# Patient Record
Sex: FEMALE | Race: BLACK OR AFRICAN AMERICAN | NOT HISPANIC OR LATINO | Employment: OTHER | URBAN - METROPOLITAN AREA
[De-identification: names, ages, dates, MRNs, and addresses within clinical notes are randomized per-mention and may not be internally consistent; named-entity substitution may affect disease eponyms.]

---

## 2017-03-06 ENCOUNTER — ALLSCRIPTS OFFICE VISIT (OUTPATIENT)
Dept: OTHER | Facility: OTHER | Age: 62
End: 2017-03-06

## 2017-03-06 DIAGNOSIS — E03.9 HYPOTHYROIDISM: ICD-10-CM

## 2017-03-06 DIAGNOSIS — I10 ESSENTIAL (PRIMARY) HYPERTENSION: ICD-10-CM

## 2017-03-06 DIAGNOSIS — K29.70 GASTRITIS WITHOUT BLEEDING: ICD-10-CM

## 2017-03-06 DIAGNOSIS — I48.91 ATRIAL FIBRILLATION (HCC): ICD-10-CM

## 2017-03-06 DIAGNOSIS — Z12.31 ENCOUNTER FOR SCREENING MAMMOGRAM FOR MALIGNANT NEOPLASM OF BREAST: ICD-10-CM

## 2017-03-21 ENCOUNTER — GENERIC CONVERSION - ENCOUNTER (OUTPATIENT)
Dept: OTHER | Facility: OTHER | Age: 62
End: 2017-03-21

## 2017-04-09 ENCOUNTER — APPOINTMENT (EMERGENCY)
Dept: RADIOLOGY | Facility: HOSPITAL | Age: 62
End: 2017-04-09
Payer: COMMERCIAL

## 2017-04-09 ENCOUNTER — HOSPITAL ENCOUNTER (EMERGENCY)
Facility: HOSPITAL | Age: 62
Discharge: HOME | End: 2017-04-09
Attending: EMERGENCY MEDICINE | Admitting: EMERGENCY MEDICINE
Payer: COMMERCIAL

## 2017-04-09 VITALS
OXYGEN SATURATION: 98 % | HEIGHT: 66 IN | DIASTOLIC BLOOD PRESSURE: 76 MMHG | BODY MASS INDEX: 35.68 KG/M2 | RESPIRATION RATE: 19 BRPM | SYSTOLIC BLOOD PRESSURE: 127 MMHG | TEMPERATURE: 97 F | WEIGHT: 222 LBS | HEART RATE: 95 BPM

## 2017-04-09 DIAGNOSIS — J45.909 ASTHMA WITH BRONCHITIS: Primary | ICD-10-CM

## 2017-04-09 LAB
ALBUMIN SERPL BCP-MCNC: 3.7 G/DL (ref 3.5–5)
ALP SERPL-CCNC: 87 U/L (ref 46–116)
ALT SERPL W P-5'-P-CCNC: 24 U/L (ref 12–78)
ANION GAP SERPL CALCULATED.3IONS-SCNC: 10 MMOL/L (ref 4–13)
APTT PPP: 35 SECONDS (ref 24–33)
AST SERPL W P-5'-P-CCNC: 15 U/L (ref 5–45)
BASOPHILS # BLD AUTO: 0 THOUSANDS/ΜL (ref 0–0.1)
BASOPHILS NFR BLD AUTO: 0 % (ref 0–1)
BILIRUB SERPL-MCNC: 0.3 MG/DL (ref 0.2–1)
BUN SERPL-MCNC: 16 MG/DL (ref 5–25)
CALCIUM SERPL-MCNC: 8.9 MG/DL (ref 8.3–10.1)
CHLORIDE SERPL-SCNC: 103 MMOL/L (ref 100–108)
CK SERPL-CCNC: 69 U/L (ref 26–192)
CO2 SERPL-SCNC: 29 MMOL/L (ref 21–32)
CREAT SERPL-MCNC: 1.13 MG/DL (ref 0.6–1.3)
DEPRECATED D DIMER PPP: 220 NG/ML (FEU) (ref 190–520)
EOSINOPHIL # BLD AUTO: 0.5 THOUSAND/ΜL (ref 0–0.61)
EOSINOPHIL NFR BLD AUTO: 6 % (ref 0–6)
ERYTHROCYTE [DISTWIDTH] IN BLOOD BY AUTOMATED COUNT: 15.9 % (ref 11.6–15.1)
GFR SERPL CREATININE-BSD FRML MDRD: 59.1 ML/MIN/1.73SQ M
GLUCOSE SERPL-MCNC: 117 MG/DL (ref 65–140)
HCT VFR BLD AUTO: 39.6 % (ref 37–47)
HGB BLD-MCNC: 12.9 G/DL (ref 12–16)
INR PPP: 1.02 (ref 0.86–1.16)
LACTATE SERPL-SCNC: 1.1 MMOL/L (ref 0.5–2)
LYMPHOCYTES # BLD AUTO: 2.5 THOUSANDS/ΜL (ref 0.6–4.47)
LYMPHOCYTES NFR BLD AUTO: 31 % (ref 14–44)
MCH RBC QN AUTO: 26.6 PG (ref 27–31)
MCHC RBC AUTO-ENTMCNC: 32.5 G/DL (ref 31.4–37.4)
MCV RBC AUTO: 82 FL (ref 82–98)
MONOCYTES # BLD AUTO: 0.6 THOUSAND/ΜL (ref 0.17–1.22)
MONOCYTES NFR BLD AUTO: 7 % (ref 4–12)
NEUTROPHILS # BLD AUTO: 4.7 THOUSANDS/ΜL (ref 1.85–7.62)
NEUTS SEG NFR BLD AUTO: 57 % (ref 43–75)
NRBC BLD AUTO-RTO: 0 /100 WBCS
NT-PROBNP SERPL-MCNC: 402 PG/ML
PLATELET # BLD AUTO: 304 THOUSANDS/UL (ref 130–400)
PMV BLD AUTO: 8 FL (ref 8.9–12.7)
POTASSIUM SERPL-SCNC: 3.8 MMOL/L (ref 3.5–5.3)
PROT SERPL-MCNC: 7.3 G/DL (ref 6.4–8.2)
PROTHROMBIN TIME: 10.7 SECONDS (ref 9.4–11.7)
RBC # BLD AUTO: 4.84 MILLION/UL (ref 4.2–5.4)
SODIUM SERPL-SCNC: 142 MMOL/L (ref 136–145)
TROPONIN I SERPL-MCNC: <0.02 NG/ML
WBC # BLD AUTO: 8.3 THOUSAND/UL (ref 4.8–10.8)

## 2017-04-09 PROCEDURE — 94640 AIRWAY INHALATION TREATMENT: CPT

## 2017-04-09 PROCEDURE — 85610 PROTHROMBIN TIME: CPT | Performed by: EMERGENCY MEDICINE

## 2017-04-09 PROCEDURE — 84484 ASSAY OF TROPONIN QUANT: CPT | Performed by: EMERGENCY MEDICINE

## 2017-04-09 PROCEDURE — 71020 HB CHEST X-RAY 2VW FRONTAL&LATL: CPT

## 2017-04-09 PROCEDURE — 85025 COMPLETE CBC W/AUTO DIFF WBC: CPT | Performed by: EMERGENCY MEDICINE

## 2017-04-09 PROCEDURE — 36415 COLL VENOUS BLD VENIPUNCTURE: CPT | Performed by: EMERGENCY MEDICINE

## 2017-04-09 PROCEDURE — 99284 EMERGENCY DEPT VISIT MOD MDM: CPT

## 2017-04-09 PROCEDURE — 82550 ASSAY OF CK (CPK): CPT | Performed by: EMERGENCY MEDICINE

## 2017-04-09 PROCEDURE — 96365 THER/PROPH/DIAG IV INF INIT: CPT

## 2017-04-09 PROCEDURE — 85379 FIBRIN DEGRADATION QUANT: CPT | Performed by: EMERGENCY MEDICINE

## 2017-04-09 PROCEDURE — 83880 ASSAY OF NATRIURETIC PEPTIDE: CPT | Performed by: EMERGENCY MEDICINE

## 2017-04-09 PROCEDURE — 93005 ELECTROCARDIOGRAM TRACING: CPT | Performed by: EMERGENCY MEDICINE

## 2017-04-09 PROCEDURE — 85730 THROMBOPLASTIN TIME PARTIAL: CPT | Performed by: EMERGENCY MEDICINE

## 2017-04-09 PROCEDURE — 87040 BLOOD CULTURE FOR BACTERIA: CPT | Performed by: EMERGENCY MEDICINE

## 2017-04-09 PROCEDURE — 96375 TX/PRO/DX INJ NEW DRUG ADDON: CPT

## 2017-04-09 PROCEDURE — 83605 ASSAY OF LACTIC ACID: CPT | Performed by: EMERGENCY MEDICINE

## 2017-04-09 PROCEDURE — 80053 COMPREHEN METABOLIC PANEL: CPT | Performed by: EMERGENCY MEDICINE

## 2017-04-09 RX ORDER — LEVOFLOXACIN 500 MG/1
500 TABLET, FILM COATED ORAL DAILY
Qty: 10 TABLET | Refills: 0 | Status: SHIPPED | OUTPATIENT
Start: 2017-04-09 | End: 2017-04-19

## 2017-04-09 RX ORDER — LEVALBUTEROL 1.25 MG/.5ML
1.25 SOLUTION, CONCENTRATE RESPIRATORY (INHALATION) ONCE
Status: COMPLETED | OUTPATIENT
Start: 2017-04-09 | End: 2017-04-09

## 2017-04-09 RX ORDER — TORSEMIDE 10 MG/1
20 TABLET ORAL DAILY
COMMUNITY
End: 2018-04-27 | Stop reason: SDUPTHER

## 2017-04-09 RX ORDER — LEVOFLOXACIN 5 MG/ML
500 INJECTION, SOLUTION INTRAVENOUS ONCE
Status: COMPLETED | OUTPATIENT
Start: 2017-04-09 | End: 2017-04-09

## 2017-04-09 RX ORDER — PREDNISONE 50 MG/1
50 TABLET ORAL DAILY
Qty: 4 TABLET | Refills: 0 | Status: SHIPPED | OUTPATIENT
Start: 2017-04-09 | End: 2017-04-13

## 2017-04-09 RX ORDER — METHYLPREDNISOLONE SODIUM SUCCINATE 125 MG/2ML
125 INJECTION, POWDER, LYOPHILIZED, FOR SOLUTION INTRAMUSCULAR; INTRAVENOUS ONCE
Status: COMPLETED | OUTPATIENT
Start: 2017-04-09 | End: 2017-04-09

## 2017-04-09 RX ORDER — LEVALBUTEROL INHALATION SOLUTION 1.25 MG/3ML
1 SOLUTION RESPIRATORY (INHALATION) EVERY 6 HOURS PRN
Qty: 72 ML | Refills: 0 | Status: SHIPPED | OUTPATIENT
Start: 2017-04-09 | End: 2017-05-09

## 2017-04-09 RX ORDER — ALPRAZOLAM 0.25 MG/1
0.25 TABLET ORAL
COMMUNITY
End: 2018-04-27 | Stop reason: CLARIF

## 2017-04-09 RX ORDER — PROMETHAZINE HYDROCHLORIDE AND CODEINE PHOSPHATE 6.25; 1 MG/5ML; MG/5ML
5 SYRUP ORAL EVERY 6 HOURS PRN
Qty: 80 ML | Refills: 0 | Status: SHIPPED | OUTPATIENT
Start: 2017-04-09 | End: 2017-04-19

## 2017-04-09 RX ADMIN — LEVALBUTEROL 1.25 MG: 1.25 SOLUTION, CONCENTRATE RESPIRATORY (INHALATION) at 10:27

## 2017-04-09 RX ADMIN — IPRATROPIUM BROMIDE 0.5 MG: 0.5 SOLUTION RESPIRATORY (INHALATION) at 08:48

## 2017-04-09 RX ADMIN — IPRATROPIUM BROMIDE 0.5 MG: 0.5 SOLUTION RESPIRATORY (INHALATION) at 09:49

## 2017-04-09 RX ADMIN — LEVALBUTEROL 1.25 MG: 1.25 SOLUTION, CONCENTRATE RESPIRATORY (INHALATION) at 09:49

## 2017-04-09 RX ADMIN — LEVOFLOXACIN 500 MG: 5 INJECTION, SOLUTION INTRAVENOUS at 10:33

## 2017-04-09 RX ADMIN — METHYLPREDNISOLONE SODIUM SUCCINATE 125 MG: 125 INJECTION, POWDER, FOR SOLUTION INTRAMUSCULAR; INTRAVENOUS at 08:47

## 2017-04-09 RX ADMIN — IPRATROPIUM BROMIDE 0.5 MG: 0.5 SOLUTION RESPIRATORY (INHALATION) at 10:27

## 2017-04-09 RX ADMIN — LEVALBUTEROL 1.25 MG: 1.25 SOLUTION, CONCENTRATE RESPIRATORY (INHALATION) at 08:47

## 2017-04-10 ENCOUNTER — GENERIC CONVERSION - ENCOUNTER (OUTPATIENT)
Dept: OTHER | Facility: OTHER | Age: 62
End: 2017-04-10

## 2017-04-14 LAB
ATRIAL RATE: 94 BPM
BACTERIA BLD CULT: NORMAL
BACTERIA BLD CULT: NORMAL
P AXIS: 38 DEGREES
PR INTERVAL: 166 MS
QRS AXIS: 4 DEGREES
QRSD INTERVAL: 118 MS
QT INTERVAL: 420 MS
QTC INTERVAL: 525 MS
T WAVE AXIS: 93 DEGREES
VENTRICULAR RATE: 94 BPM

## 2017-04-24 ENCOUNTER — ALLSCRIPTS OFFICE VISIT (OUTPATIENT)
Dept: OTHER | Facility: OTHER | Age: 62
End: 2017-04-24

## 2017-04-27 ENCOUNTER — GENERIC CONVERSION - ENCOUNTER (OUTPATIENT)
Dept: OTHER | Facility: OTHER | Age: 62
End: 2017-04-27

## 2017-04-28 ENCOUNTER — ALLSCRIPTS OFFICE VISIT (OUTPATIENT)
Dept: OTHER | Facility: OTHER | Age: 62
End: 2017-04-28

## 2017-04-29 ENCOUNTER — GENERIC CONVERSION - ENCOUNTER (OUTPATIENT)
Dept: OTHER | Facility: OTHER | Age: 62
End: 2017-04-29

## 2017-05-15 ENCOUNTER — ALLSCRIPTS OFFICE VISIT (OUTPATIENT)
Dept: OTHER | Facility: OTHER | Age: 62
End: 2017-05-15

## 2017-07-13 ENCOUNTER — ALLSCRIPTS OFFICE VISIT (OUTPATIENT)
Dept: OTHER | Facility: OTHER | Age: 62
End: 2017-07-13

## 2017-07-25 ENCOUNTER — APPOINTMENT (EMERGENCY)
Dept: RADIOLOGY | Facility: HOSPITAL | Age: 62
End: 2017-07-25
Payer: COMMERCIAL

## 2017-07-25 ENCOUNTER — HOSPITAL ENCOUNTER (EMERGENCY)
Facility: HOSPITAL | Age: 62
Discharge: HOME | End: 2017-07-25
Attending: EMERGENCY MEDICINE | Admitting: EMERGENCY MEDICINE
Payer: COMMERCIAL

## 2017-07-25 VITALS
SYSTOLIC BLOOD PRESSURE: 145 MMHG | RESPIRATION RATE: 18 BRPM | TEMPERATURE: 98.3 F | DIASTOLIC BLOOD PRESSURE: 78 MMHG | WEIGHT: 252 LBS | HEART RATE: 88 BPM | OXYGEN SATURATION: 100 % | BODY MASS INDEX: 40.67 KG/M2

## 2017-07-25 DIAGNOSIS — R06.00 DYSPNEA: Primary | ICD-10-CM

## 2017-07-25 DIAGNOSIS — J44.1 COPD EXACERBATION (HCC): ICD-10-CM

## 2017-07-25 LAB
ALBUMIN SERPL BCP-MCNC: 3.6 G/DL (ref 3.5–5)
ALP SERPL-CCNC: 75 U/L (ref 46–116)
ALT SERPL W P-5'-P-CCNC: 20 U/L (ref 12–78)
ANION GAP SERPL CALCULATED.3IONS-SCNC: 10 MMOL/L (ref 4–13)
APTT PPP: 32 SECONDS (ref 24–33)
AST SERPL W P-5'-P-CCNC: 15 U/L (ref 5–45)
BASOPHILS # BLD AUTO: 0 THOUSANDS/ΜL (ref 0–0.1)
BASOPHILS NFR BLD AUTO: 0 % (ref 0–1)
BILIRUB SERPL-MCNC: 0.4 MG/DL (ref 0.2–1)
BUN SERPL-MCNC: 10 MG/DL (ref 5–25)
CALCIUM SERPL-MCNC: 9 MG/DL (ref 8.3–10.1)
CHLORIDE SERPL-SCNC: 106 MMOL/L (ref 100–108)
CO2 SERPL-SCNC: 27 MMOL/L (ref 21–32)
CREAT SERPL-MCNC: 0.97 MG/DL (ref 0.6–1.3)
EOSINOPHIL # BLD AUTO: 0.1 THOUSAND/ΜL (ref 0–0.61)
EOSINOPHIL NFR BLD AUTO: 2 % (ref 0–6)
ERYTHROCYTE [DISTWIDTH] IN BLOOD BY AUTOMATED COUNT: 15.5 % (ref 11.6–15.1)
GFR SERPL CREATININE-BSD FRML MDRD: 72 ML/MIN/1.73SQ M
GLUCOSE SERPL-MCNC: 105 MG/DL (ref 65–140)
HCT VFR BLD AUTO: 38.1 % (ref 37–47)
HGB BLD-MCNC: 12.4 G/DL (ref 12–16)
INR PPP: 1.02 (ref 0.86–1.16)
LYMPHOCYTES # BLD AUTO: 2 THOUSANDS/ΜL (ref 0.6–4.47)
LYMPHOCYTES NFR BLD AUTO: 21 % (ref 14–44)
MCH RBC QN AUTO: 27 PG (ref 27–31)
MCHC RBC AUTO-ENTMCNC: 32.6 G/DL (ref 31.4–37.4)
MCV RBC AUTO: 83 FL (ref 82–98)
MONOCYTES # BLD AUTO: 0.5 THOUSAND/ΜL (ref 0.17–1.22)
MONOCYTES NFR BLD AUTO: 6 % (ref 4–12)
NEUTROPHILS # BLD AUTO: 6.6 THOUSANDS/ΜL (ref 1.85–7.62)
NEUTS SEG NFR BLD AUTO: 71 % (ref 43–75)
NRBC BLD AUTO-RTO: 0 /100 WBCS
NT-PROBNP SERPL-MCNC: 711 PG/ML
PLATELET # BLD AUTO: 286 THOUSANDS/UL (ref 130–400)
PMV BLD AUTO: 8.7 FL (ref 8.9–12.7)
POTASSIUM SERPL-SCNC: 4.1 MMOL/L (ref 3.5–5.3)
PROT SERPL-MCNC: 7.1 G/DL (ref 6.4–8.2)
PROTHROMBIN TIME: 10.7 SECONDS (ref 9.4–11.7)
RBC # BLD AUTO: 4.59 MILLION/UL (ref 4.2–5.4)
SODIUM SERPL-SCNC: 143 MMOL/L (ref 136–145)
TROPONIN I SERPL-MCNC: <0.02 NG/ML
WBC # BLD AUTO: 9.3 THOUSAND/UL (ref 4.8–10.8)

## 2017-07-25 PROCEDURE — 85025 COMPLETE CBC W/AUTO DIFF WBC: CPT | Performed by: EMERGENCY MEDICINE

## 2017-07-25 PROCEDURE — 71010 HB CHEST X-RAY 1 VIEW FRONTAL (PORTABLE): CPT

## 2017-07-25 PROCEDURE — 94640 AIRWAY INHALATION TREATMENT: CPT

## 2017-07-25 PROCEDURE — 85730 THROMBOPLASTIN TIME PARTIAL: CPT | Performed by: EMERGENCY MEDICINE

## 2017-07-25 PROCEDURE — 80053 COMPREHEN METABOLIC PANEL: CPT | Performed by: EMERGENCY MEDICINE

## 2017-07-25 PROCEDURE — 84484 ASSAY OF TROPONIN QUANT: CPT | Performed by: EMERGENCY MEDICINE

## 2017-07-25 PROCEDURE — 36415 COLL VENOUS BLD VENIPUNCTURE: CPT | Performed by: EMERGENCY MEDICINE

## 2017-07-25 PROCEDURE — 85610 PROTHROMBIN TIME: CPT | Performed by: EMERGENCY MEDICINE

## 2017-07-25 PROCEDURE — 83880 ASSAY OF NATRIURETIC PEPTIDE: CPT | Performed by: EMERGENCY MEDICINE

## 2017-07-25 PROCEDURE — 96374 THER/PROPH/DIAG INJ IV PUSH: CPT

## 2017-07-25 PROCEDURE — 93005 ELECTROCARDIOGRAM TRACING: CPT | Performed by: EMERGENCY MEDICINE

## 2017-07-25 PROCEDURE — 99285 EMERGENCY DEPT VISIT HI MDM: CPT

## 2017-07-25 RX ORDER — LEVALBUTEROL 1.25 MG/.5ML
1.25 SOLUTION, CONCENTRATE RESPIRATORY (INHALATION) ONCE
Status: COMPLETED | OUTPATIENT
Start: 2017-07-25 | End: 2017-07-25

## 2017-07-25 RX ORDER — SIMVASTATIN 40 MG
40 TABLET ORAL
COMMUNITY
End: 2018-04-24 | Stop reason: SDUPTHER

## 2017-07-25 RX ORDER — PREDNISONE 20 MG/1
20 TABLET ORAL 2 TIMES DAILY
Qty: 20 TABLET | Refills: 0 | Status: SHIPPED | OUTPATIENT
Start: 2017-07-25 | End: 2017-08-04

## 2017-07-25 RX ORDER — NITROGLYCERIN 0.4 MG/1
0.4 TABLET SUBLINGUAL ONCE
Status: COMPLETED | OUTPATIENT
Start: 2017-07-25 | End: 2017-07-25

## 2017-07-25 RX ORDER — METHYLPREDNISOLONE SODIUM SUCCINATE 125 MG/2ML
125 INJECTION, POWDER, LYOPHILIZED, FOR SOLUTION INTRAMUSCULAR; INTRAVENOUS ONCE
Status: COMPLETED | OUTPATIENT
Start: 2017-07-25 | End: 2017-07-25

## 2017-07-25 RX ORDER — ASPIRIN 81 MG/1
81 TABLET, CHEWABLE ORAL ONCE
Status: COMPLETED | OUTPATIENT
Start: 2017-07-25 | End: 2017-07-25

## 2017-07-25 RX ORDER — IPRATROPIUM BROMIDE AND ALBUTEROL SULFATE 2.5; .5 MG/3ML; MG/3ML
3 SOLUTION RESPIRATORY (INHALATION) ONCE
Status: DISCONTINUED | OUTPATIENT
Start: 2017-07-25 | End: 2017-07-25

## 2017-07-25 RX ADMIN — METHYLPREDNISOLONE SODIUM SUCCINATE 125 MG: 125 INJECTION, POWDER, FOR SOLUTION INTRAMUSCULAR; INTRAVENOUS at 12:58

## 2017-07-25 RX ADMIN — LEVALBUTEROL 1.25 MG: 1.25 SOLUTION, CONCENTRATE RESPIRATORY (INHALATION) at 11:36

## 2017-07-25 RX ADMIN — IPRATROPIUM BROMIDE 0.5 MG: 0.5 SOLUTION RESPIRATORY (INHALATION) at 11:36

## 2017-07-25 RX ADMIN — NITROGLYCERIN 0.4 MG: 0.4 TABLET SUBLINGUAL at 09:03

## 2017-07-25 RX ADMIN — LEVALBUTEROL 1.25 MG: 1.25 SOLUTION, CONCENTRATE RESPIRATORY (INHALATION) at 12:58

## 2017-07-25 RX ADMIN — ASPIRIN 81 MG 81 MG: 81 TABLET ORAL at 08:58

## 2017-07-27 ENCOUNTER — GENERIC CONVERSION - ENCOUNTER (OUTPATIENT)
Dept: OTHER | Facility: OTHER | Age: 62
End: 2017-07-27

## 2017-07-27 LAB
ATRIAL RATE: 96 BPM
P AXIS: 48 DEGREES
PR INTERVAL: 172 MS
QRS AXIS: 2 DEGREES
QRSD INTERVAL: 120 MS
QT INTERVAL: 394 MS
QTC INTERVAL: 497 MS
T WAVE AXIS: 110 DEGREES
VENTRICULAR RATE: 96 BPM

## 2017-08-17 ENCOUNTER — ALLSCRIPTS OFFICE VISIT (OUTPATIENT)
Dept: OTHER | Facility: OTHER | Age: 62
End: 2017-08-17

## 2017-10-23 NOTE — CONSULTS
Assessment  1  Mild intermittent asthma without complication (630 01) (X21 11)   2  Exposure to second hand smoke (V15 89) (Z77 22)   3  Cardiomyopathy (425 4) (I42 9)   4  Chronic combined systolic and diastolic heart failure, NYHA class 2 (428 42) (I50 42)    Plan  Mild intermittent asthma without complication    · Symbicort 80-4 5 MCG/ACT Inhalation Aerosol; INHALE 2 PUFFS TWICE DAILY  RINSE MOUTH AFTER USE   Rx By: Elliot Linder; Dispense: 0 Days ; #:1 GM; Refill: 5;For: Mild intermittent asthma without complication; [place] = N; Verified Transmission to [address]; Last Updated By: [employer]; 8/17/2017 8:57:27 AM   · Follow-up visit in 6 months Evaluation and Treatment  Follow-up  Status: Hold For -  Scheduling  Requested for: 05Aes9033   Ordered; For: Mild intermittent asthma without complication; Ordered By: Elliot Linder Performed:  Due: 05Mav5899    Results/Data  Afton Sleepiness Score 13OFL9338 09:09AM User, [employer]     Test Name Result Flag Reference   Afton Score 4 - Normal     Answer Summary: Q1-1, Q2-1, Q3-1, Q4-0, Q5-1, Q6-0, Q7-0, Q8-0     STOP BANG Questionnaire 09YXF5247 09:06AM User, [employer]     Test Name Result Flag Reference   STOP BANG Questionnaire Risk of MALINDA Intermediate Risk     Snoring: No  Tired: No  Observed: No  Blood Pressure: Yes  BMI: Yes  Age: Yes  Neck Circumference: No  Gender: No   STOP BANG Questionnaire MALINDA Total Score 3     Snoring: No  Tired: No  Observed: No  Blood Pressure: Yes  BMI: Yes  Age: Yes  Neck Circumference: No  Gender: No     Results Free Text Form [facility]:   Results   Chest X-ray all images are reviewed by me  Most recent chest x-ray performed in July shows no evidence of infiltrate or hyperinflation  PFT Results v2:     Spirometry: Forced vital capacity: 2 11L74% Predicted Values  Forced expiratory volume in one second: 1 66L75% Predicted Value  FEV1/FVC ratio is 79% Predicted Values     Post Bronchodilator Spirometry:   Lung Volumes: DLCO:    PFT Interpretation:   8/17/17?no obstructive defect  Discussion/Summary  Discussion Summary:   1  Mild intermittent asthma and recent exacerbation, currently improved  -At this time will decrease Symbicort dosing to 80?4 0 5 ?g -She does not need to use the rescue inhaler on a daily basis unless needed  -Refrain from exacerbating environment such as secondhand smoke  -There is no evidence of COPD and this is discussed in detail with the patient and chest imaging is also reviewed  2  Patient appears to be mildly decompensated in terms of her heart failure and this is secondary to noncompliance with dietary restrictions  I have explained to her that she will need to limit her fluid and salt intake  Should her lower extremity swelling get worse within the next day or 2 despite dietary restrictions she should call her cardiologist to discuss medication options  She verbalizes understanding  3  Follow-up in 6 months or sooner if needed  All questions are answered to the patients satisfaction and understanding and verbalize understanding  encouraged to call with any questions or concerns  Goals and Barriers: The patient has the current Goals: Improved breathing wean off medications  The patent has the current Barriers: Living environment  Patient's Capacity to Self-Care: Patient is able to Self-Care  Medication SE Review and Pt Understands Tx: Possible side effects of new medications were reviewed with the patient/guardian today  The treatment plan was reviewed with the patient/guardian   The patient/guardian understands and agrees with the treatment plan      Active Problems   · Acid reflux (530 81) (K21 9)   · Acute bronchitis (466 0) (J20 9)   · Allergic reaction (995 3) (T78 40XA)   · Atrial fibrillation (427 31) (I48 91)   · BMI 40 0-44 9, adult (V85 41) (Z68 41)   · Cardiomyopathy (425 4) (I42 9)   · Chronic combined systolic and diastolic heart failure, NYHA class 2 (428 42) (I50 42)   · Colonoscopy (Fiberoptic) Screening   · Depression with anxiety (300 4) (F41 8)   · Dyslipidemia (272 4) (E78 5)   · Encounter for screening for cardiovascular disorders (V81 2) (Z13 6)   · Gastritis (535 50) (K29 70)   · Generalized pain (780 96) (R52)   · Hypertension (401 9) (I10)   · Hypomagnesemia (275 2) (E83 42)   · Hypothyroidism (244 9) (E03 9)   · Leg cramping (729 82) (R25 2)   · Myalgia and myositis (729 1) (M79 1,M60 9)   · Need for influenza vaccination (V04 81) (Z23)   · Need for Tdap vaccination (V06 1) (Z23)   · Tracheobronchitis (490) (J40)   · Visit for screening mammogram (V76 12) (Z12 31)    Chief Complaint  Chief Complaint Free Text Note Form: pt is today for copd eval       History of Present Illness  HPI: Patient is a 59 yo female with past medical history of asthma, CHF s/p AICD/PM, hyperlipidemia, hypothyroidism,anxiety, GERD who presents for initial evaluation to assess for COPD  Patient states that her asthma started about 6-7 years ago  At that time [place] it started  She was back and forth between the ER  and she was given a home nebulizer, rescue inhalers  She has moved back for about 4 years  Initially she lived in an apartment in [place] and had a heavy smokers that lived next her- was in and out of the ER with all this environment  She has now moved and still may have slight smoke exposure but not as bad  She moved to a different apartment about 1 year ago  and over the past year she has been to the ER 3 times  She does have wheezing during these episodes  +cough  currently cough is improving  recently on antibiotics  currently off steroids  currently not requiring extra use of nebulizer or inhalers  Also component of CHF each time she presents to the emergency room  she states that most recently she has been noticing increasing lower extremity edema  She has not been compliant with her fluid and salt intake   She drinks at least 2-1/2 L a day  She does not use the nebulizer often  She uses the rescue inhaler once daily as directed and as needed  No nighttime awakening  ?snoring  no choking and gasping  no daytime sleepiness  Retired- worked in an office  family history of sister- TB patient does have seasonal allergies for which she uses Flonase  She states that this is under control  she is on Symbicort 160- has been placed on it over a year now  She has not been using it everyday until 3 weeks ago  Review of Systems  Complete-Female - Pulm:   Constitutional: No fever, no chills, feels well, no tiredness, no recent weight gain or weight loss  Eyes: no eyesight problems  ENT: no nosebleedsno nasal discharge  Cardiovascular: lower extremity edema, but--b0no chest painno palpitations  Respiratory: as noted in HPI,--b0no orthopneano PND  Gastrointestinal: no abdominal pain,--b0no nausea,--b0no constipationno diarrhea  Genitourinary: no dysuria  Musculoskeletal: no arthralgiasno myalgias  Integumentary: no rashesno skin lesions  Neurological: no headacheno confusion  Psychiatric: anxiety, but--b0not suicidalno depression  Endocrine: no muscle weakness  Hematologic/Lymphatic: no tendency for easy bleeding  Past Medical History  1  History of Abdominal pain, RLQ (789 03) (R10 31)   2  History of Abnormal blood sugar (790 29) (R73 09)   3  History of Abnormal heart rate (785 3) (R00 9)   4  History of Acute congestive heart failure, unspecified congestive heart failure type (428 0)   (I50 9)   5  History of Anxiety (300 00) (F41 9)   6  History of Asthma exacerbation (493 92) (J45 901)   7  History of Benign essential hypertension (401 1) (I10)   8  History of Cardiomyopathy (425 4) (I42 9)   9  History of Cellulitis Of The Knee (682 6)   10  History of COPD exacerbation (491 21) (J44 1)   11  History of Elbow pain, unspecified laterality (719 42) (M25 529)   12  History of Follow up (V67 9) (Z09)   13  History of Fracture Of Olecranon Process (813 01)   14  History of allergic rhinitis (V12 69) (Z87 09)   15  History of arthritis (V13 4) (Z87 39)   16  History of asthma (V12 69) (Z87 09)   17  History of chest pain (V13 89) (Z87 898)   18  History of constipation (V12 79) (Z87 19)   19  History of hematuria (V13 09) (Z87 448)   20  History of hypokalemia (V12 29) (Z86 39)   21  History of shortness of breath (V13 89) (Z87 898)   22  History of shortness of breath (V13 89) (Z87 898)   23  History of Late Effects Of Fracture Of Upper Extremities (905 2)   24  History of Morbid or severe obesity due to excess calories (278 01) (E66 01)   25  History of Nausea in adult (787 02) (R11 0)   26  History of Need for vaccination with 13-polyvalent pneumococcal conjugate vaccine    (V03 82) (Z23)   27  History of Prepatellar bursitis, unspecified laterality (726 65) (M70 40)   28  History of Screening for diabetes mellitus (V77 1) (Z13 1)   29  History of Screening for lipid disorders (V77 91) (Z13 220)   30  History of Spasm of muscle (728 85) (M62 838)   31  History of Thyroid trouble (246 9) (E07 9)  Active Problems And Past Medical History Reviewed: The active problems and past medical history were reviewed and updated today  Surgical History  1  History of Cholecystectomy Laparoscopic   2  History of Hysterectomy   3  History of Pacemaker Permanent Placement  Surgical History Reviewed: The surgical history was reviewed and updated today  Family History  Mother    1  Family history of diabetes mellitus (V18 0) (Z83 3)  Family History    2  Family history of Coronary Artery Disease (V17 49)  Family History Reviewed: The family history was reviewed and updated today  Social History   · Denied: History of Alcohol Use (History)   · Drinks wine (V49 89) (Z78 9)   · Former smoker (T46 55) (X29 098)   · Single  Social History Reviewed: The social history was reviewed and updated today   The social history was reviewed and is unchanged  Current Meds   1  Carvedilol 12 5 MG Oral Tablet; Take 1 tablet twice daily  Requested for: 98QHT0866; Last   Rx:05Jun2017 Ordered   2  Corlanor 5 MG Oral Tablet; TAKE 1/2  TABLETS 2 timesDAILY  Requested for:   98Sbp0514; Last Rx:49Cpz9355 Ordered   3  Diclofenac Sodium 1 % Transdermal Gel; apply 2 grams to affected area four times a   day DO NOT APPLY MORE THAN 8 GRAMS TO ANY AFFECTED JOINT; Therapy: 12YAU7995 to (JJQEXKHM:66DHW7864)  Requested for: 27Apr2017; Last   Rx:27Apr2017 Ordered   4  EpiPen 2-Paul 0 3 MG/0 3ML Injection Solution Auto-injector; INJECT 0 3ML   INTRAMUSCULARLY AS DIRECTED; Therapy: 02QAK2436 to (Last Rx:00Bxk0213)  Requested for: 58YBY5188 Ordered   5  Escitalopram Oxalate 10 MG Oral Tablet; TAKE 1 TABLET DAILY; Therapy: 00Dpa8035 to (Evaluate:73Gyr1366)  Requested for: 70Ljp0544; Last   Rx:55Uhc7228 Ordered   6  Fluticasone Propionate 50 MCG/ACT Nasal Suspension; use 2 sprays in each nostril   once daily; Therapy: 99PXA2538 to (Jalen Chris Rack)  Requested for: 27Apr2017 Ordered   7  Levalbuterol HCl - 1 25 MG/3ML Inhalation Nebulization Solution; USE 1 UNIT DOSE IN   NEBULIZER EVERY 6 HOURS AS NEEDED; Therapy: 60Dlj0987 to (Evaluate:25Jun2018)  Requested for: 97HSP6366; Last   Rx:30Jun2017 Ordered   8  Levothyroxine Sodium 100 MCG Oral Tablet; take 1 tablet by mouth once daily; Therapy: (Ilia Aw) to Recorded   9  Levothyroxine Sodium 150 MCG Oral Tablet; TAKE 1 TABLET DAILY; Therapy: 20OLV2533 to (Evaluate:22Apr2018)  Requested for: 27Apr2017; Last   Rx:27Apr2017 Ordered   10  Pantoprazole Sodium 40 MG Oral Tablet Delayed Release; take 1 tablet by mouth once    daily; Therapy: 93OBU2938 to (Ed Bautista)  Requested for: 80OCK3685; Last    Rx:91Dvm2839 Ordered   11  Potassium Chloride ER 10 MEQ Oral Tablet Extended Release; TAKE 1 TABLET ONCE    DAILY  Requested for: 31PRC1566; Last Rx:14Bnt1976 Ordered   12   Pravastatin Sodium 40 MG Oral Tablet; TAKE 1 TABLET DAILY; Therapy: 28Apr2017 to (Evaluate:11Oct2017)  Requested for: 73HWM6312; Last    Rx:21Qhk5872 Ordered   13  Symbicort 160-4 5 MCG/ACT Inhalation Aerosol; INHALE 1 PUFFS Twice daily; Therapy: 23Jvx9293 to (Supa Chandra)  Requested for: 28JXT0776; Last    Rx:06Mar2017 Ordered   14  Torsemide 10 MG Oral Tablet; take 1 tablet by mouth once daily; Therapy: 87PTJ2841 to (Evaluate:14Jan2018)  Requested for: 84XWJ6714; Last    Rx:17Kmi7591 Ordered   15  Valsartan 40 MG Oral Tablet; TAKE 0 5 tablet daily; Therapy: 90KSP8064 to (Marilin Bell)  Requested for: 34RAI3657; Last    Rx:96Vnh0121 Ordered   16  Xarelto 20 MG Oral Tablet; Take 1 tablet daily  Requested for: 27Jun2017; Last    DM:05GHG5501 Ordered   17  Xopenex HFA 45 MCG/ACT Inhalation Aerosol; INHALE 1 TO 2 PUFFS EVERY 4 TO 6    HOURS AS NEEDED AND AS DIRECTED; Therapy: 30Dgp6405 to (Marilin Bell)  Requested for: 43HEP8442; Last    Rx:26Fwe3655 Ordered  Medication List Reviewed: The medication list was reviewed and updated today  Allergies  1  Penicillins  2  IV Dye   3  Shellfish    Vitals  Vital Signs    Recorded: 17Aug2017 08:21AM   Temperature 98 7 F, Oral   Heart Rate 62   Pulse Quality Normal   Respiration Quality Normal   Respiration 12   Systolic 703, RUE, Sitting   Diastolic 84, RUE, Sitting   Height 5 ft 6 5 in   Weight 278 lb    BMI Calculated 44 2   BSA Calculated 2 31   O2 Saturation 100, RA     Physical Exam    Constitutional   General appearance: No acute distress, well appearing and well nourished  Eyes   Examination of pupil and irises: Anicteric, pupils reactive  Ears, Nose, Mouth, and Throat   External inspection of ears and nose: Normal     Oropharynx: Normal with no erythema, edema, exudate or lesions  --b0no tonsillar hypertrophy  Mallampati Classification: 3  Neck   Neck: Supple, symmetric, trachea midline, no masses      Pulmonary   Chest: Normal  Respiratory effort: No increased work of breathing or signs of respiratory distress  Auscultation of lungs: Clear to auscultation, no rales, no crackles, no wheezing  Cardiovascular   Auscultation of heart: Normal rate and rhythm, normal S1 and S2, no murmurs  Examination of extremities for edema and/or varicosities: Abnormal  --[id number] 1+ pitting edema  Abdomen   Abdomen: Soft, non-tender  Lymphatic   Palpation of lymph nodes in neck: No lymphadenopathy  Musculoskeletal   Gait and station: Normal     Digits and nails: Normal without clubbing or cyanosis  Neurologic   Mental Status: Normal  Not confused, no evidence of dementia, good comprehension, good concentration  Skin   Skin and subcutaneous tissue: Limited exam shows no rash  Psychiatric   Orientation to person, place and time: Normal     Mood and affect: Normal        Future Appointments    Date/Time Provider Specialty Site   02/19/2018 02:00 PM DORIE Alonso   Pulmonary Medicine [facility] [address]     Signatures   Electronically signed by : DORIE Freeman ; Aug 17 2017  2:01PM EST                       (Author)

## 2017-11-13 ENCOUNTER — ALLSCRIPTS OFFICE VISIT (OUTPATIENT)
Dept: OTHER | Facility: OTHER | Age: 62
End: 2017-11-13

## 2018-01-12 NOTE — MISCELLANEOUS
Provider Comments  Provider Comments:   Left VM for PT to CB & reschedule appt        Signatures   Electronically signed by : Nanette Conn, ; Aug  4 2016 10:10AM EST                       (Author)    Electronically signed by : James Mott DO; Aug  4 2016 10:18AM EST                       (Author)

## 2018-01-13 VITALS
BODY MASS INDEX: 43.07 KG/M2 | HEIGHT: 66 IN | SYSTOLIC BLOOD PRESSURE: 114 MMHG | DIASTOLIC BLOOD PRESSURE: 70 MMHG | WEIGHT: 268 LBS | HEART RATE: 72 BPM

## 2018-01-13 VITALS
SYSTOLIC BLOOD PRESSURE: 118 MMHG | RESPIRATION RATE: 16 BRPM | HEART RATE: 76 BPM | TEMPERATURE: 97.7 F | HEIGHT: 66 IN | BODY MASS INDEX: 43.23 KG/M2 | OXYGEN SATURATION: 98 % | DIASTOLIC BLOOD PRESSURE: 82 MMHG | WEIGHT: 269 LBS

## 2018-01-13 VITALS
WEIGHT: 268 LBS | OXYGEN SATURATION: 99 % | BODY MASS INDEX: 43.07 KG/M2 | SYSTOLIC BLOOD PRESSURE: 126 MMHG | HEIGHT: 66 IN | HEART RATE: 70 BPM | DIASTOLIC BLOOD PRESSURE: 78 MMHG | RESPIRATION RATE: 18 BRPM | TEMPERATURE: 97.8 F

## 2018-01-13 NOTE — PROGRESS NOTES
History of Present Illness  Care Coordination Encounter Information:   Type of Encounter: Telephonic   Last Office Visit: 3/6/17   Spoke to Patient  Care Coordination SL Nurse Lamine Allan:   The reason for call is to discuss outreach for follow up/needed services  I spoke with Miladis Jara as per the [facility]  She is agreeable to having a medication reconciliation with the [facility] pharmacist  She has recently joined [facility] and saw Dr Jesus Frye  Active Problems    1  Acid reflux (530 81) (K21 9)   2  Asthma (493 90) (J45 909)   3  Atrial fibrillation (427 31) (I48 91)   4  BMI 40 0-44 9, adult (V85 41) (Z68 41)   5  Cardiomyopathy (425 4) (I42 9)   6  Colonoscopy (Fiberoptic) Screening   7  Gastritis (535 50) (K29 70)   8  Generalized pain (780 96) (R52)   9  Hypertension (401 9) (I10)   10  Hypomagnesemia (275 2) (E83 42)   11  Hypothyroidism (244 9) (E03 9)   12  Leg cramping (729 82) (R25 2)   13  Myalgia and myositis (729 1) (M79 1,M60 9)   14  Need for influenza vaccination (V04 81) (Z23)   15  Need for Tdap vaccination (V06 1) (Z23)   16  Visit for screening mammogram (V76 12) (Z12 31)    Past Medical History    1  History of Abdominal pain, RLQ (789 03) (R10 31)   2  History of Abnormal blood sugar (790 29) (R73 09)   3  History of Acute congestive heart failure, unspecified congestive heart failure type (428 0)   (I50 9)   4  History of Anxiety (300 00) (F41 9)   5  History of Asthma exacerbation (493 92) (J45 901)   6  History of Benign essential hypertension (401 1) (I10)   7  History of Cardiomyopathy (425 4) (I42 9)   8  History of Cellulitis Of The Knee (682 6)   9  History of Elbow pain, unspecified laterality (719 42) (M25 529)   10  History of Follow up (V67 9) (Z09)   11  History of Fracture Of Olecranon Process (813 01)   12  History of allergic rhinitis (V12 69) (Z87 09)   13  History of arthritis (V13 4) (Z87 39)   14  History of chest pain (V13 89) (Z87 898)   15  History of constipation (V12 79) (Z87 19)   16  History of hematuria (V13 09) (Z87 448)   17  History of hypokalemia (V12 29) (Z86 39)   18  History of shortness of breath (V13 89) (Z87 898)   19  History of shortness of breath (V13 89) (Z87 898)   20  History of Late Effects Of Fracture Of Upper Extremities (905 2)   21  History of Morbid or severe obesity due to excess calories (278 01) (E66 01)   22  History of Nausea in adult (787 02) (R11 0)   23  History of Need for vaccination with 13-polyvalent pneumococcal conjugate vaccine    (V03 82) (Z23)   24  History of Prepatellar bursitis, unspecified laterality (726 65) (M70 40)   25  History of Screening for diabetes mellitus (V77 1) (Z13 1)   26  History of Screening for lipid disorders (V77 91) (Z13 220)   27  History of Spasm of muscle (728 85) (V00 299)    Surgical History    1  History of Cholecystectomy Laparoscopic   2  History of Hysterectomy   3  History of Pacemaker Permanent Placement    Family History  Mother    1  Family history of diabetes mellitus (V18 0) (Z83 3)  Family History    2  Family history of Coronary Artery Disease (V17 49)    Social History    · Denied: History of Alcohol Use (History)   · Former smoker (V15 82) (A85 004)    Current Meds    1  Pantoprazole Sodium 40 MG Oral Tablet Delayed Release (Protonix); take 1 tablet by   mouth once daily; Therapy: 62WXW0154 to (Radha Mcgowan)  Requested for: 28Nov2016; Last   Rx:28Nov2016 Ordered    2  Xarelto 20 MG Oral Tablet; Take 1 tablet daily  Requested for: 15PJL1000; Last   Rx:80Ojd4462 Ordered    3  Carvedilol 12 5 MG Oral Tablet (Coreg); Take 1 tablet twice daily  Requested for:   26MUK8595; Last Rx:90Jrx0294 Ordered    4  Levothyroxine Sodium 137 MCG Oral Tablet; take 1 tablet by mouth once daily ON EMPTY   STOMACH WITH NOTHING TO EAT OR DRINK FOR 1 HOUR;   Therapy: 05TUS7011 to (Evaluate:01Mar2018)  Requested for: 74WOD3156; Last   Rx:06Mar2017 Ordered    5   Torsemide 10 MG Oral Tablet; TAKE 1 TABLET ONCE DAILY; Therapy: 35UAE8468 to (Renew:09Zdm3977)  Requested for: 13ROR3029; Last   Rx:23Jan2017 Ordered    6  ALPRAZolam 0 25 MG Oral Tablet (Xanax); take 1 tablet daily prn; Therapy: 80HGQ2655 to [phone])  Requested for: 70BXK7212; Last   Rx:06Mar2017 Ordered    7  Dulera 200-5 MCG/ACT Inhalation Aerosol; INHALE 1 PUFFS Twice daily; Therapy: 73URC7678 to (Evaluate:18Owm3553); Last Rx:23Sep2016 Ordered   8  Levalbuterol HCl - 1 25 MG/3ML Inhalation Nebulization Solution (Xopenex); USE 1 UNIT   DOSE IN NEBULIZER EVERY 6 HOURS AS NEEDED; Therapy: 09Nhn1105 to (Darletta Madera)  Requested for: 55ICU9879; Last   Rx:06Mar2017 Ordered   9  Symbicort 160-4 5 MCG/ACT Inhalation Aerosol; INHALE 1 PUFFS Twice daily; Therapy: 97Vwu1198 to (Darletta Madera)  Requested for: 21FQF5373; Last   Rx:06Mar2017 Ordered   10  Xopenex HFA 45 MCG/ACT Inhalation Aerosol; INHALE 1 TO 2 PUFFS EVERY 4 TO 6    HOURS AS NEEDED AND AS DIRECTED; Therapy: 06Aug2015 to (Darletta Madera)  Requested for: 22RLW7212; Last    Rx:06Mar2017 Ordered    11  Potassium Chloride ER 10 MEQ Oral Tablet Extended Release; TAKE 1 TABLET ONCE    DAILY  Requested for: 92QLV4484; Last Rx:28Nov2016 Ordered    12  TiZANidine HCl - 2 MG Oral Tablet; TAKE 1 TABLET 3 times daily PRN; Therapy: 56XWG3959 to (Evaluate:75Gvm2708)  Requested for: 42IYN1584; Last    Rx:06Jun2016 Ordered    13  Corlanor 5 MG Oral Tablet; TAKE 1/2  TABLETS 2 timesDAILY; Therapy: (Recorded:06Mar2017) to Recorded   14  Diovan 40 MG Oral Tablet (Valsartan); Take 1 tablet twice daily; Therapy: (Recorded:05Nov2015) to Recorded    Allergies    1  Penicillins    2  IV Dye    End of Encounter Meds    1  Pantoprazole Sodium 40 MG Oral Tablet Delayed Release (Protonix); take 1 tablet by   mouth once daily; Therapy: 52EWY9515 to (Baudilio Hutson)  Requested for: 28Nov2016; Last   Rx:28Nov2016 Ordered    2   Xarelto 20 MG Oral Tablet; Take 1 tablet daily  Requested for: 85GPJ3095; Last   Rx:70Dix1035 Ordered    3  Carvedilol 12 5 MG Oral Tablet (Coreg); Take 1 tablet twice daily  Requested for:   53IQT0314; Last Rx:02Iek8814 Ordered    4  Levothyroxine Sodium 137 MCG Oral Tablet; take 1 tablet by mouth once daily ON EMPTY   STOMACH WITH NOTHING TO EAT OR DRINK FOR 1 HOUR;   Therapy: 33YKY0213 to (Evaluate:01Mar2018)  Requested for: 86RII8019; Last   Rx:06Mar2017 Ordered    5  Torsemide 10 MG Oral Tablet; TAKE 1 TABLET ONCE DAILY; Therapy: 59EKY5378 to (Renew:99Twr4701)  Requested for: 89GEG7100; Last   Rx:23Jan2017 Ordered    6  ALPRAZolam 0 25 MG Oral Tablet (Xanax); take 1 tablet daily prn; Therapy: 09AUG9763 to (Katie Landis)  Requested for: 27ZPH1300; Last   Rx:06Mar2017 Ordered    7  Dulera 200-5 MCG/ACT Inhalation Aerosol; INHALE 1 PUFFS Twice daily; Therapy: 92CIN0984 to (Evaluate:95Wva1063); Last Rx:04Fis5908 Ordered   8  Levalbuterol HCl - 1 25 MG/3ML Inhalation Nebulization Solution (Xopenex); USE 1 UNIT   DOSE IN NEBULIZER EVERY 6 HOURS AS NEEDED; Therapy: 90Jol6240 to (Donelda [place])  Requested for: 95IQH6301; Last   Rx:06Mar2017 Ordered   9  Symbicort 160-4 5 MCG/ACT Inhalation Aerosol; INHALE 1 PUFFS Twice daily; Therapy: 90Tym3258 to (Donelda Jeniffer)  Requested for: 18KMF9676; Last   Rx:06Mar2017 Ordered   10  Xopenex HFA 45 MCG/ACT Inhalation Aerosol; INHALE 1 TO 2 PUFFS EVERY 4 TO 6    HOURS AS NEEDED AND AS DIRECTED; Therapy: 01Imb4724 to (Donelda Jeniffer)  Requested for: 48YYS0902; Last    Rx:06Mar2017 Ordered    11  Potassium Chloride ER 10 MEQ Oral Tablet Extended Release; TAKE 1 TABLET ONCE    DAILY  Requested for: 51XST9427; Last Rx:28Nov2016 Ordered    12  TiZANidine HCl - 2 MG Oral Tablet; TAKE 1 TABLET 3 times daily PRN; Therapy: 87VUL4176 to (Evaluate:13Dqj0279)  Requested for: 80OWG9444; Last    Rx:06Jun2016 Ordered    13   Corlanor 5 MG Oral Tablet; TAKE 1/2  TABLETS 2 timesDAILY; Therapy: (Recorded:06Mar2017) to Recorded   14  Diovan 40 MG Oral Tablet (Valsartan); Take 1 tablet twice daily; Therapy: (Recorded:05Nov2015) to Recorded    Future Appointments    Date/Time Provider Specialty Site   04/28/2017 01:20 PM Lamarr Meckel, M D  Cardiology [address]     Patient Care Team    Care Team Member Role Specialty Office Number   [address] [phone]   [facility]  Cardiology [phone]   Electronically signed by :  Ever Van RN; Mar 21 2017 10:13AM EST                       (Author)

## 2018-01-14 VITALS
HEIGHT: 66 IN | SYSTOLIC BLOOD PRESSURE: 118 MMHG | BODY MASS INDEX: 43.87 KG/M2 | DIASTOLIC BLOOD PRESSURE: 72 MMHG | HEART RATE: 88 BPM | WEIGHT: 273 LBS | OXYGEN SATURATION: 98 %

## 2018-01-14 VITALS
HEIGHT: 66 IN | HEART RATE: 91 BPM | DIASTOLIC BLOOD PRESSURE: 70 MMHG | WEIGHT: 269 LBS | SYSTOLIC BLOOD PRESSURE: 130 MMHG | BODY MASS INDEX: 43.23 KG/M2 | RESPIRATION RATE: 18 BRPM | OXYGEN SATURATION: 98 %

## 2018-01-14 VITALS
OXYGEN SATURATION: 100 % | WEIGHT: 278 LBS | TEMPERATURE: 98.7 F | HEIGHT: 67 IN | BODY MASS INDEX: 43.63 KG/M2 | DIASTOLIC BLOOD PRESSURE: 84 MMHG | SYSTOLIC BLOOD PRESSURE: 122 MMHG | HEART RATE: 62 BPM | RESPIRATION RATE: 12 BRPM

## 2018-01-16 NOTE — MISCELLANEOUS
Provider Comments  Provider Comments:   L/M for pt to call to R/S missed apt  CE      Signatures   Electronically signed by : GUY Renee Sa;  Apr 10 2017  8:47AM EST                       (Author)

## 2018-01-16 NOTE — RESULT NOTES
Message   please fax labs to Dr Garces Pulse     Verified Results  [facility]) UA/M w/rflx Culture, Routine [id number] 12:34PM Froy Clayton     Test Name Result Flag Reference   Specific Gravity 1 009  1 005-1 030   pH 6 0  5 0-7 5   Urine-Color Yellow  Yellow   Appearance Clear  Clear   WBC Esterase Negative  Negative   Protein Negative  Negative/Trace   Glucose Negative  Negative   Ketones Negative  Negative   Occult Blood Negative  Negative   Bilirubin Negative  Negative   Urobilinogen,Semi-Qn 0 2 EU/dL  0 2-1 0   Nitrite, Urine Negative  Negative   Microscopic Examination Comment     Microscopic follows if indicated  Microscopic Examination See below:     Urinalysis Reflex Comment     This specimen will not reflex to a Urine Culture  WBC 0-5 /hpf  0 -  5   RBC None seen /hpf  0 -  2   Epithelial Cells (non renal) 0-10 /hpf  0 - 10   Crystals Present A N/A   Crystal Type Calcium Oxalate  N/A   Mucus Threads Present  Not Estab     Bacteria Few  None seen/Few

## 2018-01-16 NOTE — MISCELLANEOUS
Message   Recorded as Task   Date: 04/24/2017 04:00 PM, Created By: Kristian Ardon   Task Name: Miscellaneous   Assigned To: Oanh Anna   Regarding Patient: Calton Hodgkin, Status: Active   Comment:    Kristian Ardon - 24 Apr 2017 4:00 PM     TASK CREATED  pt wants to make sure you didnt forget to call her regarding lab results   [facility] - 24 Apr 2017 7:35 PM     TASK EDITED  DR ANNA - PT IS CALLING FOR HER BW RESULTS  CALL HER [phone]  Tiki Lackey - 25 Apr 2017 10:05 AM     TASK EDITED  PLEASE CALL WITH RESULTS PT HAS WAITED   Kristian Ardon - 25 Apr 2017 3:32 PM     TASK EDITED  [phone] - PT ASKING THAT YOU CALL EMMANUEL Griffith - 26 Apr 2017 11:09 AM     TASK EDITED  PT CALLED RE BLOOD WORK  PLEASE CALL PT,SHE SAID YOU CALLED HER AND WERE GOING TO GET BACK TO HER VERY CONCERNED ABOUT THYROID  LR   Soni Wood - 26 Apr 2017 11:56 AM     TASK REPLIED TO: Previously Assigned To [place] nurse,Team  I did call her leave her a message that her TSh is abnormal, medication need to be adjusted, she came in on 4/24 to follow up blood result, and nothing done? ??? what does she need? ???   Susu Denise - 26 Apr 2017 12:00 PM     TASK REASSIGNED: Previously Assigned To [place] nurse,Team  dr Sharon Bruan was this addressed at visit please let dr Mitchell Desir know  thanks   Oanh Anna - 27 Apr 2017 2:37 PM     TASK EDITED  called and left VM   Oanh Anna - 29 Apr 2017 8:37 AM     TASK EDITED  spoke with patient- discussed about her elevated TSH- will increase to 150mcg daily- repeat in 4-6 weeks- advised to start right away- pt agreed and understands - will return in 4-6 weeks    pt requesting flonase for allergies- refilled    pt requesting refill for voltaren gel- refilled    answered all questions        Signatures   Electronically signed by :  DORIE Momin ; Apr 29 2017  8:37AM EST                       (Author)

## 2018-01-16 NOTE — MISCELLANEOUS
Message  called patient to discuss about TSH results- no answer- left VM to call back   discussed results with dR myers and will increase Levothyroxine to 150mcg at this time- repeat TSH in 4-6 weeks      Plan  Hypothyroidism    · Levothyroxine Sodium 137 MCG Oral Tablet   · Levothyroxine Sodium 150 MCG Oral Tablet; TAKE ONE TABLET BY MOUTH  ONCE DAILY IN THE MORNING    Signatures   Electronically signed by :  DORIE Murry ; Apr 27 2017  2:37PM EST                       (Author)

## 2018-01-16 NOTE — PROGRESS NOTES
History of Present Illness  Care Coordination Encounter Information:   Type of Encounter: Telephonic   Contact: Initial Contact   Last Office Visit: 6/6/16   Spoke to Patient  Care Coordination SL Nurse [facility]:   The reason for call is to discuss outreach for follow up/needed services and coordination of meeting care plan treatment goals  I called Valentina Torres as she is a high Emergency Room Utilizer  I scheduled an appointment for her to be seen by Dr Emilia Vazquez at [facility]  she complains of stomach issues  suggest probiotic, enzymes, grain free diet which she will speak to the doctor about  We discussed the ability to call the office prior to returning to the ER  Active Problems    1  Acid reflux (530 81) (K21 9)   2  Asthma (493 90) (J45 909)   3  Atrial fibrillation (427 31) (I48 91)   4  BMI 40 0-44 9, adult (V85 41) (Z68 41)   5  Cardiomyopathy (425 4) (I42 9)   6  Colonoscopy (Fiberoptic) Screening   7  Gastritis (535 50) (K29 70)   8  Generalized pain (780 96) (R52)   9  Hypertension (401 9) (I10)   10  Hypothyroidism (244 9) (E03 9)   11  Leg cramping (729 82) (R25 2)   12  Myalgia and myositis (729 1) (M79 1,M60 9)    Past Medical History    1  History of Abdominal pain, RLQ (789 03) (R10 31)   2  History of Abnormal blood sugar (790 29) (R73 09)   3  History of Acute congestive heart failure, unspecified congestive heart failure type (428 0)   (I50 9)   4  History of Anxiety (300 00) (F41 9)   5  History of Asthma exacerbation (493 92) (J45 901)   6  History of Benign essential hypertension (401 1) (I10)   7  History of Cardiomyopathy (425 4) (I42 9)   8  History of Cellulitis Of The Knee (682 6)   9  History of Elbow pain, unspecified laterality (719 42) (M25 529)   10  History of Follow up (V67 9) (Z09)   11  History of Fracture Of Olecranon Process (813 01)   12  History of allergic rhinitis (V12 69) (Z87 09)   13  History of arthritis (V13 4) (Z87 39)   14   History of chest pain (V13 89) (Z87 898)   15  History of constipation (V12 79) (Z87 19)   16  History of hematuria (V13 09) (Z87 448)   17  History of hypokalemia (V12 29) (Z86 39)   18  History of shortness of breath (V13 89) (Z87 898)   19  History of shortness of breath (V13 89) (Z87 898)   20  History of Late Effects Of Fracture Of Upper Extremities (905 2)   21  History of Morbid or severe obesity due to excess calories (278 01) (E66 01)   22  History of Nausea in adult (787 02) (R11 0)   23  History of Need for vaccination with 13-polyvalent pneumococcal conjugate vaccine    (V03 82) (Z23)   24  History of Prepatellar bursitis, unspecified laterality (726 65) (M70 40)   25  History of Screening for diabetes mellitus (V77 1) (Z13 1)   26  History of Screening for lipid disorders (V77 91) (Z13 220)   27  History of Spasm of muscle (728 85) (M62 838)   28  History of Visit for screening mammogram (V76 12) (Z12 31)    Surgical History    1  History of Cholecystectomy Laparoscopic   2  History of Hysterectomy   3  History of Pacemaker Permanent Placement    Family History  Mother    1  Family history of diabetes mellitus (V18 0) (Z83 3)  Family History    2  Family history of Coronary Artery Disease (V17 49)    Social History    · Denied: History of Alcohol Use (History)   · Former smoker (V15 82) (B18 398)    Current Meds    1  Pantoprazole Sodium 40 MG Oral Tablet Delayed Release (Protonix); take 1 tablet by   mouth once daily; Therapy: 14BKQ7861 to (Evaluate:44Xke8867)  Requested for: 73NIB3053; Last   Rx:28Cja3565 Ordered    2  Xarelto 20 MG Oral Tablet; Take 1 tablet daily  Requested for: 43UXP0648; Last   Rx:22Mqt2857 Ordered    3  Lidocaine 5 % External Patch (Lidoderm); APPLY 1 PATCH TO THE AFFECTED AREA   AND LEAVE IN PLACE FOR 12 HOURS, THEN REMOVE AND LEAVE OFF FOR 12   HOURS  Requested for: 80Kwd1589; Last Rx:85Cbm6879 Ordered    4   Levothyroxine Sodium 137 MCG Oral Tablet; TAKE 1 TABLET BY MOUTH ONCE DAILY   ON AN EMPTY STOMACH WITH NOTHING TOEAT OR DRINK FOR ONE HOUR;   Therapy: 96JVK6014 to (Evaluate:70Fpv2119)  Requested for: 31EEO4109; Last   OE:22APP3045 Ordered    5  Torsemide 10 MG Oral Tablet; take 1 tablet by mouth once daily; Therapy: 42CJP2929 to (Evaluate:63Xhk2418)  Requested for: 33HUC3504; Last   D15EET7514 Ordered    6  ALPRAZolam 0 25 MG Oral Tablet (Xanax); TAKE 1 TABLET BY MOUTH EVERY 8-12   HOURS AS NEEDED; Therapy: 30GBB3801 to (Evaluate:10Whf8570)  Requested for: 90AWZ4688; Last   Rx:38Zhh0331 Ordered    7  Symbicort 160-4 5 MCG/ACT Inhalation Aerosol; INHALE 1 PUFFS Twice daily; Therapy: 90Ehp3588 to [phone])  Requested for: 77GVT5488; Last   TE:42VZU7973 Ordered   8  Xopenex HFA 45 MCG/ACT Inhalation Aerosol; INHALE 1 TO 2 PUFFS EVERY 4 TO 6   HOURS AS NEEDED AND AS DIRECTED; Therapy: 69Gov7498 to (Evaluate:04Hag6748)  Requested for: 66Aqo6140; Last   Rx:24Rws8865 Ordered    9  Voltaren 1 % Transdermal Gel (Diclofenac Sodium); APPLY TO UPPER EXTREMITIES, 2   GM OF GEL TO AFFECTED AREA 4 TIMES DAILY  DO NOT APPLY MORE   THAN 8 GM DAILY TO ANY ONE AFFECTED JOINT; Therapy: 39DOX0541 to (Last Rx:09Thf8620)  Requested for: 02Nut3759 Ordered    10  Fluticasone Propionate 50 MCG/ACT Nasal Suspension; instill 1 spray into each nostril    twice a day; Therapy: 27BNQ4693 to ([place])  Requested for: 28Nvq5759; Last    Rx:11Ndd5243 Ordered    11  Potassium Chloride ER 10 MEQ Oral Tablet Extended Release; TAKE 1 TABLET ONCE    DAILY  Requested for: 66XWJ3531; Last Rx:55Cis4364 Ordered    12  TiZANidine HCl - 2 MG Oral Tablet; TAKE 1 TABLET 3 times daily PRN; Therapy: 48HDX2805 to (Evaluate:96Qfl3336)  Requested for: 55DBF4178; Last    Rx:77Dju3693 Ordered    13  Coreg 12 5 MG Oral Tablet (Carvedilol); Take 1 tablet twice daily; Therapy: (SSWDBDYC:38PCF1449) to Recorded   14  Corlanor 5 MG Oral Tablet; TAKE 1/2 TO 2 TABLETS DAILY; Therapy: (0664 313 06 34) to Recorded   15  Diovan 40 MG Oral Tablet (Valsartan); Take 1 tablet twice daily; Therapy: (Recorded:05Nov2015) to Recorded    Allergies    1  Penicillins    2  IV Dye    End of Encounter Meds    1  Pantoprazole Sodium 40 MG Oral Tablet Delayed Release (Protonix); take 1 tablet by   mouth once daily; Therapy: 17RBN8442 to (Evaluate:68Kni0116)  Requested for: 65TUP5700; Last   Rx:06Jun2016 Ordered    2  Xarelto 20 MG Oral Tablet; Take 1 tablet daily  Requested for: 09CGE8677; Last   Rx:23Jun2016 Ordered    3  Lidocaine 5 % External Patch (Lidoderm); APPLY 1 PATCH TO THE AFFECTED AREA   AND LEAVE IN PLACE FOR 12 HOURS, THEN REMOVE AND LEAVE OFF FOR 12   HOURS  Requested for: 31Hey6667; Last Rx:85Jxg1541 Ordered    4  Levothyroxine Sodium 137 MCG Oral Tablet; TAKE 1 TABLET BY MOUTH ONCE DAILY   ON AN EMPTY STOMACH WITH NOTHING TOEAT OR DRINK FOR ONE HOUR;   Therapy: 99ZRG3687 to (Evaluate:48Yhb5399)  Requested for: 29RVQ9797; Last   Rx:23Jun2016 Ordered    5  Torsemide 10 MG Oral Tablet; take 1 tablet by mouth once daily; Therapy: 05JRX9054 to (Evaluate:80Pgq5538)  Requested for: 32RSZ7724; Last   XB:74CTG2237 Ordered    6  ALPRAZolam 0 25 MG Oral Tablet (Xanax); TAKE 1 TABLET BY MOUTH EVERY 8-12   HOURS AS NEEDED; Therapy: 95CJA7950 to (Evaluate:32Lxf7017)  Requested for: 10AXA3232; Last   Rx:06Jun2016 Ordered    7  Symbicort 160-4 5 MCG/ACT Inhalation Aerosol; INHALE 1 PUFFS Twice daily; Therapy: 83Nia3125 to [phone])  Requested for: 63BIP8213; Last   KF:86GQA0718 Ordered   8  Xopenex HFA 45 MCG/ACT Inhalation Aerosol; INHALE 1 TO 2 PUFFS EVERY 4 TO 6   HOURS AS NEEDED AND AS DIRECTED; Therapy: 32Rez6432 to (Evaluate:90Hfg1402)  Requested for: 55Huv0758; Last   Rx:87Snn7474 Ordered    9  Voltaren 1 % Transdermal Gel (Diclofenac Sodium); APPLY TO UPPER EXTREMITIES, 2   GM OF GEL TO AFFECTED AREA 4 TIMES DAILY  DO NOT APPLY MORE   THAN 8 GM DAILY TO ANY ONE AFFECTED JOINT;    Therapy: 86TWW1285 to (Last Rx:19Apr2016)  Requested for: 19Apr2016 Ordered    10  Fluticasone Propionate 50 MCG/ACT Nasal Suspension; instill 1 spray into each nostril    twice a day; Therapy: 44KRC6170 to ([facility])  Requested for: 27Apr2016; Last    Rx:27Apr2016 Ordered    11  Potassium Chloride ER 10 MEQ Oral Tablet Extended Release; TAKE 1 TABLET ONCE    DAILY  Requested for: 54OYD4930; Last Rx:07Jun2016 Ordered    12  TiZANidine HCl - 2 MG Oral Tablet; TAKE 1 TABLET 3 times daily PRN; Therapy: 44QEM7969 to (Evaluate:12Myc5180)  Requested for: 87LVG4337; Last    Rx:06Jun2016 Ordered    13  Coreg 12 5 MG Oral Tablet (Carvedilol); Take 1 tablet twice daily; Therapy: (BNSSAZEJ:04ULE8789) to Recorded   14  Corlanor 5 MG Oral Tablet; TAKE 1/2 TO 2 TABLETS DAILY; Therapy: (450 45 295) to Recorded   15  Diovan 40 MG Oral Tablet (Valsartan); Take 1 tablet twice daily; Therapy: (Recorded:20Dps7665) to Recorded    Future Appointments    Date/Time Provider Specialty Site   09/07/2016 10:00 AM Iliana Trinh, [address] [facility]     Patient Care Team    Care Team Member Role Specialty Office Number   [address] [phone]   [facility] MD  Cardiology [phone]   Electronically signed by :  Lynn Anglin RN; Aug 23 2016  4:23PM EST                       (Author)

## 2018-01-17 NOTE — MISCELLANEOUS
Provider Comments  Provider Comments:   PT rsch  appt for late November        Signatures   Electronically signed by : Sin Madera, ; Oct 17 2016  9:21AM EST                       (Author)

## 2018-01-22 ENCOUNTER — TRANSCRIBE ORDERS (OUTPATIENT)
Dept: ADMINISTRATIVE | Facility: HOSPITAL | Age: 63
End: 2018-01-22

## 2018-01-22 VITALS
OXYGEN SATURATION: 98 % | DIASTOLIC BLOOD PRESSURE: 72 MMHG | RESPIRATION RATE: 16 BRPM | WEIGHT: 268 LBS | HEART RATE: 78 BPM | HEIGHT: 66 IN | SYSTOLIC BLOOD PRESSURE: 116 MMHG | TEMPERATURE: 97.4 F | BODY MASS INDEX: 43.07 KG/M2

## 2018-01-22 DIAGNOSIS — I42.9 FAMILIAL CARDIOMYOPATHY (HCC): ICD-10-CM

## 2018-02-12 ENCOUNTER — OFFICE VISIT (OUTPATIENT)
Dept: FAMILY MEDICINE CLINIC | Facility: CLINIC | Age: 63
End: 2018-02-12
Payer: COMMERCIAL

## 2018-02-12 VITALS
DIASTOLIC BLOOD PRESSURE: 82 MMHG | WEIGHT: 255 LBS | BODY MASS INDEX: 40.98 KG/M2 | HEIGHT: 66 IN | SYSTOLIC BLOOD PRESSURE: 132 MMHG | OXYGEN SATURATION: 99 % | HEART RATE: 84 BPM

## 2018-02-12 DIAGNOSIS — J40 TRACHEOBRONCHITIS: Primary | ICD-10-CM

## 2018-02-12 PROCEDURE — 99213 OFFICE O/P EST LOW 20 MIN: CPT | Performed by: STUDENT IN AN ORGANIZED HEALTH CARE EDUCATION/TRAINING PROGRAM

## 2018-02-12 RX ORDER — BENZONATATE 150 MG/1
150 CAPSULE ORAL 3 TIMES DAILY PRN
Qty: 20 CAPSULE | Refills: 0 | Status: SHIPPED | OUTPATIENT
Start: 2018-02-12 | End: 2018-02-12 | Stop reason: SDUPTHER

## 2018-02-12 RX ORDER — BENZONATATE 150 MG/1
150 CAPSULE ORAL 3 TIMES DAILY PRN
Qty: 20 CAPSULE | Refills: 0 | Status: SHIPPED | OUTPATIENT
Start: 2018-02-12 | End: 2018-07-26

## 2018-02-12 RX ORDER — METHYLPREDNISOLONE 4 MG/1
TABLET ORAL
Qty: 21 TABLET | Refills: 0 | Status: SHIPPED | OUTPATIENT
Start: 2018-02-12 | End: 2018-02-12 | Stop reason: SDUPTHER

## 2018-02-12 RX ORDER — METHYLPREDNISOLONE 4 MG/1
TABLET ORAL
Qty: 21 TABLET | Refills: 0 | Status: SHIPPED | OUTPATIENT
Start: 2018-02-12 | End: 2018-02-20

## 2018-02-13 NOTE — PROGRESS NOTES
Assessment/Plan:    No problem-specific Assessment & Plan notes found for this encounter  Diagnoses and all orders for this visit:    Tracheobronchitis  -     benzonatate (TESSALON) 150 MG CAPS; Take 1 capsule (150 mg total) by mouth 3 (three) times a day as needed for cough  -     Methylprednisolone 4 MG TBPK; Use as directed on package        Subjective:      Patient ID: Ann Grimm is a 58 y o  female  70-year-old female with history of mild intermittent asthma who presents with 5 day history of dry hacking cough  Patient reports she gets this cough when the seasons change  She also reports wheezing that is not relieved with her home nebulizer treatments  She denies any fevers, chills, respiratory distress, chest pain  Previously she reports the cough gets better with a steroid Dosepak and cough syrup  Cough   Associated symptoms include wheezing  Pertinent negatives include no chest pain, chills, fever, headaches, rhinorrhea or shortness of breath  The following portions of the patient's history were reviewed and updated as appropriate: allergies, current medications, past family history, past medical history, past social history, past surgical history and problem list     Review of Systems   Constitutional: Negative for chills and fever  HENT: Negative for congestion, rhinorrhea and trouble swallowing  Eyes: Negative for visual disturbance  Respiratory: Positive for cough and wheezing  Negative for shortness of breath  Cardiovascular: Negative for chest pain and palpitations  Gastrointestinal: Negative for abdominal distention, abdominal pain, diarrhea and nausea  Genitourinary: Negative for dysuria  Neurological: Negative for dizziness, weakness and headaches  Objective:    Vitals:    02/12/18 1558   BP: 132/82   Pulse: 84   SpO2: 99%        Physical Exam   Constitutional: She is oriented to person, place, and time   She appears well-developed and well-nourished  No distress  HENT:   Head: Normocephalic and atraumatic  Nose: Nose normal    Mouth/Throat: Oropharynx is clear and moist  No oropharyngeal exudate  Eyes: Right eye exhibits no discharge  Left eye exhibits no discharge  Neck: Neck supple  Cardiovascular: Normal rate, regular rhythm, normal heart sounds and intact distal pulses  Exam reveals no gallop and no friction rub  No murmur heard  Pulmonary/Chest: Effort normal    Decreased breath sounds bilaterally, no wheezes or rhonchi   Abdominal: Soft  Bowel sounds are normal  She exhibits no distension  There is no tenderness  There is no rebound  Musculoskeletal: She exhibits no edema  Neurological: She is alert and oriented to person, place, and time  Skin: Skin is warm and dry  No rash noted  She is not diaphoretic  No erythema  Psychiatric: She has a normal mood and affect   Her behavior is normal

## 2018-02-14 ENCOUNTER — TELEPHONE (OUTPATIENT)
Dept: FAMILY MEDICINE CLINIC | Facility: CLINIC | Age: 63
End: 2018-02-14

## 2018-02-14 NOTE — TELEPHONE ENCOUNTER
Pt called about tessalon perles not being covered under her insurance  OTC cough med not working  Said in the past khai yoon gaver her cough med with codeine and that helped  Would like you to order it for her

## 2018-02-14 NOTE — TELEPHONE ENCOUNTER
Dr Tomie Ahumada PT IS NOT COVERED BY HER INS  SHE WANTS SOMETHING ELSE  SHE WANTS SOMETHING WITH CODEIN IN It  SHE WANTS THIS SENT TO [facility]  SHE WANTS THIS TO BE DONE TONIGHT  PT IS NOW BRINGING UP PHLEGM NOW

## 2018-02-15 NOTE — TELEPHONE ENCOUNTER
Donnie Gamez,    Dr Mauro Campos is on nights this month and will likely be unable to attend to this task  I double checked and the medication Dr Mauro Campos prescribed (benzonatate / [place]) IS in fact in the [facility] drug formulary  Did her insurance change? I would advise patient to follow up with her insurance or with the pharmacy because from my end there is no reason I can see that benzonatate would not be covered  Based on my review of the visit, narcotic cough syrup is not indicated at this time  Also, considering the patient history, the fact that she has no fever, and her symptoms have only been 5 days in duration, antibiotics are not indicated at this time either  If the patient does not feel better by next week she may make another appointment       Thanks,    Christy Fernandez

## 2018-02-15 NOTE — TELEPHONE ENCOUNTER
Dr Emilia Messer  SHE WANTS THEM TODAY CALLED INTO [facility]  SHE WOULD LIKE CALL BACK

## 2018-02-16 NOTE — TELEPHONE ENCOUNTER
Patient called on-call provider line  Call returned immediately  She stated that she has a nagging cough, worse at night, that is productive with yellow sputum  She was recently prescribed a Medrol dose pack from an office visit, which is helped her cold symptoms significantly, however, she was also prescribed Tessalon Pearles but did not pick it up due to the cost ($40-50)  Is requesting Levaquin and Robitussin with Codeine  Denied fevers, decreased oral intake, N/V, SOB  Has chest pain related to the cough  After lengthy discussion, I explained that antibiotics are likely not indicated for the treatment of her illness, especially considering the absence of other signs and symptoms  Also advised that I cannot call in something with Codeine to the pharmacy and would need a written prescription  She still has one more pill of the medrol dose pack and was agreeable to monitor her symptoms for one more day without antibiotic prescription or prescription cough medicine  She will continue to use OTC  She will call us back if not improving and/or worsening

## 2018-02-20 ENCOUNTER — TELEPHONE (OUTPATIENT)
Dept: PULMONOLOGY | Facility: MEDICAL CENTER | Age: 63
End: 2018-02-20

## 2018-02-20 ENCOUNTER — HOSPITAL ENCOUNTER (OUTPATIENT)
Dept: NON INVASIVE DIAGNOSTICS | Facility: HOSPITAL | Age: 63
Discharge: HOME | End: 2018-02-20
Attending: INTERNAL MEDICINE
Payer: COMMERCIAL

## 2018-02-20 ENCOUNTER — OFFICE VISIT (OUTPATIENT)
Dept: PULMONOLOGY | Facility: MEDICAL CENTER | Age: 63
End: 2018-02-20
Payer: COMMERCIAL

## 2018-02-20 VITALS
WEIGHT: 270 LBS | RESPIRATION RATE: 12 BRPM | BODY MASS INDEX: 43.39 KG/M2 | HEIGHT: 66 IN | HEART RATE: 101 BPM | SYSTOLIC BLOOD PRESSURE: 138 MMHG | DIASTOLIC BLOOD PRESSURE: 84 MMHG | OXYGEN SATURATION: 96 % | TEMPERATURE: 98.7 F

## 2018-02-20 DIAGNOSIS — J45.21 MILD INTERMITTENT ASTHMA WITH ACUTE EXACERBATION: Primary | ICD-10-CM

## 2018-02-20 DIAGNOSIS — I42.9 FAMILIAL CARDIOMYOPATHY (HCC): ICD-10-CM

## 2018-02-20 PROBLEM — J40 TRACHEOBRONCHITIS: Status: RESOLVED | Noted: 2018-02-12 | Resolved: 2018-02-20

## 2018-02-20 PROCEDURE — 93306 TTE W/DOPPLER COMPLETE: CPT

## 2018-02-20 PROCEDURE — 99214 OFFICE O/P EST MOD 30 MIN: CPT | Performed by: INTERNAL MEDICINE

## 2018-02-20 RX ORDER — BUDESONIDE AND FORMOTEROL FUMARATE DIHYDRATE 160; 4.5 UG/1; UG/1
1 AEROSOL RESPIRATORY (INHALATION) 2 TIMES DAILY
Qty: 1 INHALER | Refills: 0 | Status: SHIPPED | OUTPATIENT
Start: 2018-02-20 | End: 2018-07-26

## 2018-02-20 RX ORDER — HYDROCODONE POLISTIREX AND CHLORPHENIRAMINE POLISTIREX 10; 8 MG/5ML; MG/5ML
5 SUSPENSION, EXTENDED RELEASE ORAL EVERY 12 HOURS PRN
Qty: 120 ML | Refills: 0 | Status: SHIPPED | OUTPATIENT
Start: 2018-02-20 | End: 2018-02-21 | Stop reason: CLARIF

## 2018-02-20 NOTE — PATIENT INSTRUCTIONS
Increase Symbicort to 160/4 5 mg to be used twice daily  Use for 1 month and if you feel better you can decrease back to to the 80 mg Symbicort inhaler  Rinse and spit afterwards  Codeine cough syrup- take at night as needed  If cough does not improve please call office  Hydrocodone/Chlorpheniramine (By mouth)   Chlorpheniramine (klor-fen-IR-a-meen), Hydrocodone (tmr-dpqz-NHN-done)  Treats cough and other symptoms of a cold or allergies  This medicine contains a narcotic cough suppressant  Brand Name(s): HYDROcodone and Chlorpheniramine Pennkinetic, TussiCaps, Tussionex Pennkinetic, Vituz   There may be other brand names for this medicine  When This Medicine Should Not Be Used: This medicine is not right for everyone  Do not use it if you had an allergic reaction to hydrocodone or chlorpheniramine  How to Use This Medicine:   Long Acting Capsule, Liquid  · Your doctor will tell you how much medicine to use  Do not use more than directed  · Do not mix this medicine with liquids or other medicines  · Oral liquid: Measure the oral liquid medicine with a marked measuring spoon, oral syringe, or medicine cup  Shake the medicine well before you use it  Rinse the measuring device or dosing spoon after each use  · Swallow the extended-release capsule whole  Do not crush, break, or chew it  · This medicine should come with a Medication Guide  Ask your pharmacist for a copy if you do not have one  · Missed dose: Take a dose as soon as you remember  If it is almost time for your next dose, wait until then and take a regular dose  Do not take extra medicine to make up for a missed dose  · Store the medicine in a closed container at room temperature, away from heat, moisture, and direct light  Drugs and Foods to Avoid:   Ask your doctor or pharmacist before using any other medicine, including over-the-counter medicines, vitamins, and herbal products    · Do not use this medicine if you are using or have used an MAO inhibitor within the past 14 days  · Some medicines can affect how hydrocodone and chlorpheniramine work  Tell your doctor if you are using medicine to treat depression, anxiety, or mental health problems  · Do not drink alcohol while you are using this medicine  · Tell your doctor if you use anything else that makes you sleepy  Some examples are allergy medicine, narcotic pain medicine, and alcohol  Warnings While Using This Medicine:   · Tell your doctor if you are pregnant or breastfeeding, or if you have kidney disease, liver disease, Roberta disease, an enlarged prostate or trouble urinating, glaucoma, heart disease, high blood pressure, lung or breathing problems (such as asthma), stomach or bowel problems, or thyroid problems  Tell your doctor if you have a history of head injury or drug or alcohol addiction  · This medicine may cause the following problems:  ¨ Respiratory depression (severe breathing problem)  ¨ Risk of overdose, which can lead to death  · This medicine may make you dizzy or drowsy  Do not drive or do anything else that could be dangerous until you know how this medicine affects you  · This medicine can be habit-forming  Do not use more than your prescribed dose  Call your doctor if you think your medicine is not working  · This medicine may cause constipation, especially with long-term use  Ask your doctor if you should use a laxative to prevent and treat constipation  · Keep all medicine out of the reach of children  Never share your medicine with anyone    Possible Side Effects While Using This Medicine:   Call your doctor right away if you notice any of these side effects:  · Allergic reaction: Itching or hives, swelling in your face or hands, swelling or tingling in your mouth or throat, chest tightness, trouble breathing  · Blue lips, fingernails, or skin, trouble breathing  · Extreme dizziness or weakness, shallow breathing, slow heartbeat, seizures, and cold, clammy skin  · Severe constipation  If you notice these less serious side effects, talk with your doctor:   · Drowsiness  · Dry mouth or throat  · Mild constipation, nausea, or vomiting  If you notice other side effects that you think are caused by this medicine, tell your doctor  Call your doctor for medical advice about side effects  You may report side effects to FDA at 5-352-FDA-7371  © 2017 [employer]  Information is for End User's use only and may not be sold, redistributed or otherwise used for commercial purposes  The above information is an  only  It is not intended as medical advice for individual conditions or treatments  Talk to your doctor, nurse or pharmacist before following any medical regimen to see if it is safe and effective for you

## 2018-02-20 NOTE — ASSESSMENT & PLAN NOTE
She appears to be improving gradually  No further need for prednisone  Recommend that she increase her Symbicort from 80 to160 mg and prescription is sent to her pharmacy  For her cough she is given prescription for Tussionex  Nebulizer and Xopenex can be used as needed

## 2018-02-20 NOTE — LETTER
February 20, 2018     Wilfrido Rubin MD  [address] [facility] [address]    Patient: Desire Tee   YOB: 1955   Date of Visit: 2/20/2018       Dear Dr Moulton Needs:    Thank you for referring Cierra Rico to me for evaluation  Below are my notes for this consultation  If you have questions, please do not hesitate to call me  I look forward to following your patient along with you  Sincerely,        Jackie Pop MD        CC: No Recipients  Jackie Pop MD  2/20/2018  2:55 PM  Sign at close encounter  Assessment/Plan:     Problem List Items Addressed This Visit        Respiratory    Mild intermittent asthma with acute exacerbation - Primary     She appears to be improving gradually  No further need for prednisone  Recommend that she increase her Symbicort from 80 to160 mg and prescription is sent to her pharmacy  For her cough she is given prescription for Tussionex  Nebulizer and Xopenex can be used as needed  Relevant Medications    budesonide-formoterol (SYMBICORT) 160-4 5 mcg/act inhaler    hydrocodone-chlorpheniramine polistirex (TUSSIONEX) 10-8 mg/5 mL ER suspension            Return in about 6 months (around 8/20/2018)  All questions are answered to the patient's satisfaction and understanding  She verbalizes understanding  She is encouraged to call with any further questions or concerns  Portions of the record may have been created with voice recognition software  Occasional wrong word or "sound a like" substitutions may have occurred due to the inherent limitations of voice recognition software  Read the chart carefully and recognize, using context, where substitutions have occurred      ______________________________________________________________________    Chief Complaint:   Chief Complaint   Patient presents with    Cough     productive      Shortness of Breath     getting better occurs with exertion    Follow-up     feelings like someone is punching her lungs       Patient ID: Chava Cavanaugh is a 58 y o  y o  female has a past medical history of A-fib ([facility] [address] ); Abnormal blood sugar; Acid reflux; Acute bronchitis; Allergic reaction; Anxiety; Arthritis; Asthma; Asthma in adult, mild intermittent, uncomplicated; Cardiomyopathy ([facility] [address] ); Cellulitis of left lower leg; Chest pain; CHF (congestive heart failure) ([facility] [address] ); Constipation; COPD (chronic obstructive pulmonary disease) ([facility] [address] ); Depression with anxiety; Diabetes mellitus ([facility] [address] ); Disease of thyroid gland; Diverticulitis; Dyslipidemia; Dyspnea on exertion; Elbow pain; Fracture of olecranon, open; Gastritis; Generalized pain; Hematuria; History of colonoscopy; Hypertension; Hypokalemia; Hypomagnesemia; Leg cramping; Moderate obesity; Morbid obesity due to excess calories ([facility] [address] ); Myalgia; Myositis; Nausea in adult; Nephrolithiasis; Prepatellar bursitis, unspecified knee; Screening for lipid disorders; Shortness of breath; Spasm of muscle; and Thyroid trouble  2/20/2018  Patient presents today for follow-up visit  However most recently over the last 2 weeks she has been ill with an asthma exacerbation  She was seen by her primary care physician and given Medrol Dosepak and Tessalon Perles  There was no evidence for underlying infectious etiology and therefore no antibiotics were given to her at that time  She states that she had no sick contacts  It is possible that this was exacerbated by the erratic weather  She did have subjective fevers and chills  She did not take her temperature  She had a severe cough  She has been unable to sleep due to this cough  Prior to this exacerbation she had been feeling well and has not had any recent exacerbation  Cough   This is a new problem  The current episode started 1 to 4 weeks ago  The problem has been gradually improving  The cough is non-productive  Associated symptoms include chills, a fever, myalgias (From coughing), shortness of breath and wheezing   Pertinent negatives include no chest pain, ear congestion, ear pain, headaches, heartburn, hemoptysis, nasal congestion, postnasal drip, rash, rhinorrhea, sore throat, sweats or weight loss  Exacerbated by: Any activity  Risk factors: Chronic asthma  She has tried a beta-agonist inhaler and ipratropium inhaler for the symptoms  The treatment provided no relief  Her past medical history is significant for asthma  There is no history of environmental allergies  Shortness of Breath   This is a new problem  The current episode started 1 to 4 weeks ago  The problem occurs constantly  The problem has been waxing and waning  Associated symptoms include a fever and wheezing  Pertinent negatives include no abdominal pain, chest pain, ear pain, headaches, hemoptysis, leg swelling, rash, rhinorrhea or sore throat  The symptoms are aggravated by any activity  The patient has no known risk factors for DVT/PE  She has tried beta agonist inhalers, ipratropium inhalers and oral steroids (She did improve on steroids) for the symptoms  The treatment provided no relief  Her past medical history is significant for asthma  Review of Systems   Constitutional: Positive for chills and fever  Negative for weight loss  HENT: Negative for ear pain, postnasal drip, rhinorrhea and sore throat  Eyes: Negative for visual disturbance  Respiratory: Positive for cough, shortness of breath and wheezing  Negative for hemoptysis  Cardiovascular: Negative for chest pain and leg swelling  Gastrointestinal: Negative for abdominal pain, diarrhea, heartburn and nausea  Endocrine: Negative for cold intolerance  Genitourinary: Negative for difficulty urinating  Musculoskeletal: Positive for back pain (From coughing) and myalgias (From coughing)  Skin: Negative for rash  Allergic/Immunologic: Negative for environmental allergies  Neurological: Negative for dizziness, numbness and headaches  Hematological: Negative for adenopathy  Psychiatric/Behavioral: Negative for agitation and behavioral problems  Smoking history: She reports that she quit smoking about 36 years ago  She has a 0 50 pack-year smoking history   She has never used smokeless tobacco     The following portions of the patient's history were reviewed and updated as appropriate: allergies, current medications, past family history, past medical history, past social history, past surgical history and problem list     Immunization History   Administered Date(s) Administered    Pneumococcal Conjugate 13-Valent 11/05/2015    Tdap 03/06/2017     Current Outpatient Prescriptions   Medication Sig Dispense Refill    ALPRAZolam (XANAX) 0 25 mg tablet Take 0 25 mg by mouth daily at bedtime as needed for anxiety      benzonatate (TESSALON) 150 MG CAPS Take 1 capsule (150 mg total) by mouth 3 (three) times a day as needed for cough 20 capsule 0    budesonide-formoterol (SYMBICORT) 160-4 5 mcg/act inhaler Inhale 1 puff 2 (two) times a day 1 Inhaler 0    carvedilol (COREG) 12 5 mg tablet Take 12 5 mg by mouth 2 (two) times a day      Ivabradine HCl (CORLANOR) 5 MG TABS Take 5 mg by mouth daily        levalbuterol (XOPENEX HFA) 45 mcg/act inhaler Inhale 2 puffs every 6 (six) hours as needed      levothyroxine 137 mcg tablet Take 137 mcg by mouth daily      pantoprazole (PROTONIX) 40 mg tablet Take 1 tablet by mouth daily      POTASSIUM CHLORIDE ER PO Take 10 mEq by mouth daily      rivaroxaban (XARELTO) 20 mg tablet Take 1 tablet by mouth daily      simvastatin (ZOCOR) 40 mg tablet Take 40 mg by mouth daily at bedtime      torsemide (DEMADEX) 10 mg tablet Take 10 mg by mouth daily      valsartan (DIOVAN) 40 mg tablet Take 40 mg by mouth 2 (two) times a day      hydrocodone-chlorpheniramine polistirex (TUSSIONEX) 10-8 mg/5 mL ER suspension Take 5 mL by mouth every 12 (twelve) hours as needed for cough Max Daily Amount: 10 mL 120 mL 0    ipratropium (ATROVENT) 0 02 % nebulizer solution Take 2 5 mL by nebulization 4 (four) times a day for 30 days 300 mL 0     No current facility-administered medications for this visit  Allergies: Omnipaque [iohexol]; Penicillins; Iv dye  [iodinated diagnostic agents]; and Shellfish-derived products    Objective:  Vitals:    02/20/18 1352 02/20/18 1400 02/20/18 1403   BP: 138/84     BP Location: Right arm     Patient Position: Sitting     Cuff Size: Extra-Large     Pulse: 101     Resp: 12     Temp: 98 7 °F (37 1 °C)     TempSrc: Oral     SpO2: 97% 97% 96%   Weight: 122 kg (270 lb)     Height: 5' 6" (1 676 m)     Oxygen Therapy  SpO2: 96 % (with exertion)  Oxygen Therapy: None (Room air)    Wt Readings from Last 3 Encounters:   02/20/18 122 kg (270 lb)   02/12/18 116 kg (255 lb)   08/17/17 126 kg (278 lb)     Body mass index is 43 58 kg/m²  Physical Exam   Constitutional: She is oriented to person, place, and time  She appears well-developed and well-nourished  No distress  HENT:   Head: Normocephalic and atraumatic  Mouth/Throat: Oropharynx is clear and moist  No oropharyngeal exudate  Eyes: EOM are normal  Pupils are equal, round, and reactive to light  Neck: Normal range of motion  Neck supple  No tracheal deviation present  No thyromegaly present  Cardiovascular: Normal rate and regular rhythm  No murmur heard  Pulmonary/Chest: Effort normal and breath sounds normal  No respiratory distress  She has no wheezes  She has no rales  She exhibits no tenderness  Abdominal: Soft  Bowel sounds are normal  She exhibits no distension  There is no tenderness  Musculoskeletal: Normal range of motion  She exhibits no edema  Lymphadenopathy:     She has no cervical adenopathy  Neurological: She is alert and oriented to person, place, and time  A cranial nerve deficit is present  Skin: Skin is warm and dry  She is not diaphoretic  Psychiatric: She has a normal mood and affect   Her behavior is normal    Vitals reviewed        Lab Review:   No recent labs    Diagnostics:   No recent imaging  Office Spirometry Results:      Spirometry from August 2017 was normal

## 2018-02-20 NOTE — PROGRESS NOTES
Assessment/Plan:     Problem List Items Addressed This Visit        Respiratory    Mild intermittent asthma with acute exacerbation - Primary     She appears to be improving gradually  No further need for prednisone  Recommend that she increase her Symbicort from 80 to160 mg and prescription is sent to her pharmacy  For her cough she is given prescription for Tussionex  Nebulizer and Xopenex can be used as needed  Relevant Medications    budesonide-formoterol (SYMBICORT) 160-4 5 mcg/act inhaler    hydrocodone-chlorpheniramine polistirex (TUSSIONEX) 10-8 mg/5 mL ER suspension            Return in about 6 months (around 8/20/2018)  All questions are answered to the patient's satisfaction and understanding  She verbalizes understanding  She is encouraged to call with any further questions or concerns  Portions of the record may have been created with voice recognition software  Occasional wrong word or "sound a like" substitutions may have occurred due to the inherent limitations of voice recognition software  Read the chart carefully and recognize, using context, where substitutions have occurred  ______________________________________________________________________    Chief Complaint:   Chief Complaint   Patient presents with    Cough     productive      Shortness of Breath     getting better occurs with exertion    Follow-up     feelings like someone is punching her lungs       Patient ID: Tessa Bowers is a 58 y o  y o  female has a past medical history of A-fib ([facility] [address] ); Abnormal blood sugar; Acid reflux; Acute bronchitis; Allergic reaction; Anxiety; Arthritis; Asthma; Asthma in adult, mild intermittent, uncomplicated; Cardiomyopathy ([address] ); Cellulitis of left lower leg; Chest pain; CHF (congestive heart failure) ([address] ); Constipation; COPD (chronic obstructive pulmonary disease) ([address] ); Depression with anxiety; Diabetes mellitus ([address] ); Disease of thyroid gland; Diverticulitis; Dyslipidemia;  Dyspnea on exertion; Elbow pain; Fracture of olecranon, open; Gastritis; Generalized pain; Hematuria; History of colonoscopy; Hypertension; Hypokalemia; Hypomagnesemia; Leg cramping; Moderate obesity; Morbid obesity due to excess calories ([address] ); Myalgia; Myositis; Nausea in adult; Nephrolithiasis; Prepatellar bursitis, unspecified knee; Screening for lipid disorders; Shortness of breath; Spasm of muscle; and Thyroid trouble  2/20/2018  Patient presents today for follow-up visit  However most recently over the last 2 weeks she has been ill with an asthma exacerbation  She was seen by her primary care physician and given Medrol Dosepak and Tessalon Perles  There was no evidence for underlying infectious etiology and therefore no antibiotics were given to her at that time  She states that she had no sick contacts  It is possible that this was exacerbated by the erratic weather  She did have subjective fevers and chills  She did not take her temperature  She had a severe cough  She has been unable to sleep due to this cough  Prior to this exacerbation she had been feeling well and has not had any recent exacerbation  Cough   This is a new problem  The current episode started 1 to 4 weeks ago  The problem has been gradually improving  The cough is non-productive  Associated symptoms include chills, a fever, myalgias (From coughing), shortness of breath and wheezing  Pertinent negatives include no chest pain, ear congestion, ear pain, headaches, heartburn, hemoptysis, nasal congestion, postnasal drip, rash, rhinorrhea, sore throat, sweats or weight loss  Exacerbated by: Any activity  Risk factors: Chronic asthma  She has tried a beta-agonist inhaler and ipratropium inhaler for the symptoms  The treatment provided no relief  Her past medical history is significant for asthma  There is no history of environmental allergies  Shortness of Breath   This is a new problem   The current episode started 1 to 4 weeks ago  The problem occurs constantly  The problem has been waxing and waning  Associated symptoms include a fever and wheezing  Pertinent negatives include no abdominal pain, chest pain, ear pain, headaches, hemoptysis, leg swelling, rash, rhinorrhea or sore throat  The symptoms are aggravated by any activity  The patient has no known risk factors for DVT/PE  She has tried beta agonist inhalers, ipratropium inhalers and oral steroids (She did improve on steroids) for the symptoms  The treatment provided no relief  Her past medical history is significant for asthma  Review of Systems   Constitutional: Positive for chills and fever  Negative for weight loss  HENT: Negative for ear pain, postnasal drip, rhinorrhea and sore throat  Eyes: Negative for visual disturbance  Respiratory: Positive for cough, shortness of breath and wheezing  Negative for hemoptysis  Cardiovascular: Negative for chest pain and leg swelling  Gastrointestinal: Negative for abdominal pain, diarrhea, heartburn and nausea  Endocrine: Negative for cold intolerance  Genitourinary: Negative for difficulty urinating  Musculoskeletal: Positive for back pain (From coughing) and myalgias (From coughing)  Skin: Negative for rash  Allergic/Immunologic: Negative for environmental allergies  Neurological: Negative for dizziness, numbness and headaches  Hematological: Negative for adenopathy  Psychiatric/Behavioral: Negative for agitation and behavioral problems  Smoking history: She reports that she quit smoking about 36 years ago  She has a 0 50 pack-year smoking history   She has never used smokeless tobacco     The following portions of the patient's history were reviewed and updated as appropriate: allergies, current medications, past family history, past medical history, past social history, past surgical history and problem list     Immunization History   Administered Date(s) Administered    Pneumococcal Conjugate 13-Valent 11/05/2015    Tdap 03/06/2017     Current Outpatient Prescriptions   Medication Sig Dispense Refill    ALPRAZolam (XANAX) 0 25 mg tablet Take 0 25 mg by mouth daily at bedtime as needed for anxiety      benzonatate (TESSALON) 150 MG CAPS Take 1 capsule (150 mg total) by mouth 3 (three) times a day as needed for cough 20 capsule 0    budesonide-formoterol (SYMBICORT) 160-4 5 mcg/act inhaler Inhale 1 puff 2 (two) times a day 1 Inhaler 0    carvedilol (COREG) 12 5 mg tablet Take 12 5 mg by mouth 2 (two) times a day      Ivabradine HCl (CORLANOR) 5 MG TABS Take 5 mg by mouth daily        levalbuterol (XOPENEX HFA) 45 mcg/act inhaler Inhale 2 puffs every 6 (six) hours as needed      levothyroxine 137 mcg tablet Take 137 mcg by mouth daily      pantoprazole (PROTONIX) 40 mg tablet Take 1 tablet by mouth daily      POTASSIUM CHLORIDE ER PO Take 10 mEq by mouth daily      rivaroxaban (XARELTO) 20 mg tablet Take 1 tablet by mouth daily      simvastatin (ZOCOR) 40 mg tablet Take 40 mg by mouth daily at bedtime      torsemide (DEMADEX) 10 mg tablet Take 10 mg by mouth daily      valsartan (DIOVAN) 40 mg tablet Take 40 mg by mouth 2 (two) times a day      hydrocodone-chlorpheniramine polistirex (TUSSIONEX) 10-8 mg/5 mL ER suspension Take 5 mL by mouth every 12 (twelve) hours as needed for cough Max Daily Amount: 10 mL 120 mL 0    ipratropium (ATROVENT) 0 02 % nebulizer solution Take 2 5 mL by nebulization 4 (four) times a day for 30 days 300 mL 0     No current facility-administered medications for this visit  Allergies: Omnipaque [iohexol]; Penicillins;  Iv dye  [iodinated diagnostic agents]; and Shellfish-derived products    Objective:  Vitals:    02/20/18 1352 02/20/18 1400 02/20/18 1403   BP: 138/84     BP Location: Right arm     Patient Position: Sitting     Cuff Size: Extra-Large     Pulse: 101     Resp: 12     Temp: 98 7 °F (37 1 °C)     TempSrc: Oral     SpO2: 97% 97% 96%   Weight: 122 kg (270 lb)     Height: 5' 6" (1 676 m)     Oxygen Therapy  SpO2: 96 % (with exertion)  Oxygen Therapy: None (Room air)    Wt Readings from Last 3 Encounters:   02/20/18 122 kg (270 lb)   02/12/18 116 kg (255 lb)   08/17/17 126 kg (278 lb)     Body mass index is 43 58 kg/m²  Physical Exam   Constitutional: She is oriented to person, place, and time  She appears well-developed and well-nourished  No distress  HENT:   Head: Normocephalic and atraumatic  Mouth/Throat: Oropharynx is clear and moist  No oropharyngeal exudate  Eyes: EOM are normal  Pupils are equal, round, and reactive to light  Neck: Normal range of motion  Neck supple  No tracheal deviation present  No thyromegaly present  Cardiovascular: Normal rate and regular rhythm  No murmur heard  Pulmonary/Chest: Effort normal and breath sounds normal  No respiratory distress  She has no wheezes  She has no rales  She exhibits no tenderness  Abdominal: Soft  Bowel sounds are normal  She exhibits no distension  There is no tenderness  Musculoskeletal: Normal range of motion  She exhibits no edema  Lymphadenopathy:     She has no cervical adenopathy  Neurological: She is alert and oriented to person, place, and time  A cranial nerve deficit is present  Skin: Skin is warm and dry  She is not diaphoretic  Psychiatric: She has a normal mood and affect  Her behavior is normal    Vitals reviewed        Lab Review:   No recent labs    Diagnostics:   No recent imaging  Office Spirometry Results:      Spirometry from August 2017 was normal

## 2018-02-21 DIAGNOSIS — R05.3 COUGH, PERSISTENT: Primary | ICD-10-CM

## 2018-02-21 PROCEDURE — 93306 TTE W/DOPPLER COMPLETE: CPT | Performed by: INTERNAL MEDICINE

## 2018-02-21 RX ORDER — PROMETHAZINE HYDROCHLORIDE AND CODEINE PHOSPHATE 6.25; 1 MG/5ML; MG/5ML
5 SYRUP ORAL
Qty: 118 ML | Refills: 0 | Status: SHIPPED | OUTPATIENT
Start: 2018-02-21 | End: 2018-03-06

## 2018-02-27 ENCOUNTER — TELEPHONE (OUTPATIENT)
Dept: PULMONOLOGY | Facility: MEDICAL CENTER | Age: 63
End: 2018-02-27

## 2018-02-27 DIAGNOSIS — J45.21 MILD INTERMITTENT ASTHMA WITH EXACERBATION: Primary | ICD-10-CM

## 2018-02-27 RX ORDER — PREDNISONE 1 MG/1
TABLET ORAL
Qty: 30 TABLET | Refills: 0 | Status: SHIPPED | OUTPATIENT
Start: 2018-02-27 | End: 2018-04-27 | Stop reason: HOSPADM

## 2018-02-27 NOTE — PROGRESS NOTES
Patient is beginning to have difficulty with shortness of breath again and wheezing  Will prescribe longer steroid taper at lower dose  No other symptoms are reported

## 2018-02-27 NOTE — TELEPHONE ENCOUNTER
PATIENT WOULD LIKE TO KNOW IF YOU COULD CALL IN MORE PREDNISONE FOR HER  SHE SAID YOU DISCUSSED IT AT HER LAST APPT

## 2018-03-03 PROBLEM — R06.00 DYSPNEA ON EXERTION: Status: ACTIVE | Noted: 2018-01-22

## 2018-03-03 PROBLEM — R06.09 DYSPNEA ON EXERTION: Status: ACTIVE | Noted: 2018-01-22

## 2018-03-03 PROBLEM — E66.8 MODERATE OBESITY: Status: ACTIVE | Noted: 2018-01-22

## 2018-03-03 PROBLEM — I50.42 CHRONIC COMBINED SYSTOLIC AND DIASTOLIC HEART FAILURE, NYHA CLASS 2 (HCC): Status: ACTIVE | Noted: 2017-07-13

## 2018-03-03 PROBLEM — E78.5 DYSLIPIDEMIA: Status: ACTIVE | Noted: 2017-04-28

## 2018-03-03 PROBLEM — I48.0 PAROXYSMAL ATRIAL FIBRILLATION (HCC): Status: ACTIVE | Noted: 2018-01-22

## 2018-03-03 NOTE — PROGRESS NOTES
Progress Note - Cardiology Office  Ayana Umanzor 58 y o  female MRN: [id number]  : 1955   Encounter: [id number]      Assessment:     1  Chronic combined systolic and diastolic heart failure, NYHA class 2 ([facility] [address] )    2  Dilated cardiomyopathy (HCC)    3  Paroxysmal atrial fibrillation ([facility] [address] )    4  Dyslipidemia    5  Moderate obesity    6  Dyspnea on exertion    7  Mild intermittent asthma with acute exacerbation        Discussion Summary and Plan:  1  Nonischemic dilated cardiopathy myopathy  Patient has recent evidence of fluid overload  Medication adjustment will be made  Will add Aldactone 12 5 mg daily along with Demadex 10 mg to alternate with 20 mg daily  2  Chronic systolic heart failure New [place] Heart Association class II/3  Had lengthy discussion with patient's daughter about heart failure and diet control along with using newer medications  After extensive discussion patient well certain was stopped and patient will be started on a low-dose of Entresto  They were told that we have to wait 36-48 hours  She was given some samples and prescriptions written  Will add digoxin 0 125 mg twice a week  Will check BMP in 7-10 days  They need to be compliant with diet instructions were given  3  Status post [facility] ICD  Single-chamber it was interrogated and is functioning adequately   4  Bronchial asthma  Many per pulmonary medicine   5  History of paroxysmal at fibrillation currently in sinus rhythm  Continue antithrombotic therapy  Patient tolerating well   6  Hypothyroidism  She is on supplementation  Will check TSH   7  Anxiety  She's now on Lexapro   8  Dyslipidemia  Patient now on Pravachol  Hence will check fasting lipids and LFTs     9  Shortness of breath  Mostly exertional and there is no clinical evidence of any heart failure  Most likely etiologies patient weight gain  Patient's BMI is now 37  Discussed with patient at length advised to lose weight   Will check echo to see any worsening of cardiomyopathy  Further better also of these tests become available  up in 4 months  Patient will try to lose about 5-10 pounds by then  Counseling :  A description of the counseling  Goals and Barriers  The patient has the current Goals: To stay out of heart failure  The patent has the current Barriers: Weight gain  Patient's ability to self care: Yes  Medication side effect reviewed with patient in detail and all their questions answered to their satisfaction  HPI :     Lindia Nissen is a 58y o  year old female who came for follow up  Patient has with past medical history significant for nonischemic dilated cardiomyopathy, hypertension, bronchial asthma, moderate obesity, dyslipidemia, hypothyroidism, status post [facility] single-chamber ICD, mild nonobstructive disease by cardiac catheterization who came for regular follow-up and interrogation of her ICD  She has been doing pretty well she's she is on coralanor, she did she denies any chest pain, any shortness of breath  Recently she gained more weight as she was on steroids for bronchial asthma  Her ICD was interrogated today  No fever, no chills, no other significant complaint   /13/17 came for follow-up on nonischemic cardio myopathy, hypertension, obesity hypothyroidism and for ICD  She has been doing well  She has been on current medications  Somehow she was not taking valsartan  She denies any chest pain or any shortness of breath no leg edema no PND no orthopnea no nausea, no other significant complaint   /22/2018 came for follow-up  Above reviewed  She has gained some weight  Complaining about more shortness of breath  BMI is now 43  Denies any chest pain  She is taking now will certain and cholesterol medications  No PND no orthopnea  Heart rate is acceptable  No leg edema  No ICD firing  She had [address] ICD   03/06/2018  Above reviewed  Patient was recently in emergency room of a [facility] with evidence of fluid overload    I spoke to patient's ER doctor  She was prescribed additional dose of diuretic and felt better and was sent home  She has gained weight over the last few months  She is feeling now much better  Repeat echo shows her ejection fraction remains 20-25% and LV is dilated  Her leg edema has significantly improved  She is no longer wheezing  No cough no PND no orthopnea  No other significant complaint at this time  She came with her daughter  Review of Systems   Constitutional: Positive for activity change and fatigue  Respiratory: Positive for shortness of breath and wheezing  Cardiovascular: Positive for leg swelling  Neurological: Positive for weakness  Psychiatric/Behavioral: The patient is nervous/anxious  All other systems reviewed and are negative        Historical Information   Past Medical History:   Diagnosis Date    A-fib ([place] [address] )     Abnormal blood sugar     Acid reflux     Acute bronchitis     Allergic reaction     Anxiety     Arthritis     Asthma     Asthma in adult, mild intermittent, uncomplicated     Cardiomyopathy ([facility] )     Cellulitis of left lower leg     Chest pain     CH[facility]congestive heart failure) ([facility]     Constipation     COPD (chronic obstructive pulmonary disease) ([facility])     Depression with anxiety     Diabetes mellitus ([place] [address] )     Disease of thyroid gland     Diverticulitis     Dyslipidemia     Dyspnea on exertion     Elbow pain     Fracture of olecranon, open     Gastritis     Generalized pain     Hematuria     History of colonoscopy     Hypertension     Hypokalemia     Hypomagnesemia     Leg cramping     Moderate obesity     Morbid obesity due to excess calories ([facility])     Myalgia     Myositis     Nausea in adult     Nephrolithiasis     Prepatellar bursitis, unspecified knee     Screening for lipid disorders     Shortness of breath     Spasm of muscle     Thyroid trouble      Past Surgical History:   Procedure Laterality Date    CARDIAC DEFIBRILLATOR PLACEMENT      LAPAROSCOPIC CHOLECYSTECTOMY      TOTAL ABDOMINAL HYSTERECTOMY W/ BILATERAL SALPINGOOPHORECTOMY       History   Alcohol Use No     History   Drug Use No     History   Smoking Status    Former Smoker    Packs/day: 0 25    Years: 2 00    Quit date: 1982   Smokeless Tobacco    Never Used     Family History:   Family History   Problem Relation Age of Onset    Hypertension Sister     Cancer Sister     Coronary artery disease Family     Diabetes type II Family     Hypertension Family        Meds/Allergies     Allergies   Allergen Reactions    Omnipaque [Iohexol] Swelling    Penicillins Swelling    Iv Dye  [Iodinated Diagnostic Agents] Throat Swelling    Shellfish-Derived Products Hives       Current Outpatient Prescriptions:     ALPRAZolam (XANAX) 0 25 mg tablet, Take 0 25 mg by mouth daily at bedtime as needed for anxiety, Disp: , Rfl:     benzonatate (TESSALON) 150 MG CAPS, Take 1 capsule (150 mg total) by mouth 3 (three) times a day as needed for cough, Disp: 20 capsule, Rfl: 0    budesonide-formoterol (SYMBICORT) 160-4 5 mcg/act inhaler, Inhale 1 puff 2 (two) times a day, Disp: 1 Inhaler, Rfl: 0    carvedilol (COREG) 12 5 mg tablet, Take 12 5 mg by mouth 2 (two) times a day, Disp: , Rfl:     ipratropium (ATROVENT) 0 02 % nebulizer solution, Take 2 5 mL by nebulization 4 (four) times a day for 30 days, Disp: 300 mL, Rfl: 0    Ivabradine HCl (CORLANOR) 5 MG TABS, Take 5 mg by mouth daily  , Disp: , Rfl:     levalbuterol (XOPENEX HFA) 45 mcg/act inhaler, Inhale 2 puffs every 6 (six) hours as needed, Disp: , Rfl:     levothyroxine 137 mcg tablet, Take 137 mcg by mouth daily, Disp: , Rfl:     pantoprazole (PROTONIX) 40 mg tablet, Take 1 tablet by mouth daily, Disp: , Rfl:     POTASSIUM CHLORIDE ER PO, Take 10 mEq by mouth daily, Disp: , Rfl:     rivaroxaban (XARELTO) 20 mg tablet, Take 1 tablet by mouth daily, Disp: , Rfl:     simvastatin (ZOCOR) 40 mg tablet, Take 40 mg by mouth daily at bedtime, Disp: , Rfl:     torsemide (DEMADEX) 10 mg tablet, Take 20 mg by mouth daily  , Disp: , Rfl:     [START ON 3/8/2018] digoxin (LANOXIN) 0 125 mg tablet, Take 1 tablet (125 mcg total) by mouth 2 (two) times a week, Disp: 10 tablet, Rfl: 3    predniSONE 5 mg tablet, 20 mg for 3 days, then take 10 mg for 3 days then take 5 mg for 3 days then stop, Disp: 30 tablet, Rfl: 0    sacubitril-valsartan (ENTRESTO) 24-26 MG TABS, Take 1 tablet by mouth 2 (two) times a day, Disp: 60 tablet, Rfl: 0    spironolactone (ALDACTONE) 25 mg tablet, Take 0 5 tablets (12 5 mg total) by mouth daily, Disp: 30 tablet, Rfl: 3    Vitals: Blood pressure 138/70, pulse 72, height 5' 6" (1 676 m), weight 122 kg (268 lb 3 2 oz), SpO2 97 %  Body mass index is 43 29 kg/m²  Vitals:    03/06/18 1011   Weight: 122 kg (268 lb 3 2 oz)     BP Readings from Last 3 Encounters:   03/06/18 138/70   02/20/18 138/84   02/12/18 132/82       Physical Exam    Neurologic:  Alert & oriented x 3, no new focal deficits, Not in any acute distress,  Constitutional:  Well developed, well nourished, non-toxic appearance   Eyes:  Pupil equal and reacting to light, conjunctiva normal, No JVP, No LNP   HENT:  Atraumatic, oropharynx moist, Neck- normal range of motion, no tenderness,  Neck supple   Respiratory:  Bilateral air entry, mostly clear to auscultation  Cardiovascular: S1-S2 regular with a 2/6 ejection systolic murmur and S4 is present  GI:  Soft, nondistended, normal bowel sounds, nontender, no hepatosplenomegaly appreciated  Musculoskeletal:  No edema, no tenderness, no deformities     Skin:  Well hydrated, no rash   Lymphatic:  No lymphadenopathy noted   Extremities:  mild edema and distal pulses are present    Diagnostic Studies Review Cardio:    Echocardiogram/VANNESA: Echo Doppler done in February 2016 shows EF 25-30%, pacemaker in the right-sided chambers, thickened aortic valve without stenosis, moderate to severe mitral regurgitation  Repeat echo Doppler done February 20, 2018  LV is markedly dilated EF 25%, mild LVH, right atrium mildly dilated mild MR and trace TR which was insufficient to measure pulmonary artery pressure  Pacemaker Wire in right-sided chambers  Catheterization: Cardiac catheter patient done in 2013 was EF 30%, no evidence of significant obstructive coronary artery disease  This was done in outside hospital       ICD/ Pacer Interpretation Single-chamber ICD interrogation shows patient [facility] ICD is working adequately is set at a rate of the VVI 60  ECG Report:      Comparison to prior ECGs: no interval change  01/22/2018  Twelve lead EKG shows normal sinus rhythm heart rate 81 beats per minute  Poor R-wave progression  No change from old EKG  Repeat 12 lead EKG on 03/08/2000 18 shows normal sinus rhythm  Rare PVCs heart rate 78 beats per minute  Prolonged QTC  Imaging:  Chest X-Ray:   Xr Chest 2 Views    Result Date: 4/9/2017  Impression Stable cardiomegaly without active pulmonary disease   Workstation performed: [id number]     Lab Review   Lab Results   Component Value Date    WBC 9 30 07/25/2017    HGB 12 4 07/25/2017    HCT 38 1 07/25/2017    MCV 83 07/25/2017     07/25/2017     Lab Results   Component Value Date     07/25/2017    K 4 1 07/25/2017     07/25/2017    CO2 27 07/25/2017    ANIONGAP 10 07/25/2017    BUN 10 07/25/2017    CREATININE 0 97 07/25/2017    GLUCOSE 105 07/25/2017    CALCIUM 9 0 07/25/2017    AST 15 07/25/2017    ALT 20 07/25/2017    ALKPHOS 75 07/25/2017    PROT 7 1 07/25/2017    BILITOT 0 40 07/25/2017    EGFR 72 07/25/2017     Lab Results   Component Value Date    GLUCOSE 105 07/25/2017    CALCIUM 9 0 07/25/2017     07/25/2017    K 4 1 07/25/2017    CO2 27 07/25/2017     07/25/2017    BUN 10 07/25/2017    CREATININE 0 97 07/25/2017         Lab Results   Component Value Date    HGBA1C 6 2 (H) 06/06/2016         Chrissy Benoit MD [facility] [place]      "This note has been constructed using a voice recognition system  Therefore there may be syntax, spelling, and/or grammatical errors   Please call if you have any questions  "

## 2018-03-06 ENCOUNTER — TRANSCRIBE ORDERS (OUTPATIENT)
Dept: ADMINISTRATIVE | Facility: HOSPITAL | Age: 63
End: 2018-03-06

## 2018-03-06 ENCOUNTER — OFFICE VISIT (OUTPATIENT)
Dept: CARDIOLOGY CLINIC | Facility: CLINIC | Age: 63
End: 2018-03-06
Payer: COMMERCIAL

## 2018-03-06 VITALS
BODY MASS INDEX: 43.1 KG/M2 | HEART RATE: 72 BPM | WEIGHT: 268.2 LBS | SYSTOLIC BLOOD PRESSURE: 138 MMHG | DIASTOLIC BLOOD PRESSURE: 70 MMHG | HEIGHT: 66 IN | OXYGEN SATURATION: 97 %

## 2018-03-06 DIAGNOSIS — E78.5 DYSLIPIDEMIA: ICD-10-CM

## 2018-03-06 DIAGNOSIS — E66.8 MODERATE OBESITY: ICD-10-CM

## 2018-03-06 DIAGNOSIS — I50.42 CHRONIC COMBINED SYSTOLIC AND DIASTOLIC HEART FAILURE, NYHA CLASS 2 (HCC): Primary | ICD-10-CM

## 2018-03-06 DIAGNOSIS — I42.0 DILATED CARDIOMYOPATHY (HCC): ICD-10-CM

## 2018-03-06 DIAGNOSIS — J45.21 MILD INTERMITTENT ASTHMA WITH ACUTE EXACERBATION: ICD-10-CM

## 2018-03-06 DIAGNOSIS — I48.0 PAROXYSMAL ATRIAL FIBRILLATION (HCC): ICD-10-CM

## 2018-03-06 DIAGNOSIS — R06.00 DYSPNEA ON EXERTION: ICD-10-CM

## 2018-03-06 PROCEDURE — 93000 ELECTROCARDIOGRAM COMPLETE: CPT | Performed by: INTERNAL MEDICINE

## 2018-03-06 PROCEDURE — 99214 OFFICE O/P EST MOD 30 MIN: CPT | Performed by: INTERNAL MEDICINE

## 2018-03-06 RX ORDER — DIGOXIN 125 MCG
125 TABLET ORAL 2 TIMES WEEKLY
Qty: 10 TABLET | Refills: 3 | Status: SHIPPED | OUTPATIENT
Start: 2018-03-08 | End: 2018-07-26

## 2018-03-06 RX ORDER — SPIRONOLACTONE 25 MG/1
12.5 TABLET ORAL DAILY
Qty: 30 TABLET | Refills: 3 | Status: SHIPPED | OUTPATIENT
Start: 2018-03-06 | End: 2018-10-09 | Stop reason: SDUPTHER

## 2018-03-06 NOTE — PATIENT INSTRUCTIONS
Heart Failure   AMBULATORY CARE:   Heart failure (HF) is a condition that does not allow your heart to fill or pump properly  Not enough oxygen in your blood gets to your organs and tissues  HF can occur in the right side, the left side, or both lower chambers of your heart  HF is often caused by damage or injury to your heart  The damage may be caused by heart attack, other heart conditions, or high blood pressure  HF is a long-term condition that tends to get worse over time  It is important to manage your health to improve your quality of life  HF can be worsened by heavy alcohol use, smoking, diabetes that is not controlled, or obesity  Common signs and symptoms:   · Difficulty breathing with activity that worsens to difficulty breathing at rest    · Shortness of breath while lying flat    · Severe shortness of breath and coughing at night that usually wakes you     · Chest pain at night    · Periods of no breathing, then breathing fast    · Fatigue or lack of energy (often worsened by physical activity)     · Swelling in your ankles, legs, or abdomen    · Fast heartbeat, purple color around your mouth and nailbeds    · Fingers and toes cool to the touch  Call 911 if:   · You have any of the following signs of a heart attack:      ¨ Squeezing, pressure, or pain in your chest that lasts longer than 5 minutes or returns    ¨ Discomfort or pain in your back, neck, jaw, stomach, or arm     ¨ Trouble breathing    ¨ Nausea or vomiting    ¨ Lightheadedness or a sudden cold sweat, especially with chest pain or trouble breathing    Seek care immediately if:   · You gain 3 or more pounds (1 4 kg) in a day, or more than your healthcare provider says you should  · Your heartbeat is fast, slow, or uneven all the time    Contact your healthcare provider if:   · You have symptoms of worsening HF:      ¨ Shortness of breath at rest, at night, or that is getting worse in any way     ¨ Weight gain of 5 or more pounds (2 2 kg) in a week     ¨ More swelling in your legs or ankles     ¨ Abdominal pain or swelling     ¨ More coughing     ¨ Loss of appetite     ¨ Feeling tired all the time    · You feel hopeless or depressed, or you have lost interest in things you used to enjoy  · You often feel worried or afraid  · You have questions or concerns about your condition or care  Treatment for HF  may include any of the following:  · Medicines  may be needed to help regulate your heart rhythm  You may also need medicines to lower your blood pressure, and to get rid of extra fluids  · Oxygen  may help you breathe easier if your oxygen level is lower than normal  A CPAP machine may be used to keep your airway open while you sleep  · Surgery  can be done to implant a pacemaker in your chest to regulate your heart rhythm  Other types of surgery can open blocked heart vessels, replace a damaged heart valve, or remove scar tissue  Manage or prevent HF:   · Do not smoke  Nicotine and other chemicals in cigarettes and cigars can cause lung damage and make HF difficult to manage  Ask your healthcare provider for information if you currently smoke and need help to quit  E-cigarettes or smokeless tobacco still contain nicotine  Talk to your healthcare provider before you use these products  · Do not drink alcohol or take illegal drugs  Alcohol and drugs can worsen your symptoms quickly  · Weigh yourself every morning  Use the same scale, in the same spot  Do this after you use the bathroom, but before you eat or drink anything  Wear the same type of clothing  Do not wear shoes  Record your weight each day so you will notice any sudden weight gain  Swelling and weight gain are signs of fluid retention  If you are overweight, ask how to lose weight safely  · Check your blood pressure and heart rate every day  Ask for more information about how to measure your blood pressure and heart rate correctly   Ask what these numbers should be for you  · Manage any chronic health conditions you have  These include high blood pressure, diabetes, obesity, high cholesterol, metabolic syndrome, and COPD  You will have fewer symptoms if you manage these health conditions  Follow your healthcare provider's recommendations and follow up with him or her regularly  · Eat heart-healthy foods and limit sodium (salt)  An easy way to do this is to eat more fresh fruits and vegetables and fewer canned and processed foods  Replace butter and margarine with heart-healthy oils such as olive oil and canola oil  Other heart-healthy foods include walnuts, whole-grain breads, low-fat dairy products, beans, and lean meats  Fatty fish such as salmon and tuna are also heart healthy  Ask how much salt you can eat each day  Do not use salt substitutes  · Drink liquids as directed  You may need to limit the amount of liquids you drink if you retain fluid  Ask how much liquid to drink each day and which liquids are best for you  · Stay active  If you are not active, your symptoms are likely to worsen quickly  Walking, bicycling, and other types of physical activity help maintain your strength and improve your mood  Physical activity also helps you manage your weight  Work with your healthcare provider to create an exercise plan that is right for you  · Get vaccines as directed  Get a flu shot every year  You may also need the pneumonia vaccine  The flu and pneumonia can be severe for a person who has HF  Vaccines protect you from these infections  Join a support group:  Living with HF can be difficult  It may be helpful to talk with others who have HF  You may learn how to better manage your condition or get emotional support  For more information:   · [address]  [place] , [place]   Phone: 5- 801 - [phone]  Web Address: https://[employer]/  org   Follow up with your healthcare provider or cardiologist within 2 weeks or as directed: You may need to return for other tests  You may need home health care  A healthcare provider will monitor your vital signs, weight, and make sure your medicines are working  Write down your questions so you remember to ask them during your visits  © 2017 [employer] Information is for End User's use only and may not be sold, redistributed or otherwise used for commercial purposes  All illustrations and images included in CareNotes® are the copyrighted property of [employer] , [employer]  or Artem Schmitt  The above information is an  only  It is not intended as medical advice for individual conditions or treatments  Talk to your doctor, nurse or pharmacist before following any medical regimen to see if it is safe and effective for you

## 2018-03-07 LAB
BUN SERPL-MCNC: 15 MG/DL (ref 8–27)
BUN/CREAT SERPL: 14 (ref 12–28)
CALCIUM SERPL-MCNC: 9.5 MG/DL (ref 8.7–10.3)
CHLORIDE SERPL-SCNC: 101 MMOL/L (ref 96–106)
CO2 SERPL-SCNC: 25 MMOL/L (ref 18–29)
CREAT SERPL-MCNC: 1.04 MG/DL (ref 0.57–1)
GLUCOSE SERPL-MCNC: 99 MG/DL (ref 65–99)
POTASSIUM SERPL-SCNC: 3.7 MMOL/L (ref 3.5–5.2)
SL AMB EGFR AFRICAN AMERICAN: 67 ML/MIN/1.73
SL AMB EGFR NON AFRICAN AMERICAN: 58 ML/MIN/1.73
SODIUM SERPL-SCNC: 145 MMOL/L (ref 134–144)

## 2018-03-07 NOTE — PROGRESS NOTES
"  Plan  PMH: Spasm of muscle    · TiZANidine HCl - 2 MG Oral Tablet; TAKE 1 TABLET 3 times daily PRN   Rx By: Valentino Mas; Dispense: 30 Days ; #:90 Tablet; Refill: 1; For: PMH: Spasm of muscle; [place] = N; Verified Transmission to [facility]; Last Updated By: System, Helijia; 11/9/2017 5:51:54 PM    Results/Data  Cardiac Device In Clinic [id number] 02:18PM Hernandez Antis     Test Name Result Flag Reference   MISCELLANEOUS COMMENT (Report)     DEVICE INTERROGATED IN THE [facility] OFFICE: NP TO DEVICE CLINIC  BATTERY VOLTAGE ADEQUATE (5 8 YR)   < 1%  ALL LEAD PARAMETERS WITHIN NORMAL LIMITS  NO SIGNIFICANT HIGH RATE EPISODES  NO PROGRAMMING CHANGES MADE TO DEVICE PARAMETERS  CORVUE IMPEDANCE MONITORING WITHIN NORMAL LIMITS  NORMAL ICD FUNCTION  RG   Cardiac Electrophysiology Report      [id number]  pdf   DEVICE TYPE ICD       Cardiac Electrophysiology Report [id number] 02:18PM Hernandez Antis     Test Name Result Flag Reference   Cardiac Electrophysiology Report      [id number]  pdf     Signatures   Electronically signed by : Dio Davies, ; Nov 13 2017  9:52AM EST                       (Author)    Electronically signed by : DORIE Smith ; Nov 13 2017 12:44PM EST                       (Author)    "

## 2018-03-07 NOTE — PROGRESS NOTES
Plan  PMH: Spasm of muscle    · TiZANidine HCl - 2 MG Oral Tablet; TAKE 1 TABLET 3 times daily PRN   Rx By: Calli Nava; Dispense: 30 Days ; #:90 Tablet;  Refill: 1; For: PMH: Spasm of muscle; [place] = N; Verified Transmission to [facility] [place] [address] [place] ; Last Updated By: System Xtract; 11/9/2017 5:51:54 PM    Discussion/Summary  Normal device function      Signatures   Electronically signed by : DORIE Stout ; Nov 13 2017 12:46PM EST                       (Author)

## 2018-03-08 ENCOUNTER — TELEPHONE (OUTPATIENT)
Dept: CARDIOLOGY CLINIC | Facility: CLINIC | Age: 63
End: 2018-03-08

## 2018-03-09 ENCOUNTER — CLINICAL SUPPORT (OUTPATIENT)
Dept: CARDIOLOGY CLINIC | Facility: CLINIC | Age: 63
End: 2018-03-09
Payer: COMMERCIAL

## 2018-03-09 DIAGNOSIS — I50.42 CHRONIC COMBINED SYSTOLIC AND DIASTOLIC HEART FAILURE (HCC): Primary | ICD-10-CM

## 2018-03-09 DIAGNOSIS — Z95.810 PRESENCE OF AUTOMATIC CARDIOVERTER/DEFIBRILLATOR (AICD): ICD-10-CM

## 2018-03-09 DIAGNOSIS — I48.0 PAROXYSMAL ATRIAL FIBRILLATION (HCC): ICD-10-CM

## 2018-03-09 PROCEDURE — 93295 DEV INTERROG REMOTE 1/2/MLT: CPT | Performed by: INTERNAL MEDICINE

## 2018-03-09 PROCEDURE — 93297 REM INTERROG DEV EVAL ICPMS: CPT | Performed by: INTERNAL MEDICINE

## 2018-03-09 PROCEDURE — 93296 REM INTERROG EVL PM/IDS: CPT | Performed by: INTERNAL MEDICINE

## 2018-03-12 NOTE — PROGRESS NOTES
[facility] TRANSMISSION:(ICD)   BATTERY VOLTAGE ADEQUATE  (5 6 YRS)   <1%  ALL AVAILABLE LEAD PARAMETERS WITHIN NORMAL LIMITS  NO SIGNIFICANT HIGH RATE EPISODES  CORVUE IMPEDANCE MONITORING WITHIN NORMAL LIMITS  NORMAL DEVICE FUNCTION  ---WALLACE

## 2018-03-14 ENCOUNTER — TELEPHONE (OUTPATIENT)
Dept: CARDIOLOGY CLINIC | Facility: CLINIC | Age: 63
End: 2018-03-14

## 2018-03-14 NOTE — TELEPHONE ENCOUNTER
PT called believes she may have had an allergic reaction to the medication Lanoxin (digoxin)  Same day as beginning the med she broke out with rash, PT reported she took benadryl and symptoms subsided  Spoke to Dr Gomez explained situation he suggested PT hold medication  Spoke to PT and gave instructions as per Dr Gomez  PT verbalized understanding  Please follow up with PT on Monday

## 2018-03-19 NOTE — TELEPHONE ENCOUNTER
Spoke to PT wants to know if you want her to take another medication in place of the digoxin  PT also states with the dosage of tursomide and lina she is still having muscle spasm  Also wants to know if its safe foe her to travel

## 2018-03-27 DIAGNOSIS — I50.42 CHRONIC COMBINED SYSTOLIC AND DIASTOLIC HEART FAILURE (HCC): Primary | ICD-10-CM

## 2018-04-02 DIAGNOSIS — I48.0 PAROXYSMAL ATRIAL FIBRILLATION (HCC): Primary | ICD-10-CM

## 2018-04-02 RX ORDER — RIVAROXABAN 20 MG/1
TABLET, FILM COATED ORAL
Qty: 90 TABLET | Refills: 0 | Status: SHIPPED | OUTPATIENT
Start: 2018-04-02 | End: 2018-07-26

## 2018-04-05 LAB
BUN SERPL-MCNC: 16 MG/DL (ref 8–27)
BUN/CREAT SERPL: 14 (ref 12–28)
CALCIUM SERPL-MCNC: 10 MG/DL (ref 8.7–10.3)
CHLORIDE SERPL-SCNC: 101 MMOL/L (ref 96–106)
CO2 SERPL-SCNC: 26 MMOL/L (ref 18–29)
CREAT SERPL-MCNC: 1.11 MG/DL (ref 0.57–1)
GLUCOSE SERPL-MCNC: 106 MG/DL (ref 65–99)
POTASSIUM SERPL-SCNC: 4.5 MMOL/L (ref 3.5–5.2)
SL AMB EGFR AFRICAN AMERICAN: 61 ML/MIN/1.73
SL AMB EGFR NON AFRICAN AMERICAN: 53 ML/MIN/1.73
SODIUM SERPL-SCNC: 144 MMOL/L (ref 134–144)

## 2018-04-06 ENCOUNTER — TELEPHONE (OUTPATIENT)
Dept: CARDIOLOGY CLINIC | Facility: CLINIC | Age: 63
End: 2018-04-06

## 2018-04-09 ENCOUNTER — TELEPHONE (OUTPATIENT)
Dept: CARDIOLOGY CLINIC | Facility: CLINIC | Age: 63
End: 2018-04-09

## 2018-04-09 NOTE — TELEPHONE ENCOUNTER
----- Message from Param Dickerson MD sent at 4/7/2018  8:24 AM EDT -----  Patient labs are acceptable  Continue same medications  She is advised to follow up her appointment regularly

## 2018-04-23 DIAGNOSIS — I50.42 CHRONIC COMBINED SYSTOLIC AND DIASTOLIC HEART FAILURE, NYHA CLASS 2 (HCC): ICD-10-CM

## 2018-04-24 RX ORDER — ESCITALOPRAM OXALATE 10 MG/1
10 TABLET ORAL DAILY
Qty: 30 TABLET | Refills: 0 | Status: CANCELLED | OUTPATIENT
Start: 2018-04-24

## 2018-04-24 RX ORDER — TIZANIDINE 2 MG/1
2 TABLET ORAL 3 TIMES DAILY
Qty: 90 TABLET | Refills: 0 | Status: CANCELLED | OUTPATIENT
Start: 2018-04-24

## 2018-04-24 RX ORDER — VALSARTAN 80 MG/1
TABLET ORAL
Refills: 0 | COMMUNITY
Start: 2018-03-20 | End: 2018-07-26

## 2018-04-24 RX ORDER — PRAVASTATIN SODIUM 80 MG/1
TABLET ORAL
Refills: 0 | COMMUNITY
Start: 2018-03-20 | End: 2018-07-03 | Stop reason: SDUPTHER

## 2018-04-27 DIAGNOSIS — M62.830 BACK MUSCLE SPASM: ICD-10-CM

## 2018-04-27 DIAGNOSIS — E87.6 HYPOKALEMIA: Primary | ICD-10-CM

## 2018-04-27 DIAGNOSIS — I50.42 CHRONIC COMBINED SYSTOLIC AND DIASTOLIC HEART FAILURE, NYHA CLASS 2 (HCC): Primary | ICD-10-CM

## 2018-04-27 DIAGNOSIS — F41.9 ANXIETY: Primary | ICD-10-CM

## 2018-04-27 RX ORDER — TIZANIDINE HYDROCHLORIDE 2 MG/1
2 CAPSULE, GELATIN COATED ORAL 3 TIMES DAILY
Qty: 90 CAPSULE | Refills: 0 | OUTPATIENT
Start: 2018-04-27

## 2018-04-27 RX ORDER — ESCITALOPRAM OXALATE 10 MG/1
10 TABLET ORAL DAILY
Qty: 7 TABLET | Refills: 0 | Status: SHIPPED | OUTPATIENT
Start: 2018-04-27 | End: 2018-09-11 | Stop reason: ALTCHOICE

## 2018-04-27 RX ORDER — ESCITALOPRAM OXALATE 10 MG/1
10 TABLET ORAL DAILY
Qty: 30 TABLET | Refills: 0 | OUTPATIENT
Start: 2018-04-27

## 2018-04-27 RX ORDER — TIZANIDINE 2 MG/1
2 TABLET ORAL EVERY 8 HOURS PRN
Qty: 30 TABLET | Refills: 0 | Status: SHIPPED | OUTPATIENT
Start: 2018-04-27 | End: 2018-06-04 | Stop reason: SDUPTHER

## 2018-04-27 RX ORDER — TORSEMIDE 10 MG/1
TABLET ORAL
Qty: 30 TABLET | Refills: 3 | Status: SHIPPED | OUTPATIENT
Start: 2018-04-27 | End: 2018-06-11 | Stop reason: SDUPTHER

## 2018-04-27 RX ORDER — POTASSIUM CHLORIDE 750 MG/1
10 TABLET, FILM COATED, EXTENDED RELEASE ORAL DAILY
Qty: 30 TABLET | Refills: 0 | Status: SHIPPED | OUTPATIENT
Start: 2018-04-27 | End: 2018-04-30 | Stop reason: SDUPTHER

## 2018-04-30 DIAGNOSIS — E87.6 HYPOKALEMIA: ICD-10-CM

## 2018-04-30 DIAGNOSIS — K21.9 GASTROESOPHAGEAL REFLUX DISEASE WITHOUT ESOPHAGITIS: Primary | ICD-10-CM

## 2018-04-30 RX ORDER — PANTOPRAZOLE SODIUM 40 MG/1
40 TABLET, DELAYED RELEASE ORAL DAILY
Qty: 60 TABLET | Refills: 0 | Status: SHIPPED | OUTPATIENT
Start: 2018-04-30 | End: 2018-12-31 | Stop reason: SDUPTHER

## 2018-04-30 RX ORDER — POTASSIUM CHLORIDE 750 MG/1
10 TABLET, FILM COATED, EXTENDED RELEASE ORAL DAILY
Qty: 30 TABLET | Refills: 0 | Status: SHIPPED | OUTPATIENT
Start: 2018-04-30 | End: 2018-06-04 | Stop reason: SDUPTHER

## 2018-06-04 DIAGNOSIS — E87.6 HYPOKALEMIA: ICD-10-CM

## 2018-06-04 DIAGNOSIS — M62.830 BACK MUSCLE SPASM: ICD-10-CM

## 2018-06-05 RX ORDER — POTASSIUM CHLORIDE 750 MG/1
10 TABLET, FILM COATED, EXTENDED RELEASE ORAL DAILY
Qty: 30 TABLET | Refills: 6 | Status: SHIPPED | OUTPATIENT
Start: 2018-06-05 | End: 2019-01-16 | Stop reason: SDUPTHER

## 2018-06-05 RX ORDER — TIZANIDINE 2 MG/1
2 TABLET ORAL EVERY 8 HOURS PRN
Qty: 30 TABLET | Refills: 6 | Status: SHIPPED | OUTPATIENT
Start: 2018-06-05 | End: 2019-01-10 | Stop reason: SDUPTHER

## 2018-06-11 DIAGNOSIS — I50.42 CHRONIC COMBINED SYSTOLIC AND DIASTOLIC HEART FAILURE, NYHA CLASS 2 (HCC): ICD-10-CM

## 2018-06-11 RX ORDER — TORSEMIDE 10 MG/1
TABLET ORAL
Qty: 60 TABLET | Refills: 1 | Status: SHIPPED | OUTPATIENT
Start: 2018-06-11 | End: 2018-08-23 | Stop reason: SDUPTHER

## 2018-07-03 DIAGNOSIS — E78.5 DYSLIPIDEMIA: Primary | ICD-10-CM

## 2018-07-03 RX ORDER — PRAVASTATIN SODIUM 80 MG/1
80 TABLET ORAL DAILY
Qty: 90 TABLET | Refills: 3 | Status: SHIPPED | OUTPATIENT
Start: 2018-07-03 | End: 2019-07-14 | Stop reason: HOSPADM

## 2018-07-03 NOTE — TELEPHONE ENCOUNTER
Please confirm PT is still taking this medication, checked last note didn't see this medication on the current med list

## 2018-07-24 ENCOUNTER — TELEPHONE (OUTPATIENT)
Dept: CARDIOLOGY CLINIC | Facility: CLINIC | Age: 63
End: 2018-07-24

## 2018-07-25 NOTE — PROGRESS NOTES
Progress Note - Cardiology Office  [facility]  Ranjit Cavanaugh 61 y o  female MRN: [id number]  : 1955   Encounter: [id number]      Assessment:     1  Chronic combined systolic and diastolic heart failure, NYHA class 2 (HCC)    2  Paroxysmal atrial fibrillation ([address] )    3  Dilated cardiomyopathy ([address] )    4  Hypothyroidism due to acquired atrophy of thyroid    5  Mild intermittent asthma with acute exacerbation    6  Dyslipidemia    7  Moderate obesity    8  Dyspnea on exertion        Discussion Summary and Plan:  1  Nonischemic dilated cardiopathy myopathy  Patient has recent evidence of fluid overload  Medication adjustment will be made  Will add Aldactone 12 5 mg daily along with Demadex 10 mg to alternate with 20 mg daily  Patient is so far doing well  Will check electrolytes  2  Chronic systolic heart failure New [place] Heart Association class II/3  Patient has tolerated Entresto very well  Dose will be increased to we will check BMP  She was given some samples of medication  She is not taking digoxin  But she is taking Aldactone and Demadex  Will also continue coralanor as heart rate is still little high and her blood pressure is low  I would like her heart rate to be around 70 beats per minute  3  Status post [facility] ICD  Single-chamber it was interrogated and is functioning adequately     4  Bronchial asthma  Currently stable  5  History of paroxysmal at fibrillation currently in sinus rhythm  Continue antithrombotic therapy  Patient tolerating well     6  Hypothyroidism  She is on supplementation  7  Anxiety  She's now on Lexapro     8  Dyslipidemia  Patient now on Pravachol  9   Dyspnea on exertion  Patient dyspnea on exertion is better  She need to lose more weight  Continue low-dose diuretics  All issues discussed with patient at length including low-salt diet  Regular exercise  Counseling :  A description of the counseling    Issues related to heart failure, regular diet, weight all discussed at length  All her questions answered  Goals and Barriers  The patient has the current Goals: To stay out of heart failure  The patent has the current Barriers: Weight gain  Patient's ability to self care: Yes  Medication side effect reviewed with patient in detail and all their questions answered to their satisfaction  HPI :     Katelyn Bocanegra is a 61y o  year old female who came for follow up  Patient has with past medical history significant for nonischemic dilated cardiomyopathy, hypertension, bronchial asthma, moderate obesity, dyslipidemia, hypothyroidism, status post [facility] single-chamber ICD, mild nonobstructive disease by cardiac catheterization who came for regular follow-up and interrogation of her ICD  She has been doing pretty well she's she is on coralanor, she did she denies any chest pain, any shortness of breath  Recently she gained more weight as she was on steroids for bronchial asthma  Her ICD was interrogated today  No fever, no chills, no other significant complaint   /13/17 came for follow-up on nonischemic cardio myopathy, hypertension, obesity hypothyroidism and for ICD  She has been doing well  She has been on current medications  Somehow she was not taking valsartan  She denies any chest pain or any shortness of breath no leg edema no PND no orthopnea no nausea, no other significant complaint   /22/2018 came for follow-up  Above reviewed  She has gained some weight  Complaining about more shortness of breath  BMI is now 43  Denies any chest pain  She is taking now will certain and cholesterol medications  No PND no orthopnea  Heart rate is acceptable  No leg edema  No ICD firing  She had [address] ICD   03/06/2018  Above reviewed  Patient was recently in emergency room of a [facility] with evidence of fluid overload  I spoke to patient's ER doctor    She was prescribed additional dose of diuretic and felt better and was sent home  She has gained weight over the last few months  She is feeling now much better  Repeat echo shows her ejection fraction remains 20-25% and LV is dilated  Her leg edema has significantly improved  She is no longer wheezing  No cough no PND no orthopnea  No other significant complaint at this time  She came with her daughter  Review of Systems   Constitutional: Negative for activity change, chills, diaphoresis, fever and unexpected weight change  HENT: Negative for congestion  Eyes: Negative for discharge and redness  Respiratory: Negative for cough, chest tightness, shortness of breath and wheezing  Cardiovascular: Negative for chest pain and palpitations  Gastrointestinal: Negative for abdominal pain, diarrhea and nausea  Endocrine: Negative  Genitourinary: Negative for decreased urine volume and urgency  Musculoskeletal: Negative  Negative for arthralgias, back pain and gait problem  Skin: Negative for rash and wound  Allergic/Immunologic: Negative  Neurological: Negative for dizziness, seizures, syncope, light-headedness and headaches  Hematological: Negative  Psychiatric/Behavioral: Negative for agitation and confusion  The patient is nervous/anxious  All other systems reviewed and are negative        Historical Information   Past Medical History:   Diagnosis Date    A-fib ([facility] )     Abnormal blood sugar     Acid reflux     Acute bronchitis     Allergic reaction     Anxiety     Arthritis     Asthma     Asthma in adult, mild intermittent, uncomplicated     Cardiomyopathy (HCC)     Cellulitis of left lower leg     Chest pain     CH[facility]congestive heart failure) ([facility])     Constipation     COPD (chronic obstructive pulmonary disease) ([facility])     Depression with anxiety     Diabetes mellitus ([facility] )     Disease of thyroid gland     Diverticulitis     Dyslipidemia     Dyspnea on exertion     Elbow pain     Fracture of olecranon, open     Gastritis     Generalized pain     Hematuria     History of colonoscopy     Hypertension     Hypokalemia     Hypomagnesemia     Leg cramping     Moderate obesity     Morbid obesity due to excess calories (HCC)     Myalgia     Myositis     Nausea in adult     Nephrolithiasis     Prepatellar bursitis, unspecified knee     Screening for lipid disorders     Shortness of breath     Spasm of muscle     Thyroid trouble      Past Surgical History:   Procedure Laterality Date    CARDIAC DEFIBRILLATOR PLACEMENT      LAPAROSCOPIC CHOLECYSTECTOMY      TOTAL ABDOMINAL HYSTERECTOMY W/ BILATERAL SALPINGOOPHORECTOMY       History   Alcohol Use No     History   Drug Use No     History   Smoking Status    Former Smoker    Packs/day: 0 25    Years: 2 00    Quit date: 1982   Smokeless Tobacco    Never Used     Family History:   Family History   Problem Relation Age of Onset    Hypertension Sister     Cancer Sister     Coronary artery disease Family     Diabetes type II Family     Hypertension Family        Meds/Allergies     Allergies   Allergen Reactions    Omnipaque [Iohexol] Swelling    Penicillins Swelling    Iv Dye  [Iodinated Diagnostic Agents] Throat Swelling    Shellfish-Derived Products Hives       Current Outpatient Prescriptions:     carvedilol (COREG) 12 5 mg tablet, Take 12 5 mg by mouth 2 (two) times a day, Disp: , Rfl:     escitalopram (LEXAPRO) 10 mg tablet, Take 1 tablet (10 mg total) by mouth daily, Disp: 7 tablet, Rfl: 0    Ivabradine HCl (CORLANOR) 5 MG TABS, Take 5 mg by mouth daily  , Disp: , Rfl:     levalbuterol (XOPENEX HFA) 45 mcg/act inhaler, Inhale 2 puffs every 6 (six) hours as needed, Disp: , Rfl:     levothyroxine 137 mcg tablet, Take 137 mcg by mouth daily, Disp: , Rfl:     pantoprazole (PROTONIX) 40 mg tablet, Take 1 tablet (40 mg total) by mouth daily, Disp: 60 tablet, Rfl: 0    potassium chloride (K-DUR) 10 mEq tablet, Take 1 tablet (10 mEq total) by mouth daily, Disp: 30 tablet, Rfl: 6    pravastatin (PRAVACHOL) 80 mg tablet, Take 1 tablet (80 mg total) by mouth daily, Disp: 90 tablet, Rfl: 3    rivaroxaban (XARELTO) 20 mg tablet, Take 1 tablet (20 mg total) by mouth daily, Disp: 90 tablet, Rfl: 3    sacubitril-valsartan (ENTRESTO) 24-26 MG TABS, Take 1 tablet by mouth 2 (two) times a day, Disp: 180 tablet, Rfl: 3    spironolactone (ALDACTONE) 25 mg tablet, Take 0 5 tablets (12 5 mg total) by mouth daily, Disp: 30 tablet, Rfl: 3    tiZANidine (ZANAFLEX) 2 mg tablet, Take 1 tablet (2 mg total) by mouth every 8 (eight) hours as needed for muscle spasms, Disp: 30 tablet, Rfl: 6    torsemide (DEMADEX) 10 mg tablet, Patient taking 20 mg daily as per last office note , Disp: 60 tablet, Rfl: 1    ipratropium (ATROVENT) 0 02 % nebulizer solution, Take 2 5 mL by nebulization 4 (four) times a day for 30 days, Disp: 300 mL, Rfl: 0    Vitals: Blood pressure 122/80, pulse 90, height 5' 6" (1 676 m), weight 122 kg (270 lb), SpO2 100 %  Body mass index is 43 58 kg/m²  Vitals:    07/26/18 1525   Weight: 122 kg (270 lb)     BP Readings from Last 3 Encounters:   07/26/18 122/80   03/06/18 138/70   02/20/18 138/84       Physical Exam   Constitutional: She is oriented to person, place, and time  She appears well-developed  No distress  HENT:   Head: Normocephalic and atraumatic  Eyes: Pupils are equal, round, and reactive to light  Neck: Normal range of motion  Neck supple  No JVD present  No thyromegaly present  Cardiovascular:   S1-S2 irregular with heart rate around 90 beats per minute  2/6 ejection systolic murmur  Pulmonary/Chest: No respiratory distress  She has no wheezes  She has no rales  Bilateral air entry clear to auscultation even at bases  Abdominal: Soft  Bowel sounds are normal  She exhibits no distension  There is no tenderness  There is no rebound  Musculoskeletal: Normal range of motion  She exhibits no edema or tenderness     No edema Neurological: She is alert and oriented to person, place, and time  Skin: Skin is warm and dry  She is not diaphoretic  Psychiatric: She has a normal mood and affect  Her behavior is normal  Judgment normal      Diagnostic Studies Review Cardio:    Echocardiogram/VANNESA: Echo Doppler done in February 2016 shows EF 25-30%, pacemaker in the right-sided chambers, thickened aortic valve without stenosis, moderate to severe mitral regurgitation  Repeat echo Doppler done February 20, 2018  LV is markedly dilated EF 25%, mild LVH, right atrium mildly dilated mild MR and trace TR which was insufficient to measure pulmonary artery pressure  Pacemaker Wire in right-sided chambers  Catheterization: Cardiac catheter patient done in 2013 was EF 30%, no evidence of significant obstructive coronary artery disease  This was done in outside hospital       ICD/ Pacer Interpretation Single-chamber ICD interrogation shows patient [facility] ICD is working adequately is set at a rate of the VVI 60  ECG Report:      Comparison to prior ECGs: no interval change  01/22/2018  Twelve lead EKG shows normal sinus rhythm heart rate 81 beats per minute  Poor R-wave progression  No change from old EKG  Repeat 12 lead EKG on 03/08/2018 shows normal sinus rhythm  Rare PVCs heart rate 78 beats per minute  Prolonged QTC      07/26/2018 12 lead EKG shows normal sinus rhythm heart rate 90 beats per minute  Peak PVCs noted  IVCD otherwise no other significant ST changes  Her QRS duration is 128 milliseconds  Imaging:  Chest X-Ray:   Xr Chest 2 Views    Result Date: 4/9/2017  Impression Stable cardiomegaly without active pulmonary disease   Workstation performed: [id number]     Lab Review   Lab Results   Component Value Date    WBC 9 30 07/25/2017    HGB 12 4 07/25/2017    HCT 38 1 07/25/2017    MCV 83 07/25/2017     07/25/2017     Lab Results   Component Value Date     07/25/2017    K 4 1 07/25/2017     07/25/2017    CO2 27 07/25/2017    ANIONGAP 10 07/25/2017    BUN 16 04/04/2018    CREATININE 1 11 (H) 04/04/2018    GLUCOSE 105 07/25/2017    CALCIUM 9 0 07/25/2017    AST 15 07/25/2017    ALT 20 07/25/2017    ALKPHOS 75 07/25/2017    PROT 7 1 07/25/2017    BILITOT 0 40 07/25/2017    EGFR 72 07/25/2017     Lab Results   Component Value Date    GLUCOSE 105 07/25/2017    CALCIUM 9 0 07/25/2017     07/25/2017    K 4 1 07/25/2017    CO2 27 07/25/2017     07/25/2017    BUN 16 04/04/2018    CREATININE 1 11 (H) 04/04/2018         Lab Results   Component Value Date    HGBA1C 6 2 (H) 06/06/2016       Dr Cristi Rondon MD [facility]      "This note has been constructed using a voice recognition system  Therefore there may be syntax, spelling, and/or grammatical errors   Please call if you have any questions  "

## 2018-07-26 ENCOUNTER — OFFICE VISIT (OUTPATIENT)
Dept: CARDIOLOGY CLINIC | Facility: CLINIC | Age: 63
End: 2018-07-26
Payer: COMMERCIAL

## 2018-07-26 ENCOUNTER — TELEPHONE (OUTPATIENT)
Dept: CARDIOLOGY CLINIC | Facility: CLINIC | Age: 63
End: 2018-07-26

## 2018-07-26 VITALS
HEART RATE: 90 BPM | HEIGHT: 66 IN | BODY MASS INDEX: 43.39 KG/M2 | DIASTOLIC BLOOD PRESSURE: 80 MMHG | OXYGEN SATURATION: 100 % | WEIGHT: 270 LBS | SYSTOLIC BLOOD PRESSURE: 122 MMHG

## 2018-07-26 DIAGNOSIS — J45.21 MILD INTERMITTENT ASTHMA WITH ACUTE EXACERBATION: ICD-10-CM

## 2018-07-26 DIAGNOSIS — I50.22 CHRONIC SYSTOLIC HEART FAILURE (HCC): Primary | ICD-10-CM

## 2018-07-26 DIAGNOSIS — E03.4 HYPOTHYROIDISM DUE TO ACQUIRED ATROPHY OF THYROID: ICD-10-CM

## 2018-07-26 DIAGNOSIS — I42.0 DILATED CARDIOMYOPATHY (HCC): ICD-10-CM

## 2018-07-26 DIAGNOSIS — E78.5 DYSLIPIDEMIA: ICD-10-CM

## 2018-07-26 DIAGNOSIS — I50.42 CHRONIC COMBINED SYSTOLIC AND DIASTOLIC HEART FAILURE, NYHA CLASS 2 (HCC): ICD-10-CM

## 2018-07-26 DIAGNOSIS — R06.00 DYSPNEA ON EXERTION: ICD-10-CM

## 2018-07-26 DIAGNOSIS — I48.0 PAROXYSMAL ATRIAL FIBRILLATION (HCC): ICD-10-CM

## 2018-07-26 DIAGNOSIS — E66.8 MODERATE OBESITY: ICD-10-CM

## 2018-07-26 PROCEDURE — 93000 ELECTROCARDIOGRAM COMPLETE: CPT | Performed by: INTERNAL MEDICINE

## 2018-07-26 PROCEDURE — 99214 OFFICE O/P EST MOD 30 MIN: CPT | Performed by: INTERNAL MEDICINE

## 2018-07-26 NOTE — TELEPHONE ENCOUNTER
Samples:   entresto 49-51 mg  56 tabs  Lot   Exp 7/2019    corlanor 5 mg  28 tabs  Lot [id number]  Exp: 12/2018

## 2018-08-19 DIAGNOSIS — I42.0 DILATED CARDIOMYOPATHY (HCC): Primary | ICD-10-CM

## 2018-08-19 RX ORDER — CARVEDILOL 12.5 MG/1
TABLET ORAL
Qty: 180 TABLET | Refills: 1 | Status: SHIPPED | OUTPATIENT
Start: 2018-08-19 | End: 2019-02-14 | Stop reason: SDUPTHER

## 2018-08-23 DIAGNOSIS — I50.42 CHRONIC COMBINED SYSTOLIC AND DIASTOLIC HEART FAILURE, NYHA CLASS 2 (HCC): ICD-10-CM

## 2018-08-25 RX ORDER — TORSEMIDE 10 MG/1
TABLET ORAL
Qty: 60 TABLET | Refills: 1 | Status: SHIPPED | OUTPATIENT
Start: 2018-08-25 | End: 2018-10-18 | Stop reason: SDUPTHER

## 2018-09-11 ENCOUNTER — OFFICE VISIT (OUTPATIENT)
Dept: FAMILY MEDICINE CLINIC | Facility: CLINIC | Age: 63
End: 2018-09-11
Payer: COMMERCIAL

## 2018-09-11 VITALS
RESPIRATION RATE: 18 BRPM | SYSTOLIC BLOOD PRESSURE: 122 MMHG | WEIGHT: 273.44 LBS | DIASTOLIC BLOOD PRESSURE: 88 MMHG | BODY MASS INDEX: 43.94 KG/M2 | OXYGEN SATURATION: 99 % | TEMPERATURE: 97.3 F | HEART RATE: 106 BPM | HEIGHT: 66 IN
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
## 2018-09-11 PROCEDURE — 99213 OFFICE O/P EST LOW 20 MIN: CPT | Performed by: FAMILY MEDICINE

## 2018-09-11 PROCEDURE — 3008F BODY MASS INDEX DOCD: CPT | Performed by: FAMILY MEDICINE

## 2018-09-11 PROCEDURE — 3725F SCREEN DEPRESSION PERFORMED: CPT | Performed by: FAMILY MEDICINE

## 2018-09-11 RX ORDER — TRIAMCINOLONE ACETONIDE 5 MG/G
OINTMENT TOPICAL 2 TIMES DAILY
Qty: 30 G | Refills: 0 | Status: SHIPPED | OUTPATIENT
Start: 2018-09-11 | End: 2018-10-17 | Stop reason: ALTCHOICE

## 2018-09-11 NOTE — PROGRESS NOTES
Assessment/Plan:     Diagnoses and all orders for this visit:    Bug bite, initial encounter  -     triamcinolone (KENALOG) 0 5 % ointment; Apply topically 2 (two) times a day  - Pt also counseled on using [employer] original meat tenderizer from the grocery store which is known to help decrease inflammation in bug bites  Pt expressed understanding  All questions answered  - Discussed with Dr Mari Aguero  Subjective:      Patient ID: Dez Sarah is a 61 y o  female  HPI  60 yo F presents today for bug bites that started while she was on vacation last week at [facility] [place]  Bites are located on left arm, back, left leg and right leg  They are large, round, red, itchy, swollen and at times painful  Pt has tried OTC Benadryl topical and PO as well as Cortisone cream with minimal relief  She does not recall what bit her, but woke up with bites in her hotel room where she was sleeping alone  Denies fevers, chills, fatigue, HA, dizziness, N/V  The following portions of the patient's history were reviewed and updated as appropriate: allergies, current medications, past family history, past medical history, past social history, past surgical history and problem list     Review of Systems   Constitutional: Negative for activity change, appetite change, chills, diaphoresis, fatigue, fever and unexpected weight change  HENT: Negative  Respiratory: Negative for cough, shortness of breath and wheezing  Cardiovascular: Negative for chest pain, palpitations and leg swelling  Gastrointestinal: Negative for nausea and vomiting  Genitourinary: Negative  Musculoskeletal: Negative for arthralgias and myalgias  Skin:        As noted in HPI   Neurological: Negative for dizziness, weakness, light-headedness and headaches           Objective:      /88 (BP Location: Left arm, Patient Position: Sitting)   Pulse (!) 106   Temp (!) 97 3 °F (36 3 °C) (Tympanic)   Resp 18   Ht 5' 6" (1 676 m)   Wt 124 kg (273 lb 7 oz)   SpO2 99%   BMI 44 13 kg/m²          Physical Exam   Constitutional: She is oriented to person, place, and time  She appears well-developed and well-nourished  No distress  Musculoskeletal: Normal range of motion  She exhibits no edema, tenderness or deformity  Neurological: She is alert and oriented to person, place, and time  Skin: Skin is warm and dry  She is not diaphoretic  Multiple large (approx 1 5 cm x 1 5 cm) erythematous raised, round lesions throughout body on left arm, left leg, back, right arm

## 2018-09-11 NOTE — LETTER
September 11, 2018     Patient: Nelson Bryant   YOB: 1955   Date of Visit: 9/11/2018       To Whom it May Concern:    Juan Antonio Ash is under my professional care  She was seen in my office on 9/11/2018  Pt's diagnosis is consistent with bug bites, type of bug unclear  She is advised to use topical steroid ointment until resolution  If you have any questions or concerns, please don't hesitate to call           Sincerely,          Rani Dumas MD        CC: No Recipients

## 2018-09-12 ENCOUNTER — TELEPHONE (OUTPATIENT)
Dept: CARDIOLOGY CLINIC | Facility: CLINIC | Age: 63
End: 2018-09-12

## 2018-09-12 LAB
ALBUMIN SERPL-MCNC: 4.2 G/DL (ref 3.6–4.8)
ALBUMIN/GLOB SERPL: 1.8 {RATIO} (ref 1.2–2.2)
ALP SERPL-CCNC: 78 IU/L (ref 39–117)
ALT SERPL-CCNC: 13 IU/L (ref 0–32)
AST SERPL-CCNC: 15 IU/L (ref 0–40)
BILIRUB SERPL-MCNC: 0.3 MG/DL (ref 0–1.2)
BUN SERPL-MCNC: 12 MG/DL (ref 8–27)
BUN/CREAT SERPL: 11 (ref 12–28)
CALCIUM SERPL-MCNC: 9.3 MG/DL (ref 8.7–10.3)
CHLORIDE SERPL-SCNC: 102 MMOL/L (ref 96–106)
CO2 SERPL-SCNC: 26 MMOL/L (ref 20–29)
CREAT SERPL-MCNC: 1.05 MG/DL (ref 0.57–1)
GLOBULIN SER-MCNC: 2.4 G/DL (ref 1.5–4.5)
GLUCOSE SERPL-MCNC: 102 MG/DL (ref 65–99)
POTASSIUM SERPL-SCNC: 4.2 MMOL/L (ref 3.5–5.2)
PROT SERPL-MCNC: 6.6 G/DL (ref 6–8.5)
SL AMB EGFR AFRICAN AMERICAN: 65 ML/MIN/1.73
SL AMB EGFR NON AFRICAN AMERICAN: 57 ML/MIN/1.73
SODIUM SERPL-SCNC: 143 MMOL/L (ref 134–144)

## 2018-09-12 NOTE — TELEPHONE ENCOUNTER
----- Message from Chase Garcia MD sent at 9/12/2018 10:23 AM EDT -----  Patient labs are acceptable  Continue same medications  Patient is advised to follow up  appointment regularly  Please call patient about the  about results

## 2018-09-12 NOTE — PROGRESS NOTES
Patient labs are acceptable  Continue same medications  Patient is advised to follow up  appointment regularly  Please call patient about the  about results

## 2018-09-15 RX ORDER — LEVOTHYROXINE SODIUM 137 UG/1
TABLET ORAL
Qty: 90 TABLET | Refills: 0 | Status: CANCELLED | OUTPATIENT
Start: 2018-09-15

## 2018-09-17 DIAGNOSIS — E03.9 HYPOTHYROIDISM, UNSPECIFIED TYPE: Primary | ICD-10-CM

## 2018-09-18 RX ORDER — LEVOTHYROXINE SODIUM 137 UG/1
137 TABLET ORAL DAILY
Qty: 90 TABLET | Refills: 0 | Status: SHIPPED | OUTPATIENT
Start: 2018-09-18 | End: 2018-11-15

## 2018-09-25 ENCOUNTER — TELEPHONE (OUTPATIENT)
Dept: FAMILY MEDICINE CLINIC | Facility: CLINIC | Age: 63
End: 2018-09-25

## 2018-09-25 NOTE — TELEPHONE ENCOUNTER
PT CALLED TO REQUEST REFILL OF VOLTAREN GEL THAT SHE TAKES FOR JOINT PAIN  THIS IS ON HER MED LIST IN [facility]  REFILL WAS REFUSED BY DR Daron Saldana BUT PT SAID SHE HAS BEEN ON THIS MED FOREVER  ASKS IF YOU WILL REFILL  PLEASE CALL PT WITH ANY QUESTIONS OR IF YOU ARE NOT GOING TO REFILL  WAS HERE ON 9/11 WITH DR VENTURA FOR A SICK VISIT

## 2018-09-27 DIAGNOSIS — J45.21 MILD INTERMITTENT ASTHMA WITH ACUTE EXACERBATION: ICD-10-CM

## 2018-09-27 RX ORDER — BUDESONIDE AND FORMOTEROL FUMARATE DIHYDRATE 160; 4.5 UG/1; UG/1
AEROSOL RESPIRATORY (INHALATION)
Qty: 10.2 G | Refills: 0 | OUTPATIENT
Start: 2018-09-27

## 2018-09-27 RX ORDER — LEVALBUTEROL TARTRATE 45 MCG
HFA AEROSOL WITH ADAPTER (GRAM) INHALATION
Refills: 2 | Status: CANCELLED | OUTPATIENT
Start: 2018-09-27

## 2018-09-28 DIAGNOSIS — J45.21 MILD INTERMITTENT ASTHMA WITH EXACERBATION: Primary | ICD-10-CM

## 2018-09-28 RX ORDER — LEVALBUTEROL TARTRATE 45 UG/1
2 AEROSOL, METERED ORAL EVERY 6 HOURS PRN
Qty: 1 INHALER | Refills: 3 | Status: SHIPPED | OUTPATIENT
Start: 2018-09-28 | End: 2018-10-04 | Stop reason: SDUPTHER

## 2018-09-28 RX ORDER — BUDESONIDE AND FORMOTEROL FUMARATE DIHYDRATE 160; 4.5 UG/1; UG/1
2 AEROSOL RESPIRATORY (INHALATION) 2 TIMES DAILY
Qty: 1 INHALER | Refills: 3 | Status: SHIPPED | OUTPATIENT
Start: 2018-09-28 | End: 2018-10-04 | Stop reason: SDUPTHER

## 2018-10-01 DIAGNOSIS — M19.90 OSTEOARTHRITIS, UNSPECIFIED OSTEOARTHRITIS TYPE, UNSPECIFIED SITE: Primary | ICD-10-CM

## 2018-10-04 ENCOUNTER — TELEPHONE (OUTPATIENT)
Dept: FAMILY MEDICINE CLINIC | Facility: CLINIC | Age: 63
End: 2018-10-04

## 2018-10-04 DIAGNOSIS — J45.21 MILD INTERMITTENT ASTHMA WITH EXACERBATION: ICD-10-CM

## 2018-10-04 RX ORDER — LEVALBUTEROL TARTRATE 45 UG/1
2 AEROSOL, METERED ORAL EVERY 6 HOURS PRN
Qty: 1 INHALER | Refills: 5 | Status: SHIPPED | OUTPATIENT
Start: 2018-10-04

## 2018-10-04 RX ORDER — BUDESONIDE AND FORMOTEROL FUMARATE DIHYDRATE 160; 4.5 UG/1; UG/1
2 AEROSOL RESPIRATORY (INHALATION) 2 TIMES DAILY
Qty: 1 INHALER | Refills: 5 | Status: SHIPPED | OUTPATIENT
Start: 2018-10-04

## 2018-10-04 NOTE — TELEPHONE ENCOUNTER
DR Ruperto Neal - PT IS REQUESTING REFILL FOR VOLTAREN WHICH WAS SENT TO PHARM BY YOU , HOWEVER, IT NEEDS TO APPROVED BY HER [employer]  SHE SAID THE PHARM FAXED SOMETHING TO US FOR APPROVAL  DID YOU RECEIVE IT

## 2018-10-09 DIAGNOSIS — I50.42 CHRONIC COMBINED SYSTOLIC AND DIASTOLIC HEART FAILURE, NYHA CLASS 2 (HCC): ICD-10-CM

## 2018-10-09 RX ORDER — SPIRONOLACTONE 25 MG/1
12.5 TABLET ORAL DAILY
Qty: 30 TABLET | Refills: 5 | Status: SHIPPED | OUTPATIENT
Start: 2018-10-09 | End: 2019-09-03 | Stop reason: SDUPTHER

## 2018-10-17 ENCOUNTER — OFFICE VISIT (OUTPATIENT)
Dept: FAMILY MEDICINE CLINIC | Facility: CLINIC | Age: 63
End: 2018-10-17
Payer: COMMERCIAL

## 2018-10-17 VITALS
WEIGHT: 279 LBS | HEART RATE: 98 BPM | DIASTOLIC BLOOD PRESSURE: 74 MMHG | BODY MASS INDEX: 45.03 KG/M2 | OXYGEN SATURATION: 98 % | RESPIRATION RATE: 16 BRPM | SYSTOLIC BLOOD PRESSURE: 122 MMHG

## 2018-10-17 DIAGNOSIS — R51.9 ACUTE NONINTRACTABLE HEADACHE, UNSPECIFIED HEADACHE TYPE: Primary | ICD-10-CM

## 2018-10-17 PROCEDURE — 99213 OFFICE O/P EST LOW 20 MIN: CPT | Performed by: FAMILY MEDICINE

## 2018-10-18 DIAGNOSIS — I50.42 CHRONIC COMBINED SYSTOLIC AND DIASTOLIC HEART FAILURE, NYHA CLASS 2 (HCC): ICD-10-CM

## 2018-10-18 RX ORDER — TORSEMIDE 10 MG/1
TABLET ORAL
Qty: 60 TABLET | Refills: 1 | Status: SHIPPED | OUTPATIENT
Start: 2018-10-18 | End: 2018-12-12 | Stop reason: SDUPTHER

## 2018-10-18 NOTE — PROGRESS NOTES
Assessment/Plan:    No problem-specific Assessment & Plan notes found for this encounter  Headache   -advised patient to keep a headache diary log of when headaches are occurring and was associated symptoms occur  Advised to take Tylenol or Advil for the headaches and see if it resolves  At this time there is no neurological deficits  Will monitor and follow up in 1-2 weeks  There are no diagnoses linked to this encounter  Subjective:      Patient ID: Cricket Styles is a 61 y o  female  70-year-old female with a history of cardiomyopathy presents with left-sided headache  She states that it feels like someone is punching on the head  She has been having this for the past 2 weeks  It occurs at random times there is no pattern  No radiation  No alleviating or exacerbating factors  She denies any nausea, tearing from the eyes, association with food, or any relation to certain time of day  She has not tried any medication for it  She did call Neurology and make an appointment in January and was concern for stroke  She denies any visual auras or neurological deficits  She denies any chest pain, shortness of breath, or abdominal pain  Denies any recent stress  The following portions of the patient's history were reviewed and updated as appropriate: allergies, current medications, past family history, past medical history, past social history, past surgical history and problem list     Review of Systems   Constitutional: Negative for appetite change and fever  HENT: Negative for ear pain and sore throat  Eyes: Negative for photophobia and visual disturbance  Respiratory: Negative for shortness of breath  Cardiovascular: Negative for chest pain and leg swelling  Gastrointestinal: Negative for abdominal pain, diarrhea, nausea and vomiting  Musculoskeletal: Negative for arthralgias  Skin: Negative for color change  Neurological: Positive for headaches   Negative for dizziness, tremors and light-headedness  Psychiatric/Behavioral: Negative for agitation and behavioral problems  Objective:      /74   Pulse 98   Resp 16   Wt 127 kg (279 lb)   SpO2 98%   BMI 45 03 kg/m²          Physical Exam   Constitutional: She is oriented to person, place, and time  She appears well-developed and well-nourished  No distress  HENT:   Head: Normocephalic and atraumatic  Right Ear: External ear normal    Left Ear: External ear normal    Nose: Nose normal    Mouth/Throat: Oropharynx is clear and moist  No oropharyngeal exudate  Eyes: EOM are normal  Right eye exhibits no discharge  Left eye exhibits no discharge  Neck: Normal range of motion  Neck supple  Cardiovascular: Normal rate, regular rhythm, normal heart sounds and intact distal pulses  No murmur heard  Pulmonary/Chest: Effort normal and breath sounds normal  No respiratory distress  Abdominal: Soft  Bowel sounds are normal  She exhibits no distension  There is no tenderness  Musculoskeletal: Normal range of motion  She exhibits no edema  Neurological: She is alert and oriented to person, place, and time  She has normal reflexes  No cranial nerve deficit  She exhibits normal muscle tone  Skin: Skin is warm  Psychiatric: She has a normal mood and affect   Her behavior is normal

## 2018-11-08 ENCOUNTER — OFFICE VISIT (OUTPATIENT)
Dept: CARDIOLOGY CLINIC | Facility: CLINIC | Age: 63
End: 2018-11-08
Payer: COMMERCIAL

## 2018-11-08 VITALS
DIASTOLIC BLOOD PRESSURE: 78 MMHG | SYSTOLIC BLOOD PRESSURE: 126 MMHG | HEIGHT: 66 IN | HEART RATE: 86 BPM | OXYGEN SATURATION: 96 % | BODY MASS INDEX: 45.48 KG/M2 | WEIGHT: 283 LBS

## 2018-11-08 DIAGNOSIS — E78.5 DYSLIPIDEMIA: ICD-10-CM

## 2018-11-08 DIAGNOSIS — I50.42 CHRONIC COMBINED SYSTOLIC AND DIASTOLIC HEART FAILURE, NYHA CLASS 2 (HCC): ICD-10-CM

## 2018-11-08 DIAGNOSIS — I48.0 PAROXYSMAL ATRIAL FIBRILLATION (HCC): ICD-10-CM

## 2018-11-08 DIAGNOSIS — I47.2 VENTRICULAR TACHYCARDIA (HCC): ICD-10-CM

## 2018-11-08 DIAGNOSIS — E66.8 MODERATE OBESITY: ICD-10-CM

## 2018-11-08 DIAGNOSIS — E03.4 HYPOTHYROIDISM DUE TO ACQUIRED ATROPHY OF THYROID: ICD-10-CM

## 2018-11-08 DIAGNOSIS — J45.21 MILD INTERMITTENT ASTHMA WITH ACUTE EXACERBATION: ICD-10-CM

## 2018-11-08 DIAGNOSIS — I42.0 DILATED CARDIOMYOPATHY (HCC): ICD-10-CM

## 2018-11-08 DIAGNOSIS — R06.00 DYSPNEA ON EXERTION: ICD-10-CM

## 2018-11-08 DIAGNOSIS — I10 ESSENTIAL HYPERTENSION: ICD-10-CM

## 2018-11-08 PROCEDURE — 93000 ELECTROCARDIOGRAM COMPLETE: CPT | Performed by: INTERNAL MEDICINE

## 2018-11-08 PROCEDURE — 99215 OFFICE O/P EST HI 40 MIN: CPT | Performed by: INTERNAL MEDICINE

## 2018-11-08 RX ORDER — SACUBITRIL AND VALSARTAN 49; 51 MG/1; MG/1
1 TABLET, FILM COATED ORAL 2 TIMES DAILY
Refills: 0 | COMMUNITY
Start: 2018-10-18 | End: 2019-07-14 | Stop reason: HOSPADM

## 2018-11-08 NOTE — PROGRESS NOTES
Progress Note - Cardiology Office  [facility]  Anmol Kaur 61 y o  female MRN: [id number]  : 1955   Encounter: [id number]      Assessment:     1  Dilated cardiomyopathy ([facility] [address] )    2  Chronic combined systolic and diastolic heart failure, NYHA class 2 ([facility] [address] )    3  Essential hypertension    4  Paroxysmal atrial fibrillation (HCC)    5  Dyslipidemia    6  Mild intermittent asthma with acute exacerbation    7  Hypothyroidism due to acquired atrophy of thyroid    8  Moderate obesity    9  Dyspnea on exertion    10  Status post ICD shock for VFib arrest        Discussion Summary and Plan:  1  Status post ICD shock for ventricular arrhythmias  I reviewed those strips  I also spoke to E P  There appears to be post PVC pause and then arrhythmias  Heart rate seems to be regular  She does have history of atrial fibrillation  Spoke to EP  Will check electrolytes  Of CMP CBC fasting lipid TSH  She may need device upgrade to have dual-chamber device along with possible biventricular ICD as patient has QRS complex of 118 milliseconds  She will be referred to EP  2  Chronic systolic heart failure New York Heart Association class II/3  Patient has tolerated Entresto very well  No clinical evidence of heart failure or ischemia at this time  She is tolerating her medications well  Continue current Rx  Electrolyte has been ordered  3  Status post [facility]en ICD  Single-chamber it was interrogated had ventricular arrhythmia it will be upgraded  EP referral made    4  Bronchial asthma  Currently stable  5  History of paroxysmal at fibrillation currently in sinus rhythm  Continue antithrombotic therapy  Patient tolerating well  Due to history of atrial fibrillation it makes more sense to put a atrial lead  6  Hypothyroidism  She is on supplementation  7  Anxiety  She's now on Lexapro     8  Dyslipidemia  Patient now on Pravachol  She is tolerating it very well    9  Dyspnea on exertion  Patient dyspnea on exertion is better  She need to lose more weight  Continue low-dose diuretics  All issues discussed with patient at length including low-salt diet  Regular exercise  I spoke to patient and patient's daughter who came with her along with patient's sister who is a nurse  Issues related to atrial fibrillation, ventricle arrhythmias, ICD shock, need to upgrade device, infection all discussed at length  They agree with the plan referral was made  She will be seen by EPS as possible  They were told to go to hospital if she has any more ICD shocks  Labs were ordered  Counseling :  A description of the counseling  Issues related to heart failure, regular diet, weight all discussed at length  All her questions answered  Goals and Barriers  The patient has the current Goals: To stay out of heart failure  The patent has the current Barriers: Weight gain  Patient's ability to self care: Yes  Medication side effect reviewed with patient in detail and all their questions answered to their satisfaction  HPI :     Pepe Silva is a 61y o  year old female who came for follow up  Patient has with past medical history significant for nonischemic dilated cardiomyopathy, hypertension, bronchial asthma, moderate obesity, dyslipidemia, hypothyroidism, status post [facility] single-chamber ICD, mild nonobstructive disease by cardiac catheterization who came for regular follow-up and interrogation of her ICD  She has been doing pretty well she's she is on coralanor, she did she denies any chest pain, any shortness of breath  Recently she gained more weight as she was on steroids for bronchial asthma  Her ICD was interrogated today  No fever, no chills, no other significant complaint   /13/17 came for follow-up on nonischemic cardio myopathy, hypertension, obesity hypothyroidism and for ICD  She has been doing well  She has been on current medications   Somehow she was not taking valsartan  She denies any chest pain or any shortness of breath no leg edema no PND no orthopnea no nausea, no other significant complaint   /22/2018 came for follow-up  Above reviewed  She has gained some weight  Complaining about more shortness of breath  BMI is now 43  Denies any chest pain  She is taking now will certain and cholesterol medications  No PND no orthopnea  Heart rate is acceptable  No leg edema  No ICD firing  She had [address] ICD   03/06/2018  Above reviewed  Patient was recently in emergency room of a [facility] with evidence of fluid overload  I spoke to patient's ER doctor  She was prescribed additional dose of diuretic and felt better and was sent home  She has gained weight over the last few months  She is feeling now much better  Repeat echo shows her ejection fraction remains 20-25% and LV is dilated  Her leg edema has significantly improved  She is no longer wheezing  No cough no PND no orthopnea  No other significant complaint at this time  She came with her daughter  11/08/2018  Above reviewed  We got alert that patient had ICD shock yesterday  Patient was was aware of it but did not call us  It happened at 4:11 a m  Yesterday  She felt lightheaded and then she felt shock  Her strips were reviewed  Look like she had a post PVC pause and then went into to ventricular arrhythmia leading to VFib and ICD shock  She went in to sinus rhythm  She feels okay she had no significant issues  Her intrinsic heart rate is around 80 beats per minute  Her QRS is mildly wide  I did discuss her strips with EP  She did not do anything unusual   She has no leg swelling no shortness of breath no PND no orthopnea she does have history of nonischemic cardiomyopathy and she is very compliant with her medications  She does have history of atrial fibrillation but she has single-chamber device             Review of Systems   Constitutional: Negative for activity change, chills, diaphoresis, fever and unexpected weight change  HENT: Negative for congestion  Eyes: Negative for discharge and redness  Respiratory: Negative for cough, chest tightness, shortness of breath and wheezing  Cardiovascular: Negative for chest pain and palpitations  Recent ICD shock as mentioned above   Gastrointestinal: Negative for abdominal pain, diarrhea and nausea  Endocrine: Negative  Genitourinary: Negative for decreased urine volume and urgency  Musculoskeletal: Negative  Negative for arthralgias, back pain and gait problem  Skin: Negative for rash and wound  Allergic/Immunologic: Negative  Neurological: Negative for dizziness, seizures, syncope, light-headedness and headaches  Hematological: Negative  Psychiatric/Behavioral: Negative for agitation and confusion  The patient is nervous/anxious  All other systems reviewed and are negative        Historical Information   Past Medical History:   Diagnosis Date    A-fib ([place] [address] )     Abnormal blood sugar     Acid reflux     Acute bronchitis     Allergic reaction     Anxiety     Arthritis     Asthma     Asthma in adult, mild intermittent, uncomplicated     Cardiomyopathy ([place] [address] )     Cellulitis of left lower leg     Chest pain     [facility]     Constipation     COPD (chronic obstructive pulmonary disease) ([facility]     Depression with anxiety     Diabetes mellitus ([place] [address] )     Disease of thyroid gland     Diverticulitis     Dyslipidemia     Dyspnea on exertion     Elbow pain     Fracture of olecranon, open     Gastritis     Generalized pain     Hematuria     History of colonoscopy     Hypertension     Hypokalemia     Hypomagnesemia     Leg cramping     Moderate obesity     Morbid obesity due to excess calories ([facility])     Myalgia     Myositis     Nausea in adult     Nephrolithiasis     Prepatellar bursitis, unspecified knee     Screening for lipid disorders     Shortness of breath     Spasm of muscle     Thyroid trouble      Past Surgical History:   Procedure Laterality Date    CARDIAC DEFIBRILLATOR PLACEMENT      LAPAROSCOPIC CHOLECYSTECTOMY      TOTAL ABDOMINAL HYSTERECTOMY W/ BILATERAL SALPINGOOPHORECTOMY       History   Alcohol Use No     History   Drug Use No     History   Smoking Status    Never Smoker   Smokeless Tobacco    Never Used     Family History:   Family History   Problem Relation Age of Onset    Hypertension Sister     Cancer Sister     Coronary artery disease Family     Diabetes type II Family     Hypertension Family        Meds/Allergies     Allergies   Allergen Reactions    Omnipaque [Iohexol] Swelling    Penicillins Swelling    Iv Dye  [Iodinated Diagnostic Agents] Throat Swelling    Shellfish-Derived Products Hives       Current Outpatient Prescriptions:     budesonide-formoterol (SYMBICORT) 160-4 5 mcg/act inhaler, Inhale 2 puffs 2 (two) times a day Rinse mouth after use , Disp: 1 Inhaler, Rfl: 5    carvedilol (COREG) 12 5 mg tablet, take 1 tablet by mouth twice a day, Disp: 180 tablet, Rfl: 1    diclofenac sodium (VOLTAREN) 1 %, Apply 2 g topically 4 (four) times a day, Disp: 1 Tube, Rfl: 3    ENTRESTO 49-51 MG TABS, , Disp: , Rfl: 0    ivabradine HCl (CORLANOR) 5 MG tablet, Take 1 tablet (5 mg total) by mouth daily, Disp: 28 tablet, Rfl: 0    levalbuterol (XOPENEX HFA) 45 mcg/act inhaler, Inhale 2 puffs every 6 (six) hours as needed for shortness of breath, Disp: 1 Inhaler, Rfl: 5    levothyroxine 137 mcg tablet, Take 1 tablet (137 mcg total) by mouth daily, Disp: 90 tablet, Rfl: 0    pantoprazole (PROTONIX) 40 mg tablet, Take 1 tablet (40 mg total) by mouth daily, Disp: 60 tablet, Rfl: 0    potassium chloride (K-DUR) 10 mEq tablet, Take 1 tablet (10 mEq total) by mouth daily, Disp: 30 tablet, Rfl: 6    pravastatin (PRAVACHOL) 80 mg tablet, Take 1 tablet (80 mg total) by mouth daily, Disp: 90 tablet, Rfl: 3    rivaroxaban (XARELTO) 20 mg tablet, Take 1 tablet (20 mg total) by mouth daily, Disp: 90 tablet, Rfl: 3    spironolactone (ALDACTONE) 25 mg tablet, Take 0 5 tablets (12 5 mg total) by mouth daily, Disp: 30 tablet, Rfl: 5    tiZANidine (ZANAFLEX) 2 mg tablet, Take 1 tablet (2 mg total) by mouth every 8 (eight) hours as needed for muscle spasms, Disp: 30 tablet, Rfl: 6    torsemide (DEMADEX) 10 mg tablet, take 2 tablet by mouth once daily, Disp: 60 tablet, Rfl: 1    ipratropium (ATROVENT) 0 02 % nebulizer solution, Take 2 5 mL by nebulization 4 (four) times a day for 30 days, Disp: 300 mL, Rfl: 0    Vitals: Blood pressure 126/78, pulse 86, height 5' 6" (1 676 m), weight 128 kg (283 lb), SpO2 96 %  Body mass index is 45 68 kg/m²  Vitals:    11/08/18 1557   Weight: 128 kg (283 lb)     BP Readings from Last 3 Encounters:   11/08/18 126/78   10/17/18 122/74   09/11/18 122/88       Physical Exam   Constitutional: She is oriented to person, place, and time  She appears well-developed  No distress  HENT:   Head: Normocephalic and atraumatic  Eyes: Pupils are equal, round, and reactive to light  Neck: Normal range of motion  Neck supple  No JVD present  No thyromegaly present  Cardiovascular: Normal rate and regular rhythm  Murmur heard  S1-S2 regular with a 2/6 ejection   Pulmonary/Chest: Effort normal and breath sounds normal  No respiratory distress  She has no wheezes  She has no rales  Abdominal: Soft  Bowel sounds are normal  She exhibits no distension  There is no tenderness  There is no rebound  Musculoskeletal: Normal range of motion  She exhibits no edema or tenderness  Neurological: She is alert and oriented to person, place, and time  Skin: Skin is warm and dry  She is not diaphoretic  Psychiatric: She has a normal mood and affect   Her behavior is normal  Judgment normal      Diagnostic Studies Review Cardio:    Echocardiogram/VANNESA: Echo Doppler done in February 2016 shows EF 25-30%, pacemaker in the right-sided chambers, thickened aortic valve without stenosis, moderate to severe mitral regurgitation  Repeat echo Doppler done February 20, 2018  LV is markedly dilated EF 25%, mild LVH, right atrium mildly dilated mild MR and trace TR which was insufficient to measure pulmonary artery pressure  Pacemaker Wire in right-sided chambers  Catheterization: Cardiac catheter patient done in 2013 was EF 30%, no evidence of significant obstructive coronary artery disease  This was done in outside hospital       ICD/ Pacer Interpretation Single-chamber ICD interrogation shows patient [facility] ICD is working adequately is set at a rate of the VVI 60  ECG Report:      Comparison to prior ECGs: no interval change  01/22/2018  Twelve lead EKG shows normal sinus rhythm heart rate 81 beats per minute  Poor R-wave progression  No change from old EKG  Repeat 12 lead EKG on 03/08/2018 shows normal sinus rhythm  Rare PVCs heart rate 78 beats per minute  Prolonged QTC      07/26/2018 12 lead EKG shows normal sinus rhythm heart rate 90 beats per minute  Peak PVCs noted  IVCD otherwise no other significant ST changes  Her QRS duration is 118 milliseconds  Twelve lead EKG done today 11/08/2018 shows normal sinus rhythm  Incomplete LBBB with IVCD her QS duration is 118 milliseconds  Her device findings reviewed with her    Imaging:  Chest X-Ray:   Xr Chest 2 Views    Result Date: 4/9/2017  Impression Stable cardiomegaly without active pulmonary disease   Workstation performed: [id number]     Lab Review   Lab Results   Component Value Date    WBC 9 30 07/25/2017    HGB 12 4 07/25/2017    HCT 38 1 07/25/2017    MCV 83 07/25/2017     07/25/2017     Lab Results   Component Value Date     06/06/2016    K 4 2 09/11/2018     09/11/2018    CO2 26 09/11/2018    ANIONGAP 12 9 11/01/2015    BUN 12 09/11/2018    CREATININE 0 97 07/25/2017    GLUCOSE 95 06/06/2016    CALCIUM 9 0 07/25/2017    AST 15 07/25/2017    ALT 20 07/25/2017    ALKPHOS 75 07/25/2017    PROT 6 8 06/06/2016    BILITOT 0 3 06/06/2016    EGFR 72 07/25/2017     Lab Results   Component Value Date    GLUCOSE 95 06/06/2016    CALCIUM 9 0 07/25/2017     06/06/2016    K 4 2 09/11/2018    CO2 26 09/11/2018     09/11/2018    BUN 12 09/11/2018    CREATININE 0 97 07/25/2017         Lab Results   Component Value Date    HGBA1C 6 2 (H) 06/06/2016       Dr Mina Kovacs MD [facility]      "This note has been constructed using a voice recognition system  Therefore there may be syntax, spelling, and/or grammatical errors   Please call if you have any questions  "

## 2018-11-09 ENCOUNTER — TELEPHONE (OUTPATIENT)
Dept: CARDIOLOGY CLINIC | Facility: CLINIC | Age: 63
End: 2018-11-09

## 2018-11-09 NOTE — TELEPHONE ENCOUNTER
Patient called today with concerns/questions about her defibrillator  It went off and she is afraid  She has a lot of questions and would like a call back today  She may be reached at [phone]

## 2018-11-09 NOTE — TELEPHONE ENCOUNTER
I will call her  Unfortunately her insurance will not allow to go her to [place]  I spoke to Dr Ash Renteria at [place]  He will be contacting her  Please get me a copy of her labs

## 2018-11-10 ENCOUNTER — HOSPITAL ENCOUNTER (EMERGENCY)
Facility: HOSPITAL | Age: 63
Discharge: HOME | End: 2018-11-10
Attending: EMERGENCY MEDICINE | Admitting: EMERGENCY MEDICINE
Payer: COMMERCIAL

## 2018-11-10 ENCOUNTER — APPOINTMENT (EMERGENCY)
Dept: RADIOLOGY | Facility: HOSPITAL | Age: 63
End: 2018-11-10
Payer: COMMERCIAL

## 2018-11-10 VITALS
OXYGEN SATURATION: 97 % | WEIGHT: 256 LBS | DIASTOLIC BLOOD PRESSURE: 65 MMHG | RESPIRATION RATE: 18 BRPM | HEART RATE: 71 BPM | SYSTOLIC BLOOD PRESSURE: 174 MMHG | BODY MASS INDEX: 41.32 KG/M2 | TEMPERATURE: 98 F

## 2018-11-10 DIAGNOSIS — T82.9XXA PACEMAKER COMPLICATIONS, INITIAL ENCOUNTER: Primary | ICD-10-CM

## 2018-11-10 LAB
ALBUMIN SERPL BCP-MCNC: 3.9 G/DL (ref 3.5–5)
ALBUMIN SERPL-MCNC: 4.5 G/DL (ref 3.6–4.8)
ALBUMIN/GLOB SERPL: 1.7 {RATIO} (ref 1.2–2.2)
ALP SERPL-CCNC: 87 IU/L (ref 39–117)
ALP SERPL-CCNC: 88 U/L (ref 46–116)
ALT SERPL W P-5'-P-CCNC: 19 U/L (ref 12–78)
ALT SERPL-CCNC: 11 IU/L (ref 0–32)
ANION GAP SERPL CALCULATED.3IONS-SCNC: 9 MMOL/L (ref 4–13)
APTT PPP: 46 SECONDS (ref 24–36)
AST SERPL W P-5'-P-CCNC: 13 U/L (ref 5–45)
AST SERPL-CCNC: 17 IU/L (ref 0–40)
BASOPHILS # BLD AUTO: 0 X10E3/UL (ref 0–0.2)
BASOPHILS # BLD AUTO: 0.04 THOUSANDS/ΜL (ref 0–0.1)
BASOPHILS NFR BLD AUTO: 0 %
BASOPHILS NFR BLD AUTO: 1 % (ref 0–1)
BILIRUB SERPL-MCNC: 0.4 MG/DL (ref 0.2–1)
BILIRUB SERPL-MCNC: 0.4 MG/DL (ref 0–1.2)
BUN SERPL-MCNC: 13 MG/DL (ref 8–27)
BUN SERPL-MCNC: 20 MG/DL (ref 5–25)
BUN/CREAT SERPL: 11 (ref 12–28)
CALCIUM SERPL-MCNC: 9.1 MG/DL (ref 8.3–10.1)
CALCIUM SERPL-MCNC: 9.6 MG/DL (ref 8.7–10.3)
CHLORIDE SERPL-SCNC: 101 MMOL/L (ref 96–106)
CHLORIDE SERPL-SCNC: 102 MMOL/L (ref 100–108)
CO2 SERPL-SCNC: 25 MMOL/L (ref 20–29)
CO2 SERPL-SCNC: 28 MMOL/L (ref 21–32)
CREAT SERPL-MCNC: 1.18 MG/DL (ref 0.57–1)
CREAT SERPL-MCNC: 1.37 MG/DL (ref 0.6–1.3)
EOSINOPHIL # BLD AUTO: 0.3 X10E3/UL (ref 0–0.4)
EOSINOPHIL # BLD AUTO: 0.32 THOUSAND/ΜL (ref 0–0.61)
EOSINOPHIL NFR BLD AUTO: 4 %
EOSINOPHIL NFR BLD AUTO: 4 % (ref 0–6)
ERYTHROCYTE [DISTWIDTH] IN BLOOD BY AUTOMATED COUNT: 14.6 % (ref 11.6–15.1)
ERYTHROCYTE [DISTWIDTH] IN BLOOD BY AUTOMATED COUNT: 15.6 % (ref 12.3–15.4)
GFR SERPL CREATININE-BSD FRML MDRD: 47 ML/MIN/1.73SQ M
GLOBULIN SER-MCNC: 2.6 G/DL (ref 1.5–4.5)
GLUCOSE SERPL-MCNC: 100 MG/DL (ref 65–99)
GLUCOSE SERPL-MCNC: 103 MG/DL (ref 65–140)
HCT VFR BLD AUTO: 40.1 % (ref 34–46.6)
HCT VFR BLD AUTO: 42.3 % (ref 34.8–46.1)
HGB BLD-MCNC: 13.1 G/DL (ref 11.1–15.9)
HGB BLD-MCNC: 13.5 G/DL (ref 11.5–15.4)
IMM GRANULOCYTES # BLD AUTO: 0.02 THOUSAND/UL (ref 0–0.2)
IMM GRANULOCYTES # BLD: 0 X10E3/UL (ref 0–0.1)
IMM GRANULOCYTES NFR BLD AUTO: 0 % (ref 0–2)
IMM GRANULOCYTES NFR BLD: 0 %
INR PPP: 1.38 (ref 0.86–1.16)
LYMPHOCYTES # BLD AUTO: 2.4 X10E3/UL (ref 0.7–3.1)
LYMPHOCYTES # BLD AUTO: 2.76 THOUSANDS/ΜL (ref 0.6–4.47)
LYMPHOCYTES NFR BLD AUTO: 32 % (ref 14–44)
LYMPHOCYTES NFR BLD AUTO: 33 %
MAGNESIUM SERPL-MCNC: 2.1 MG/DL (ref 1.6–2.6)
MAGNESIUM SERPL-MCNC: 2.2 MG/DL (ref 1.6–2.3)
MCH RBC QN AUTO: 27.2 PG (ref 26.8–34.3)
MCH RBC QN AUTO: 27.4 PG (ref 26.6–33)
MCHC RBC AUTO-ENTMCNC: 31.9 G/DL (ref 31.4–37.4)
MCHC RBC AUTO-ENTMCNC: 32.7 G/DL (ref 31.5–35.7)
MCV RBC AUTO: 84 FL (ref 79–97)
MCV RBC AUTO: 85 FL (ref 82–98)
MONOCYTES # BLD AUTO: 0.5 X10E3/UL (ref 0.1–0.9)
MONOCYTES # BLD AUTO: 0.68 THOUSAND/ΜL (ref 0.17–1.22)
MONOCYTES NFR BLD AUTO: 6 %
MONOCYTES NFR BLD AUTO: 8 % (ref 4–12)
NEUTROPHILS # BLD AUTO: 4.1 X10E3/UL (ref 1.4–7)
NEUTROPHILS # BLD AUTO: 4.75 THOUSANDS/ΜL (ref 1.85–7.62)
NEUTROPHILS NFR BLD AUTO: 57 %
NEUTS SEG NFR BLD AUTO: 55 % (ref 43–75)
NRBC BLD AUTO-RTO: 0 /100 WBCS
NT-PROBNP SERPL-MCNC: 412 PG/ML
PLATELET # BLD AUTO: 324 X10E3/UL (ref 150–379)
PLATELET # BLD AUTO: 356 THOUSANDS/UL (ref 149–390)
PMV BLD AUTO: 9.7 FL (ref 8.9–12.7)
POTASSIUM SERPL-SCNC: 4 MMOL/L (ref 3.5–5.3)
POTASSIUM SERPL-SCNC: 4.1 MMOL/L (ref 3.5–5.2)
PROT SERPL-MCNC: 7.1 G/DL (ref 6–8.5)
PROT SERPL-MCNC: 7.8 G/DL (ref 6.4–8.2)
PROTHROMBIN TIME: 14.2 SECONDS (ref 9.4–11.7)
RBC # BLD AUTO: 4.78 X10E6/UL (ref 3.77–5.28)
RBC # BLD AUTO: 4.97 MILLION/UL (ref 3.81–5.12)
SL AMB EGFR AFRICAN AMERICAN: 57 ML/MIN/1.73
SL AMB EGFR NON AFRICAN AMERICAN: 49 ML/MIN/1.73
SODIUM SERPL-SCNC: 139 MMOL/L (ref 136–145)
SODIUM SERPL-SCNC: 141 MMOL/L (ref 134–144)
TROPONIN I SERPL-MCNC: <0.02 NG/ML
TSH SERPL DL<=0.005 MIU/L-ACNC: 10.27 UIU/ML (ref 0.45–4.5)
TSH SERPL DL<=0.05 MIU/L-ACNC: 6.52 UIU/ML (ref 0.36–3.74)
WBC # BLD AUTO: 7.3 X10E3/UL (ref 3.4–10.8)
WBC # BLD AUTO: 8.57 THOUSAND/UL (ref 4.31–10.16)

## 2018-11-10 PROCEDURE — 36415 COLL VENOUS BLD VENIPUNCTURE: CPT | Performed by: EMERGENCY MEDICINE

## 2018-11-10 PROCEDURE — 93005 ELECTROCARDIOGRAM TRACING: CPT

## 2018-11-10 PROCEDURE — 84443 ASSAY THYROID STIM HORMONE: CPT | Performed by: EMERGENCY MEDICINE

## 2018-11-10 PROCEDURE — 71045 X-RAY EXAM CHEST 1 VIEW: CPT

## 2018-11-10 PROCEDURE — 83735 ASSAY OF MAGNESIUM: CPT | Performed by: EMERGENCY MEDICINE

## 2018-11-10 PROCEDURE — 85610 PROTHROMBIN TIME: CPT | Performed by: EMERGENCY MEDICINE

## 2018-11-10 PROCEDURE — 85025 COMPLETE CBC W/AUTO DIFF WBC: CPT | Performed by: EMERGENCY MEDICINE

## 2018-11-10 PROCEDURE — 85730 THROMBOPLASTIN TIME PARTIAL: CPT | Performed by: EMERGENCY MEDICINE

## 2018-11-10 PROCEDURE — 80053 COMPREHEN METABOLIC PANEL: CPT | Performed by: EMERGENCY MEDICINE

## 2018-11-10 PROCEDURE — 83880 ASSAY OF NATRIURETIC PEPTIDE: CPT | Performed by: EMERGENCY MEDICINE

## 2018-11-10 PROCEDURE — 84484 ASSAY OF TROPONIN QUANT: CPT | Performed by: EMERGENCY MEDICINE

## 2018-11-10 PROCEDURE — 99284 EMERGENCY DEPT VISIT MOD MDM: CPT

## 2018-11-10 NOTE — ED PROVIDER NOTES
History  Chief Complaint   Patient presents with    Pacemaker Problem     her pacer went off on wednesday  states she can feel that its going to go off again  dr Rossana Aldana saw her on thursday  states " her heart had stopped and thats why it happened"       History provided by:  Patient   used: No      Patient is a 51-year-old female with cardiomyopathy, defibrillator/ICD in place who presents emergency department for pacemaker evaluation  States that she was in the kitchen today and felt brief period of lightheadedness and warmth which is symptoms she had prior to defibrillation on 11/07/2018  She had been seen after that by Dr Honorio Carr and was referred to occur physiology due to a dangerous rhythm  She has since felt fine  She denies any chest pain, lightheadedness, dizziness, shortness of breath  No aggravating or alleviating symptoms    Prior to Admission Medications   Prescriptions Last Dose Informant Patient Reported? Taking? ENTRESTO 49-51 MG TABS  Self Yes No   budesonide-formoterol (SYMBICORT) 160-4 5 mcg/act inhaler  Self No No   Sig: Inhale 2 puffs 2 (two) times a day Rinse mouth after use     carvedilol (COREG) 12 5 mg tablet  Self No No   Sig: take 1 tablet by mouth twice a day   diclofenac sodium (VOLTAREN) 1 %  Self No No   Sig: Apply 2 g topically 4 (four) times a day   ipratropium (ATROVENT) 0 02 % nebulizer solution  Self No No   Sig: Take 2 5 mL by nebulization 4 (four) times a day for 30 days   ivabradine HCl (CORLANOR) 5 MG tablet  Self No No   Sig: Take 1 tablet (5 mg total) by mouth daily   levalbuterol (XOPENEX HFA) 45 mcg/act inhaler  Self No No   Sig: Inhale 2 puffs every 6 (six) hours as needed for shortness of breath   levothyroxine 137 mcg tablet  Self No No   Sig: Take 1 tablet (137 mcg total) by mouth daily   pantoprazole (PROTONIX) 40 mg tablet  Self No No   Sig: Take 1 tablet (40 mg total) by mouth daily   potassium chloride (K-DUR) 10 mEq tablet  Self No No Sig: Take 1 tablet (10 mEq total) by mouth daily   pravastatin (PRAVACHOL) 80 mg tablet  Self No No   Sig: Take 1 tablet (80 mg total) by mouth daily   rivaroxaban (XARELTO) 20 mg tablet  Self No No   Sig: Take 1 tablet (20 mg total) by mouth daily   spironolactone (ALDACTONE) 25 mg tablet  Self No No   Sig: Take 0 5 tablets (12 5 mg total) by mouth daily   tiZANidine (ZANAFLEX) 2 mg tablet  Self No No   Sig: Take 1 tablet (2 mg total) by mouth every 8 (eight) hours as needed for muscle spasms   torsemide (DEMADEX) 10 mg tablet  Self No No   Sig: take 2 tablet by mouth once daily      Facility-Administered Medications: None       Past Medical History:   Diagnosis Date    A-fib ([facility] )     Abnormal blood sugar     Acid reflux     Acute bronchitis     Allergic reaction     Anxiety     Arthritis     Asthma     Asthma in adult, mild intermittent, uncomplicated     Cardiomyopathy (HCC)     Cellulitis of left lower leg     Chest pain     CHF (congestive heart failure) (HCC)     Constipation     COPD (chronic obstructive pulmonary disease) (HCC)     Depression with anxiety     Diabetes mellitus (HCC)     Disease of thyroid gland     Diverticulitis     Dyslipidemia     Dyspnea on exertion     Elbow pain     Fracture of olecranon, open     Gastritis     Generalized pain     Hematuria     History of colonoscopy     Hypertension     Hypokalemia     Hypomagnesemia     Leg cramping     Moderate obesity     Morbid obesity due to excess calories (HCC)     Myalgia     Myositis     Nausea in adult     Nephrolithiasis     Prepatellar bursitis, unspecified knee     Screening for lipid disorders     Shortness of breath     Spasm of muscle     Thyroid trouble        Past Surgical History:   Procedure Laterality Date    CARDIAC DEFIBRILLATOR PLACEMENT      LAPAROSCOPIC CHOLECYSTECTOMY      TOTAL ABDOMINAL HYSTERECTOMY W/ BILATERAL SALPINGOOPHORECTOMY         Family History   Problem Relation Age of Onset    Hypertension Sister     Cancer Sister     Coronary artery disease Family     Diabetes type II Family     Hypertension Family      I have reviewed and agree with the history as documented  Social History   Substance Use Topics    Smoking status: Never Smoker    Smokeless tobacco: Never Used    Alcohol use No        Review of Systems   Constitutional: Negative for activity change, appetite change, chills, diaphoresis, fatigue and fever  HENT: Negative for congestion, dental problem, ear discharge, facial swelling, nosebleeds, rhinorrhea, sinus pressure and trouble swallowing  Eyes: Negative for photophobia, discharge, itching and visual disturbance  Respiratory: Negative for choking, chest tightness and shortness of breath  Cardiovascular: Negative for chest pain, palpitations and leg swelling  Gastrointestinal: Negative for abdominal distention, abdominal pain, constipation, diarrhea, nausea and vomiting  Endocrine: Negative for polydipsia and polyphagia  Genitourinary: Negative for decreased urine volume, difficulty urinating, dysuria, flank pain, frequency, hematuria, vaginal bleeding and vaginal discharge  Musculoskeletal: Negative for back pain, gait problem, joint swelling, neck pain and neck stiffness  Skin: Negative for color change and rash  Neurological: Negative for dizziness, facial asymmetry, speech difficulty, weakness and light-headedness  Psychiatric/Behavioral: Negative for agitation and behavioral problems  The patient is not nervous/anxious and is not hyperactive  All other systems reviewed and are negative  Physical Exam  Physical Exam   Constitutional: She is oriented to person, place, and time  She appears well-developed and well-nourished  No distress  HENT:   Head: Normocephalic and atraumatic  Eyes: Pupils are equal, round, and reactive to light  EOM are normal    Neck: Normal range of motion  Neck supple     Cardiovascular: Normal rate, regular rhythm and normal heart sounds  No murmur heard  Pulmonary/Chest: Effort normal and breath sounds normal  No respiratory distress  She has no wheezes  She has no rales  Abdominal: Soft  Bowel sounds are normal  She exhibits no distension  There is no tenderness  There is no rebound and no guarding  Musculoskeletal: Normal range of motion  She exhibits no edema or deformity  Lymphadenopathy:     She has no cervical adenopathy  Neurological: She is alert and oriented to person, place, and time  No cranial nerve deficit  She exhibits normal muscle tone  Coordination normal    Skin: Capillary refill takes less than 2 seconds  No rash noted  No erythema  Psychiatric: She has a normal mood and affect  Her behavior is normal    Nursing note and vitals reviewed        Vital Signs  ED Triage Vitals [11/10/18 1645]   Temperature Pulse Respirations Blood Pressure SpO2   98 °F (36 7 °C) 95 18 (!) 174/65 95 %      Temp src Heart Rate Source Patient Position - Orthostatic VS BP Location FiO2 (%)   -- -- -- -- --      Pain Score       No Pain           Vitals:    11/10/18 1645   BP: (!) 174/65   Pulse: 95       Visual Acuity      ED Medications  Medications - No data to display    Diagnostic Studies  Results Reviewed     Procedure Component Value Units Date/Time    BNP [[id number]]  (Abnormal) Collected:  11/10/18 1700    Lab Status:  Final result Specimen:  Blood from Arm, Left Updated:  11/10/18 1759     NT-proBNP 412 (H) pg/mL     Magnesium [[id number]]  (Normal) Collected:  11/10/18 1700    Lab Status:  Final result Specimen:  Blood from Arm, Left Updated:  11/10/18 1737     Magnesium 2 1 mg/dL     TSH [[id number]]  (Abnormal) Collected:  11/10/18 1700    Lab Status:  Final result Specimen:  Blood from Arm, Left Updated:  11/10/18 1737     TSH 3RD GENERATON 6 520 (H) uIU/mL     Narrative:         Patients undergoing fluorescein dye angiography may retain small amounts of fluorescein in the body for 48-72 hours post procedure  Samples containing fluorescein can produce falsely depressed TSH values  If the patient had this procedure,a specimen should be resubmitted post fluorescein clearance  The recommended reference ranges for TSH during pregnancy are as follows:  First trimester 0 1 to 2 5 uIU/mL  Second trimester  0 2 to 3 0 uIU/mL  Third trimester 0 3 to 3 0 uIU/m      Troponin I [[id number]]  (Normal) Collected:  11/10/18 1700    Lab Status:  Final result Specimen:  Blood from Arm, Left Updated:  11/10/18 1727     Troponin I <0 02 ng/mL     Comprehensive metabolic panel [[id number]]  (Abnormal) Collected:  11/10/18 1700    Lab Status:  Final result Specimen:  Blood from Arm, Left Updated:  11/10/18 1725     Sodium 139 mmol/L      Potassium 4 0 mmol/L      Chloride 102 mmol/L      CO2 28 mmol/L      ANION GAP 9 mmol/L      BUN 20 mg/dL      Creatinine 1 37 (H) mg/dL      Glucose 103 mg/dL      Calcium 9 1 mg/dL      AST 13 U/L      ALT 19 U/L      Alkaline Phosphatase 88 U/L      Total Protein 7 8 g/dL      Albumin 3 9 g/dL      Total Bilirubin 0 40 mg/dL      eGFR 47 ml/min/1 73sq m     Narrative:         National Kidney Disease Education Program recommendations are as follows:  GFR calculation is accurate only with a steady state creatinine  Chronic Kidney disease less than 60 ml/min/1 73 sq  meters  Kidney failure less than 15 ml/min/1 73 sq  meters      Protime-INR [[id number]]  (Abnormal) Collected:  11/10/18 1700    Lab Status:  Final result Specimen:  Blood from Arm, Left Updated:  11/10/18 1721     Protime 14 2 (H) seconds      INR 1 38 (H)    APTT [[id number]]  (Abnormal) Collected:  11/10/18 1700    Lab Status:  Final result Specimen:  Blood from Arm, Left Updated:  11/10/18 1721     PTT 46 (H) seconds     CBC and differential [[id number]] Collected:  11/10/18 1700    Lab Status:  Final result Specimen:  Blood from Arm, Left Updated:  11/10/18 1706     WBC 8 57 Thousand/uL      RBC 4 97 Million/uL      Hemoglobin 13 5 g/dL      Hematocrit 42 3 %      MCV 85 fL      MCH 27 2 pg      MCHC 31 9 g/dL      RDW 14 6 %      MPV 9 7 fL      Platelets 963 Thousands/uL      nRBC 0 /100 WBCs      Neutrophils Relative 55 %      Immat GRANS % 0 %      Lymphocytes Relative 32 %      Monocytes Relative 8 %      Eosinophils Relative 4 %      Basophils Relative 1 %      Neutrophils Absolute 4 75 Thousands/µL      Immature Grans Absolute 0 02 Thousand/uL      Lymphocytes Absolute 2 76 Thousands/µL      Monocytes Absolute 0 68 Thousand/µL      Eosinophils Absolute 0 32 Thousand/µL      Basophils Absolute 0 04 Thousands/µL                  X-ray chest 1 view portable   ED Interpretation by Andressa Toure MD (11/10 1946)   No acute cardiopulmonary abnormality  Procedures  ECG 12 Lead Documentation  Date/Time: 11/10/2018 7:58 PM  Performed by: Teresita Hammonds  Authorized by: Teresita Hammonds     Indications / Diagnosis:  Lightheaded, resolved  ECG reviewed by me, the ED Provider: yes    Interpretation:     Interpretation: non-specific    Rate:     ECG rate:  89  Rhythm:     Rhythm: sinus rhythm    Ectopy:     Ectopy: none    QRS:     QRS axis:  Normal  Conduction:     Conduction: abnormal      Abnormal conduction comment:  Paced rhythm  ST segments:     ST segments:  Normal           Phone Contacts  ED Phone Contact    ED Course                               MDM  Number of Diagnoses or Management Options  Pacemaker complications, initial encounter: new and requires workup  Diagnosis management comments: Patient with pacemaker presents for evaluation of pacemaker/defibrillator  Had pacemaker fire several days ago, saw by Dr Maureen Guadarrama and referred to electrophysiology for consult, possible pacemaker changes  She stated she felt warm and briefly lightheaded today with her symptoms that she got during last defibrillator firing  She is unsure whether her defibrillator fired today    She is currently asymptomatic  Vital signs stable  EKG with no acute signs of ischemia  Laboratory studies reviewed, no acute abnormalities  Chest x-ray with no acute cardiopulmonary abnormality  Discussed case with Dr Siomara Pagan  We reviewed patient's signs and symptoms, medical history  He reviewed patient's chart  Pacemaker interrogated, no acute event today  Per Dr Siomara Pagan, no indication for hospitalization at this time as pacemaker did not fire, normal laboratory studies  Patient to follow up with electrophysiology as previously scheduled return to ED should she have any more difficulty with her pacemaker  Patient stable, observed ED with no arrhythmias  Feels well without chest pain, shortness of breath, lightheadedness, dizziness at time of discharge  Strict return precautions given to patient and family member at bedside  Amount and/or Complexity of Data Reviewed  Clinical lab tests: ordered and reviewed  Tests in the radiology section of CPT®: ordered and reviewed  Tests in the medicine section of CPT®: ordered and reviewed  Review and summarize past medical records: yes  Discuss the patient with other providers: yes  Independent visualization of images, tracings, or specimens: yes    Risk of Complications, Morbidity, and/or Mortality  Presenting problems: moderate  Diagnostic procedures: low  Management options: moderate    Patient Progress  Patient progress: stable    CritCare Time    Disposition  Final diagnoses:   Pacemaker complications, initial encounter     Time reflects when diagnosis was documented in both MDM as applicable and the Disposition within this note     Time User Action Codes Description Comment    11/10/2018  7:46 PM Randal Conde  [id number] Pacemaker complications, initial encounter       ED Disposition     ED Disposition Condition Comment    Discharge  Florencia Hester discharge to home/self care      Condition at discharge: Good        Follow-up Information     Follow up With Specialties Details Why Contact Info    with Dr Oriana Avendano on Friday as scheduled              Patient's Medications   Discharge Prescriptions    No medications on file     No discharge procedures on file      ED Provider  Electronically Signed by           Lady Mckeon MD  11/10/18 2001

## 2018-11-11 LAB
ATRIAL RATE: 89 BPM
P AXIS: 37 DEGREES
PR INTERVAL: 176 MS
QRS AXIS: -13 DEGREES
QRSD INTERVAL: 128 MS
QT INTERVAL: 410 MS
QTC INTERVAL: 498 MS
T WAVE AXIS: 102 DEGREES
VENTRICULAR RATE: 89 BPM

## 2018-11-11 PROCEDURE — 93010 ELECTROCARDIOGRAM REPORT: CPT | Performed by: INTERNAL MEDICINE

## 2018-11-11 NOTE — DISCHARGE INSTRUCTIONS
Pacemaker   WHAT YOU NEED TO KNOW:   A pacemaker is a small, battery-powered device that is implanted into your chest to help regulate your heart rate  DISCHARGE INSTRUCTIONS:   Medicines:   · Pain medicine: You may need medicine to take away or decrease pain  ¨ Learn how to take your medicine  Ask what medicine and how much you should take  Be sure you know how, when, and how often to take it  ¨ Do not wait until the pain is severe before you take your medicine  Tell caregivers if your pain does not decrease  ¨ Pain medicine can make you dizzy or sleepy  Prevent falls by calling someone when you get out of bed or if you need help  · Antibiotics: This medicine is given to fight or prevent an infection caused by bacteria  Always take your antibiotics exactly as ordered by your healthcare provider  Do not stop taking your medicine unless directed by your healthcare provider  Never save antibiotics or take leftover antibiotics that were given to you for another illness  · Take your medicine as directed  Contact your healthcare provider if you think your medicine is not helping or if you have side effects  Tell him or her if you are allergic to any medicine  Keep a list of the medicines, vitamins, and herbs you take  Include the amounts, and when and why you take them  Bring the list or the pill bottles to follow-up visits  Carry your medicine list with you in case of an emergency  Follow up with your cardiologist after your procedure: You may need a follow-up visit 7 to 10 days after you leave the hospital  Your cardiologist will check your wound and make sure that your pacemaker is working correctly  Follow the instructions to check your pacemaker: Your cardiologist or healthcare provider will check your pacemaker and the battery regularly, usually every 3 to 6 months  He may do this in his office  He may also use a computer to check your pacemaker over the telephone between visits  Pacemaker batteries usually last 5 to 8 years  The pacemaker unit will be replaced when the battery gets low  This is a simpler procedure than the original one to implant your pacemaker  Heart rate checks:   · You may need to use a heart monitor at home after your procedure  This device may be called an event monitor, Holter monitor, or mobile telemetry  Monitoring may be done for a period of time, such as a week, or at regular intervals until your heart rhythm is regular  · You may be taught how to check your pulse on your wrist or on your neck  This allows you to monitor how your pacemaker is working  A normal heart beats 50 to 70 times a minute when you are resting  Ask what your pulse should be (your pacemaker's set rate)  Wound care:  Keep your incisions clean and dry for 7 to 10 days after your procedure  Ask your healthcare provider how to care for your incisions and when you can shower or bathe  Activity:   · Arm movement and lifting:  Be careful using the arm on the side of your pacemaker  Do not move your arm for the first 24 hours after your procedure  Do not  lift your arm above your shoulder or lift more than 10 pounds for 6 weeks after your procedure  This helps the leads stay in place and helps your wound heal  Ask your healthcare provider when you can drive after your procedure  · Sports:  Ask your healthcare provider when it is okay to play tennis, golf, basketball, or any sport that requires you to lift your arms  Do not play full contact sports, such as football, that could damage your pacemaker  Ask your cardiologist or healthcare provider how much and what kinds of physical activity are safe for you  Living with a pacemaker:   · Tell all healthcare providers you have a pacemaker: This includes surgeons, radiologists, and medical technicians  You may want to wear a medical alert ID bracelet or necklace that states that you have a pacemaker  · Carry your pacemaker ID card:   Make sure you receive a pacemaker ID card  Carry it with you at all times  It lists important information about your pacemaker  Show it to airport security if you travel  · Avoid electrical interference:  Avoid welding equipment, MRI machines, and other equipment with large magnets or electric fields  These things could interfere with how your pacemaker works  Use your cell phone on the ear opposite from your pacemaker  Do not carry your cell phone in your shirt pocket over your chest      · Do not touch the skin around your pacemaker: This can cause damage to the lead wires or move the pacemaker unit from where it should be  Contact your cardiologist or healthcare provider if:   · The area around your pacemaker is painful or tender after surgery  · The skin around your stitches is red, swollen, or has drainage  This may mean that you have an infection  · You have a fever  · You have chills, a cough, and feel weak or achy  These are also signs of infection  · Your feet or ankles are swollen  Seek care immediately if:   · Your bandage becomes soaked with blood  · Your stitches open up  · You feel your heart suddenly beating very slowly or quickly  · You become too weak or dizzy to stand, or you pass out  · Your arm or leg feels warm, tender, and painful  It may look swollen and red  You have chest pain that does not go away with rest or medicine  · You feel lightheaded, short of breath, and have chest pain  You cough up blood  © 2017 [employer] Information is for End User's use only and may not be sold, redistributed or otherwise used for commercial purposes  All illustrations and images included in CareNotes® are the copyrighted property of A D A M , Inc  or Artem Schmitt  The above information is an  only  It is not intended as medical advice for individual conditions or treatments   Talk to your doctor, nurse or pharmacist before following any medical regimen to see if it is safe and effective for you

## 2018-11-12 ENCOUNTER — TELEPHONE (OUTPATIENT)
Dept: FAMILY MEDICINE CLINIC | Facility: CLINIC | Age: 63
End: 2018-11-12

## 2018-11-12 ENCOUNTER — TELEPHONE (OUTPATIENT)
Dept: CARDIOLOGY CLINIC | Facility: CLINIC | Age: 63
End: 2018-11-12

## 2018-11-12 NOTE — TELEPHONE ENCOUNTER
----- Message from David Gomez MD sent at 11/12/2018  8:55 AM EST -----  Patient labs are acceptable except for TSH much is high  She may need a higher dose of levothyroxine  She should contact her primary care doctor  She should be getting a call from [facility]   I did talk to her last week  Just tell her that her TSH is high and she need higher dose than what she is taking now  Please call patient about the  about results

## 2018-11-12 NOTE — PROGRESS NOTES
Patient labs are acceptable except for TSH much is high  She may need a higher dose of levothyroxine  She should contact her primary care doctor  She should be getting a call from [facility]   I did talk to her last week  Just tell her that her TSH is high and she need higher dose than what she is taking now  Please call patient about the  about results

## 2018-11-12 NOTE — TELEPHONE ENCOUNTER
Pt had bw for cardiology, they told her her tsh is high and may need higher dose of levothyroxine, pt would liek you to call her and increase dose if indicated  [phone]

## 2018-11-12 NOTE — TELEPHONE ENCOUNTER
Spoke to [employer]  - She just wants to know what prompted her to be referred to [facility]  Was it something that you saw that made you make that decision?

## 2018-11-13 ENCOUNTER — TELEPHONE (OUTPATIENT)
Dept: FAMILY MEDICINE CLINIC | Facility: CLINIC | Age: 63
End: 2018-11-13

## 2018-11-13 NOTE — TELEPHONE ENCOUNTER
Dr Gomez Mahmood,  Patient called again today to ask about her labs and medication for thyroid    Thank you  minal

## 2018-11-14 NOTE — TELEPHONE ENCOUNTER
Dr Betty Lockett can you please address this today as pt is having a surgery done and would like everything to be in order

## 2018-11-15 DIAGNOSIS — E03.9 HYPOTHYROIDISM, UNSPECIFIED TYPE: Primary | ICD-10-CM

## 2018-11-15 RX ORDER — LEVOTHYROXINE SODIUM 0.15 MG/1
150 TABLET ORAL DAILY
Qty: 30 TABLET | Refills: 3 | Status: SHIPPED | OUTPATIENT
Start: 2018-11-15 | End: 2019-03-15 | Stop reason: SDUPTHER

## 2018-11-19 ENCOUNTER — TELEPHONE (OUTPATIENT)
Dept: CARDIOLOGY CLINIC | Facility: CLINIC | Age: 63
End: 2018-11-19

## 2018-11-19 NOTE — TELEPHONE ENCOUNTER
Spoke to Dr Heather Rosenberg at [facility]   As per my discussion with him he want the patient to be on sotalol and he would change the device to a biventricular ICD with atrial lead  This was conveyed to the patient    She is okay with the plan he would like the patient to be on sotalol as he think it was a VFib

## 2018-11-19 NOTE — TELEPHONE ENCOUNTER
[facility]

## 2018-12-03 ENCOUNTER — TELEPHONE (OUTPATIENT)
Dept: CARDIOLOGY CLINIC | Facility: CLINIC | Age: 63
End: 2018-12-03

## 2018-12-12 DIAGNOSIS — I50.42 CHRONIC COMBINED SYSTOLIC AND DIASTOLIC HEART FAILURE, NYHA CLASS 2 (HCC): ICD-10-CM

## 2018-12-12 RX ORDER — TORSEMIDE 10 MG/1
TABLET ORAL
Qty: 60 TABLET | Refills: 1 | Status: SHIPPED | OUTPATIENT
Start: 2018-12-12 | End: 2019-02-08 | Stop reason: SDUPTHER

## 2018-12-31 DIAGNOSIS — K21.9 GASTROESOPHAGEAL REFLUX DISEASE WITHOUT ESOPHAGITIS: ICD-10-CM

## 2018-12-31 RX ORDER — PANTOPRAZOLE SODIUM 40 MG/1
TABLET, DELAYED RELEASE ORAL
Qty: 90 TABLET | Refills: 1 | Status: SHIPPED | OUTPATIENT
Start: 2018-12-31 | End: 2019-06-28 | Stop reason: SDUPTHER

## 2019-01-02 ENCOUNTER — TELEPHONE (OUTPATIENT)
Dept: FAMILY MEDICINE CLINIC | Facility: CLINIC | Age: 64
End: 2019-01-02

## 2019-01-10 DIAGNOSIS — M62.830 BACK MUSCLE SPASM: ICD-10-CM

## 2019-01-11 DIAGNOSIS — I50.42 CHRONIC COMBINED SYSTOLIC AND DIASTOLIC HEART FAILURE, NYHA CLASS 2 (HCC): ICD-10-CM

## 2019-01-11 RX ORDER — SACUBITRIL AND VALSARTAN 49; 51 MG/1; MG/1
TABLET, FILM COATED ORAL
Qty: 60 TABLET | Refills: 5 | Status: SHIPPED | OUTPATIENT
Start: 2019-01-11 | End: 2019-01-16 | Stop reason: SDUPTHER

## 2019-01-14 RX ORDER — TIZANIDINE 2 MG/1
2 TABLET ORAL EVERY 8 HOURS PRN
Qty: 30 TABLET | Refills: 2 | Status: SHIPPED | OUTPATIENT
Start: 2019-01-14 | End: 2019-06-02 | Stop reason: SDUPTHER

## 2019-01-15 ENCOUNTER — TELEPHONE (OUTPATIENT)
Dept: CARDIOLOGY CLINIC | Facility: CLINIC | Age: 64
End: 2019-01-15

## 2019-01-16 DIAGNOSIS — E87.6 HYPOKALEMIA: ICD-10-CM

## 2019-01-16 DIAGNOSIS — I50.42 CHRONIC COMBINED SYSTOLIC AND DIASTOLIC HEART FAILURE, NYHA CLASS 2 (HCC): ICD-10-CM

## 2019-01-16 NOTE — TELEPHONE ENCOUNTER
Patient leaving for [facility] needs refill for [facility] [id number] sees dr Hardik Arguelles

## 2019-01-26 RX ORDER — POTASSIUM CHLORIDE 750 MG/1
TABLET, FILM COATED, EXTENDED RELEASE ORAL
Qty: 30 TABLET | Refills: 0 | Status: SHIPPED | OUTPATIENT
Start: 2019-01-26 | End: 2019-03-15 | Stop reason: SDUPTHER

## 2019-02-08 DIAGNOSIS — I50.42 CHRONIC COMBINED SYSTOLIC AND DIASTOLIC HEART FAILURE, NYHA CLASS 2 (HCC): ICD-10-CM

## 2019-02-08 RX ORDER — TORSEMIDE 10 MG/1
TABLET ORAL
Qty: 60 TABLET | Refills: 1 | Status: SHIPPED | OUTPATIENT
Start: 2019-02-08 | End: 2019-04-05 | Stop reason: SDUPTHER

## 2019-02-14 DIAGNOSIS — I42.0 DILATED CARDIOMYOPATHY (HCC): ICD-10-CM

## 2019-02-14 RX ORDER — CARVEDILOL 12.5 MG/1
TABLET ORAL
Qty: 180 TABLET | Refills: 1 | Status: SHIPPED | OUTPATIENT
Start: 2019-02-14 | End: 2019-05-14

## 2019-02-28 ENCOUNTER — DOCUMENTATION (OUTPATIENT)
Dept: PULMONOLOGY | Facility: MEDICAL CENTER | Age: 64
End: 2019-02-28

## 2019-03-06 NOTE — PROGRESS NOTES
Patient called stating that she has been having increasing wheezing and shortness of Breath  No fevers  She she is in [place] at this time  Requesting prednisone taper  This is been ordered for her  She will need to follow up upon her return and she is notified of this

## 2019-03-11 DIAGNOSIS — E03.9 HYPOTHYROIDISM, UNSPECIFIED TYPE: ICD-10-CM

## 2019-03-11 DIAGNOSIS — E87.6 HYPOKALEMIA: ICD-10-CM

## 2019-03-11 RX ORDER — LEVOTHYROXINE SODIUM 0.15 MG/1
150 TABLET ORAL DAILY
Qty: 30 TABLET | Refills: 3 | Status: CANCELLED | OUTPATIENT
Start: 2019-03-11

## 2019-03-11 RX ORDER — POTASSIUM CHLORIDE 750 MG/1
10 TABLET, FILM COATED, EXTENDED RELEASE ORAL DAILY
Qty: 30 TABLET | Refills: 3 | Status: CANCELLED | OUTPATIENT
Start: 2019-03-11

## 2019-03-13 DIAGNOSIS — E87.6 HYPOKALEMIA: ICD-10-CM

## 2019-03-13 DIAGNOSIS — I48.0 PAROXYSMAL ATRIAL FIBRILLATION (HCC): ICD-10-CM

## 2019-03-15 DIAGNOSIS — E87.6 HYPOKALEMIA: ICD-10-CM

## 2019-03-15 DIAGNOSIS — I48.0 PAROXYSMAL ATRIAL FIBRILLATION (HCC): ICD-10-CM

## 2019-03-15 DIAGNOSIS — E03.9 HYPOTHYROIDISM, UNSPECIFIED TYPE: ICD-10-CM

## 2019-03-15 RX ORDER — POTASSIUM CHLORIDE 750 MG/1
10 TABLET, FILM COATED, EXTENDED RELEASE ORAL DAILY
Qty: 30 TABLET | Refills: 0 | Status: SHIPPED | OUTPATIENT
Start: 2019-03-15 | End: 2019-05-14

## 2019-03-15 RX ORDER — LEVOTHYROXINE SODIUM 0.15 MG/1
150 TABLET ORAL DAILY
Qty: 30 TABLET | Refills: 3 | Status: SHIPPED | OUTPATIENT
Start: 2019-03-15 | End: 2019-06-20 | Stop reason: SDUPTHER

## 2019-03-15 NOTE — TELEPHONE ENCOUNTER
DR Cassie Barnard - PT IS CALLING AGAIN FOR HER REFILL  SHE NEEDS THIS ALONG WITH HER LEVOTHYROXINE  SEE OTHER MESSAGE DATED 3/11/19  SHE IS NOW OUT AND NEEDS THEM TODAY

## 2019-03-18 RX ORDER — RIVAROXABAN 20 MG/1
TABLET, FILM COATED ORAL
Qty: 90 TABLET | Refills: 3 | Status: SHIPPED | OUTPATIENT
Start: 2019-03-18 | End: 2019-09-19

## 2019-03-18 RX ORDER — POTASSIUM CHLORIDE 750 MG/1
TABLET, FILM COATED, EXTENDED RELEASE ORAL
Qty: 30 TABLET | Refills: 5 | Status: SHIPPED | OUTPATIENT
Start: 2019-03-18 | End: 2019-09-06 | Stop reason: SDUPTHER

## 2019-03-22 ENCOUNTER — TELEPHONE (OUTPATIENT)
Dept: CARDIOLOGY CLINIC | Facility: CLINIC | Age: 64
End: 2019-03-22

## 2019-03-22 NOTE — TELEPHONE ENCOUNTER
Graciela Nam would like to know when you want to see her has done follow up at [facility] but wants to know if she can start seeing you again

## 2019-04-02 ENCOUNTER — TELEPHONE (OUTPATIENT)
Dept: CARDIOLOGY CLINIC | Facility: CLINIC | Age: 64
End: 2019-04-02

## 2019-04-05 DIAGNOSIS — I50.42 CHRONIC COMBINED SYSTOLIC AND DIASTOLIC HEART FAILURE, NYHA CLASS 2 (HCC): ICD-10-CM

## 2019-04-05 RX ORDER — TORSEMIDE 10 MG/1
TABLET ORAL
Qty: 60 TABLET | Refills: 1 | Status: SHIPPED | OUTPATIENT
Start: 2019-04-05 | End: 2019-05-26 | Stop reason: SDUPTHER

## 2019-04-08 ENCOUNTER — TELEPHONE (OUTPATIENT)
Dept: CARDIOLOGY CLINIC | Facility: CLINIC | Age: 64
End: 2019-04-08

## 2019-04-17 ENCOUNTER — IN-CLINIC DEVICE VISIT (OUTPATIENT)
Dept: CARDIOLOGY CLINIC | Facility: CLINIC | Age: 64
End: 2019-04-17
Payer: COMMERCIAL

## 2019-04-17 DIAGNOSIS — Z95.810 AICD (AUTOMATIC CARDIOVERTER/DEFIBRILLATOR) PRESENT: ICD-10-CM

## 2019-04-17 DIAGNOSIS — I48.0 PAROXYSMAL ATRIAL FIBRILLATION (HCC): Primary | ICD-10-CM

## 2019-04-17 PROCEDURE — 93282 PRGRMG EVAL IMPLANTABLE DFB: CPT | Performed by: INTERNAL MEDICINE

## 2019-04-18 ENCOUNTER — TELEPHONE (OUTPATIENT)
Dept: CARDIOLOGY CLINIC | Facility: CLINIC | Age: 64
End: 2019-04-18

## 2019-05-14 ENCOUNTER — OFFICE VISIT (OUTPATIENT)
Dept: CARDIOLOGY CLINIC | Facility: CLINIC | Age: 64
End: 2019-05-14
Payer: COMMERCIAL

## 2019-05-14 VITALS
HEIGHT: 66 IN | OXYGEN SATURATION: 98 % | SYSTOLIC BLOOD PRESSURE: 104 MMHG | BODY MASS INDEX: 42.27 KG/M2 | DIASTOLIC BLOOD PRESSURE: 78 MMHG | HEART RATE: 75 BPM | WEIGHT: 263 LBS

## 2019-05-14 DIAGNOSIS — I48.0 PAROXYSMAL ATRIAL FIBRILLATION (HCC): ICD-10-CM

## 2019-05-14 DIAGNOSIS — R06.00 DYSPNEA ON EXERTION: ICD-10-CM

## 2019-05-14 DIAGNOSIS — I42.0 DILATED CARDIOMYOPATHY (HCC): ICD-10-CM

## 2019-05-14 DIAGNOSIS — I10 ESSENTIAL HYPERTENSION: ICD-10-CM

## 2019-05-14 DIAGNOSIS — E66.8 MODERATE OBESITY: ICD-10-CM

## 2019-05-14 DIAGNOSIS — E78.5 DYSLIPIDEMIA: ICD-10-CM

## 2019-05-14 DIAGNOSIS — I50.42 CHRONIC COMBINED SYSTOLIC AND DIASTOLIC HEART FAILURE, NYHA CLASS 2 (HCC): ICD-10-CM

## 2019-05-14 PROCEDURE — 99214 OFFICE O/P EST MOD 30 MIN: CPT | Performed by: INTERNAL MEDICINE

## 2019-05-14 PROCEDURE — 93000 ELECTROCARDIOGRAM COMPLETE: CPT | Performed by: INTERNAL MEDICINE

## 2019-05-14 RX ORDER — CARVEDILOL 12.5 MG/1
12.5 TABLET ORAL 2 TIMES DAILY WITH MEALS
COMMUNITY
End: 2019-07-25

## 2019-05-14 RX ORDER — SOTALOL HYDROCHLORIDE 80 MG/1
80 TABLET ORAL EVERY 12 HOURS
Refills: 0 | COMMUNITY
Start: 2019-04-01 | End: 2019-08-08 | Stop reason: SDUPTHER

## 2019-05-19 DIAGNOSIS — Z76.0 MEDICATION REFILL: Primary | ICD-10-CM

## 2019-05-26 DIAGNOSIS — I50.42 CHRONIC COMBINED SYSTOLIC AND DIASTOLIC HEART FAILURE, NYHA CLASS 2 (HCC): ICD-10-CM

## 2019-05-26 RX ORDER — TORSEMIDE 10 MG/1
TABLET ORAL
Qty: 60 TABLET | Refills: 1 | Status: SHIPPED | OUTPATIENT
Start: 2019-05-26 | End: 2019-07-24 | Stop reason: SDUPTHER

## 2019-05-29 RX ORDER — LEVALBUTEROL INHALATION SOLUTION 1.25 MG/3ML
SOLUTION RESPIRATORY (INHALATION)
Qty: 720 ML | Refills: 1 | Status: SHIPPED | OUTPATIENT
Start: 2019-05-29 | End: 2022-04-27 | Stop reason: HOSPADM

## 2019-06-02 DIAGNOSIS — M62.830 BACK MUSCLE SPASM: ICD-10-CM

## 2019-06-03 RX ORDER — TIZANIDINE 2 MG/1
TABLET ORAL
Qty: 30 TABLET | Refills: 2 | Status: SHIPPED | OUTPATIENT
Start: 2019-06-03 | End: 2019-10-04 | Stop reason: SDUPTHER

## 2019-06-20 DIAGNOSIS — E03.9 HYPOTHYROIDISM, UNSPECIFIED TYPE: ICD-10-CM

## 2019-06-20 RX ORDER — LEVOTHYROXINE SODIUM 0.15 MG/1
TABLET ORAL
Qty: 120 TABLET | Refills: 0 | Status: SHIPPED | OUTPATIENT
Start: 2019-06-20 | End: 2019-07-14 | Stop reason: HOSPADM

## 2019-06-28 DIAGNOSIS — K21.9 GASTROESOPHAGEAL REFLUX DISEASE WITHOUT ESOPHAGITIS: ICD-10-CM

## 2019-06-28 RX ORDER — PANTOPRAZOLE SODIUM 40 MG/1
TABLET, DELAYED RELEASE ORAL
Qty: 90 TABLET | Refills: 1 | Status: SHIPPED | OUTPATIENT
Start: 2019-06-28 | End: 2019-12-09 | Stop reason: SDUPTHER

## 2019-07-12 ENCOUNTER — TELEPHONE (OUTPATIENT)
Dept: PULMONOLOGY | Facility: MEDICAL CENTER | Age: 64
End: 2019-07-12

## 2019-07-13 ENCOUNTER — APPOINTMENT (EMERGENCY)
Dept: RADIOLOGY | Facility: HOSPITAL | Age: 64
End: 2019-07-13
Payer: COMMERCIAL

## 2019-07-13 ENCOUNTER — HOSPITAL ENCOUNTER (OUTPATIENT)
Facility: HOSPITAL | Age: 64
Setting detail: OBSERVATION
Discharge: HOME | End: 2019-07-14
Attending: EMERGENCY MEDICINE | Admitting: FAMILY MEDICINE
Payer: COMMERCIAL

## 2019-07-13 DIAGNOSIS — I42.0 DILATED CARDIOMYOPATHY (HCC): ICD-10-CM

## 2019-07-13 DIAGNOSIS — I50.9 CHF (CONGESTIVE HEART FAILURE) (HCC): Primary | ICD-10-CM

## 2019-07-13 DIAGNOSIS — E03.4 HYPOTHYROIDISM DUE TO ACQUIRED ATROPHY OF THYROID: ICD-10-CM

## 2019-07-13 DIAGNOSIS — I10 ESSENTIAL HYPERTENSION: ICD-10-CM

## 2019-07-13 DIAGNOSIS — I50.43 ACUTE ON CHRONIC COMBINED SYSTOLIC (CONGESTIVE) AND DIASTOLIC (CONGESTIVE) HEART FAILURE (HCC): ICD-10-CM

## 2019-07-13 DIAGNOSIS — E78.5 DYSLIPIDEMIA: ICD-10-CM

## 2019-07-13 PROBLEM — J45.20 MILD INTERMITTENT ASTHMA WITHOUT COMPLICATION: Status: ACTIVE | Noted: 2018-02-20

## 2019-07-13 PROBLEM — D72.829 LEUKOCYTOSIS: Status: ACTIVE | Noted: 2019-07-13

## 2019-07-13 LAB
ALBUMIN SERPL BCP-MCNC: 3.3 G/DL (ref 3.5–5)
ALP SERPL-CCNC: 77 U/L (ref 46–116)
ALT SERPL W P-5'-P-CCNC: 35 U/L (ref 12–78)
ANION GAP SERPL CALCULATED.3IONS-SCNC: 9 MMOL/L (ref 4–13)
AST SERPL W P-5'-P-CCNC: 14 U/L (ref 5–45)
BASOPHILS # BLD AUTO: 0.06 THOUSANDS/ΜL (ref 0–0.1)
BASOPHILS NFR BLD AUTO: 1 % (ref 0–1)
BILIRUB SERPL-MCNC: 0.4 MG/DL (ref 0.2–1)
BUN SERPL-MCNC: 15 MG/DL (ref 5–25)
CALCIUM SERPL-MCNC: 8.7 MG/DL (ref 8.3–10.1)
CHLORIDE SERPL-SCNC: 105 MMOL/L (ref 100–108)
CO2 SERPL-SCNC: 28 MMOL/L (ref 21–32)
CREAT SERPL-MCNC: 1.17 MG/DL (ref 0.6–1.3)
EOSINOPHIL # BLD AUTO: 0.39 THOUSAND/ΜL (ref 0–0.61)
EOSINOPHIL NFR BLD AUTO: 3 % (ref 0–6)
ERYTHROCYTE [DISTWIDTH] IN BLOOD BY AUTOMATED COUNT: 15.4 % (ref 11.6–15.1)
GFR SERPL CREATININE-BSD FRML MDRD: 57 ML/MIN/1.73SQ M
GLUCOSE SERPL-MCNC: 106 MG/DL (ref 65–140)
HCT VFR BLD AUTO: 43.3 % (ref 34.8–46.1)
HGB BLD-MCNC: 13.7 G/DL (ref 11.5–15.4)
IMM GRANULOCYTES # BLD AUTO: 0.07 THOUSAND/UL (ref 0–0.2)
IMM GRANULOCYTES NFR BLD AUTO: 1 % (ref 0–2)
LYMPHOCYTES # BLD AUTO: 4.81 THOUSANDS/ΜL (ref 0.6–4.47)
LYMPHOCYTES NFR BLD AUTO: 43 % (ref 14–44)
MCH RBC QN AUTO: 27.5 PG (ref 26.8–34.3)
MCHC RBC AUTO-ENTMCNC: 31.6 G/DL (ref 31.4–37.4)
MCV RBC AUTO: 87 FL (ref 82–98)
MONOCYTES # BLD AUTO: 0.64 THOUSAND/ΜL (ref 0.17–1.22)
MONOCYTES NFR BLD AUTO: 6 % (ref 4–12)
NEUTROPHILS # BLD AUTO: 5.36 THOUSANDS/ΜL (ref 1.85–7.62)
NEUTS SEG NFR BLD AUTO: 46 % (ref 43–75)
NRBC BLD AUTO-RTO: 0 /100 WBCS
NT-PROBNP SERPL-MCNC: 1632 PG/ML
PLATELET # BLD AUTO: 393 THOUSANDS/UL (ref 149–390)
PMV BLD AUTO: 9.7 FL (ref 8.9–12.7)
POTASSIUM SERPL-SCNC: 4.2 MMOL/L (ref 3.5–5.3)
PROT SERPL-MCNC: 7 G/DL (ref 6.4–8.2)
RBC # BLD AUTO: 4.99 MILLION/UL (ref 3.81–5.12)
SODIUM SERPL-SCNC: 142 MMOL/L (ref 136–145)
T4 FREE SERPL-MCNC: 1.08 NG/DL (ref 0.76–1.46)
TROPONIN I SERPL-MCNC: <0.02 NG/ML
TSH SERPL DL<=0.05 MIU/L-ACNC: 18.13 UIU/ML (ref 0.36–3.74)
WBC # BLD AUTO: 11.33 THOUSAND/UL (ref 4.31–10.16)

## 2019-07-13 PROCEDURE — 83880 ASSAY OF NATRIURETIC PEPTIDE: CPT | Performed by: EMERGENCY MEDICINE

## 2019-07-13 PROCEDURE — 84439 ASSAY OF FREE THYROXINE: CPT | Performed by: STUDENT IN AN ORGANIZED HEALTH CARE EDUCATION/TRAINING PROGRAM

## 2019-07-13 PROCEDURE — 36415 COLL VENOUS BLD VENIPUNCTURE: CPT | Performed by: EMERGENCY MEDICINE

## 2019-07-13 PROCEDURE — 93005 ELECTROCARDIOGRAM TRACING: CPT

## 2019-07-13 PROCEDURE — 84484 ASSAY OF TROPONIN QUANT: CPT | Performed by: EMERGENCY MEDICINE

## 2019-07-13 PROCEDURE — 87081 CULTURE SCREEN ONLY: CPT | Performed by: FAMILY MEDICINE

## 2019-07-13 PROCEDURE — 84443 ASSAY THYROID STIM HORMONE: CPT | Performed by: STUDENT IN AN ORGANIZED HEALTH CARE EDUCATION/TRAINING PROGRAM

## 2019-07-13 PROCEDURE — 70450 CT HEAD/BRAIN W/O DYE: CPT

## 2019-07-13 PROCEDURE — 71045 X-RAY EXAM CHEST 1 VIEW: CPT

## 2019-07-13 PROCEDURE — 99285 EMERGENCY DEPT VISIT HI MDM: CPT

## 2019-07-13 PROCEDURE — 85025 COMPLETE CBC W/AUTO DIFF WBC: CPT | Performed by: EMERGENCY MEDICINE

## 2019-07-13 PROCEDURE — 96374 THER/PROPH/DIAG INJ IV PUSH: CPT

## 2019-07-13 PROCEDURE — 99220 PR INITIAL OBSERVATION CARE/DAY 70 MINUTES: CPT | Performed by: FAMILY MEDICINE

## 2019-07-13 PROCEDURE — 80053 COMPREHEN METABOLIC PANEL: CPT | Performed by: EMERGENCY MEDICINE

## 2019-07-13 RX ORDER — TIZANIDINE 2 MG/1
2 TABLET ORAL EVERY 8 HOURS PRN
Status: DISCONTINUED | OUTPATIENT
Start: 2019-07-13 | End: 2019-07-14 | Stop reason: HOSPADM

## 2019-07-13 RX ORDER — ALBUTEROL SULFATE 90 UG/1
1 AEROSOL, METERED RESPIRATORY (INHALATION) EVERY 4 HOURS PRN
Status: DISCONTINUED | OUTPATIENT
Start: 2019-07-13 | End: 2019-07-14 | Stop reason: HOSPADM

## 2019-07-13 RX ORDER — BUDESONIDE AND FORMOTEROL FUMARATE DIHYDRATE 160; 4.5 UG/1; UG/1
2 AEROSOL RESPIRATORY (INHALATION) 2 TIMES DAILY
Status: DISCONTINUED | OUTPATIENT
Start: 2019-07-13 | End: 2019-07-14 | Stop reason: HOSPADM

## 2019-07-13 RX ORDER — LEVOTHYROXINE SODIUM 0.15 MG/1
150 TABLET ORAL
Status: DISCONTINUED | OUTPATIENT
Start: 2019-07-13 | End: 2019-07-13

## 2019-07-13 RX ORDER — POTASSIUM CHLORIDE 750 MG/1
10 TABLET, FILM COATED, EXTENDED RELEASE ORAL DAILY
Status: DISCONTINUED | OUTPATIENT
Start: 2019-07-13 | End: 2019-07-13 | Stop reason: CLARIF

## 2019-07-13 RX ORDER — FUROSEMIDE 10 MG/ML
40 INJECTION INTRAMUSCULAR; INTRAVENOUS EVERY 8 HOURS
Status: DISCONTINUED | OUTPATIENT
Start: 2019-07-13 | End: 2019-07-14 | Stop reason: HOSPADM

## 2019-07-13 RX ORDER — CARVEDILOL 12.5 MG/1
12.5 TABLET ORAL 2 TIMES DAILY WITH MEALS
Status: DISCONTINUED | OUTPATIENT
Start: 2019-07-13 | End: 2019-07-14 | Stop reason: HOSPADM

## 2019-07-13 RX ORDER — PANTOPRAZOLE SODIUM 40 MG/1
40 TABLET, DELAYED RELEASE ORAL
Status: DISCONTINUED | OUTPATIENT
Start: 2019-07-13 | End: 2019-07-14 | Stop reason: HOSPADM

## 2019-07-13 RX ORDER — TRAMADOL HYDROCHLORIDE 50 MG/1
50 TABLET ORAL ONCE
Status: COMPLETED | OUTPATIENT
Start: 2019-07-13 | End: 2019-07-13

## 2019-07-13 RX ORDER — ACETAMINOPHEN 325 MG/1
650 TABLET ORAL EVERY 6 HOURS PRN
Status: DISCONTINUED | OUTPATIENT
Start: 2019-07-13 | End: 2019-07-14 | Stop reason: HOSPADM

## 2019-07-13 RX ORDER — PRAVASTATIN SODIUM 80 MG/1
80 TABLET ORAL DAILY
Status: DISCONTINUED | OUTPATIENT
Start: 2019-07-13 | End: 2019-07-14 | Stop reason: HOSPADM

## 2019-07-13 RX ORDER — POTASSIUM CHLORIDE 750 MG/1
10 TABLET, EXTENDED RELEASE ORAL DAILY
Status: DISCONTINUED | OUTPATIENT
Start: 2019-07-13 | End: 2019-07-14 | Stop reason: HOSPADM

## 2019-07-13 RX ORDER — SOTALOL HYDROCHLORIDE 80 MG/1
80 TABLET ORAL EVERY 12 HOURS SCHEDULED
Status: DISCONTINUED | OUTPATIENT
Start: 2019-07-13 | End: 2019-07-14 | Stop reason: HOSPADM

## 2019-07-13 RX ORDER — LEVALBUTEROL INHALATION SOLUTION 1.25 MG/3ML
1.25 SOLUTION RESPIRATORY (INHALATION) EVERY 6 HOURS PRN
Status: DISCONTINUED | OUTPATIENT
Start: 2019-07-13 | End: 2019-07-13

## 2019-07-13 RX ORDER — FUROSEMIDE 10 MG/ML
40 INJECTION INTRAMUSCULAR; INTRAVENOUS ONCE
Status: COMPLETED | OUTPATIENT
Start: 2019-07-13 | End: 2019-07-13

## 2019-07-13 RX ORDER — SPIRONOLACTONE 25 MG/1
12.5 TABLET ORAL DAILY
Status: DISCONTINUED | OUTPATIENT
Start: 2019-07-13 | End: 2019-07-14 | Stop reason: HOSPADM

## 2019-07-13 RX ADMIN — PANTOPRAZOLE SODIUM 40 MG: 40 TABLET, DELAYED RELEASE ORAL at 14:00

## 2019-07-13 RX ADMIN — FUROSEMIDE 40 MG: 10 INJECTION, SOLUTION INTRAMUSCULAR; INTRAVENOUS at 09:09

## 2019-07-13 RX ADMIN — BUDESONIDE AND FORMOTEROL FUMARATE DIHYDRATE 2 PUFF: 160; 4.5 AEROSOL RESPIRATORY (INHALATION) at 13:46

## 2019-07-13 RX ADMIN — SOTALOL HYDROCHLORIDE 80 MG: 80 TABLET ORAL at 23:20

## 2019-07-13 RX ADMIN — PRAVASTATIN SODIUM 80 MG: 80 TABLET ORAL at 13:59

## 2019-07-13 RX ADMIN — LEVOTHYROXINE SODIUM 150 MCG: 150 TABLET ORAL at 14:00

## 2019-07-13 RX ADMIN — SACUBITRIL AND VALSARTAN 1 TABLET: 49; 51 TABLET, FILM COATED ORAL at 13:49

## 2019-07-13 RX ADMIN — SOTALOL HYDROCHLORIDE 80 MG: 80 TABLET ORAL at 14:00

## 2019-07-13 RX ADMIN — SPIRONOLACTONE 12.5 MG: 25 TABLET ORAL at 14:00

## 2019-07-13 RX ADMIN — RIVAROXABAN 20 MG: 10 TABLET, FILM COATED ORAL at 13:59

## 2019-07-13 RX ADMIN — DICLOFENAC 2 G: 10 GEL TOPICAL at 17:34

## 2019-07-13 RX ADMIN — TRAMADOL HYDROCHLORIDE 50 MG: 50 TABLET, FILM COATED ORAL at 23:21

## 2019-07-13 RX ADMIN — DICLOFENAC 2 G: 10 GEL TOPICAL at 23:21

## 2019-07-13 RX ADMIN — TIZANIDINE 2 MG: 2 TABLET ORAL at 17:28

## 2019-07-13 RX ADMIN — POTASSIUM CHLORIDE 10 MEQ: 750 TABLET, EXTENDED RELEASE ORAL at 13:59

## 2019-07-13 RX ADMIN — DICLOFENAC 2 G: 10 GEL TOPICAL at 13:47

## 2019-07-13 RX ADMIN — FUROSEMIDE 40 MG: 10 INJECTION, SOLUTION INTRAMUSCULAR; INTRAVENOUS at 17:29

## 2019-07-13 RX ADMIN — BUDESONIDE AND FORMOTEROL FUMARATE DIHYDRATE 2 PUFF: 160; 4.5 AEROSOL RESPIRATORY (INHALATION) at 17:37

## 2019-07-13 RX ADMIN — SACUBITRIL AND VALSARTAN 1 TABLET: 49; 51 TABLET, FILM COATED ORAL at 17:32

## 2019-07-13 NOTE — ED PROCEDURE NOTE
PROCEDURE  ECG 12 Lead Documentation Only  Date/Time: 7/13/2019 8:24 AM  Performed by: Stan Kerns DO  Authorized by: Stan Kerns DO     ECG reviewed by me, the ED Provider: yes    Patient location:  ED  Interpretation:     Interpretation: abnormal    Rate:     ECG rate:  86    ECG rate assessment: normal    Rhythm:     Rhythm: paced    Pacing:     Capture:  Complete    Type of pacing:  AV  Ectopy:     Ectopy: PVCs           Stan Kerns DO  07/13/19 0825

## 2019-07-13 NOTE — PLAN OF CARE
Problem: Potential for Falls  Goal: Patient will remain free of falls  Description  INTERVENTIONS:  - Assess patient frequently for physical needs  -  Identify cognitive and physical deficits and behaviors that affect risk of falls  -  [place] fall precautions as indicated by assessment   - Educate patient/family on patient safety including physical limitations  - Instruct patient to call for assistance with activity based on assessment  - Modify environment to reduce risk of injury  - Consider OT/PT consult to assist with strengthening/mobility  Outcome: Progressing     Problem: CARDIOVASCULAR - ADULT  Goal: Maintains optimal cardiac output and hemodynamic stability  Description  INTERVENTIONS:  - Monitor I/O, vital signs and rhythm  - Monitor for S/S and trends of decreased cardiac output i e  bleeding, hypotension  - Administer and titrate ordered vasoactive medications to optimize hemodynamic stability  - Assess quality of pulses, skin color and temperature  - Assess for signs of decreased coronary artery perfusion - ex   Angina  - Instruct patient to report change in severity of symptoms  Outcome: Progressing  Goal: Absence of cardiac dysrhythmias or at baseline rhythm  Description  INTERVENTIONS:  - Continuous cardiac monitoring, monitor vital signs, obtain 12 lead EKG if indicated  - Administer antiarrhythmic and heart rate control medications as ordered  - Monitor electrolytes and administer replacement therapy as ordered  Outcome: Progressing     Problem: RESPIRATORY - ADULT  Goal: Achieves optimal ventilation and oxygenation  Description  INTERVENTIONS:  - Assess for changes in respiratory status  - Assess for changes in mentation and behavior  - Position to facilitate oxygenation and minimize respiratory effort  - Oxygen administration by appropriate delivery method based on oxygen saturation (per order) or ABGs  - Initiate smoking cessation education as indicated  - Encourage broncho-pulmonary hygiene including cough, deep breathe, Incentive Spirometry  - Assess the need for suctioning and aspirate as needed  - Assess and instruct to report SOB or any respiratory difficulty  - Respiratory Therapy support as indicated  Outcome: Progressing     Handbook for Congestive heart Failure given to patient and education started with medications and side effects  Signs and symptoms to monitor

## 2019-07-13 NOTE — ASSESSMENT & PLAN NOTE
Wt Readings from Last 3 Encounters:   07/13/19 123 kg (271 lb 9 7 oz)   05/14/19 119 kg (263 lb)   11/10/18 116 kg (256 lb)       Source of acute CHF exacerbation is unknown at this time  Differential is broad and may include  In ED BNP was markedly elevated 1632 pg/ml, troponin (-) x 1, EKG showed paced rhythm  Obvious JVD present along with 1+ lower extremity edema  Pt given Lasix IV 40 mg x 1  Pt follows with cardiology and last visit was on 5/14  Pt follows with cards and last visit was 5/19 recently got a biventricular pacer and also started on sotalol  Pt is currently considered Chronic systolic heart failure [employer] [place] [employer] class II/3  Last echo 2/20/18: Ejection fraction was estimated in the range of 20 % to 25 %  Patient no longer feeling short of breath  Exam is benign  Possible discharge today pending echo  · Fluid restricted to 1 L per day  · Lasix 40mg IV TID   · CXR final read: Small patchy area of medial left upper lobe pneumonia  Continued radiographic follow-up is recommended to document complete interval resolution  ; Unchanged cardiomegaly  · Monitor Daily Weights, I/Os  · Cardiology consulted  Recommendations appreciated    · Cont home med: Carvedilol 12 5mg BID , Sacubitril-valsartan (Entresto) 49-51 Qdaily, Sotalol 80mg Q12H, Spironolactone 12 5mg Qdaily  · Output 2 6 L since admission  · Fu BNP

## 2019-07-13 NOTE — ASSESSMENT & PLAN NOTE
Patient currently in no acute distress, no wheezing  Likely no exacerbation at this time given saturating well on room air      Continue with home meds:  Symbicort 160-4 5/act inhaler 2 puffs BID PRN  Albuterol inhaler 1 puff Q4H PRN

## 2019-07-13 NOTE — ASSESSMENT & PLAN NOTE
Stable    Continue Home medication:  carvedilol 12 5mg BID  Sotalol 80mg BID  Spironolactone 25mg Qdaily

## 2019-07-13 NOTE — TELEPHONE ENCOUNTER
I was contacted through answering service 10:00 p m  Friday night that patient Daya Moneral called to speak to Dr Wilma Olszewski  Patient states she has had cough for 2 days and difficulty sleeping because of her cough and wanted Tussionex because she  has had difficulty sleeping last 2 nights because of her cough and also wanted prednisone  She has not been seen in our office since February of 2018 for mild intermittent asthma  At that time she complained of coughing was given prednisone and Tussionex  She was called in February 2019 by our physician Dr Min Arauz that she needed to have a follow-up appointment but she never has made a follow-up appointment  Her last office visit was in February 2018  The patient is voice sounded normal and she did not have any coughing the whole time we spoke on the phone  She also had no conversational dyspnea  I asked her why she did not call earlier in the day or even yesterday and she had no good explanation for this  I told her I did not feel comfortable giving her a cough medication with narcotic in it over the phone without examining her  She then became frustrated and hung up the phone  Prior to that I told her if she could go to an urgent care center or emergency room to be evaluated if she thought her symptoms were causing her difficulty breathing or any distress

## 2019-07-13 NOTE — ASSESSMENT & PLAN NOTE
Patient currently in normal sinus rhythm      Continue home meds of Sotalol 80 mg BID and   Xarelto 20mg Qdaily for anticoagulation

## 2019-07-13 NOTE — ASSESSMENT & PLAN NOTE
POA WBC 11 33 Possibly reactive as patient reports taking numerous inhaler over the last 3 days  Patient is Afebrile  Vitals within normal limits  Will monitor with daily CBCs

## 2019-07-13 NOTE — ED PROVIDER NOTES
History  No chief complaint on file  Patient presents for evaluation of SOB worsening for the last 3 days  Non productive cough  Symptoms are worse at night and with laying down  Denies chest pain  Using nebulizer at night without relief  History of asthma and congestive heart failure  States she couldn't buckle her pants today  Patient also noted left sided weakness for 3 days as well  Left arm and left leg  History provided by:  Patient   used: No        Prior to Admission Medications   Prescriptions Last Dose Informant Patient Reported? Taking? ENTRESTO 49-51 MG TABS  Self Yes No   Sig: Take 1 tablet by mouth 2 (two) times a day    XARELTO 20 MG tablet  Self No No   Sig: take 1 tablet by mouth once daily   budesonide-formoterol (SYMBICORT) 160-4 5 mcg/act inhaler  Self No No   Sig: Inhale 2 puffs 2 (two) times a day Rinse mouth after use     carvedilol (COREG) 12 5 mg tablet  Self Yes No   Sig: Take 12 5 mg by mouth 2 (two) times a day with meals   diclofenac sodium (VOLTAREN) 1 %  Self No No   Sig: Apply 2 g topically 4 (four) times a day   Patient not taking: Reported on 5/14/2019   ipratropium (ATROVENT) 0 02 % nebulizer solution  Self No No   Sig: Take 2 5 mL by nebulization 4 (four) times a day for 30 days   ivabradine HCl (CORLANOR) 5 MG tablet  Self No No   Sig: Take 1 tablet (5 mg total) by mouth daily   levalbuterol (XOPENEX HFA) 45 mcg/act inhaler  Self No No   Sig: Inhale 2 puffs every 6 (six) hours as needed for shortness of breath   Patient not taking: Reported on 5/14/2019   levalbuterol (XOPENEX) 1 25 mg/3 mL nebulizer solution   No No   Sig: inhale contents of 1 vial in nebulizer every 6 hours if needed   levothyroxine 150 mcg tablet   No No   Sig: take 1 tablet by mouth once daily   pantoprazole (PROTONIX) 40 mg tablet   No No   Sig: take 1 tablet by mouth once daily   potassium chloride (K-DUR) 10 mEq tablet  Self No No   Sig: take 1 tablet by mouth once daily pravastatin (PRAVACHOL) 80 mg tablet  Self No No   Sig: Take 1 tablet (80 mg total) by mouth daily   rivaroxaban (XARELTO) 20 mg tablet  Self No No   Sig: Take 1 tablet (20 mg total) by mouth daily   sotalol (BETAPACE) 80 mg tablet  Self Yes No   Sig: Take 80 mg by mouth every 12 (twelve) hours   spironolactone (ALDACTONE) 25 mg tablet  Self No No   Sig: Take 0 5 tablets (12 5 mg total) by mouth daily   tiZANidine (ZANAFLEX) 2 mg tablet   No No   Sig: take 1 tablet by mouth every 8 hours if needed for muscle spasm   torsemide (DEMADEX) 10 mg tablet   No No   Sig: take 2 tablets by mouth once daily      Facility-Administered Medications: None       Past Medical History:   Diagnosis Date    A-fib ([facility] )     Abnormal blood sugar     Acid reflux     Acute bronchitis     Allergic reaction     Anxiety     Arthritis     Asthma     Asthma in adult, mild intermittent, uncomplicated     Cardiomyopathy (HCC)     Cellulitis of left lower leg     Chest pain     CHF (congestive heart failure) (HCC)     Constipation     COPD (chronic obstructive pulmonary disease) (HCC)     Depression with anxiety     Diabetes mellitus (HCC)     Disease of thyroid gland     Diverticulitis     Dyslipidemia     Dyspnea on exertion     Elbow pain     Fracture of olecranon, open     Gastritis     Generalized pain     Hematuria     History of colonoscopy     Hypertension     Hypokalemia     Hypomagnesemia     Leg cramping     Moderate obesity     Morbid obesity due to excess calories (HCC)     Myalgia     Myositis     Nausea in adult     Nephrolithiasis     Prepatellar bursitis, unspecified knee     Screening for lipid disorders     Shortness of breath     Spasm of muscle     Thyroid trouble        Past Surgical History:   Procedure Laterality Date    CARDIAC DEFIBRILLATOR PLACEMENT      LAPAROSCOPIC CHOLECYSTECTOMY      TOTAL ABDOMINAL HYSTERECTOMY W/ BILATERAL SALPINGOOPHORECTOMY         Family History Problem Relation Age of Onset    Hypertension Sister     Cancer Sister     Coronary artery disease Family     Diabetes type II Family     Hypertension Family      I have reviewed and agree with the history as documented  Social History     Tobacco Use    Smoking status: Never Smoker    Smokeless tobacco: Never Used   Substance Use Topics    Alcohol use: No    Drug use: No        Review of Systems   Respiratory: Positive for cough and shortness of breath  Cardiovascular: Negative for chest pain  Neurological: Positive for weakness  All other systems reviewed and are negative  Physical Exam  Physical Exam   Constitutional: She is oriented to person, place, and time  No distress  HENT:   Mouth/Throat: Oropharynx is clear and moist    Eyes: Pupils are equal, round, and reactive to light  Neck: Normal range of motion  Cardiovascular: Normal rate, regular rhythm and intact distal pulses  Pulmonary/Chest: Effort normal  No respiratory distress  Decreased BS bases bilaterally   Abdominal: Soft  There is no tenderness  Musculoskeletal: Normal range of motion  She exhibits no edema  Neurological: She is alert and oriented to person, place, and time  No cranial nerve deficit or sensory deficit  Coordination normal    No drift or appreciated weakness in left arm, left leg has trouble hold leg up but has good effort against gravity and resistance  Skin: Capillary refill takes less than 2 seconds  She is not diaphoretic  Nursing note and vitals reviewed  Vital Signs  ED Triage Vitals   Temp Pulse Resp BP SpO2   -- -- -- -- --      Temp src Heart Rate Source Patient Position - Orthostatic VS BP Location FiO2 (%)   -- -- -- -- --      Pain Score       --           There were no vitals filed for this visit        Visual Acuity      ED Medications  Medications - No data to display    Diagnostic Studies  Results Reviewed     Procedure Component Value Units Date/Time    CBC and differential [[id number]]     Lab Status:  No result Specimen:  Blood     Comprehensive metabolic panel [[id number]]     Lab Status:  No result Specimen:  Blood     Troponin I [[id number]]     Lab Status:  No result Specimen:  Blood     B-type natriuretic peptide [[id number]]     Lab Status:  No result Specimen:  Blood                  XR chest 1 view portable    (Results Pending)              Procedures  Procedures       ED Course                               MDM  Number of Diagnoses or Management Options  CHF (congestive heart failure) [facility]):   Diagnosis management comments: Pulse ox 97% on RA indicating adequate oxygenation  CXR: Cardiomegaly, NAD as read by me       Amount and/or Complexity of Data Reviewed  Clinical lab tests: ordered and reviewed  Tests in the radiology section of CPT®: ordered and reviewed  Decide to obtain previous medical records or to obtain history from someone other than the patient: yes  Review and summarize past medical records: yes  Discuss the patient with other providers: yes  Independent visualization of images, tracings, or specimens: yes    Patient Progress  Patient progress: stable      Disposition  Final diagnoses:   None     ED Disposition     None      Follow-up Information    None         Patient's Medications   Discharge Prescriptions    No medications on file     No discharge procedures on file      ED Provider  Electronically Signed by           Le Saravia DO  07/13/19 1052

## 2019-07-13 NOTE — ASSESSMENT & PLAN NOTE
Patient sees cardiology for dilated cardiomyopathy  She was upgraded to biventricular device as well as started on sotalol     · FU cards recommendations

## 2019-07-13 NOTE — ASSESSMENT & PLAN NOTE
Likely due to CHF exacerbation at this visit  Pt tried three days of asthma medication which did not help, which points more in the direction that her dyspnea is cardiac in origin  CXR final read pending  Please see recommendations for acute on Chronic CHF

## 2019-07-13 NOTE — H&P
H&P Exam - Katiana Baumann 59 y o  female MRN: [id number]    Unit/Bed#: ED 10 Encounter: [id number]      60 yo F with PMH of CHF, Hyperlipidemia, Hypothyroidism, Mild intermittent asthma, will be admitted to the general medical floor for an anticipated stay of less than 2 midnights under the general medical floor for acute on chronic systolic/diastolic heart failure under the services of Dr John Singh      Assessment/Plan:  * Acute on chronic combined systolic (congestive) and diastolic (congestive) heart failure (HCC)  Assessment & Plan  Wt Readings from Last 3 Encounters:   07/13/19 123 kg (271 lb 9 7 oz)   05/14/19 119 kg (263 lb)   11/10/18 116 kg (256 lb)       Source of acute CHF exacerbation is unknown at this time  Differential is broad and may include  In ED BNP was markedly elevated 1632 pg/ml, troponin (-) x 1, EKG showed paced rhythm  Obvious JVD present along with 1+ lower extremity edema  Pt given Lasix IV 40 mg x 1  Pt follows with cardiology and last visit was on 5/14  Pt follows with cards and last visit was 5/19 recently got a biventricular pacer and also started on sotalol  Pt is currently considered Chronic systolic heart failure New York Heart Association class II/3  Last echo 2/20/18: Ejection fraction was estimated in the range of 20 % to 25 %  · Fluid restricted to 1 L per day  · Lasix 40mg IV TID   · Monitor I/Os  · CXR final read  · Monitor Daily Weights  · Cardiology consulted  Recommendations appreciated  · Carvedilol 12 5mg BID   · Sacubitril-valsartan (Entresto) 49-51 Qdaily  · Sotalol 80mg Q12H  · Spironolactone 12 5mg Qdaily      Leukocytosis  Assessment & Plan  POA WBC 11 33 Possibly reactive as patient reports taking numerous inhaler over the last 3 days  Patient is Afebrile  Vitals within normal limits  Will monitor with daily CBCs       Hypothyroidism  Assessment & Plan  Last TSH 12/1/2018  83     Reorder TSH   Continue Home med Levothyroxine 150mcg Qdaily    Moderate obesity  Assessment & Plan  BMI 43 7  · Nutrition counseling    Paroxysmal atrial fibrillation [facility])  Assessment & Plan  Patient currently in normal sinus rhythm  Continue home meds of Sotalol 80 mg BID and   Xarelto 20mg Qdaily for anticoagulation    Hypertension  Assessment & Plan  Stable    Continue Home medication:  carvedilol 12 5mg BID  Sotalol 80mg BID  Spironolactone 25mg Qdaily      Dyspnea on exertion  Assessment & Plan  Likely due to CHF exacerbation at this visit  Pt tried three days of asthma medication which did not help, which points more in the direction that her dyspnea is cardiac in origin  CXR final read pending  Please see recommendations for acute on Chronic CHF  Dyslipidemia  Assessment & Plan  Last lipid panel seen on 6/6/2016  Reorder Lipid Panel  Continue pravastatin 80mg Qdaily    Cardiomyopathy [facility])  Assessment & Plan  Patient sees cardiology for dilated cardiomyopathy  She was upgraded to biventricular device as well as started on sotalol  · FU cards recommendations              Mild intermittent asthma without complication  Assessment & Plan  Patient currently in no acute distress, no wheezing  Likely no exacerbation at this time given saturating well on room air  Continue with home meds:  Symbicort 160-4 5/act inhaler 2 puffs BID PRN  Albuterol inhaler 1 puff Q4H PRN    Global:  Cardiac Diet 2g Na 1 L fluid restrict xarelto, scd        History of Present Illness   Patient is a 60 yo F with a PMH of Paroxysmal AFib, Mild Intermittent asthma, hypothyroidism, HTN, CHF who presented to the ED with worsening SOB over 3 days at rest and exertion associated with fatigue, sleep disturbance, and back pain which is secondary to cough  Patient reports symptoms are worse at night  Patient tried nebulizer treatment because she was suspicious this was an asthma exacerbation   She proceeded to call Pulmonology to ask for cough prescription with codeine but was told to go to urgent care or the emergency department  Pt denies chest pain, palpitation, or abdominal pain  Patient reports abdominal distension and leg swelling bilaterally  patient reports use of two pillows at night to sleep  patient reports similar symptoms two years ago in but was never admitted to hospital      ED Course: IV lasix 40 mg x 1    Review of Systems   Constitutional: Positive for fatigue  Negative for fever  Respiratory: Positive for chest tightness and shortness of breath (for 3 days)  Cardiovascular: Positive for leg swelling  Negative for chest pain  Gastrointestinal: Positive for abdominal distention and constipation (constipated the last 3-4 days  Last bowel movement this morning)  Negative for abdominal pain and diarrhea  Endocrine: Cold intolerance: At night has been going back and forth between sweating and feeling chills  Genitourinary: Positive for decreased urine volume  Negative for dysuria  Musculoskeletal: Positive for back pain  Allergic/Immunologic: Positive for food allergies (shellfish)  Neurological: Positive for dizziness, weakness (weakness on upper and lower left side) and headaches  Psychiatric/Behavioral: Positive for agitation, confusion (patient reported some confusion during heaving symptoms of SOB) and sleep disturbance         Historical Information   Past Medical History:   Diagnosis Date    A-fib ([facility] )     Abnormal blood sugar     Acid reflux     Acute bronchitis     Allergic reaction     Anxiety     Arthritis     Asthma     Asthma in adult, mild intermittent, uncomplicated     Cardiomyopathy (HCC)     Cellulitis of left lower leg     Chest pain     CHF (congestive heart failure) (HCC)     Constipation     COPD (chronic obstructive pulmonary disease) (HCC)     Depression with anxiety     Diabetes mellitus ([facility] )     Disease of thyroid gland     Diverticulitis     Dyslipidemia     Dyspnea on exertion     Elbow pain     Fracture of olecranon, open     Gastritis     Generalized pain     Hematuria     History of colonoscopy     Hypertension     Hypokalemia     Hypomagnesemia     Leg cramping     Moderate obesity     Morbid obesity due to excess calories (HCC)     Myalgia     Myositis     Nausea in adult     Nephrolithiasis     Prepatellar bursitis, unspecified knee     Screening for lipid disorders     Shortness of breath     Spasm of muscle     Thyroid trouble      Past Surgical History:   Procedure Laterality Date    CARDIAC DEFIBRILLATOR PLACEMENT      LAPAROSCOPIC CHOLECYSTECTOMY      TOTAL ABDOMINAL HYSTERECTOMY W/ BILATERAL SALPINGOOPHORECTOMY       Social History   Social History     Substance and Sexual Activity   Alcohol Use No     Social History     Substance and Sexual Activity   Drug Use No     Social History     Tobacco Use   Smoking Status Never Smoker   Smokeless Tobacco Never Used     Family History:   Family History   Problem Relation Age of Onset    Hypertension Sister     Cancer Sister     Coronary artery disease Family     Diabetes type II Family     Hypertension Family        Meds/Allergies   PTA meds:   Prior to Admission Medications   Prescriptions Last Dose Informant Patient Reported? Taking? ENTRESTO 49-51 MG TABS  Self Yes No   Sig: Take 1 tablet by mouth 2 (two) times a day    XARELTO 20 MG tablet  Self No No   Sig: take 1 tablet by mouth once daily   budesonide-formoterol (SYMBICORT) 160-4 5 mcg/act inhaler  Self No No   Sig: Inhale 2 puffs 2 (two) times a day Rinse mouth after use     carvedilol (COREG) 12 5 mg tablet  Self Yes No   Sig: Take 12 5 mg by mouth 2 (two) times a day with meals   diclofenac sodium (VOLTAREN) 1 %  Self No No   Sig: Apply 2 g topically 4 (four) times a day   Patient not taking: Reported on 5/14/2019   ipratropium (ATROVENT) 0 02 % nebulizer solution  Self No No   Sig: Take 2 5 mL by nebulization 4 (four) times a day for 30 days   ivabradine HCl (CORLANOR) 5 MG tablet  Self No No Sig: Take 1 tablet (5 mg total) by mouth daily   levalbuterol (XOPENEX HFA) 45 mcg/act inhaler  Self No No   Sig: Inhale 2 puffs every 6 (six) hours as needed for shortness of breath   Patient not taking: Reported on 5/14/2019   levalbuterol (XOPENEX) 1 25 mg/3 mL nebulizer solution   No No   Sig: inhale contents of 1 vial in nebulizer every 6 hours if needed   levothyroxine 150 mcg tablet   No No   Sig: take 1 tablet by mouth once daily   pantoprazole (PROTONIX) 40 mg tablet   No No   Sig: take 1 tablet by mouth once daily   potassium chloride (K-DUR) 10 mEq tablet  Self No No   Sig: take 1 tablet by mouth once daily   pravastatin (PRAVACHOL) 80 mg tablet  Self No No   Sig: Take 1 tablet (80 mg total) by mouth daily   rivaroxaban (XARELTO) 20 mg tablet  Self No No   Sig: Take 1 tablet (20 mg total) by mouth daily   sotalol (BETAPACE) 80 mg tablet  Self Yes No   Sig: Take 80 mg by mouth every 12 (twelve) hours   spironolactone (ALDACTONE) 25 mg tablet  Self No No   Sig: Take 0 5 tablets (12 5 mg total) by mouth daily   tiZANidine (ZANAFLEX) 2 mg tablet   No No   Sig: take 1 tablet by mouth every 8 hours if needed for muscle spasm   torsemide (DEMADEX) 10 mg tablet   No No   Sig: take 2 tablets by mouth once daily      Facility-Administered Medications: None     Allergies   Allergen Reactions    Omnipaque [Iohexol] Swelling    Penicillins Swelling    Iv Dye  [Iodinated Diagnostic Agents] Throat Swelling    Shellfish-Derived Products Hives       Objective   First Vitals:   Blood Pressure: 136/82 (07/13/19 0821)  Pulse: 88 (07/13/19 0821)  Temperature: 98 2 °F (36 8 °C) (07/13/19 0821)  Temp Source: Tympanic (07/13/19 0821)  Respirations: 20 (07/13/19 0821)  Weight - Scale: 109 kg (240 lb) (07/13/19 0821)  SpO2: 100 % (07/13/19 0821)    Current Vitals:   Blood Pressure: 110/71 (07/13/19 0915)  Pulse: 76 (07/13/19 0915)  Temperature: 98 2 °F (36 8 °C) (07/13/19 0821)  Temp Source: Tympanic (07/13/19 8254)  Respirations: 21 (07/13/19 0915)  Weight - Scale: 109 kg (240 lb) (07/13/19 0821)  SpO2: 99 % (07/13/19 0915)    No intake or output data in the 24 hours ending 07/13/19 0958    Invasive Devices     None                 Physical Exam   Constitutional: She is oriented to person, place, and time  HENT:   Mouth/Throat: No oropharyngeal exudate  orpharynx dry     Eyes: Pupils are equal, round, and reactive to light  EOM are normal  Right eye exhibits no discharge  Left eye exhibits no discharge  Neck: JVD present  Cardiovascular: Normal rate  No murmur heard  Pulmonary/Chest: Breath sounds normal  She has no wheezes  She has no rales  Abdominal: Soft  Bowel sounds are normal  She exhibits distension  There is no tenderness  Musculoskeletal:   Upper extremity 1+ strength   Neurological: She is alert and oriented to person, place, and time  Skin: Skin is warm  Extremities: 2+ pulses upper,  1+ lower   Psychiatric: She has a normal mood and affect  Vitals reviewed        Lab Results:   Results for orders placed or performed during the hospital encounter of 07/13/19   CBC and differential   Result Value Ref Range    WBC 11 33 (H) 4 31 - 10 16 Thousand/uL    RBC 4 99 3 81 - 5 12 Million/uL    Hemoglobin 13 7 11 5 - 15 4 g/dL    Hematocrit 43 3 34 8 - 46 1 %    MCV 87 82 - 98 fL    MCH 27 5 26 8 - 34 3 pg    MCHC 31 6 31 4 - 37 4 g/dL    RDW 15 4 (H) 11 6 - 15 1 %    MPV 9 7 8 9 - 12 7 fL    Platelets 930 (H) 529 - 390 Thousands/uL    nRBC 0 /100 WBCs    Neutrophils Relative 46 43 - 75 %    Immat GRANS % 1 0 - 2 %    Lymphocytes Relative 43 14 - 44 %    Monocytes Relative 6 4 - 12 %    Eosinophils Relative 3 0 - 6 %    Basophils Relative 1 0 - 1 %    Neutrophils Absolute 5 36 1 85 - 7 62 Thousands/µL    Immature Grans Absolute 0 07 0 00 - 0 20 Thousand/uL    Lymphocytes Absolute 4 81 (H) 0 60 - 4 47 Thousands/µL    Monocytes Absolute 0 64 0 17 - 1 22 Thousand/µL    Eosinophils Absolute 0 39 0 00 - 0 61 Thousand/µL    Basophils Absolute 0 06 0 00 - 0 10 Thousands/µL   Comprehensive metabolic panel   Result Value Ref Range    Sodium 142 136 - 145 mmol/L    Potassium 4 2 3 5 - 5 3 mmol/L    Chloride 105 100 - 108 mmol/L    CO2 28 21 - 32 mmol/L    ANION GAP 9 4 - 13 mmol/L    BUN 15 5 - 25 mg/dL    Creatinine 1 17 0 60 - 1 30 mg/dL    Glucose 106 65 - 140 mg/dL    Calcium 8 7 8 3 - 10 1 mg/dL    AST 14 5 - 45 U/L    ALT 35 12 - 78 U/L    Alkaline Phosphatase 77 46 - 116 U/L    Total Protein 7 0 6 4 - 8 2 g/dL    Albumin 3 3 (L) 3 5 - 5 0 g/dL    Total Bilirubin 0 40 0 20 - 1 00 mg/dL    eGFR 57 ml/min/1 73sq m   Troponin I   Result Value Ref Range    Troponin I <0 02 <=0 04 ng/mL   B-type natriuretic peptide   Result Value Ref Range    NT-proBNP 1,632 (H) <125 pg/mL     Imaging:   CT head without contrast   Final Result      No acute intracranial hemorrhage seen   No extra-axial collection seen   No CT signs of acute territorial infarction                  Workstation performed: [id number]         XR chest 1 view portable    (Results Pending)     EKG, Pathology, and Other Studies: ECG reviewed by me, the ED Provider: yes    Patient location:  ED  Interpretation:     Interpretation: abnormal    Rate:     ECG rate:  86    ECG rate assessment: normal    Rhythm:     Rhythm: paced    Pacing:     Capture:  Complete    Type of pacing:  AV  Ectopy:     Ectopy: PVCs      Code Status: No Order  Advance Directive and Living Will:      Power of :    POLST:      Counseling / Coordination of Care:   Greater than 50% of total time was spent with the patient and / or family counseling and / or coordination of care  A description of the counseling / coordination of care: dsicussed with attending physician

## 2019-07-13 NOTE — ASSESSMENT & PLAN NOTE
Last TSH 12/1/2018  83     TSH 18 1 on 7/13  T4 WNL    Continue Home med Levothyroxine 150mcg Qdaily- now increased to 175

## 2019-07-14 VITALS
HEIGHT: 66 IN | DIASTOLIC BLOOD PRESSURE: 68 MMHG | TEMPERATURE: 98.2 F | HEART RATE: 60 BPM | SYSTOLIC BLOOD PRESSURE: 118 MMHG | OXYGEN SATURATION: 99 % | RESPIRATION RATE: 20 BRPM | BODY MASS INDEX: 43.08 KG/M2 | WEIGHT: 268.08 LBS

## 2019-07-14 LAB
ANION GAP SERPL CALCULATED.3IONS-SCNC: 10 MMOL/L (ref 4–13)
BASOPHILS # BLD AUTO: 0.05 THOUSANDS/ΜL (ref 0–0.1)
BASOPHILS NFR BLD AUTO: 0 % (ref 0–1)
BUN SERPL-MCNC: 20 MG/DL (ref 5–25)
CALCIUM SERPL-MCNC: 8.8 MG/DL (ref 8.3–10.1)
CHLORIDE SERPL-SCNC: 102 MMOL/L (ref 100–108)
CHOLEST SERPL-MCNC: 230 MG/DL (ref 50–200)
CO2 SERPL-SCNC: 30 MMOL/L (ref 21–32)
CREAT SERPL-MCNC: 1.32 MG/DL (ref 0.6–1.3)
EOSINOPHIL # BLD AUTO: 0.34 THOUSAND/ΜL (ref 0–0.61)
EOSINOPHIL NFR BLD AUTO: 3 % (ref 0–6)
ERYTHROCYTE [DISTWIDTH] IN BLOOD BY AUTOMATED COUNT: 15.5 % (ref 11.6–15.1)
GFR SERPL CREATININE-BSD FRML MDRD: 49 ML/MIN/1.73SQ M
GLUCOSE SERPL-MCNC: 116 MG/DL (ref 65–140)
HCT VFR BLD AUTO: 42.4 % (ref 34.8–46.1)
HDLC SERPL-MCNC: 46 MG/DL (ref 40–60)
HGB BLD-MCNC: 13.2 G/DL (ref 11.5–15.4)
IMM GRANULOCYTES # BLD AUTO: 0.05 THOUSAND/UL (ref 0–0.2)
IMM GRANULOCYTES NFR BLD AUTO: 0 % (ref 0–2)
LDLC SERPL CALC-MCNC: 141 MG/DL (ref 0–100)
LYMPHOCYTES # BLD AUTO: 4.29 THOUSANDS/ΜL (ref 0.6–4.47)
LYMPHOCYTES NFR BLD AUTO: 36 % (ref 14–44)
MAGNESIUM SERPL-MCNC: 2.5 MG/DL (ref 1.6–2.6)
MCH RBC QN AUTO: 27.1 PG (ref 26.8–34.3)
MCHC RBC AUTO-ENTMCNC: 31.1 G/DL (ref 31.4–37.4)
MCV RBC AUTO: 87 FL (ref 82–98)
MONOCYTES # BLD AUTO: 0.91 THOUSAND/ΜL (ref 0.17–1.22)
MONOCYTES NFR BLD AUTO: 8 % (ref 4–12)
MRSA NOSE QL CULT: NORMAL
NEUTROPHILS # BLD AUTO: 6.29 THOUSANDS/ΜL (ref 1.85–7.62)
NEUTS SEG NFR BLD AUTO: 53 % (ref 43–75)
NONHDLC SERPL-MCNC: 184 MG/DL
NRBC BLD AUTO-RTO: 0 /100 WBCS
NT-PROBNP SERPL-MCNC: 1137 PG/ML
PHOSPHATE SERPL-MCNC: 5.3 MG/DL (ref 2.3–4.1)
PLATELET # BLD AUTO: 363 THOUSANDS/UL (ref 149–390)
PMV BLD AUTO: 10.2 FL (ref 8.9–12.7)
POTASSIUM SERPL-SCNC: 3.6 MMOL/L (ref 3.5–5.3)
PROCALCITONIN SERPL-MCNC: <0.05 NG/ML
RBC # BLD AUTO: 4.87 MILLION/UL (ref 3.81–5.12)
SODIUM SERPL-SCNC: 142 MMOL/L (ref 136–145)
TRIGL SERPL-MCNC: 213 MG/DL
TSH SERPL DL<=0.05 MIU/L-ACNC: 16.52 UIU/ML (ref 0.36–3.74)
WBC # BLD AUTO: 11.93 THOUSAND/UL (ref 4.31–10.16)

## 2019-07-14 PROCEDURE — 99215 OFFICE O/P EST HI 40 MIN: CPT | Performed by: INTERNAL MEDICINE

## 2019-07-14 PROCEDURE — 84145 PROCALCITONIN (PCT): CPT | Performed by: STUDENT IN AN ORGANIZED HEALTH CARE EDUCATION/TRAINING PROGRAM

## 2019-07-14 PROCEDURE — 84100 ASSAY OF PHOSPHORUS: CPT | Performed by: STUDENT IN AN ORGANIZED HEALTH CARE EDUCATION/TRAINING PROGRAM

## 2019-07-14 PROCEDURE — NC001 PR NO CHARGE: Performed by: FAMILY MEDICINE

## 2019-07-14 PROCEDURE — 99217 PR OBSERVATION CARE DISCHARGE MANAGEMENT: CPT | Performed by: FAMILY MEDICINE

## 2019-07-14 PROCEDURE — 84443 ASSAY THYROID STIM HORMONE: CPT | Performed by: STUDENT IN AN ORGANIZED HEALTH CARE EDUCATION/TRAINING PROGRAM

## 2019-07-14 PROCEDURE — 85025 COMPLETE CBC W/AUTO DIFF WBC: CPT | Performed by: STUDENT IN AN ORGANIZED HEALTH CARE EDUCATION/TRAINING PROGRAM

## 2019-07-14 PROCEDURE — 83735 ASSAY OF MAGNESIUM: CPT | Performed by: STUDENT IN AN ORGANIZED HEALTH CARE EDUCATION/TRAINING PROGRAM

## 2019-07-14 PROCEDURE — 80048 BASIC METABOLIC PNL TOTAL CA: CPT | Performed by: STUDENT IN AN ORGANIZED HEALTH CARE EDUCATION/TRAINING PROGRAM

## 2019-07-14 PROCEDURE — 83880 ASSAY OF NATRIURETIC PEPTIDE: CPT | Performed by: STUDENT IN AN ORGANIZED HEALTH CARE EDUCATION/TRAINING PROGRAM

## 2019-07-14 PROCEDURE — 80061 LIPID PANEL: CPT | Performed by: STUDENT IN AN ORGANIZED HEALTH CARE EDUCATION/TRAINING PROGRAM

## 2019-07-14 RX ORDER — ATORVASTATIN CALCIUM 40 MG/1
40 TABLET, FILM COATED ORAL DAILY
Qty: 90 TABLET | Refills: 3 | Status: SHIPPED | OUTPATIENT
Start: 2019-07-14 | End: 2019-07-16

## 2019-07-14 RX ORDER — LEVOTHYROXINE SODIUM 175 UG/1
175 TABLET ORAL DAILY
Qty: 90 TABLET | Refills: 3 | Status: SHIPPED | OUTPATIENT
Start: 2019-07-14 | End: 2020-08-24 | Stop reason: SDUPTHER

## 2019-07-14 RX ADMIN — SPIRONOLACTONE 12.5 MG: 25 TABLET ORAL at 09:05

## 2019-07-14 RX ADMIN — DICLOFENAC 2 G: 10 GEL TOPICAL at 11:12

## 2019-07-14 RX ADMIN — POTASSIUM CHLORIDE 10 MEQ: 750 TABLET, EXTENDED RELEASE ORAL at 09:04

## 2019-07-14 RX ADMIN — SOTALOL HYDROCHLORIDE 80 MG: 80 TABLET ORAL at 09:05

## 2019-07-14 RX ADMIN — LEVOTHYROXINE SODIUM 175 MCG: 150 TABLET ORAL at 05:22

## 2019-07-14 RX ADMIN — PANTOPRAZOLE SODIUM 40 MG: 40 TABLET, DELAYED RELEASE ORAL at 05:23

## 2019-07-14 RX ADMIN — RIVAROXABAN 20 MG: 10 TABLET, FILM COATED ORAL at 09:05

## 2019-07-14 RX ADMIN — BUDESONIDE AND FORMOTEROL FUMARATE DIHYDRATE 2 PUFF: 160; 4.5 AEROSOL RESPIRATORY (INHALATION) at 09:04

## 2019-07-14 RX ADMIN — SACUBITRIL AND VALSARTAN 1 TABLET: 49; 51 TABLET, FILM COATED ORAL at 09:05

## 2019-07-14 RX ADMIN — FUROSEMIDE 40 MG: 10 INJECTION, SOLUTION INTRAMUSCULAR; INTRAVENOUS at 01:22

## 2019-07-14 RX ADMIN — PRAVASTATIN SODIUM 80 MG: 80 TABLET ORAL at 09:04

## 2019-07-14 RX ADMIN — FUROSEMIDE 40 MG: 10 INJECTION, SOLUTION INTRAMUSCULAR; INTRAVENOUS at 09:06

## 2019-07-14 NOTE — PROGRESS NOTES
Progress Note - Dav Flaherty 59 y o  female MRN: [id number]    Unit/Bed#: [address] [place] [address] Encounter: [id number]      Assessment & Plan:  Patient is a 60 yo F with a PMH of Paroxysmal AFib, Mild Intermittent asthma, hypothyroidism, HTN, CHF who presented to the ED with worsening SOB over 3 days at rest and exertion associated with fatigue, sleep disturbance, and back pain which is secondary to cough  Patient reports symptoms are worse at night    Pt denied chest pain, palpitation, or abdominal pain  Patient reported  abdominal distension and leg swelling bilaterally     Leukocytosis  Assessment & Plan  POA WBC 11 33 Possibly reactive as patient reports taking numerous inhaler over the last 3 days  Patient is Afebrile  Vitals within normal limits  Will monitor with daily CBCs  Hypothyroidism  Assessment & Plan  Last TSH 12/1/2018  83     TSH 18 1 on 7/13  T4 WNL  Continue Home med Levothyroxine 150mcg Qdaily- now increased to 175    Moderate obesity  Assessment & Plan  BMI 43 7  · Nutrition counseling    Paroxysmal atrial fibrillation [facility])  Assessment & Plan  Patient currently in normal sinus rhythm  Continue home meds of Sotalol 80 mg BID and   Xarelto 20mg Qdaily for anticoagulation    Hypertension  Assessment & Plan  Stable    Continue Home medication:  carvedilol 12 5mg BID  Sotalol 80mg BID  Spironolactone 25mg Qdaily      Dyspnea on exertion  Assessment & Plan  Likely due to CHF exacerbation at this visit  Pt tried three days of asthma medication which did not help, which points more in the direction that her dyspnea is cardiac in origin  CXR final read pending  Please see recommendations for acute on Chronic CHF  Dyslipidemia  Assessment & Plan  Last lipid panel seen on 6/6/2016  fu Lipid Panel  Continue pravastatin 80mg Qdaily    Cardiomyopathy [facility])  Assessment & Plan  Patient sees cardiology for dilated cardiomyopathy   She was upgraded to biventricular device as well as started on sotalol  · FU cards recommendations              Mild intermittent asthma without complication  Assessment & Plan  Patient currently in no acute distress, no wheezing  Likely no exacerbation at this time given saturating well on room air  Continue with home meds:  Symbicort 160-4 5/act inhaler 2 puffs BID PRN  Albuterol inhaler 1 puff Q4H PRN      * Acute on chronic combined systolic (congestive) and diastolic (congestive) heart failure (HCC)  Assessment & Plan  Wt Readings from Last 3 Encounters:   07/13/19 123 kg (271 lb 9 7 oz)   05/14/19 119 kg (263 lb)   11/10/18 116 kg (256 lb)       Source of acute CHF exacerbation is unknown at this time  Differential is broad and may include  In ED BNP was markedly elevated 1632 pg/ml, troponin (-) x 1, EKG showed paced rhythm  Obvious JVD present along with 1+ lower extremity edema  Pt given Lasix IV 40 mg x 1  Pt follows with cardiology and last visit was on 5/14  Pt follows with cards and last visit was 5/19 recently got a biventricular pacer and also started on sotalol  Pt is currently considered Chronic systolic heart failure New York Heart Association class II/3  Last echo 2/20/18: Ejection fraction was estimated in the range of 20 % to 25 %  Patient no longer feeling short of breath  Exam is benign  Possible discharge today pending echo  · Fluid restricted to 1 L per day  · Lasix 40mg IV TID   · CXR final read: Small patchy area of medial left upper lobe pneumonia  Continued radiographic follow-up is recommended to document complete interval resolution  ; Unchanged cardiomegaly  · Monitor Daily Weights, I/Os  · Cardiology consulted  Recommendations appreciated    · Cont home med: Carvedilol 12 5mg BID , Sacubitril-valsartan (Entresto) 49-51 Qdaily, Sotalol 80mg Q12H, Spironolactone 12 5mg Qdaily  · Output 2 6 L since admission  · Fu BNP        Global   Full code  Room air  Cardiovascular diet  No fluids      Subjective:   Patient states that she is feeling markedly improved today  Her swelling in both the legs and abdomen have gone down noticeably for her  She states that she is back at her baseline  She does not use home oxygen, and she is not currently on oxygen now  She is able to ambulate around the room without getting short of breath at all  She states that she has been completely improved and would like to go home  Objective:     Vitals: Blood pressure 118/68, pulse 62, temperature 97 5 °F (36 4 °C), temperature source Oral, resp  rate 20, height 5' 6" (1 676 m), weight 122 kg (268 lb 1 3 oz), SpO2 98 %  ,Body mass index is 43 27 kg/m²  Intake/Output Summary (Last 24 hours) at 7/14/2019 0908  Last data filed at 7/14/2019 0349  Gross per 24 hour   Intake 220 ml   Output 2850 ml   Net -2630 ml       Physical Exam: General appearance: alert and oriented, in no acute distress  Lungs: clear to auscultation bilaterally  Heart: regular rate and rhythm, S1, S2 normal, no murmur, click, rub or gallop  Abdomen: soft, non-tender; bowel sounds normal; no masses,  no organomegaly  Skin: no edema present bl on le     Invasive Devices     Peripheral Intravenous Line            Peripheral IV 07/13/19 Left;Proximal;Ventral (anterior) Forearm 1 day                Lab, Imaging and other studies: I have personally reviewed pertinent reports      VTE Pharmacologic Prophylaxis: Reason for no pharmacologic prophylaxis already anticoaggulated  VTE Mechanical Prophylaxis: sequential compression device

## 2019-07-14 NOTE — UTILIZATION REVIEW
Notification of Observation Care Status/Observation Authorization Request  This is a Notification of Observation Care Status to our facility [address] [place] [address]  Please be advised that this patient is currently in our facility under Observation Status  Below you will find the Attending Physician and Facilitys information including NPI# and contact information for the Utilization  assigned to the [facility] where the patient is receiving services  Please feel free to contact the Utilization Review Department with any questions  Place of Service Code: 25  Place of Service Name: [facility]  CPT Code for Observation:       PRESENTATION DATE/TIME: 7/13/2019  8:13 AM  OBS ADMISSION DATE/TIME:  Admission Orders (From admission, onward)    Ordered        07/13/19 0954  Place in Observation (expected length of stay for this patient is less than two midnights)  Once             DISCHARGE DATE/TIME: No discharge date for patient encounter  DISPOSITION: Home/Self Care  Attending Physician: Concha Gutierrez Md [[id number]] [unfilled]    Facility: [address] [place] [address] Utilization Review Department  Phone: [phone]; Fax [phone]  [email or web address]  ATTENTION: Please call with any questions or concerns to [phone]  and carefully listen to the prompts so that you are directed to the right person  Send all requests for admission clinical reviews, approved or denied determinations and any other requests to fax [phone]   All voicemails are confidential

## 2019-07-14 NOTE — CONSULTS
Consultation - Cardiology   Dav Flaherty 59 y o  female MRN: [id number]  Unit/Bed#: 96146 [facility] 404-01 Encounter: [id number]        Assessment/Plan     Assessment:    1  Acutely to decompensated chronic combined systolic and diastolic heart failure, now much improved, partly related to dietary sodium excessive intake  2  Chronic nonischemic dilated cardiomyopathy with baseline left ventricular ejection fraction of 20-25% and chronic PVCs  3  Uncontrolled acquired hypothyroidism with TSH level of 18 133   4  Normally functioning [facility] ICD/CRT  5  Controlled essential hypertension  6  Suppressed paroxysmal atrial fibrillation  7  Uncontrolled mixed dyslipidemia  8  Morbid obesity  9  Controlled bronchial asthma  10  Chronic anxiety disorder    Plan:    1  May discharge patient home today  2  Increase Entresto dose to  milligrams b i d   3  Discontinue pravastatin because of ineffectiveness  4  Begin atorvastatin 40 milligrams daily, which is much more potent  5  Avoid Gatorade and limit seltzer water intake as well as salty snacks, which the patient admits to using  6  Daily fluid restriction of 1-1/2 quarts of fluid per day  7  Cardiology follow-up with Dr Ashley Wlicox in 2-3 weeks            Current Facility-Administered Medications   Medication Dose Route Frequency Provider Last Rate Last Dose    acetaminophen (TYLENOL) tablet 650 mg  650 mg Oral Q6H PRN Rodrigo Rios MD        albuterol (PROVENTIL HFA,VENTOLIN HFA) inhaler 1 puff  1 puff Inhalation Q4H PRN Valery Kennedy MD        budesonide-formoterol (SYMBICORT) 160-4 5 mcg/act inhaler 2 puff  2 puff Inhalation BID Valery Kennedy MD   2 puff at 07/14/19 0904    carvedilol (COREG) tablet 12 5 mg  12 5 mg Oral BID With Meals Valery Kennedy MD        diclofenac sodium (VOLTAREN) 1 % topical gel 2 g  2 g Topical 4x Daily Valery Kennedy MD   2 g at 07/13/19 2321    furosemide (LASIX) injection 40 mg  40 mg Intravenous Q8H Reece Reyes MD   40 mg at 07/14/19 0906    levothyroxine tablet 175 mcg  175 mcg Oral Early Morning Linda Mosqueda MD   175 mcg at 07/14/19 0522    pantoprazole (PROTONIX) EC tablet 40 mg  40 mg Oral Early Morning Valery Kennedy MD   40 mg at 07/14/19 0523    potassium chloride (K-DUR,KLOR-CON) CR tablet 10 mEq  10 mEq Oral Daily Valery Kennedy MD   10 mEq at 07/14/19 0904    pravastatin (PRAVACHOL) tablet 80 mg  80 mg Oral Daily Valery Kennedy MD   80 mg at 07/14/19 0904    rivaroxaban (XARELTO) tablet 20 mg  20 mg Oral Daily Valery Kennedy MD   20 mg at 07/14/19 0905    sacubitril-valsartan (ENTRESTO) 49-51 MG per tablet 1 tablet  1 tablet Oral BID Valery Kennedy MD   1 tablet at 07/14/19 0905    sotalol (BETAPACE) tablet 80 mg  80 mg Oral Q12H [facility] Valery Kennedy MD   80 mg at 07/14/19 0905    spironolactone (ALDACTONE) tablet 12 5 mg  12 5 mg Oral Daily Valery Kennedy MD   12 5 mg at 07/14/19 0905    tiZANidine (ZANAFLEX) tablet 2 mg  2 mg Oral Q8H PRN Valery Kennedy MD   2 mg at 07/13/19 1728         History of Present Illness     Physician Requesting Consult: Marisol Peng MD      Reason for Consult / Principal Problem:  Worsening dyspnea and history of chronic combined systolic and diastolic heart failure  HPI: Barry Webber is a 59y o  year old female who presents with 3 days of worsening dyspnea at rest and with activity with associated fatigue, trouble sleeping, cough, back discomfort aggravated by coughing, orthopnea, paroxysmal nocturnal dyspnea  She used nebulizer therapy at home with no relief  There has been no chest pain, palpitations, syncope, dizziness, sputum production, fever, chills, abdominal pain, nausea, vomiting, diaphoresis  She did notice some posterior calf and foot swelling recently      In questioning the patient, she admits to using Gatorade and seltzer water when she is thirsty and also admits to eating frequent salty snacks, such as potato chips  The patient had her atrial sensed biventricular pacemaker and ICD interrogated by our device clinic at [facility] on 04/17/2019  She demonstrated biventricular pacing 87% of the time with adequate battery voltage and normal lead parameters  Impedance monitoring was normal   Device function was normal     This patient was last seen by Dr Raleigh Cordon the of Cardiology on 05/14/2019, at which time her weight was 263 lbs  She was thought to be doing well at that time  This patient carries is a history of dilated cardiomyopathy with 20-25% LVEF, chronic combined systolic and diastolic heart failure, essential hypertension, mixed dyslipidemia, acquired hypothyroidism , anxiety, paroxysmal atrial fibrillation, morbid obesity, bronchial asthma, prior ICD in upgrade to [facility] ICD/CRT in December, 2018  Since admission, the patient has had a negative fluid balance of 2 63 liters in a weight loss of approximately 3 5 lbs  She is now symptom free          Historical Information    Past Medical History:   Diagnosis Date    A-fib ([facility] )     Abnormal blood sugar     Acid reflux     Acute bronchitis     Allergic reaction     Anxiety     Arthritis     [facility]     Cellulitis of left lower leg     Chest pain     [facility]     Constipation     COPD (chronic obstructive pulmonary disease) ([facility])     Depression with anxiety     Diabetes mellitus [facility]     Disease of thyroid gland     Diverticulitis     Dyslipidemia     Dyspnea on exertion     Elbow pain     Fracture of olecranon, open     Gastritis     Generalized pain     Hematuria     History of colonoscopy     Hypertension     Hypokalemia     Hypomagnesemia     Leg cramping     Moderate obesity     Morbid obesity due to excess calories ([facility])     Myalgia     Myositis     Nausea in adult     Nephrolithiasis     Prepatellar bursitis, unspecified knee     Screening for lipid disorders     Shortness of breath     Spasm of muscle     Thyroid trouble      Past Surgical History:  Past Surgical History:   Procedure Laterality Date    CARDIAC DEFIBRILLATOR PLACEMENT      LAPAROSCOPIC CHOLECYSTECTOMY      TOTAL ABDOMINAL HYSTERECTOMY W/ BILATERAL SALPINGOOPHORECTOMY       Social History     Substance and Sexual Activity   Alcohol Use No     Social History     Substance and Sexual Activity   Drug Use No     Social History     Tobacco Use   Smoking Status Never Smoker   Smokeless Tobacco Never Used     Family History   Problem Relation Age of Onset    Hypertension Sister     Cancer Sister     Coronary artery disease Family     Diabetes type II Family     Hypertension Family        Meds/Allergies   Prior to Admission medications    Medication Sig Start Date End Date Taking? Authorizing Provider   budesonide-formoterol (SYMBICORT) 160-4 5 mcg/act inhaler Inhale 2 puffs 2 (two) times a day Rinse mouth after use   10/4/18  Yes Brenda Sarah MD   diclofenac sodium (VOLTAREN) 1 % Apply 2 g topically 4 (four) times a day 10/1/18  Yes Gabbi Naranjo MD   ENTRESTO 49-51 MG TABS Take 1 tablet by mouth 2 (two) times a day  10/18/18  Yes Historical Provider, MD   levalbuterol Karli Kelly HFA) 45 mcg/act inhaler Inhale 2 puffs every 6 (six) hours as needed for shortness of breath 10/4/18  Yes Brenda Sarah MD   levalbuterol (XOPENEX) 1 25 mg/3 mL nebulizer solution inhale contents of 1 vial in nebulizer every 6 hours if needed 5/29/19  Yes Gabbi Naranjo MD   levothyroxine 150 mcg tablet take 1 tablet by mouth once daily 6/20/19  Yes Rene Gtz, DO   pantoprazole (PROTONIX) 40 mg tablet take 1 tablet by mouth once daily 6/28/19  Yes Gabbi Naranjo MD   potassium chloride (K-DUR) 10 mEq tablet take 1 tablet by mouth once daily 3/18/19  Yes Jules Holiday, DO   rivaroxaban (XARELTO) 20 mg tablet Take 1 tablet (20 mg total) by mouth daily 3/15/19  Yes Roe Gr MD   sotalol (BETAPACE) 80 mg tablet Take 80 mg by mouth every 12 (twelve) hours 4/1/19  Yes Historical Provider, MD   tiZANidine (ZANAFLEX) 2 mg tablet take 1 tablet by mouth every 8 hours if needed for muscle spasm 6/3/19  Yes Amena Short MD   torsemide [facility]) 10 mg tablet take 2 tablets by mouth once daily 5/26/19  Yes Mariah Yun MD   carvedilol (COREG) 12 5 mg tablet Take 12 5 mg by mouth 2 (two) times a day with meals    Historical Provider, MD   ipratropium (ATROVENT) 0 02 % nebulizer solution Take 2 5 mL by nebulization 4 (four) times a day for 30 days 10/23/16 5/14/19  Savita Armstrong DO   ivabradine HCl (CORLANOR) 5 MG tablet Take 1 tablet (5 mg total) by mouth daily 7/27/18   Mariah Yun MD   pravastatin (PRAVACHOL) 80 mg tablet Take 1 tablet (80 mg total) by mouth daily  Patient not taking: Reported on 7/13/2019 7/3/18   Mariah Yun MD   spironolactone (ALDACTONE) 25 mg tablet Take 0 5 tablets (12 5 mg total) by mouth daily  Patient not taking: Reported on 7/13/2019 10/9/18   Mariah Yun MD   XARELTO 20 MG tablet take 1 tablet by mouth once daily  Patient not taking: Reported on 7/13/2019 3/18/19   Mariah Yun MD     Allergies   Allergen Reactions    Omnipaque [Iohexol] Swelling    Penicillins Swelling    Iv Dye  [Iodinated Diagnostic Agents] Throat Swelling    Shellfish-Derived Products Hives       Cardiovascular ROS:       More than 10 systems reviewed and all systems negative, except as noted in history of present illness    Objective     Vitals: Blood pressure 118/68, pulse 62, temperature 97 5 °F (36 4 °C), temperature source Oral, resp  rate 20, height 5' 6" (1 676 m), weight 122 kg (268 lb 1 3 oz), SpO2 98 %  Body mass index is 43 27 kg/m²  Physical Exam  General-Well-developed morbidly obese   female  in no acute distress  Patient is alert, oriented X3 and cooperative  Skin-Warm and dry with no pallor, rashes, ecchymoses, lesions  HEENT-Normocephalic   Pupils equally round and reactive to light and accommodation  Extraocular muscles intact  Mucous membranes pink and moist  Sclerae nonicteric  Optic fundi poorly seen  Neck-No jugular venous distention, AJR, masses, thyromegaly, adenopathy, bruits  Lungs-No rales, rhonchi, wheezes  Heart-No murmur, gallop, rub, click, heave, thrill  Abdomen-Normal bowel sounds with no masses, organomegaly, guarding, tenderness, or rigidity  Extremities-No cyanosis, clubbing, edema, pulse decrease  Neurological-No focal neurological signs  Imaging:     EKG 07/13/2019:     100% biventricular pacing with brief period of atrial-biventricular pacing  Single PVC    Imaging    Chest X-Ray 07/13/2019 :    Prominent cephalization in left lung with radiology calling a small patchy area of medial left upper lobe pneumonia on their interpretation      Transthoracic echocardiogram 02/20/2018:    Marked LV dilatation with 20-25% LVEF and mild LVH  Pacing wire in right ventricle  Mild LAE  1+ MR      Lab Review     Lab Results   Component Value Date    SODIUM 142 07/14/2019    K 3 6 07/14/2019     07/14/2019    CO2 30 07/14/2019    ANIONGAP 12 9 11/01/2015    BUN 20 07/14/2019    CREATININE 1 32 (H) 07/14/2019    GLUCOSE 95 06/06/2016    CALCIUM 8 8 07/14/2019    AST 14 07/13/2019    ALT 35 07/13/2019    ALKPHOS 77 07/13/2019    PROT 6 8 06/06/2016    BILITOT 0 3 06/06/2016    EGFR 49 07/14/2019     Other laboratory data since admission:    TSH:  18 133 with prior level of 6 52  NT-proBNP:  1632 on admission and currently 1137 with baseline of 412 on 11/10/2018  Glucose 07/14/2019:  116  WBC 11 33 on admission and now 11 93 with baseline of 8 57    Lab Results   Component Value Date    CHOLESTEROL 230 (H) 07/14/2019     Lab Results   Component Value Date    HDL 46 07/14/2019    HDL 50 06/06/2016    HDL 48 06/12/2015     No results found for: LDLCHOLEST  Lab Results   Component Value Date    LDLCALC 141 (H) 07/14/2019 LDLCALC 169 (H) 06/06/2016    LDLCALC 149 (H) 06/12/2015     No components found for: [facility]  Lab Results   Component Value Date    TRIG 213 (H) 07/14/2019    TRIG 105 06/06/2016    TRIG 99 06/12/2015         Lab Results   Component Value Date    GLUCOSE 95 06/06/2016    CALCIUM 8 8 07/14/2019     06/06/2016    K 3 6 07/14/2019    CO2 30 07/14/2019     07/14/2019    BUN 20 07/14/2019    CREATININE 1 32 (H) 07/14/2019       Counseling / Coordination of Care  Total floor time spent today 64   minutes       Ricardo Zamora MD

## 2019-07-14 NOTE — PLAN OF CARE
Problem: Potential for Falls  Goal: Patient will remain free of falls  Description  INTERVENTIONS:  - Assess patient frequently for physical needs  -  Identify cognitive and physical deficits and behaviors that affect risk of falls  -  [place] fall precautions as indicated by assessment   - Educate patient/family on patient safety including physical limitations  - Instruct patient to call for assistance with activity based on assessment  - Modify environment to reduce risk of injury  - Consider OT/PT consult to assist with strengthening/mobility  Outcome: Progressing     Problem: CARDIOVASCULAR - ADULT  Goal: Maintains optimal cardiac output and hemodynamic stability  Description  INTERVENTIONS:  - Monitor I/O, vital signs and rhythm  - Monitor for S/S and trends of decreased cardiac output i e  bleeding, hypotension  - Administer and titrate ordered vasoactive medications to optimize hemodynamic stability  - Assess quality of pulses, skin color and temperature  - Assess for signs of decreased coronary artery perfusion - ex   Angina  - Instruct patient to report change in severity of symptoms  Outcome: Progressing  Goal: Absence of cardiac dysrhythmias or at baseline rhythm  Description  INTERVENTIONS:  - Continuous cardiac monitoring, monitor vital signs, obtain 12 lead EKG if indicated  - Administer antiarrhythmic and heart rate control medications as ordered  - Monitor electrolytes and administer replacement therapy as ordered  Outcome: Progressing     Problem: RESPIRATORY - ADULT  Goal: Achieves optimal ventilation and oxygenation  Description  INTERVENTIONS:  - Assess for changes in respiratory status  - Assess for changes in mentation and behavior  - Position to facilitate oxygenation and minimize respiratory effort  - Oxygen administration by appropriate delivery method based on oxygen saturation (per order) or ABGs  - Initiate smoking cessation education as indicated  - Encourage broncho-pulmonary hygiene including cough, deep breathe, Incentive Spirometry  - Assess the need for suctioning and aspirate as needed  - Assess and instruct to report SOB or any respiratory difficulty  - Respiratory Therapy support as indicated  Outcome: Progressing

## 2019-07-14 NOTE — DISCHARGE SUMMARY
Discharge Summary - [facility] 59 y o  female MRN: [id number]    Unit/Bed#: 4 [place] 404-01 Encounter: [id number]    Admission Date:   Admission Orders (From admission, onward)    Ordered        07/13/19 0954  Place in Observation (expected length of stay for this patient is less than two midnights)  Once               Admitting Diagnosis: CHF (congestive heart failure) ([facility]) [I50 9]  SOB (shortness of breath) [R06 02]    HPI: as per admission note  Patient is a 60 yo F with a PMH of Paroxysmal AFib, Mild Intermittent asthma, hypothyroidism, HTN, CHF who presented to the ED with worsening SOB over 3 days at rest and exertion associated with fatigue, sleep disturbance, and back pain which is secondary to cough  Patient reports symptoms are worse at night  Patient tried nebulizer treatment because she was suspicious this was an asthma exacerbation  She proceeded to call Pulmonology to ask for cough prescription with codeine but was told to go to urgent care or the emergency department  Pt denies chest pain, palpitation, or abdominal pain  Patient reports abdominal distension and leg swelling bilaterally  patient reports use of two pillows at night to sleep  patient reports similar symptoms two years ago in but was never admitted to hospital      Procedures Performed:   Orders Placed This Encounter   Procedures    ED ECG Documentation Only       Summary of Hospital Course: Patient was given lasix IV 40 mg in the ED, and then continued to be vigorously diuresed with Lasix 40 mg IV TID  During the course of her admission, she put out 2 6 L and lost 3 kg  All of her symptoms resolved and she remained on room air  She stated that she was completely back to baseline  Both the primary team and cardiology felt that she was ready for discharge      Changes made in her med:  -Entresto increased to  from 49-51  -Levothyroxine increased to 175 mcg from 150  -Pravastatin DC'd and replaced with atorvastatin 40 mg po daily to better address he mixed hyperlipidemia     Resolved Problems  Date Reviewed: 5/14/2019    None          Condition at Discharge: good         Discharge instructions/Information to patient and family:   See after visit summary for information provided to patient and family  Provisions for Follow-Up Care:  See after visit summary for information related to follow-up care and any pertinent home health orders  PCP: Ashley Vazquez DO    Disposition: Home    Planned Readmission: No    Discharge Statement   I spent 30 minutes discharging the patient  This time was spent on the day of discharge  I had direct contact with the patient on the day of discharge  Additional documentation is required if more than 30 minutes were spent on discharge  Discharge Medications:  See after visit summary for reconciled discharge medications provided to patient and family

## 2019-07-14 NOTE — UTILIZATION REVIEW
Initial Clinical Review    Admission: Date/Time/Statement:   Admission Orders (From admission, onward)    Ordered        07/13/19 0954  Place in Observation (expected length of stay for this patient is less than two midnights)  Once             Orders Placed This Encounter   Procedures    Place in Observation (expected length of stay for this patient is less than two midnights)     Standing Status:   Standing     Number of Occurrences:   1     Order Specific Question:   Admitting Physician     Answer:   Nick Arriola [[id number]]     Order Specific Question:   Level of Care     Answer:   Med Surg [[id number]]     ED Arrival Information     Expected Arrival Acuity Means of Arrival Escorted By Service Admission Type    - 7/13/2019 08:06 Emergent Walk-In Self General Medicine Emergency    Arrival Complaint    -        Chief Complaint   Patient presents with    Shortness of Breath     pt c/o sob for 3 days with l sided weakness for 3 days     Assessment/Plan:   Patient presents for evaluation of SOB worsening for the last 3 days  Non productive cough  Symptoms are worse at night and with laying down  Denies chest pain  Using nebulizer at night without relief  History of asthma and congestive heart failure  States she couldn't buckle her pants today  Patient also noted left sided weakness for 3 days as well  Left arm and left leg  * Acute on chronic combined systolic (congestive) and diastolic (congestive) heart failure (HCC)  Assessment & Plan      Wt Readings from Last 3 Encounters:   07/13/19 123 kg (271 lb 9 7 oz)   05/14/19 119 kg (263 lb)   11/10/18 116 kg (256 lb)   Source of acute CHF exacerbation is unknown at this time  Differential is broad and may include  In ED BNP was markedly elevated 1632 pg/ml, troponin (-) x 1, EKG showed paced rhythm  Obvious JVD present along with 1+ lower extremity edema  Pt given Lasix IV 40 mg x 1  Pt follows with cardiology and last visit was on 5/14   Pt follows with cards and last visit was 5/19 recently got a biventricular pacer and also started on sotalol  Pt is currently considered Chronic systolic heart failure [employer] [place] [employer] Association class II/3  Last echo 2/20/18: Ejection fraction was estimated in the range of 20 % to 25 %  · Fluid restricted to 1 L per day  · Lasix 40mg IV TID   · Monitor I/Os  · CXR final read  · Monitor Daily Weights  · Cardiology consulted  Recommendations appreciated  · Carvedilol 12 5mg BID   · Sacubitril-valsartan (Entresto) 49-51 Qdaily  · Sotalol 80mg Q12H  · Spironolactone 12 5mg Qdaily  Leukocytosis  Assessment & Plan  POA WBC 11 33 Possibly reactive as patient reports taking numerous inhaler over the last 3 days  Patient is Afebrile  Vitals within normal limits  Will monitor with daily CBCs       ED Triage Vitals   Temperature Pulse Respirations Blood Pressure SpO2   07/13/19 0821 07/13/19 0821 07/13/19 0821 07/13/19 0821 07/13/19 0821   98 2 °F (36 8 °C) 88 20 136/82 100 %      Temp Source Heart Rate Source Patient Position - Orthostatic VS BP Location FiO2 (%)   07/13/19 0821 07/13/19 0821 07/13/19 0821 07/13/19 0821 --   Tympanic Monitor Lying Left arm       Pain Score       07/13/19 1015       No Pain        Wt Readings from Last 1 Encounters:   07/14/19 122 kg (268 lb 1 3 oz)     Additional Vital Signs:   Date/Time  Temp  Pulse  Resp  BP  SpO2  O2 Device  Patient Position - Orthostatic VS   07/14/19 0349  96 5 °F (35 8 °C)Abnormal   72  18  110/54  98 %  None (Room air)  Lying   07/13/19 2318  97 6 °F (36 4 °C)  63  18  102/50  95 %  None (Room air)     07/13/19 1939  98 4 °F (36 9 °C)  77  18  127/67  97 %  None (Room air)  Lying   07/13/19 1525  97 8 °F (36 6 °C)  75  18  139/58  99 %  None (Room air)  Sitting   07/13/19 1045          96 %  None (Room air)     07/13/19 1015  98 °F (36 7 °C)  77  18  133/94  97 %  None (Room air)  Sitting   07/13/19 1000    82  25Abnormal     97 %       07/13/19 0945    74  30Abnormal     97 %     07/13/19 0915    76  21  110/71  99 %       07/13/19 0900    76  25Abnormal   117/73  98 %       07/13/19 0845    76  31Abnormal     99 %       07/13/19 0830    84  26Abnormal     99 %       07/13/19 0821  98 2 °F (36 8 °C)  88  20  136/82  100 %  None (Room air)  Lying   Pertinent Labs/Diagnostic Test Results:   Results from last 7 days   Lab Units 07/14/19  0524 07/13/19  0830   WBC Thousand/uL 11 93* 11 33*   HEMOGLOBIN g/dL 13 2 13 7   HEMATOCRIT % 42 4 43 3   PLATELETS Thousands/uL 363 393*   NEUTROS ABS Thousands/µL 6 29 5 36     Results from last 7 days   Lab Units 07/14/19  0524 07/13/19  0830   SODIUM mmol/L 142 142   POTASSIUM mmol/L 3 6 4 2   CHLORIDE mmol/L 102 105   CO2 mmol/L 30 28   ANION GAP mmol/L 10 9   BUN mg/dL 20 15   CREATININE mg/dL 1 32* 1 17   EGFR ml/min/1 73sq m 49 57   CALCIUM mg/dL 8 8 8 7   MAGNESIUM mg/dL 2 5  --    PHOSPHORUS mg/dL 5 3*  --      Results from last 7 days   Lab Units 07/13/19  0830   AST U/L 14   ALT U/L 35   ALK PHOS U/L 77   TOTAL PROTEIN g/dL 7 0   ALBUMIN g/dL 3 3*   TOTAL BILIRUBIN mg/dL 0 40     Results from last 7 days   Lab Units 07/14/19  0524 07/13/19  0830   GLUCOSE RANDOM mg/dL 116 106     Results from last 7 days   Lab Units 07/13/19  0830   TROPONIN I ng/mL <0 02     Results from last 7 days   Lab Units 07/13/19  0830   NT-PRO BNP pg/mL 1,632*      Ref Range & Units 7/13/19 0830   TSH 3RD GENERATON 0 358 - 3 740 uIU/mL 18 133High    CXR=Small patchy area of medial left upper lobe pneumonia  Unchanged cardiomegaly    CT HEAD=No acute intracranial hemorrhage seen  No extra-axial collection seen  No CT signs of acute territorial infarction  EKG=Interpretation: abnormal      ECG rate:  86    ECG rate assessment: normal       Rhythm: paced    Pacing:    Capture:  Complete    Type of pacing:  AV    Ectopy: PVCs    ED Treatment:   Medication Administration from 07/13/2019 0806 to 07/13/2019 1029       Date/Time Order Dose Route Action Action by Comments     07/13/2019 0909 furosemide (LASIX) injection 40 mg 40 mg Intravenous Given Lyubov Chaudhry RN         Past Medical History:   Diagnosis Date    A-fib [facility])     Abnormal blood sugar     Acid reflux     Acute bronchitis     Allergic reaction     Anxiety     Arthritis     Cardiomyopathy [facility] )     Cellulitis of left lower leg     Chest pain     [facility]est[facility]     Constipation     COPD (chronic obstructive pulmonary disease) ([facility])     Depression with anxiety     Diabetes mellitus ([place] [address] )     Disease of thyroid gland     Diverticulitis     Dyslipidemia     Dyspnea on exertion     Elbow pain     Fracture of olecranon, open     Gastritis     Generalized pain     Hematuria     History of colonoscopy     Hypertension     Hypokalemia     Hypomagnesemia     Leg cramping     Moderate obesity     Morbid obesity due to excess calories ([facility])     Myalgia     Myositis     Nausea in adult     Nephrolithiasis     Prepatellar bursitis, unspecified knee     Screening for lipid disorders     Shortness of breath     Spasm of muscle     Thyroid trouble      Present on Admission:   Acute on chronic combined systolic (congestive) and diastolic (congestive) heart failure ([facility])   Mild intermittent asthma without complication   Cardiomyopathy ([place] [address] )   Dyslipidemia   Hypertension   Paroxysmal atrial fibrillation ([facility])   Hypothyroidism   Dyspnea on exertion   Moderate obesity  Admitting Diagnosis: CHF (congestive heart failure) ([facility]) [I50 9]  SOB (shortness of breath) [R06 02]  Age/Sex: 59 y o  female  Admission Orders:  TELEMETRY  VENODYNES  1000CC FLUID RESTRICTION  CONSULT CARDIO  PT EVAL & TX  Current Facility-Administered Medications:  acetaminophen 650 mg Oral Q6H PRN   albuterol 1 puff Inhalation Q4H PRN   budesonide-formoterol 2 puff Inhalation BID   carvedilol 12 5 mg Oral BID With Meals   diclofenac sodium 2 g Topical 4x Daily   furosemide 40 mg Intravenous Q8H   levothyroxine 175 mcg Oral Early Morning   pantoprazole 40 mg Oral Early Morning   potassium chloride 10 mEq Oral Daily   pravastatin 80 mg Oral Daily   rivaroxaban 20 mg Oral Daily   sacubitril-valsartan 1 tablet Oral BID   sotalol 80 mg Oral Q12H [facility]   spironolactone 12 5 mg Oral Daily   tiZANidine 2 mg Oral Q8H PRN     Network Utilization Review Department  Phone: [phone]; Fax [phone]  [email or web address]  ATTENTION: Please call with any questions or concerns to [phone]  and carefully listen to the prompts so that you are directed to the right person  Send all requests for admission clinical reviews, approved or denied determinations and any other requests to fax [phone]   All voicemails are confidential

## 2019-07-14 NOTE — DISCHARGE INSTRUCTIONS
Heart Failure   WHAT YOU NEED TO KNOW:   Heart failure (HF) is a condition that does not allow your heart to fill or pump properly  Not enough oxygen in your blood gets to your organs and tissues  HF can occur in the right side, the left side, or both lower chambers of your heart  HF is often caused by damage or injury to your heart  The damage may be caused by heart attack, other heart conditions, or high blood pressure  HF is a long-term condition that tends to get worse over time  It is important to manage your health to improve your quality of life  HF can be worsened by heavy alcohol use, smoking, diabetes that is not controlled, or obesity  DISCHARGE INSTRUCTIONS:   Call 911 if:   · You have any of the following signs of a heart attack:      ¨ Squeezing, pressure, or pain in your chest that lasts longer than 5 minutes or returns    ¨ Discomfort or pain in your back, neck, jaw, stomach, or arm     ¨ Trouble breathing    ¨ Nausea or vomiting    ¨ Lightheadedness or a sudden cold sweat, especially with chest pain or trouble breathing    Return to the emergency department if:   · You gain 3 or more pounds (1 4 kg) in a day, or more than your healthcare provider says you should  · Your heartbeat is fast, slow, or uneven all the time  Contact your healthcare provider if:   · You have symptoms of worsening HF:      ¨ Shortness of breath at rest, at night, or that is getting worse in any way     ¨ Weight gain of 5 or more pounds (2 2 kg) in a week     ¨ More swelling in your legs or ankles     ¨ Abdominal pain or swelling     ¨ More coughing     ¨ Loss of appetite     ¨ Feeling tired all the time    · You feel hopeless or depressed, or you have lost interest in things you used to enjoy  · You often feel worried or afraid  · You have questions or concerns about your condition or care  Medicines: You may  need any of the following:  · Medicines  may be needed to help regulate your heart rhythm   You may also need medicine to lower your blood pressure, and to get rid of extra fluids  · Take your medicine as directed  Contact your healthcare provider if you think your medicine is not helping or if you have side effects  Tell him of her if you are allergic to any medicine  Keep a list of the medicines, vitamins, and herbs you take  Include the amounts, and when and why you take them  Bring the list or the pill bottles to follow-up visits  Carry your medicine list with you in case of an emergency  Follow up with your healthcare provider or cardiologist within 2 weeks or as directed: You may need to return for other tests  You may need home health care  A healthcare provider will monitor your vital signs, weight, and make sure your medicines are working  Write down your questions so you remember to ask them during your visits  Go to cardiac rehab as directed:  Cardiac rehab is a program run by specialists who will help you safely strengthen your heart  The program includes exercise, relaxation, stress management, and heart-healthy nutrition  Healthcare providers will also make sure your medicines are helping to reduce your symptoms  Manage HF:   · Do not smoke  Nicotine and other chemicals in cigarettes and cigars can cause lung damage and make HF difficult to manage  Ask your healthcare provider for information if you currently smoke and need help to quit  E-cigarettes or smokeless tobacco still contain nicotine  Talk to your healthcare provider before you use these products  · Do not drink alcohol or take illegal drugs  Alcohol and drugs can worsen your symptoms quickly  · Weigh yourself every morning  Use the same scale, in the same spot  Do this after you use the bathroom, but before you eat or drink anything  Wear the same type of clothing  Do not wear shoes  Record your weight each day so you will notice any sudden weight gain  Swelling and weight gain are signs of fluid retention   If you are overweight, ask how to lose weight safely  · Check your blood pressure and heart rate every day  Ask for more information about how to measure your blood pressure and heart rate correctly  Ask what these numbers should be for you  · Manage any chronic health conditions you have  These include high blood pressure, diabetes, obesity, high cholesterol, metabolic syndrome, and COPD  You will have fewer symptoms if you manage these health conditions  Follow your healthcare provider's recommendations and follow up with him or her regularly  · Eat heart-healthy foods and limit sodium (salt)  An easy way to do this is to eat more fresh fruits and vegetables and fewer canned and processed foods  Replace butter and margarine with heart-healthy oils such as olive oil and canola oil  Other heart-healthy foods include walnuts, whole-grain breads, low-fat dairy products, beans, and lean meats  Fatty fish such as salmon and tuna are also heart healthy  Ask how much salt you can eat each day  Do not use salt substitutes  · Drink liquids as directed  You may need to limit the amount of liquids you drink if you retain fluid  Ask how much liquid to drink each day and which liquids are best for you  · Stay active  If you are not active, your symptoms are likely to worsen quickly  Walking, bicycling, and other types of physical activity help maintain your strength and improve your mood  Physical activity also helps you manage your weight  Work with your healthcare provider to create an exercise plan that is right for you  · Get vaccines as directed  Get a flu shot every year  You may also need the pneumonia vaccine  The flu and pneumonia can be severe for a person who has HF  Vaccines protect you from these infections  Join a support group:  Living with HF can be difficult  It may be helpful to talk with others who have HF   You may learn how to better manage your condition or get emotional support  For more information:   · [address]  [place] , [place]   Phone: 4- [phone]  Web Address: https://www strong com/  org   © 2017 [employer] Information is for End User's use only and may not be sold, redistributed or otherwise used for commercial purposes  All illustrations and images included in CareNotes® are the copyrighted property of [employer] , [employer]  or Artem Schmitt  The above information is an  only  It is not intended as medical advice for individual conditions or treatments  Talk to your doctor, nurse or pharmacist before following any medical regimen to see if it is safe and effective for you

## 2019-07-15 ENCOUNTER — TELEPHONE (OUTPATIENT)
Dept: CARDIOLOGY CLINIC | Facility: CLINIC | Age: 64
End: 2019-07-15

## 2019-07-15 LAB
ATRIAL RATE: 86 BPM
P AXIS: 34 DEGREES
PR INTERVAL: 126 MS
QRS AXIS: -72 DEGREES
QRSD INTERVAL: 154 MS
QT INTERVAL: 456 MS
QTC INTERVAL: 545 MS
T WAVE AXIS: 94 DEGREES
VENTRICULAR RATE: 86 BPM

## 2019-07-15 PROCEDURE — 93010 ELECTROCARDIOGRAM REPORT: CPT | Performed by: INTERNAL MEDICINE

## 2019-07-16 ENCOUNTER — TRANSITIONAL CARE MANAGEMENT (OUTPATIENT)
Dept: FAMILY MEDICINE CLINIC | Facility: CLINIC | Age: 64
End: 2019-07-16

## 2019-07-16 DIAGNOSIS — E78.5 DYSLIPIDEMIA: Primary | ICD-10-CM

## 2019-07-16 RX ORDER — PRAVASTATIN SODIUM 40 MG
40 TABLET ORAL DAILY
Qty: 90 TABLET | Refills: 1 | Status: SHIPPED | OUTPATIENT
Start: 2019-07-16 | End: 2019-12-29

## 2019-07-16 NOTE — UTILIZATION REVIEW
Initial Clinical Review     Admission: Date/Time/Statement:          Admission Orders (From admission, onward)     Ordered         07/13/19 0954   Place in Observation (expected length of stay for this patient is less than two midnights)  Once                     Orders Placed This Encounter   Procedures    Place in Observation (expected length of stay for this patient is less than two midnights)       Standing Status:   Standing       Number of Occurrences:   1       Order Specific Question:   Admitting Physician       Answer:   Bobby Steel [[id number]]       Order Specific Question:   Level of Care       Answer:   Med Surg [16]                ED Arrival Information      Expected Arrival Acuity Means of Arrival Escorted By Service Admission Type     - 7/13/2019 08:06 Emergent Walk-In Self General Medicine Emergency     Arrival Complaint     -               Chief Complaint   Patient presents with    Shortness of Breath       pt c/o sob for 3 days with l sided weakness for 3 days      Assessment/Plan:   Patient presents for evaluation of SOB worsening for the last 3 days  Non productive cough  Symptoms are worse at night and with laying down  Denies chest pain  Using nebulizer at night without relief  History of asthma and congestive heart failure  States she couldn't buckle her pants today  Patient also noted left sided weakness for 3 days as well  Left arm and left leg  * Acute on chronic combined systolic (congestive) and diastolic (congestive) heart failure (HCC)  Assessment & Plan        Wt Readings from Last 3 Encounters:   07/13/19 123 kg (271 lb 9 7 oz)   05/14/19 119 kg (263 lb)   11/10/18 116 kg (256 lb)   Source of acute CHF exacerbation is unknown at this time  Differential is broad and may include  In ED BNP was markedly elevated 1632 pg/ml, troponin (-) x 1, EKG showed paced rhythm  Obvious JVD present along with 1+ lower extremity edema   Pt given Lasix IV 40 mg x 1     Pt follows with cardiology and last visit was on 5/14  Pt follows with cards and last visit was 5/19 recently got a biventricular pacer and also started on sotalol  Pt is currently considered Chronic systolic heart failure [employer] [place] [employer] class II/3  Last echo 2/20/18: Ejection fraction was estimated in the range of 20 % to 25 %  · Fluid restricted to 1 L per day  · Lasix 40mg IV TID   · Monitor I/Os  · CXR final read  · Monitor Daily Weights  · Cardiology consulted  Recommendations appreciated  · Carvedilol 12 5mg BID   · Sacubitril-valsartan (Entresto) 49-51 Qdaily  · Sotalol 80mg Q12H  · Spironolactone 12 5mg Qdaily  Leukocytosis  Assessment & Plan  POA WBC 11 33 Possibly reactive as patient reports taking numerous inhaler over the last 3 days  Patient is Afebrile  Vitals within normal limits    Will monitor with daily CBCs       ED Triage Vitals   Temperature Pulse Respirations Blood Pressure SpO2   07/13/19 0821 07/13/19 0821 07/13/19 0821 07/13/19 0821 07/13/19 0821   98 2 °F (36 8 °C) 88 20 136/82 100 %       Temp Source Heart Rate Source Patient Position - Orthostatic VS BP Location FiO2 (%)   07/13/19 0821 07/13/19 0821 07/13/19 0821 07/13/19 0821 --   Tympanic Monitor Lying Left arm         Pain Score           07/13/19 1015           No Pain                Wt Readings from Last 1 Encounters:   07/14/19 122 kg (268 lb 1 3 oz)      Additional Vital Signs:   Date/Time   Temp   Pulse   Resp   BP   SpO2   O2 Device   Patient Position - Orthostatic VS   07/14/19 0349   96 5 °F (35 8 °C)Abnormal    72   18   110/54   98 %   None (Room air)   Lying   07/13/19 2318   97 6 °F (36 4 °C)   63   18   102/50   95 %   None (Room air)      07/13/19 1939   98 4 °F (36 9 °C)   77   18   127/67   97 %   None (Room air)   Lying   07/13/19 1525   97 8 °F (36 6 °C)   75   18   139/58   99 %   None (Room air)   Sitting   07/13/19 1045               96 %   None (Room air)      07/13/19 1015   98 °F (36 7 °C)   77   18   133/94   97 %   None (Room air)   Sitting   07/13/19 1000      82   25Abnormal       97 %         07/13/19 0945      74   30Abnormal       97 %         07/13/19 0915      76   21   110/71   99 %         07/13/19 0900      76   25Abnormal    117/73   98 %         07/13/19 0845      76   31Abnormal       99 %         07/13/19 0830      84   26Abnormal       99 %         07/13/19 0821   98 2 °F (36 8 °C)   88   20   136/82   100 %   None (Room air)   Lying   Pertinent Labs/Diagnostic Test Results:         Results from last 7 days   Lab Units 07/14/19  0524 07/13/19  0830   WBC Thousand/uL 11 93* 11 33*   HEMOGLOBIN g/dL 13 2 13 7   HEMATOCRIT % 42 4 43 3   PLATELETS Thousands/uL 363 393*   NEUTROS ABS Thousands/µL 6 29 5 36            Results from last 7 days   Lab Units 07/14/19  0524 07/13/19  0830   SODIUM mmol/L 142 142   POTASSIUM mmol/L 3 6 4 2   CHLORIDE mmol/L 102 105   CO2 mmol/L 30 28   ANION GAP mmol/L 10 9   BUN mg/dL 20 15   CREATININE mg/dL 1 32* 1 17   EGFR ml/min/1 73sq m 49 57   CALCIUM mg/dL 8 8 8 7   MAGNESIUM mg/dL 2 5  --    PHOSPHORUS mg/dL 5 3*  --            Results from last 7 days   Lab Units 07/13/19  0830   AST U/L 14   ALT U/L 35   ALK PHOS U/L 77   TOTAL PROTEIN g/dL 7 0   ALBUMIN g/dL 3 3*   TOTAL BILIRUBIN mg/dL 0 40            Results from last 7 days   Lab Units 07/14/19  0524 07/13/19  0830   GLUCOSE RANDOM mg/dL 116 106           Results from last 7 days   Lab Units 07/13/19  0830   TROPONIN I ng/mL <0 02      Results from last 7 days   Lab Units 07/13/19  0830   NT-PRO BNP pg/mL 1,632*        Ref Range & Units 7/13/19 0830   TSH 3RD GENERATON 0 358 - 3 740 uIU/mL 18 133High    CXR=Small patchy area of medial left upper lobe pneumonia  Unchanged cardiomegaly    CT HEAD=No acute intracranial hemorrhage seen  No extra-axial collection seen  No CT signs of acute territorial infarction  EKG=Interpretation: abnormal      ECG rate:  86    ECG rate assessment: normal       Rhythm: paced    Pacing:    Capture:  Complete    Type of pacing:  AV    Ectopy: PVCs    ED Treatment:               Medication Administration from 07/13/2019 0806 to 07/13/2019 1029        Date/Time Order Dose Route Action Action by Comments       07/13/2019 0909 furosemide (LASIX) injection 40 mg 40 mg Intravenous Given Obdulia Mistry RN            Medical History        Past Medical History:   Diagnosis Date    A-fib [facility])      Abnormal blood sugar      Acid reflux      Acute bronchitis      Allergic reaction      Anxiety      Arthritis      Cardiomyopathy ([facility] )      Cellulitis of left lower leg      Chest pain      CHF (congestive heart failure) ([facility])      Constipation      COPD (chronic obstructive pulmonary disease) ([facility])      Depression with anxiety      Diabetes mellitus (HCC)      Disease of thyroid gland      Diverticulitis      Dyslipidemia      Dyspnea on exertion      Elbow pain      Fracture of olecranon, open      Gastritis      Generalized pain      Hematuria      History of colonoscopy      Hypertension      Hypokalemia      Hypomagnesemia      Leg cramping      Moderate obesity      Morbid obesity due to excess calories ([facility])      Myalgia      Myositis      Nausea in adult      Nephrolithiasis      Prepatellar bursitis, unspecified knee      Screening for lipid disorders      Shortness of breath      Spasm of muscle      Thyroid trouble           Present on Admission:   Acute on chronic combined systolic (congestive) and diastolic (congestive) heart failure (HCC)   Mild intermittent asthma without complication   Cardiomyopathy ([facility] )   Dyslipidemia   Hypertension   Paroxysmal atrial fibrillation (HCC)   Hypothyroidism   Dyspnea on exertion   Moderate obesity  Admitting Diagnosis: CHF (congestive heart failure) (HCC) [I50 9]  SOB (shortness of breath) [R06 02]  Age/Sex: 59 y o  female  Admission Orders:  TELEMETRY  VENODYNES  1000CC FLUID RESTRICTION  CONSULT CARDIO  PT EVAL & TX  Current Facility-Administered Medications:  acetaminophen 650 mg Oral Q6H PRN   albuterol 1 puff Inhalation Q4H PRN   budesonide-formoterol 2 puff Inhalation BID   carvedilol 12 5 mg Oral BID With Meals   diclofenac sodium 2 g Topical 4x Daily   furosemide 40 mg Intravenous Q8H   levothyroxine 175 mcg Oral Early Morning   pantoprazole 40 mg Oral Early Morning   potassium chloride 10 mEq Oral Daily   pravastatin 80 mg Oral Daily   rivaroxaban 20 mg Oral Daily   sacubitril-valsartan 1 tablet Oral BID   sotalol 80 mg Oral Q12H [address]   spironolactone 12 5 mg Oral Daily   tiZANidine 2 mg Oral Q8H PRN      Network Utilization Review Department  Phone: [phone]; Fax [phone]  [email or web address]  ATTENTION: Please call with any questions or concerns to [phone]  and carefully listen to the prompts so that you are directed to the right person  Send all requests for admission clinical reviews, approved or denied determinations and any other requests to fax [phone]   All voicemails are confidential

## 2019-07-16 NOTE — PROGRESS NOTES
Spoke to patient at length  She is not interested take Lipitor  Advised her to continue taking increased dose of entresto and levothyroxine

## 2019-07-19 DIAGNOSIS — Z71.89 COMPLEX CARE COORDINATION: Primary | ICD-10-CM

## 2019-07-24 DIAGNOSIS — I50.42 CHRONIC COMBINED SYSTOLIC AND DIASTOLIC HEART FAILURE, NYHA CLASS 2 (HCC): ICD-10-CM

## 2019-07-24 RX ORDER — TORSEMIDE 10 MG/1
TABLET ORAL
Qty: 60 TABLET | Refills: 1 | Status: SHIPPED | OUTPATIENT
Start: 2019-07-24 | End: 2019-09-19 | Stop reason: SDUPTHER

## 2019-07-25 ENCOUNTER — OFFICE VISIT (OUTPATIENT)
Dept: FAMILY MEDICINE CLINIC | Facility: CLINIC | Age: 64
End: 2019-07-25
Payer: COMMERCIAL

## 2019-07-25 ENCOUNTER — PATIENT OUTREACH (OUTPATIENT)
Dept: FAMILY MEDICINE CLINIC | Facility: CLINIC | Age: 64
End: 2019-07-25

## 2019-07-25 VITALS
RESPIRATION RATE: 18 BRPM | DIASTOLIC BLOOD PRESSURE: 80 MMHG | WEIGHT: 263 LBS | HEART RATE: 82 BPM | OXYGEN SATURATION: 99 % | BODY MASS INDEX: 42.27 KG/M2 | SYSTOLIC BLOOD PRESSURE: 126 MMHG | HEIGHT: 66 IN

## 2019-07-25 DIAGNOSIS — Z09 HOSPITAL DISCHARGE FOLLOW-UP: Primary | ICD-10-CM

## 2019-07-25 DIAGNOSIS — I50.43 ACUTE ON CHRONIC COMBINED SYSTOLIC (CONGESTIVE) AND DIASTOLIC (CONGESTIVE) HEART FAILURE (HCC): ICD-10-CM

## 2019-07-25 DIAGNOSIS — I48.0 PAROXYSMAL ATRIAL FIBRILLATION (HCC): ICD-10-CM

## 2019-07-25 PROCEDURE — 99213 OFFICE O/P EST LOW 20 MIN: CPT | Performed by: FAMILY MEDICINE

## 2019-07-25 PROCEDURE — 3008F BODY MASS INDEX DOCD: CPT | Performed by: FAMILY MEDICINE

## 2019-07-25 PROCEDURE — 1111F DSCHRG MED/CURRENT MED MERGE: CPT | Performed by: FAMILY MEDICINE

## 2019-07-25 NOTE — PROGRESS NOTES
Assessment/Plan:    Diagnoses and all orders for this visit:    #1: Hospital discharge follow-up  Stable; Continue current management; Maintain follow-up appointment with Cardiology    #2: Acute on chronic combined systolic (congestive) and diastolic (congestive) heart failure (HCC)  Continue HF Medications; Continue 1 5L Fluid Restriction     #3: Paroxysmal Atrial Fibrillation  Current NSR; Continue with ICD; Continue Anticoagulation    #4: BMI 40 0-44 9, adult ([address] )  Counseled on Diet/Lifestyle Modification      Subjective:      Patient ID: Katelyn Bocanegra is a 59 y o  female  HPI    TCM Call (since 6/25/2019)     Date and time call was made  7/16/2019  2:04 PM    Hospital care reviewed  Records reviewed    Patient was hospitialized at  [facility]    Date of Admission  07/13/19    Date of discharge  07/14/19    Diagnosis  SOB /CHF    Disposition  Home      TCM Call (since 6/25/2019)     Should patient be enrolled in anticoag monitoring? No    Patients specialists  Cardiologist    I have advised the patient to call PCP with any new or worsening symptoms  LELYA Denise LPN         59year old female with a PMH of Paroxysmal Afib w  Pacer, Mild Intermittent asthma, Hypothyroidism, HTN, and CHF presents to clinic after recent hospital discharge on 7/13/19 for shortness of breath  Was admitted for CHF Exacerbation  Was diuresed with IV Lasix  Adequate I&Os were monitored and daily weights were recorded  Medication changes at discharge as follows     Entresto increased to  from 49-51; Levothyroxine increased to 175 mcg from 150; Pravastatin was discontinued and replaced with Atorvastatin 40mg PO qd  Today she feels better overall  No reoccurring symptoms of shortness of breath  Is measuring her weight daily  Has follow-up appointment with Cardiology  Denies headache, vision changes, shortness of breath, chest pain, palpitations, diaphoresis, abdominal pain, N/V/D , or lower extremity swelling   Additionally denies any side effects with medication adjustments  Additional Concerns: None      The following portions of the patient's history were reviewed and updated as appropriate: allergies, current medications, past family history, past medical history, past social history, past surgical history and problem list     Review of Systems   Constitutional: Negative for chills, diaphoresis, fatigue and fever  Eyes: Negative for visual disturbance  Respiratory: Negative for chest tightness and shortness of breath  Cardiovascular: Negative for chest pain, palpitations and leg swelling  Gastrointestinal: Negative for abdominal pain, constipation, diarrhea, nausea and vomiting  All other systems reviewed and are negative  Objective:    /80   Pulse 82   Resp 18   Ht 5' 6" (1 676 m)   Wt 119 kg (263 lb)   SpO2 99%   BMI 42 45 kg/m²      Physical Exam   Constitutional: She appears well-developed and well-nourished  No distress  Cardiovascular: Normal rate, regular rhythm, normal heart sounds and intact distal pulses  Exam reveals no gallop and no friction rub  No murmur heard  Pulmonary/Chest: Effort normal and breath sounds normal  No respiratory distress  Abdominal: Soft  Bowel sounds are normal    Musculoskeletal: She exhibits no edema  Skin: Capillary refill takes less than 2 seconds  She is not diaphoretic  Nursing note and vitals reviewed

## 2019-07-25 NOTE — PROGRESS NOTES
Outreach to patient  Introduced self and role in managing patients care  Requested return phone call  CM contact information provided  Reminded patient of appointment today with PCP at 7:15 this evening

## 2019-08-06 DIAGNOSIS — I42.0 DILATED CARDIOMYOPATHY (HCC): ICD-10-CM

## 2019-08-06 RX ORDER — CARVEDILOL 12.5 MG/1
TABLET ORAL
Qty: 180 TABLET | Refills: 1 | OUTPATIENT
Start: 2019-08-06

## 2019-08-06 NOTE — TELEPHONE ENCOUNTER
Please clarify with the patient issues still taking Coreg  There is a conflicting message on this prescription

## 2019-08-08 DIAGNOSIS — I48.0 PAF (PAROXYSMAL ATRIAL FIBRILLATION) (HCC): Primary | ICD-10-CM

## 2019-08-08 RX ORDER — SOTALOL HYDROCHLORIDE 80 MG/1
80 TABLET ORAL EVERY 12 HOURS
Qty: 180 TABLET | Refills: 0 | Status: SHIPPED | OUTPATIENT
Start: 2019-08-08 | End: 2019-11-09 | Stop reason: SDUPTHER

## 2019-08-08 NOTE — TELEPHONE ENCOUNTER
Patient of dr Joy Myrick needs refill to [facility] in [place] for sotalol 80mg takes 1 tablet every 12hrs 90 days

## 2019-09-03 DIAGNOSIS — I50.42 CHRONIC COMBINED SYSTOLIC AND DIASTOLIC HEART FAILURE, NYHA CLASS 2 (HCC): ICD-10-CM

## 2019-09-03 RX ORDER — SPIRONOLACTONE 25 MG/1
12.5 TABLET ORAL DAILY
Qty: 45 TABLET | Refills: 3 | Status: SHIPPED | OUTPATIENT
Start: 2019-09-03 | End: 2019-09-26 | Stop reason: SDUPTHER

## 2019-09-03 NOTE — TELEPHONE ENCOUNTER
Fax rcd from [facility] for Spironolactone,   I called patient to clarify if this medication is being taken     LM for clarification call back

## 2019-09-03 NOTE — TELEPHONE ENCOUNTER
Refill of:   spironolactone (ALDACTONE) 25 mg tablet  90-day supply  [place] [facility] [id number]

## 2019-09-06 DIAGNOSIS — E87.6 HYPOKALEMIA: ICD-10-CM

## 2019-09-06 RX ORDER — POTASSIUM CHLORIDE 750 MG/1
TABLET, FILM COATED, EXTENDED RELEASE ORAL
Qty: 30 TABLET | Refills: 5 | Status: SHIPPED | OUTPATIENT
Start: 2019-09-06 | End: 2020-02-14

## 2019-09-11 ENCOUNTER — PATIENT OUTREACH (OUTPATIENT)
Dept: FAMILY MEDICINE CLINIC | Facility: CLINIC | Age: 64
End: 2019-09-11

## 2019-09-15 PROBLEM — I50.22 CHRONIC SYSTOLIC CONGESTIVE HEART FAILURE, NYHA CLASS 2 (HCC): Status: ACTIVE | Noted: 2017-07-13

## 2019-09-15 NOTE — PROGRESS NOTES
Progress Note - Cardiology Office  [facility]  Katelyn Bocanegra 59 y o  female MRN: [id number]  : 1955   Encounter: [id number]      Assessment:     1  Chronic systolic congestive heart failure, NYHA class 2 (HCC)    2  Paroxysmal atrial fibrillation ([address] )    3  Dilated cardiomyopathy ([facility] [address] )    4  Essential hypertension    5  Hypothyroidism due to acquired atrophy of thyroid    6  Dyslipidemia        Discussion Summary and Plan:  1  Status post ICD shock for ventricular arrhythmias  Patient device was upgraded to biventricular device  She was also started on sotalol  QTC acceptable since then no ICD shock  Her LV lead threshold is slightly high  Device interrogation from [facility] reviewed  Clinically she feels much better  She is taking her medications no ICD shocks noted  2  Chronic systolic heart failure [place] Heart Association class II/3  Patient has tolerated Entresto very well  No clinical evidence of heart failure or ischemia at this time  Continue current cardiac medication  Currently she is taking sotalol, Aldactone, entresto  She is on high dose of 97/103 mg twice a day  3  Status post [facility]  [facility] ICD  Her device was upgraded to [facility] biventricular ICD  No more ICD shocks  Enrolled in our clinic    4  Bronchial asthma  Not actively wheezing she uses p r n  Inhalers  5  History of paroxysmal at fibrillation currently in sinus rhythm  Continue antithrombotic therapy  Patient tolerating well  Continue sotalol  QTC is acceptable electrolytes are acceptable  Continue Xarelto  Since dose of sotalol depend upon her serum creatinine last crit was 1 3 with repeat BMP    6  Hypothyroidism  She is on levothyroxine TSH was acceptable  Currently she is taking levothyroxine 175 mcg  7  Anxiety  She's now on Lexapro     8  Dyslipidemia  Patient's cholesterol has gone up  She does not want take any statin    Start her with Zetia at least it will help lowering cholesterol some extent  9   Dyspnea on exertion  Much improved  She is encouraged to lose weight  Continue current other Rx as before  Counseling :  A description of the counseling  Issues related to heart failure, regular diet, weight all discussed at length  All her questions answered  Goals and Barriers  The patient has the current Goals: To stay out of heart failure  The patent has the current Barriers: Weight gain  Patient's ability to self care: Yes  Medication side effect reviewed with patient in detail and all their questions answered to their satisfaction  HPI :     Denisha Simmons is a 59y o  year old female who came for follow up  Patient has with past medical history significant for nonischemic dilated cardiomyopathy, hypertension, bronchial asthma, moderate obesity, dyslipidemia, hypothyroidism, status post [facility] single-chamber ICD, mild nonobstructive disease by cardiac catheterization who came for regular follow-up and interrogation of her ICD  She has been doing pretty well she's she is on coralanor, she did she denies any chest pain, any shortness of breath  Recently she gained more weight as she was on steroids for bronchial asthma  Her ICD was interrogated today  No fever, no chills, no other significant complaint     05/14/2019  Above reviewed  Patient had an ICD shock in November at that time she felt lightheaded  She had PVC and a pause and then went into ventricular arrhythmia  Her device was upgraded to biventricular ICD as QRS was wide this has helped her significantly  Her QRS duration is now decreased  She is feeling better she is able to climb up and stair without any problem  Her LV lead output is still little high it is around 4 5 volts  She has no VT or no VF on ICD interrogation which was [facility]   Atrial and right ventricular lead thresholds were acceptable    No fever no chills no nausea no vomiting no PND no orthopnea     She was started on sotalol and she is continuing to take it  Heart rate today 75 beats per minute  During her ICD upgrade she was found to have left subclavian vein stenosis which was dilated  She had a [employer] biventricular device now placed  09/19/2019  Above reviewed  Patient came for follow-up  Since he arm ICD was upgraded to biventricular device and she was placed on sotalol there is no ICD shocks since then  Her heart rate is 83 beats per minute she is biventricular paced  Her LV lead threshold is still little high  She denies any chest pain any shortness of breath he is feeling better since biventricular paced was age  She was found to have left subclavian vein stenosis which was dilated  She had a [employer] biventricular ICD device placed  No fever no chills no nausea no vomiting no PND no orthopnea no leg edema  Her labs from July 2014 reviewed creatinine around 1 32 potassium was 3 6  TSH was 16  And her levothyroxine was increased  Her cholesterol was found to be high and she is telling us she stopped taking her cholesterol medication  It makes her joint heart  She understand high risk for heart attack with that  Review of Systems   Constitutional: Negative for activity change, chills, diaphoresis, fever and unexpected weight change  HENT: Negative for congestion  Eyes: Negative for discharge and redness  Respiratory: Negative for cough, chest tightness, shortness of breath and wheezing  Cardiovascular: Negative  Negative for chest pain, palpitations and leg swelling  Gastrointestinal: Negative for abdominal pain, diarrhea and nausea  Endocrine: Negative  Genitourinary: Negative for decreased urine volume and urgency  Musculoskeletal: Negative  Negative for arthralgias, back pain and gait problem  Muscle pain with statins   Skin: Negative for rash and wound  Allergic/Immunologic: Negative      Neurological: Negative for dizziness, seizures, syncope, weakness, light-headedness and headaches  Hematological: Negative  Psychiatric/Behavioral: Negative for agitation and confusion  The patient is nervous/anxious          Historical Information   Past Medical History:   Diagnosis Date    A-fib ([facility] )     Abnormal blood sugar     Acid reflux     Acute bronchitis     Allergic reaction     Anxiety     Arthritis     Cardiomyopathy (HCC)     Cellulitis of left lower leg     Chest pain     [facility]est[facility]     Constipation     COPD (chronic obstructive pulmonary disease) ([facility]     Depression with anxiety     Diabetes mellitus ([facility] )     Disease of thyroid gland     Diverticulitis     Dyslipidemia     Dyspnea on exertion     Elbow pain     Fracture of olecranon, open     Gastritis     Generalized pain     Hematuria     History of colonoscopy     Hypertension     Hypokalemia     Hypomagnesemia     Leg cramping     Moderate obesity     Morbid obesity due to excess calories ([facility])     Myalgia     Myositis     Nausea in adult     Nephrolithiasis     Prepatellar bursitis, unspecified knee     Screening for lipid disorders     Shortness of breath     Spasm of muscle     Thyroid trouble      Past Surgical History:   Procedure Laterality Date    CARDIAC DEFIBRILLATOR PLACEMENT      LAPAROSCOPIC CHOLECYSTECTOMY      TOTAL ABDOMINAL HYSTERECTOMY W/ BILATERAL SALPINGOOPHORECTOMY       Social History     Substance and Sexual Activity   Alcohol Use Not Currently     Social History     Substance and Sexual Activity   Drug Use No     Social History     Tobacco Use   Smoking Status Never Smoker   Smokeless Tobacco Never Used     Family History:   Family History   Problem Relation Age of Onset    Hypertension Sister     Cancer Sister     Coronary artery disease Family     Diabetes type II Family     Hypertension Family        Meds/Allergies     Allergies   Allergen Reactions    Omnipaque [Iohexol] Swelling    Penicillins Swelling    Iv Dye  [Iodinated Diagnostic Agents] Throat Swelling    Shellfish-Derived Products Hives       Current Outpatient Medications:     budesonide-formoterol (SYMBICORT) 160-4 5 mcg/act inhaler, Inhale 2 puffs 2 (two) times a day Rinse mouth after use , Disp: 1 Inhaler, Rfl: 5    diclofenac sodium (VOLTAREN) 1 %, Apply 2 g topically 4 (four) times a day, Disp: 1 Tube, Rfl: 3    levalbuterol (XOPENEX HFA) 45 mcg/act inhaler, Inhale 2 puffs every 6 (six) hours as needed for shortness of breath, Disp: 1 Inhaler, Rfl: 5    levalbuterol (XOPENEX) 1 25 mg/3 mL nebulizer solution, inhale contents of 1 vial in nebulizer every 6 hours if needed, Disp: 720 mL, Rfl: 1    levothyroxine 175 mcg tablet, Take 1 tablet (175 mcg total) by mouth daily, Disp: 90 tablet, Rfl: 3    pantoprazole (PROTONIX) 40 mg tablet, take 1 tablet by mouth once daily, Disp: 90 tablet, Rfl: 1    potassium chloride (K-DUR) 10 mEq tablet, take 1 tablet by mouth once daily, Disp: 30 tablet, Rfl: 5    rivaroxaban (XARELTO) 20 mg tablet, Take 1 tablet (20 mg total) by mouth daily, Disp: 90 tablet, Rfl: 3    sotalol (BETAPACE) 80 mg tablet, Take 1 tablet (80 mg total) by mouth every 12 (twelve) hours, Disp: 180 tablet, Rfl: 0    spironolactone (ALDACTONE) 25 mg tablet, Take 0 5 tablets (12 5 mg total) by mouth daily, Disp: 45 tablet, Rfl: 3    tiZANidine (ZANAFLEX) 2 mg tablet, take 1 tablet by mouth every 8 hours if needed for muscle spasm, Disp: 30 tablet, Rfl: 2    torsemide (DEMADEX) 10 mg tablet, take 2 tablets by mouth once daily, Disp: 60 tablet, Rfl: 1    ezetimibe (ZETIA) 10 mg tablet, Take 1 tablet (10 mg total) by mouth daily, Disp: 30 tablet, Rfl: 3    ipratropium (ATROVENT) 0 02 % nebulizer solution, Take 2 5 mL by nebulization 4 (four) times a day for 30 days, Disp: 300 mL, Rfl: 0    pravastatin (PRAVACHOL) 40 mg tablet, Take 1 tablet (40 mg total) by mouth daily (Patient not taking: Reported on 9/19/2019), Disp: 90 tablet, Rfl: 1    Vitals: Blood pressure 120/78, pulse 83, height 5' 6" (1 676 m), weight 119 kg (263 lb), SpO2 97 %  Body mass index is 42 45 kg/m²  Vitals:    09/19/19 1336   Weight: 119 kg (263 lb)     BP Readings from Last 3 Encounters:   09/19/19 120/78   07/25/19 126/80   07/14/19 118/68       Physical Exam   Constitutional: She is oriented to person, place, and time  She appears well-developed and well-nourished  No distress  HENT:   Head: Normocephalic and atraumatic  Eyes: Pupils are equal, round, and reactive to light  Neck: Neck supple  No JVD present  No tracheal deviation present  No thyromegaly present  Cardiovascular: Normal rate, regular rhythm, S1 normal, S2 normal and intact distal pulses  Exam reveals no gallop, no S3, no S4, no distant heart sounds and no friction rub  Murmur heard  Systolic (ejection) murmur is present with a grade of 2/6  Pulmonary/Chest: Effort normal and breath sounds normal  No respiratory distress  She has no wheezes  She has no rales  She exhibits no tenderness  Abdominal: Soft  Bowel sounds are normal  She exhibits no distension  There is no tenderness  Musculoskeletal: She exhibits no edema or deformity  Neurological: She is alert and oriented to person, place, and time  Skin: Skin is warm and dry  No rash noted  She is not diaphoretic  No pallor  ICD site has healed well   Psychiatric: She has a normal mood and affect  Her behavior is normal  Judgment normal      Diagnostic Studies Review Cardio:    Echocardiogram/VANNESA: Echo Doppler done in February 2016 shows EF 25-30%, pacemaker in the right-sided chambers, thickened aortic valve without stenosis, moderate to severe mitral regurgitation  Repeat echo Doppler done February 20, 2018  LV is markedly dilated EF 25%, mild LVH, right atrium mildly dilated mild MR and trace TR which was insufficient to measure pulmonary artery pressure    Pacemaker Wire in right-sided chambers  Catheterization: Cardiac catheter patient done in 2013 was EF 30%, no evidence of significant obstructive coronary artery disease  This was done in outside hospital       ICD/ Pacer Interpretation Single-chamber ICD interrogation shows patient [facility] ICD is working adequately is set at a rate of the VVI 60  ECG Report:      Comparison to prior ECGs: no interval change  01/22/2018  Twelve lead EKG shows normal sinus rhythm heart rate 81 beats per minute  Poor R-wave progression  No change from old EKG  Repeat 12 lead EKG on 03/08/2018 shows normal sinus rhythm  Rare PVCs heart rate 78 beats per minute  Prolonged QTC      07/26/2018 12 lead EKG shows normal sinus rhythm heart rate 90 beats per minute  Peak PVCs noted  IVCD otherwise no other significant ST changes  Her QRS duration is 118 milliseconds  Twelve lead EKG done today 11/08/2018 shows normal sinus rhythm  Incomplete LBBB with IVCD her QS duration is 118 milliseconds  Twelve lead EKG shows atrial sense ventricular paced heart rate 75 beats per minute  She is biventricular paced now    Her device findings reviewed with her which was interrogated at [address] [place], [place] [address]d:  Chest X-Ray:   Xr Chest 2 Views    Result Date: 4/9/2017  Impression Stable cardiomegaly without active pulmonary disease   Workstation performed: [id number]     Lab Review   Lab Results   Component Value Date    WBC 11 93 (H) 07/14/2019    HGB 13 2 07/14/2019    HCT 42 4 07/14/2019    MCV 87 07/14/2019     07/14/2019     Lab Results   Component Value Date     06/06/2016    K 3 6 07/14/2019     07/14/2019    CO2 30 07/14/2019    ANIONGAP 12 9 11/01/2015    BUN 20 07/14/2019    CREATININE 1 32 (H) 07/14/2019    GLUCOSE 95 06/06/2016    CALCIUM 8 8 07/14/2019    AST 14 07/13/2019    ALT 35 07/13/2019    ALKPHOS 77 07/13/2019    PROT 6 8 06/06/2016    BILITOT 0 3 06/06/2016    EGFR 49 07/14/2019     Lab Results   Component Value Date    GLUCOSE 95 06/06/2016    CALCIUM 8 8 07/14/2019     06/06/2016    K 3 6 07/14/2019    CO2 30 07/14/2019     07/14/2019    BUN 20 07/14/2019    CREATININE 1 32 (H) 07/14/2019         Lab Results   Component Value Date    HGBA1C 6 2 (H) 06/06/2016       Dr Aminata Springer MD [facility]      "This note has been constructed using a voice recognition system  Therefore there may be syntax, spelling, and/or grammatical errors   Please call if you have any questions  "

## 2019-09-19 ENCOUNTER — OFFICE VISIT (OUTPATIENT)
Dept: CARDIOLOGY CLINIC | Facility: CLINIC | Age: 64
End: 2019-09-19
Payer: COMMERCIAL

## 2019-09-19 VITALS
DIASTOLIC BLOOD PRESSURE: 78 MMHG | WEIGHT: 263 LBS | HEART RATE: 83 BPM | HEIGHT: 66 IN | SYSTOLIC BLOOD PRESSURE: 120 MMHG | BODY MASS INDEX: 42.27 KG/M2 | OXYGEN SATURATION: 97 %

## 2019-09-19 DIAGNOSIS — E78.5 DYSLIPIDEMIA: ICD-10-CM

## 2019-09-19 DIAGNOSIS — I42.0 DILATED CARDIOMYOPATHY (HCC): ICD-10-CM

## 2019-09-19 DIAGNOSIS — I10 ESSENTIAL HYPERTENSION: ICD-10-CM

## 2019-09-19 DIAGNOSIS — I50.42 CHRONIC COMBINED SYSTOLIC AND DIASTOLIC HEART FAILURE, NYHA CLASS 2 (HCC): ICD-10-CM

## 2019-09-19 DIAGNOSIS — I50.22 CHRONIC SYSTOLIC CONGESTIVE HEART FAILURE, NYHA CLASS 2 (HCC): ICD-10-CM

## 2019-09-19 DIAGNOSIS — I48.0 PAROXYSMAL ATRIAL FIBRILLATION (HCC): ICD-10-CM

## 2019-09-19 DIAGNOSIS — E03.4 HYPOTHYROIDISM DUE TO ACQUIRED ATROPHY OF THYROID: ICD-10-CM

## 2019-09-19 PROCEDURE — 99214 OFFICE O/P EST MOD 30 MIN: CPT | Performed by: INTERNAL MEDICINE

## 2019-09-19 PROCEDURE — 3074F SYST BP LT 130 MM HG: CPT | Performed by: INTERNAL MEDICINE

## 2019-09-19 PROCEDURE — 3078F DIAST BP <80 MM HG: CPT | Performed by: INTERNAL MEDICINE

## 2019-09-19 PROCEDURE — 93000 ELECTROCARDIOGRAM COMPLETE: CPT | Performed by: INTERNAL MEDICINE

## 2019-09-19 RX ORDER — TORSEMIDE 10 MG/1
TABLET ORAL
Qty: 60 TABLET | Refills: 1 | Status: SHIPPED | OUTPATIENT
Start: 2019-09-19 | End: 2019-11-19 | Stop reason: SDUPTHER

## 2019-09-19 RX ORDER — EZETIMIBE 10 MG/1
10 TABLET ORAL DAILY
Qty: 30 TABLET | Refills: 3 | Status: SHIPPED | OUTPATIENT
Start: 2019-09-19 | End: 2020-01-16

## 2019-09-26 ENCOUNTER — TELEPHONE (OUTPATIENT)
Dept: CARDIOLOGY CLINIC | Facility: CLINIC | Age: 64
End: 2019-09-26

## 2019-09-26 DIAGNOSIS — I50.42 CHRONIC COMBINED SYSTOLIC AND DIASTOLIC HEART FAILURE, NYHA CLASS 2 (HCC): ICD-10-CM

## 2019-09-26 RX ORDER — SPIRONOLACTONE 25 MG/1
12.5 TABLET ORAL DAILY
Qty: 45 TABLET | Refills: 3 | Status: SHIPPED | OUTPATIENT
Start: 2019-09-26 | End: 2020-10-16

## 2019-10-04 ENCOUNTER — TELEPHONE (OUTPATIENT)
Dept: CARDIOLOGY CLINIC | Facility: CLINIC | Age: 64
End: 2019-10-04

## 2019-10-04 DIAGNOSIS — M62.830 BACK MUSCLE SPASM: ICD-10-CM

## 2019-10-04 LAB
ALBUMIN SERPL-MCNC: 4.6 G/DL (ref 3.6–4.8)
ALBUMIN/GLOB SERPL: 1.9 {RATIO} (ref 1.2–2.2)
ALP SERPL-CCNC: 76 IU/L (ref 39–117)
ALT SERPL-CCNC: 29 IU/L (ref 0–32)
AST SERPL-CCNC: 25 IU/L (ref 0–40)
BILIRUB SERPL-MCNC: 0.5 MG/DL (ref 0–1.2)
BUN SERPL-MCNC: 21 MG/DL (ref 8–27)
BUN/CREAT SERPL: 17 (ref 12–28)
CALCIUM SERPL-MCNC: 10.1 MG/DL (ref 8.7–10.3)
CHLORIDE SERPL-SCNC: 100 MMOL/L (ref 96–106)
CO2 SERPL-SCNC: 23 MMOL/L (ref 20–29)
CREAT SERPL-MCNC: 1.25 MG/DL (ref 0.57–1)
GLOBULIN SER-MCNC: 2.4 G/DL (ref 1.5–4.5)
GLUCOSE SERPL-MCNC: 102 MG/DL (ref 65–99)
POTASSIUM SERPL-SCNC: 4.8 MMOL/L (ref 3.5–5.2)
PROT SERPL-MCNC: 7 G/DL (ref 6–8.5)
SL AMB EGFR AFRICAN AMERICAN: 53 ML/MIN/1.73
SL AMB EGFR NON AFRICAN AMERICAN: 46 ML/MIN/1.73
SODIUM SERPL-SCNC: 139 MMOL/L (ref 134–144)

## 2019-10-04 NOTE — TELEPHONE ENCOUNTER
----- Message from Abelardo Anderson MD sent at 10/4/2019 11:08 AM EDT -----  Patient labs are acceptable  Continue same medications  Patient is advised to follow up  appointment regularly  Please call patient about the  about results

## 2019-10-05 RX ORDER — TIZANIDINE 2 MG/1
TABLET ORAL
Qty: 30 TABLET | Refills: 2 | Status: SHIPPED | OUTPATIENT
Start: 2019-10-05 | End: 2020-01-02

## 2019-10-21 ENCOUNTER — OFFICE VISIT (OUTPATIENT)
Dept: FAMILY MEDICINE CLINIC | Facility: CLINIC | Age: 64
End: 2019-10-21
Payer: COMMERCIAL

## 2019-10-21 ENCOUNTER — TELEPHONE (OUTPATIENT)
Dept: CARDIOLOGY CLINIC | Facility: CLINIC | Age: 64
End: 2019-10-21

## 2019-10-21 VITALS
DIASTOLIC BLOOD PRESSURE: 78 MMHG | HEART RATE: 83 BPM | WEIGHT: 263 LBS | OXYGEN SATURATION: 97 % | SYSTOLIC BLOOD PRESSURE: 124 MMHG | BODY MASS INDEX: 42.45 KG/M2 | RESPIRATION RATE: 18 BRPM

## 2019-10-21 DIAGNOSIS — T78.40XA ALLERGIC REACTION TO DRUG, INITIAL ENCOUNTER: Primary | ICD-10-CM

## 2019-10-21 DIAGNOSIS — E78.5 DYSLIPIDEMIA: ICD-10-CM

## 2019-10-21 PROCEDURE — 99213 OFFICE O/P EST LOW 20 MIN: CPT | Performed by: FAMILY MEDICINE

## 2019-10-21 NOTE — ASSESSMENT & PLAN NOTE
Patient was previously on pravastatin, however, due to muscle cramping she was switched to Zetia  · In light of new rash, patient will stop taking Zetia  · Discussed with patient that if she does not want to take statins, she has few other options for lowering her cholesterol with medications  · She is agreeable to improving her diet and increasing her exercise as weight loss would lower her cholesterol signficantly  · Also discussed that her cholesterol would improve with thyroid stabilization

## 2019-10-21 NOTE — TELEPHONE ENCOUNTER
PT believes she is having an allergic reaction to medication Ezetimibe, complains of rash on arms and face since sat

## 2019-10-21 NOTE — ASSESSMENT & PLAN NOTE
Patient complains of rash of 2 days   She started taking Zetia 1 week ago  · Agree with cardiologist, patient should stop taking medication   · Recommend to continue taking benadryl for pruritis   · Reassurance that this is not related to heart condition   · If rash worsens or she begins to experience throat closing or lip swelling, she should report to the ED immediately

## 2019-10-21 NOTE — PROGRESS NOTES
Assessment/Plan:    Patient demanding steroids in office for pruritis  As there is no indication of anaphylaxis or hives (and given the significant side effects of steroid use) it is inappropriate to prescribe steroids at this time  Patient notes that she will go to ER instead  Problem List Items Addressed This Visit        Other    Dyslipidemia     Patient was previously on pravastatin, however, due to muscle cramping she was switched to Zetia  · In light of new rash, patient will stop taking Zetia  · Discussed with patient that if she does not want to take statins, she has few other options for lowering her cholesterol with medications  · She is agreeable to improving her diet and increasing her exercise as weight loss would lower her cholesterol signficantly  · Also discussed that her cholesterol would improve with thyroid stabilization  Allergic drug reaction - Primary     Patient complains of rash of 2 days  She started taking Zetia 1 week ago  · Agree with cardiologist, patient should stop taking medication   · Recommend to continue taking benadryl for pruritis   · Reassurance that this is not related to heart condition   · If rash worsens or she begins to experience throat closing or lip swelling, she should report to the ED immediately                Subjective:      Patient ID: Lizz Capellan is a 59 y o  female  HPI    Patient is a 59year old female with a PMH significant for Afib, HTN, CHF, and HLD who presents with pruritic rash of 2 days  She attributes it to new medication, Zetia, which she has taken for a week  She notes that Benadryl relieves itchiness  Rash located on arms and face  She denies changes in soaps, detergents, and lotions  She does not wear perfume  She denies tongue swelling although her lips are numb, which started this morning  She denies difficulty breathing or sensation of throat closing   She called cardiologist office today who told her to stop taking medication  She has not taken it today  Patient is very concerned that this is directly related to her heart  Review of Systems   Constitutional: Negative for chills and fever  HENT: Negative for trouble swallowing  Respiratory: Negative for cough, chest tightness, shortness of breath and wheezing  Cardiovascular: Negative for chest pain, palpitations and leg swelling  Gastrointestinal: Negative for diarrhea, nausea and vomiting  Musculoskeletal: Negative for myalgias  Skin: Positive for color change and rash  Negative for wound  Neurological: Positive for numbness (around lips) and headaches (two days ago, have since resolved)  Negative for dizziness  Objective:    /78   Pulse 83   Resp 18   Wt 119 kg (263 lb)   SpO2 97%   BMI 42 45 kg/m²           Physical Exam   Constitutional: She is oriented to person, place, and time  She appears well-developed and well-nourished  Cardiovascular: Normal rate, regular rhythm and normal heart sounds  Exam reveals no gallop and no friction rub  No murmur heard  Pulmonary/Chest: Effort normal and breath sounds normal    Neurological: She is alert and oriented to person, place, and time  No cranial nerve deficit  Skin: Skin is warm and dry          Psychiatric: Judgment and thought content normal    Tearful, agitated

## 2019-10-21 NOTE — TELEPHONE ENCOUNTER
SW patient, made aware of discontinuing  She wants to know if you would like a replacement due to upcoming labs

## 2019-11-09 DIAGNOSIS — I48.0 PAF (PAROXYSMAL ATRIAL FIBRILLATION) (HCC): ICD-10-CM

## 2019-11-11 RX ORDER — SOTALOL HYDROCHLORIDE 80 MG/1
80 TABLET ORAL EVERY 12 HOURS
Qty: 180 TABLET | Refills: 3 | Status: SHIPPED | OUTPATIENT
Start: 2019-11-11 | End: 2020-10-31

## 2019-11-19 DIAGNOSIS — I50.42 CHRONIC COMBINED SYSTOLIC AND DIASTOLIC HEART FAILURE, NYHA CLASS 2 (HCC): ICD-10-CM

## 2019-11-19 RX ORDER — TORSEMIDE 10 MG/1
TABLET ORAL
Qty: 60 TABLET | Refills: 0 | Status: SHIPPED | OUTPATIENT
Start: 2019-11-19 | End: 2019-12-21 | Stop reason: SDUPTHER

## 2019-12-06 ENCOUNTER — TELEPHONE (OUTPATIENT)
Dept: FAMILY MEDICINE CLINIC | Facility: CLINIC | Age: 64
End: 2019-12-06

## 2019-12-06 DIAGNOSIS — M19.90 OSTEOARTHRITIS, UNSPECIFIED OSTEOARTHRITIS TYPE, UNSPECIFIED SITE: ICD-10-CM

## 2019-12-09 DIAGNOSIS — K21.9 GASTROESOPHAGEAL REFLUX DISEASE WITHOUT ESOPHAGITIS: ICD-10-CM

## 2019-12-10 RX ORDER — PANTOPRAZOLE SODIUM 40 MG/1
TABLET, DELAYED RELEASE ORAL
Qty: 90 TABLET | Refills: 0 | Status: SHIPPED | OUTPATIENT
Start: 2019-12-10 | End: 2020-03-02

## 2019-12-11 ENCOUNTER — TELEPHONE (OUTPATIENT)
Dept: FAMILY MEDICINE CLINIC | Facility: CLINIC | Age: 64
End: 2019-12-11

## 2019-12-21 DIAGNOSIS — I50.42 CHRONIC COMBINED SYSTOLIC AND DIASTOLIC HEART FAILURE, NYHA CLASS 2 (HCC): ICD-10-CM

## 2019-12-21 RX ORDER — TORSEMIDE 10 MG/1
TABLET ORAL
Qty: 60 TABLET | Refills: 0 | Status: SHIPPED | OUTPATIENT
Start: 2019-12-21 | End: 2020-01-20

## 2019-12-29 ENCOUNTER — HOSPITAL ENCOUNTER (EMERGENCY)
Facility: HOSPITAL | Age: 64
Discharge: HOME | End: 2019-12-29
Attending: EMERGENCY MEDICINE | Admitting: EMERGENCY MEDICINE
Payer: COMMERCIAL

## 2019-12-29 ENCOUNTER — APPOINTMENT (EMERGENCY)
Dept: RADIOLOGY | Facility: HOSPITAL | Age: 64
End: 2019-12-29
Payer: COMMERCIAL

## 2019-12-29 VITALS
HEIGHT: 66 IN | DIASTOLIC BLOOD PRESSURE: 80 MMHG | WEIGHT: 260 LBS | BODY MASS INDEX: 41.78 KG/M2 | SYSTOLIC BLOOD PRESSURE: 140 MMHG | OXYGEN SATURATION: 98 % | HEART RATE: 88 BPM | RESPIRATION RATE: 20 BRPM | TEMPERATURE: 98.8 F

## 2019-12-29 DIAGNOSIS — M54.32 SCIATICA OF LEFT SIDE: Primary | ICD-10-CM

## 2019-12-29 PROCEDURE — 71045 X-RAY EXAM CHEST 1 VIEW: CPT

## 2019-12-29 PROCEDURE — 99284 EMERGENCY DEPT VISIT MOD MDM: CPT

## 2019-12-29 PROCEDURE — 93005 ELECTROCARDIOGRAM TRACING: CPT

## 2019-12-29 RX ORDER — METHYLPREDNISOLONE 4 MG/1
TABLET ORAL
Qty: 21 TABLET | Refills: 0 | Status: SHIPPED | OUTPATIENT
Start: 2019-12-29 | End: 2020-10-10

## 2019-12-29 NOTE — ED PROCEDURE NOTE
PROCEDURE  ECG 12 Lead Documentation Only  Date/Time: 12/29/2019 8:10 AM  Performed by: Cheli Durham DO  Authorized by: Cheli Durham DO     ECG reviewed by me, the ED Provider: yes    Patient location:  ED  Interpretation:     Interpretation: abnormal    Rate:     ECG rate:  76    ECG rate assessment: normal    Rhythm:     Rhythm: paced    Pacing:     Type of pacing:  Ventricular  Ectopy:     Ectopy: PVCs           Cheli Durham DO  12/29/19 0181

## 2019-12-30 LAB
ATRIAL RATE: 76 BPM
P AXIS: 39 DEGREES
PR INTERVAL: 132 MS
QRS AXIS: -41 DEGREES
QRSD INTERVAL: 178 MS
QT INTERVAL: 516 MS
QTC INTERVAL: 580 MS
T WAVE AXIS: 75 DEGREES
VENTRICULAR RATE: 76 BPM

## 2019-12-30 PROCEDURE — 93010 ELECTROCARDIOGRAM REPORT: CPT | Performed by: INTERNAL MEDICINE

## 2020-01-01 DIAGNOSIS — M62.830 BACK MUSCLE SPASM: ICD-10-CM
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
## 2020-01-02 DIAGNOSIS — E78.5 DYSLIPIDEMIA: ICD-10-CM

## 2020-01-02 RX ORDER — PRAVASTATIN SODIUM 40 MG
TABLET ORAL
Qty: 90 TABLET | Refills: 0 | Status: SHIPPED | OUTPATIENT
Start: 2020-01-02 | End: 2020-04-07 | Stop reason: SDUPTHER

## 2020-01-02 RX ORDER — TIZANIDINE 2 MG/1
TABLET ORAL
Qty: 30 TABLET | Refills: 0 | Status: SHIPPED | OUTPATIENT
Start: 2020-01-02 | End: 2020-03-11

## 2020-01-08 ENCOUNTER — TELEPHONE (OUTPATIENT)
Dept: OTHER | Facility: OTHER | Age: 65
End: 2020-01-08

## 2020-01-09 ENCOUNTER — REMOTE DEVICE CLINIC VISIT (OUTPATIENT)
Dept: CARDIOLOGY CLINIC | Facility: CLINIC | Age: 65
End: 2020-01-09
Payer: COMMERCIAL

## 2020-01-09 ENCOUNTER — TELEPHONE (OUTPATIENT)
Dept: CARDIOLOGY CLINIC | Facility: CLINIC | Age: 65
End: 2020-01-09

## 2020-01-09 DIAGNOSIS — Z95.810 PRESENCE OF AUTOMATIC CARDIOVERTER/DEFIBRILLATOR (AICD): Primary | ICD-10-CM

## 2020-01-09 PROCEDURE — 93295 DEV INTERROG REMOTE 1/2/MLT: CPT | Performed by: INTERNAL MEDICINE

## 2020-01-09 PROCEDURE — 93296 REM INTERROG EVL PM/IDS: CPT | Performed by: INTERNAL MEDICINE

## 2020-01-09 NOTE — PROGRESS NOTES
Results for orders placed or performed in visit on 01/09/20   Cardiac EP device report    Narrative    SJM SINGLE ICDACTIVE SYSTEM IS MRI CONDITIONAL  MERLIN TRANSMISSION:(ICD) BATTERY VOLTAGE ADEQUATE  (2 5YRS)  AP 5% BVP 83% ( <90%)  ALL AVAILABLE LEAD PARAMETERS WITHIN NORMAL LIMITS  NO SIGNIFICANT HIGH RATE EPISODES  CORVUE IMPEDANCE MONITORING WITHIN NORMAL LIMITS  NORMAL DEVICE FUNCTION  ---WALLACE

## 2020-01-09 NOTE — TELEPHONE ENCOUNTER
Looping in patient's PCP    Elmira Gloria is being filled by Cardiology  Several medication changes have been made for this patient by Dr Yann Landry recently  I do not feel it is appropriate for us to refill this medication at this time  Please advise patient to reach out to Dr Lurdes barillas

## 2020-01-09 NOTE — TELEPHONE ENCOUNTER
----- Message from Kirsten Morales MD sent at 1/9/2020 10:19 AM EST -----  Patient's device was interrogated in our office clinic  It shows device function is normal      Please call patient

## 2020-01-11 NOTE — PROGRESS NOTES
Progress Note - Cardiology Office  [facility]  Jaylen Armando 59 y o  female MRN: [id number]  : 1955   Encounter: [id number]      Assessment:     1  Dilated cardiomyopathy ([facility] )    2  Chronic systolic congestive heart failure, NYHA class 2 (HCC)    3  Parox[facility]mal [facility] ([facility] )    4  Essential hypertension    5  Dyslipidemia    6  Dyspnea on exertion    7  Moderate obesity    8  Hypothyroidism due to acquired atrophy of thyroid    9  Mild intermittent asthma without complication        Discussion Summary and Plan:  1  Status post ICD shock for ventricular arrhythmias  Patient device was upgraded to biventricular device  She was also started on sotalol  QTC acceptable since then no ICD shock  Her LV lead threshold is slightly high  Her device was interrogated recently  She still have battery life of 2 5 years  No ICD shocks no occult atrial fibrillation  2  Chronic systolic heart failure [place] Heart Association class II/3  Patient has tolerated Entresto very well  No clinical evidence of heart failure or ischemia at this time  Currently she is taking sotalol, Aldactone, entresto  She is on high dose of 97/103 mg twice a day  Continue same medications  3  Status post [employer] ICD  Her device was upgraded to [facility] biventricular ICD  No more ICD shocks  Her device interrogation reviewed with her  4  Bronchial asthma  Not actively wheezing she uses p r n  Inhalers  5  History of paroxysmal at fibrillation currently in sinus rhythm  Continue antithrombotic therapy  Patient tolerating well  Continue sotalol  QTC is acceptable electrolytes are acceptable  Continue Xarelto  Since dose of sotalol depend upon her serum creatinine last crit was 1 3  Repeat lab shows patient's electrolytes are acceptable  Cholesterol is high    6  Hypothyroidism  She is on levothyroxine TSH was acceptable    Currently she is taking levothyroxine 175 mcg     7  Anxiety  She's now on Lexapro     8  Dyslipidemia  Patient was not taking her Pravachol  She also stop taking her Zetia  She does not like Zetia but she is willing to try Pravachol again  Will restart her on Pravachol 40 milligram q h s  9   Dyspnea on exertion  Much improved  She is encouraged to lose weight  Continue other Rx as before  Follow-up 4-6 months             Counseling :  A description of the counseling  Issues related to heart failure, regular diet, weight all discussed at length  All her questions answered  Goals and Barriers  The patient has the current Goals: To stay out of heart failure  The patent has the current Barriers: Weight gain  Patient's ability to self care: Yes  Medication side effect reviewed with patient in detail and all their questions answered to their satisfaction  HPI :     Heather Lugo is a 59y o  year old female who came for follow up  Patient has with past medical history significant for nonischemic dilated cardiomyopathy, hypertension, bronchial asthma, moderate obesity, dyslipidemia, hypothyroidism, status post [employer] single-chamber ICD, mild nonobstructive disease by cardiac catheterization who came for regular follow-up and interrogation of her ICD  She has been doing pretty well she's she is on coralanor, she did she denies any chest pain, any shortness of breath  Recently she gained more weight as she was on steroids for bronchial asthma  Her ICD was interrogated today  No fever, no chills, no other significant complaint     01/16/2020  Above reviewed  Patient came for follow-up  She is feeling well since her ICD  Was upgraded to biventricular device  She was placed on sotalol and is no ICD shocks since then  Her heart rate today is 82 beats per minute  She is atrial sensed ventricular paced and rare PVCs  Her LV threshold little high but acceptable  She denies any chest pain any shortness of breath    No fever no chills no nausea no vomiting  Her battery voltage is still good for 2 5 years  She had labs done in December which were acceptable  No nausea no vomiting no fever no chills no PND no orthopnea  She had history of dilated cardiomyopathy and chronic systolic and diastolic heart failure which is currently compensated  No nausea no vomiting no fever no chills  Her labs done in October 2019 were reviewed  She was not taking any cholesterol medication as she had some kind of reaction to it  She was also prescribed Zetia which she stopped taking she said she could not take it  Review of Systems   Constitutional: Negative for activity change, chills, diaphoresis, fever and unexpected weight change  HENT: Negative for congestion  Eyes: Negative for discharge and redness  Respiratory: Negative for cough, chest tightness, shortness of breath and wheezing  Cardiovascular: Negative  Negative for chest pain, palpitations and leg swelling  Gastrointestinal: Negative for abdominal pain, diarrhea and nausea  Endocrine: Negative  Genitourinary: Negative for decreased urine volume and urgency  Musculoskeletal: Negative  Negative for arthralgias, back pain and gait problem  Skin: Negative for rash and wound  Allergic/Immunologic: Negative  Neurological: Negative for dizziness, seizures, syncope, weakness, light-headedness and headaches  Hematological: Negative  Psychiatric/Behavioral: Negative for agitation and confusion  The patient is not nervous/anxious          Historical Information   Past Medical History:   Diagnosis Date    [facility] )     Abnormal blood sugar     Acid reflux     Acute bronchitis     Allergic reaction     Anxiety     Arthritis     Cardiomyopathy (HCC)     Cellulitis of left lower leg     Chest pain     CHF (congestive heart failure) (HCC)     Constipation     COPD (chronic obstructive pulmonary disease) (HCC)     Depression with anxiety     Diabetes mellitus ([facility] )     Disease of thyroid gland     Diverticulitis     Dyslipidemia     Dyspnea on exertion     Elbow pain     Fracture of olecranon, open     Gastritis     Generalized pain     Hematuria     History of colonoscopy     Hypertension     Hypokalemia     Hypomagnesemia     Leg cramping     Moderate obesity     Morbid obesity due to excess calories (HCC)     Myalgia     Myositis     Nausea in adult     Nephrolithiasis     Prepatellar bursitis, unspecified knee     Screening for lipid disorders     Shortness of breath     Spasm of muscle     Thyroid trouble      Past Surgical History:   Procedure Laterality Date    CARDIAC DEFIBRILLATOR PLACEMENT      LAPAROSCOPIC CHOLECYSTECTOMY      TOTAL ABDOMINAL HYSTERECTOMY W/ BILATERAL SALPINGOOPHORECTOMY       Social History     Substance and Sexual Activity   Alcohol Use Not Currently     Social History     Substance and Sexual Activity   Drug Use No     Social History     Tobacco Use   Smoking Status Never Smoker   Smokeless Tobacco Never Used     Family History:   Family History   Problem Relation Age of Onset    Hypertension Sister     Cancer Sister     Coronary artery disease Family     Diabetes type II Family     Hypertension Family        Meds/Allergies     Allergies   Allergen Reactions    Omnipaque [Iohexol] Swelling    Penicillins Swelling    Iv Dye  [Iodinated Diagnostic Agents] Throat Swelling    Shellfish-Derived Products Hives       Current Outpatient Medications:     budesonide-formoterol (SYMBICORT) 160-4 5 mcg/act inhaler, Inhale 2 puffs 2 (two) times a day Rinse mouth after use , Disp: 1 Inhaler, Rfl: 5    diclofenac sodium (VOLTAREN) 1 %, apply 2 grams to affected area four times a day, Disp: 100 g, Rfl: 0    levalbuterol (XOPENEX HFA) 45 mcg/act inhaler, Inhale 2 puffs every 6 (six) hours as needed for shortness of breath, Disp: 1 Inhaler, Rfl: 5    levalbuterol (XOPENEX) 1 25 mg/3 mL nebulizer solution, inhale contents of 1 vial in nebulizer every 6 hours if needed, Disp: 720 mL, Rfl: 1    levothyroxine 175 mcg tablet, Take 1 tablet (175 mcg total) by mouth daily, Disp: 90 tablet, Rfl: 3    pantoprazole (PROTONIX) 40 mg tablet, take 1 tablet by mouth once daily, Disp: 90 tablet, Rfl: 0    potassium chloride (K-DUR) 10 mEq tablet, take 1 tablet by mouth once daily, Disp: 30 tablet, Rfl: 5    pravastatin (PRAVACHOL) 40 mg tablet, take 1 tablet by mouth once daily, Disp: 90 tablet, Rfl: 0    rivaroxaban (XARELTO) 20 mg tablet, Take 1 tablet (20 mg total) by mouth daily, Disp: 90 tablet, Rfl: 3    sacubitril-valsartan (ENTRESTO) 49-51 MG TABS, Take 1 tablet by mouth 2 (two) times a day, Disp: , Rfl:     sotalol (BETAPACE) 80 mg tablet, Take 1 tablet (80 mg total) by mouth every 12 (twelve) hours, Disp: 180 tablet, Rfl: 3    spironolactone (ALDACTONE) 25 mg tablet, Take 0 5 tablets (12 5 mg total) by mouth daily, Disp: 45 tablet, Rfl: 3    tiZANidine (ZANAFLEX) 2 mg tablet, take 1 tablet by mouth every 8 hours if needed for MUSCLE SPASM, Disp: 30 tablet, Rfl: 0    torsemide (DEMADEX) 10 mg tablet, take 2 tablet by mouth once daily, Disp: 60 tablet, Rfl: 0    ipratropium (ATROVENT) 0 02 % nebulizer solution, Take 2 5 mL by nebulization 4 (four) times a day for 30 days, Disp: 300 mL, Rfl: 0    methylPREDNISolone 4 MG tablet therapy pack, Use as directed on package (Patient not taking: Reported on 1/16/2020), Disp: 21 tablet, Rfl: 0    Vitals: Height 5' 6" (1 676 m), weight 121 kg (267 lb)  Body mass index is 43 09 kg/m²  Vitals:    01/16/20 1339   Weight: 121 kg (267 lb)     BP Readings from Last 3 Encounters:   12/29/19 140/80   10/21/19 124/78   09/19/19 120/78       Physical Exam   Constitutional: She is oriented to person, place, and time  She appears well-developed and well-nourished  No distress  HENT:   Head: Normocephalic and atraumatic  Eyes: Pupils are equal, round, and reactive to light     Neck: Neck supple  No JVD present  No tracheal deviation present  No thyromegaly present  Cardiovascular: Normal rate, regular rhythm, S1 normal, S2 normal and intact distal pulses  Exam reveals no gallop, no S3, no S4, no distant heart sounds and no friction rub  Murmur heard  Systolic (ejection) murmur is present with a grade of 2/6  Pulmonary/Chest: Effort normal and breath sounds normal  No respiratory distress  She has no wheezes  She has no rales  She exhibits no tenderness  Abdominal: Soft  Bowel sounds are normal  She exhibits no distension  There is no tenderness  Musculoskeletal: She exhibits no edema or deformity  Neurological: She is alert and oriented to person, place, and time  Skin: Skin is warm and dry  No rash noted  She is not diaphoretic  No pallor  Psychiatric: She has a normal mood and affect  Her behavior is normal  Judgment normal      Diagnostic Studies Review Cardio:    Echocardiogram/VANNESA: Echo Doppler done in February 2016 shows EF 25-30%, pacemaker in the right-sided chambers, thickened aortic valve without stenosis, moderate to severe mitral regurgitation  Repeat echo Doppler done February 20, 2018  LV is markedly dilated EF 25%, mild LVH, right atrium mildly dilated mild MR and trace TR which was insufficient to measure pulmonary artery pressure  Pacemaker Wire in right-sided chambers  Catheterization: Cardiac catheter patient done in 2013 was EF 30%, no evidence of significant obstructive coronary artery disease  This was done in outside hospital       ICD/ Pacer Interpretation Single-chamber ICD interrogation shows patient [facility] ICD is working adequately is set at a rate of the VVI 60  ECG Report:      Comparison to prior ECGs: no interval change  01/22/2018  Twelve lead EKG shows normal sinus rhythm heart rate 81 beats per minute  Poor R-wave progression  No change from old EKG  Repeat 12 lead EKG on 03/08/2018 shows normal sinus rhythm    Rare PVCs heart rate 78 beats per minute  Prolonged QTC      07/26/2018 12 lead EKG shows normal sinus rhythm heart rate 90 beats per minute  Peak PVCs noted  IVCD otherwise no other significant ST changes  Her QRS duration is 118 milliseconds  Twelve lead EKG done today 11/08/2018 shows normal sinus rhythm  Incomplete LBBB with IVCD her QS duration is 118 milliseconds  Twelve lead EKG shows atrial sense ventricular paced heart rate 75 beats per minute  She is biventricular paced now      Twelve lead EKG done 01/16/2020 shows atrial sensed ventricular paced heart rate 82 beats per minute she is biventricular pacemaker  Her device findings reviewed with her which was interrogated at Psychiatric    Repeat interrogation from January 2020 reviewed    Imaging:  Chest X-Ray:   Xr Chest 2 Views    Result Date: 4/9/2017  Impression Stable cardiomegaly without active pulmonary disease  Workstation performed: [id number]     Lab Review   Lab Results   Component Value Date    WBC 11 93 (H) 07/14/2019    HGB 13 2 07/14/2019    HCT 42 4 07/14/2019    MCV 87 07/14/2019     07/14/2019     Lab Results   Component Value Date     06/06/2016    K 4 8 10/03/2019     10/03/2019    CO2 23 10/03/2019    ANIONGAP 12 9 11/01/2015    BUN 21 10/03/2019    CREATININE 1 25 (H) 10/03/2019    GLUCOSE 95 06/06/2016    CALCIUM 8 8 07/14/2019    AST 25 10/03/2019    ALT 29 10/03/2019    ALKPHOS 77 07/13/2019    PROT 6 8 06/06/2016    BILITOT 0 3 06/06/2016    EGFR 49 07/14/2019     Lab Results   Component Value Date    GLUCOSE 95 06/06/2016    CALCIUM 8 8 07/14/2019     06/06/2016    K 4 8 10/03/2019    CO2 23 10/03/2019     10/03/2019    BUN 21 10/03/2019    CREATININE 1 25 (H) 10/03/2019         Lab Results   Component Value Date    HGBA1C 6 2 (H) 06/06/2016       Dr Matias De La Torre MD [facility]      "This note has been constructed using a voice recognition system  Therefore there may be syntax, spelling, and/or grammatical errors   Please call if you have any questions  "

## 2020-01-16 ENCOUNTER — OFFICE VISIT (OUTPATIENT)
Dept: CARDIOLOGY CLINIC | Facility: CLINIC | Age: 65
End: 2020-01-16
Payer: COMMERCIAL

## 2020-01-16 VITALS — WEIGHT: 267 LBS | BODY MASS INDEX: 42.91 KG/M2 | HEIGHT: 66 IN

## 2020-01-16 DIAGNOSIS — I50.22 CHRONIC SYSTOLIC CONGESTIVE HEART FAILURE, NYHA CLASS 2 (HCC): ICD-10-CM

## 2020-01-16 DIAGNOSIS — I48.0 PAROXYSMAL ATRIAL FIBRILLATION (HCC): ICD-10-CM

## 2020-01-16 DIAGNOSIS — J45.20 MILD INTERMITTENT ASTHMA WITHOUT COMPLICATION: ICD-10-CM

## 2020-01-16 DIAGNOSIS — E03.4 HYPOTHYROIDISM DUE TO ACQUIRED ATROPHY OF THYROID: ICD-10-CM

## 2020-01-16 DIAGNOSIS — E66.8 MODERATE OBESITY: ICD-10-CM

## 2020-01-16 DIAGNOSIS — E78.5 DYSLIPIDEMIA: Primary | ICD-10-CM

## 2020-01-16 DIAGNOSIS — I10 ESSENTIAL HYPERTENSION: ICD-10-CM

## 2020-01-16 DIAGNOSIS — I42.0 DILATED CARDIOMYOPATHY (HCC): ICD-10-CM

## 2020-01-16 DIAGNOSIS — R06.00 DYSPNEA ON EXERTION: ICD-10-CM

## 2020-01-16 DIAGNOSIS — E78.5 DYSLIPIDEMIA: ICD-10-CM

## 2020-01-16 PROCEDURE — 3008F BODY MASS INDEX DOCD: CPT | Performed by: INTERNAL MEDICINE

## 2020-01-16 PROCEDURE — 93000 ELECTROCARDIOGRAM COMPLETE: CPT | Performed by: INTERNAL MEDICINE

## 2020-01-16 PROCEDURE — 99214 OFFICE O/P EST MOD 30 MIN: CPT | Performed by: INTERNAL MEDICINE

## 2020-01-16 NOTE — TELEPHONE ENCOUNTER
Dr Joey Scruggs,   Patient was confused in office  Should she be taking pravastatin? She has not been  I will call her with any recommendations

## 2020-01-17 ENCOUNTER — TELEPHONE (OUTPATIENT)
Dept: CARDIOLOGY CLINIC | Facility: CLINIC | Age: 65
End: 2020-01-17

## 2020-01-17 DIAGNOSIS — I10 ESSENTIAL HYPERTENSION: ICD-10-CM

## 2020-01-17 DIAGNOSIS — I42.0 DILATED CARDIOMYOPATHY (HCC): ICD-10-CM

## 2020-01-17 RX ORDER — ROSUVASTATIN CALCIUM 10 MG/1
10 TABLET, COATED ORAL DAILY
Qty: 30 TABLET | Refills: 5 | Status: SHIPPED | OUTPATIENT
Start: 2020-01-17 | End: 2020-04-07

## 2020-01-17 RX ORDER — SACUBITRIL AND VALSARTAN 97; 103 MG/1; MG/1
TABLET, FILM COATED ORAL
Qty: 90 TABLET | Refills: 0 | Status: SHIPPED | OUTPATIENT
Start: 2020-01-17 | End: 2020-03-11 | Stop reason: ALTCHOICE

## 2020-01-17 NOTE — TELEPHONE ENCOUNTER
Okay   She can come off from the medicine she need to understand that she is high risk for heart attack and stroke    If she is willing to we can try other medications like Crestor 10 milligram

## 2020-01-17 NOTE — TELEPHONE ENCOUNTER
Patient just have labs done in July 2019  She is not due for next few months  We will do it when she come next visit  Is not going to change anything whether cholesterol is little lower or higher  She need to decide whether she is willing to take different medication or not  And she need to understand that she is high risk for heart attack stroke and even death

## 2020-01-20 DIAGNOSIS — I50.42 CHRONIC COMBINED SYSTOLIC AND DIASTOLIC HEART FAILURE, NYHA CLASS 2 (HCC): ICD-10-CM

## 2020-01-20 RX ORDER — TORSEMIDE 10 MG/1
TABLET ORAL
Qty: 60 TABLET | Refills: 0 | Status: SHIPPED | OUTPATIENT
Start: 2020-01-20 | End: 2020-02-14

## 2020-01-20 NOTE — TELEPHONE ENCOUNTER
Spoke with patient made aware  She states that she has been taking a half dose of pravastatin with no issues

## 2020-01-30 ENCOUNTER — TELEPHONE (OUTPATIENT)
Dept: CARDIOLOGY CLINIC | Facility: CLINIC | Age: 65
End: 2020-01-30

## 2020-01-30 NOTE — TELEPHONE ENCOUNTER
----- Message from Peggy Vasquez MD sent at 1/30/2020  9:35 AM EST -----  Patient's device was interrogated in our office clinic  It shows device function is ok      Please call patient
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
## 2020-02-05 ENCOUNTER — HOSPITAL ENCOUNTER (EMERGENCY)
Facility: HOSPITAL | Age: 65
Discharge: HOME | End: 2020-02-05
Attending: EMERGENCY MEDICINE
Payer: COMMERCIAL

## 2020-02-05 VITALS
DIASTOLIC BLOOD PRESSURE: 85 MMHG | SYSTOLIC BLOOD PRESSURE: 135 MMHG | WEIGHT: 261 LBS | TEMPERATURE: 97.2 F | RESPIRATION RATE: 20 BRPM | BODY MASS INDEX: 42.13 KG/M2 | OXYGEN SATURATION: 99 % | HEART RATE: 74 BPM

## 2020-02-05 DIAGNOSIS — R21 RASH: Primary | ICD-10-CM

## 2020-02-05 PROCEDURE — 99282 EMERGENCY DEPT VISIT SF MDM: CPT

## 2020-02-05 PROCEDURE — 99284 EMERGENCY DEPT VISIT MOD MDM: CPT | Performed by: PHYSICIAN ASSISTANT

## 2020-02-05 RX ORDER — PREDNISONE 20 MG/1
TABLET ORAL
Qty: 10 TABLET | Refills: 0 | Status: SHIPPED | OUTPATIENT
Start: 2020-02-05 | End: 2020-02-19

## 2020-02-05 RX ADMIN — PREDNISONE 50 MG: 20 TABLET ORAL at 17:28

## 2020-02-05 NOTE — ED PROVIDER NOTES
History  Chief Complaint   Patient presents with    Rash     states two days ago while at physical therapy, the therapist called to her attention a bug that was on her pillow  took a picture of the bug, since then noted to have itchy bite type rash that is spreading     69-year-old female history of asthma presents with rash x2 days  She states she was at physical therapy a few days ago when she was informed that there was a bed bug on her pillow  She got home and she immediately cleaned all her clothes then changed her bed sheets  She has several insect bite marks to her left upper arm with minimal swelling  She has been using Benadryl cream with minimal relief  No fever chills  No difficulty breathing, shortness of breath, throat closure  No numbness or tingling  No difficulty moving arm  No new meds or foods  Prior to Admission Medications   Prescriptions Last Dose Informant Patient Reported? Taking? ENTRESTO  MG TABS   No No   Sig: TAKE 1 TABLET BY MOUTH TWICE DAILY   budesonide-formoterol (SYMBICORT) 160-4 5 mcg/act inhaler  Self No No   Sig: Inhale 2 puffs 2 (two) times a day Rinse mouth after use     diclofenac sodium (VOLTAREN) 1 %  Self No No   Sig: apply 2 grams to affected area four times a day   ipratropium (ATROVENT) 0 02 % nebulizer solution  Self No No   Sig: Take 2 5 mL by nebulization 4 (four) times a day for 30 days   levalbuterol (XOPENEX HFA) 45 mcg/act inhaler  Self No No   Sig: Inhale 2 puffs every 6 (six) hours as needed for shortness of breath   levalbuterol (XOPENEX) 1 25 mg/3 mL nebulizer solution  Self No No   Sig: inhale contents of 1 vial in nebulizer every 6 hours if needed   levothyroxine 175 mcg tablet  Self No No   Sig: Take 1 tablet (175 mcg total) by mouth daily   methylPREDNISolone 4 MG tablet therapy pack  Self No No   Sig: Use as directed on package   Patient not taking: Reported on 1/16/2020   pantoprazole (PROTONIX) 40 mg tablet  Self No No   Sig: take 1 tablet by mouth once daily   potassium chloride (K-DUR) 10 mEq tablet  Self No No   Sig: take 1 tablet by mouth once daily   pravastatin (PRAVACHOL) 40 mg tablet  Self No No   Sig: take 1 tablet by mouth once daily   rivaroxaban (XARELTO) 20 mg tablet  Self No No   Sig: Take 1 tablet (20 mg total) by mouth daily   rosuvastatin (CRESTOR) 10 MG tablet   No No   Sig: Take 1 tablet (10 mg total) by mouth daily   sacubitril-valsartan (ENTRESTO) 49-51 MG TABS  Self Yes No   Sig: Take 1 tablet by mouth 2 (two) times a day   sotalol (BETAPACE) 80 mg tablet  Self No No   Sig: Take 1 tablet (80 mg total) by mouth every 12 (twelve) hours   spironolactone (ALDACTONE) 25 mg tablet  Self No No   Sig: Take 0 5 tablets (12 5 mg total) by mouth daily   tiZANidine (ZANAFLEX) 2 mg tablet  Self No No   Sig: take 1 tablet by mouth every 8 hours if needed for MUSCLE SPASM   torsemide (DEMADEX) 10 mg tablet   No No   Sig: take 2 tablets by mouth once daily      Facility-Administered Medications: None       Past Medical History:   Diagnosis Date    A-fib ([facility]     Abnormal blood sugar     Acid reflux     Acute bronchitis     Allergic reaction     Anxiety     Arthritis     Cardiomyopathy ([facility]     Cellulitis of left lower leg     Chest pain     CHF (congestive heart failure) ([facility])     Constipation     COPD (chronic obstructive pulmonary disease) ([facility]     Depression with anxiety     Diabetes mellitus ([facility]     Disease of thyroid gland     Diverticulitis     Dyslipidemia     Dyspnea on exertion     Elbow pain     Fracture of olecranon, open     Gastritis     Generalized pain     Hematuria     History of colonoscopy     Hypertension     Hypokalemia     Hypomagnesemia     Leg cramping     Moderate obesity     Morbid obesity due to excess calories ([facility])     Myalgia     Myositis     Nausea in adult     Nephrolithiasis     Prepatellar bursitis, unspecified knee     Screening for lipid disorders     Shortness of breath     Spasm of muscle     Thyroid trouble        Past Surgical History:   Procedure Laterality Date    CARDIAC DEFIBRILLATOR PLACEMENT      LAPAROSCOPIC CHOLECYSTECTOMY      TOTAL ABDOMINAL HYSTERECTOMY W/ BILATERAL SALPINGOOPHORECTOMY         Family History   Problem Relation Age of Onset    Hypertension Sister     Cancer Sister     Coronary artery disease Family     Diabetes type II Family     Hypertension Family      I have reviewed and agree with the history as documented  Social History     Tobacco Use    Smoking status: Never Smoker    Smokeless tobacco: Never Used   Substance Use Topics    Alcohol use: Not Currently    Drug use: No        Review of Systems   Constitutional: Negative for chills and fever  HENT: Negative for sneezing and sore throat  Respiratory: Negative for cough and shortness of breath  Cardiovascular: Negative for chest pain, palpitations and leg swelling  Gastrointestinal: Negative for abdominal pain, constipation, diarrhea, nausea and vomiting  Musculoskeletal: Negative for back pain, gait problem and joint swelling  Skin: Positive for rash  Negative for color change, pallor and wound  Neurological: Negative for dizziness, syncope, weakness, light-headedness, numbness and headaches  All other systems reviewed and are negative  Physical Exam  Physical Exam   Constitutional: She appears well-developed and well-nourished  No distress  HENT:   Head: Normocephalic and atraumatic  Nose: Nose normal    Eyes: EOM are normal    Neck: Normal range of motion  Cardiovascular: Normal rate, regular rhythm, normal heart sounds and intact distal pulses  Exam reveals no gallop and no friction rub  No murmur heard  Pulmonary/Chest: Effort normal and breath sounds normal  No stridor  No respiratory distress  She has no wheezes  She has no rales  Sp02 is 99% indicating adequate oxygenation on room air   Skin: Skin is warm   Capillary refill takes less than 2 seconds  Rash noted  She is not diaphoretic  No erythema  No pallor  Nursing note and vitals reviewed  Vital Signs  ED Triage Vitals [02/05/20 1700]   Temperature Pulse Respirations Blood Pressure SpO2   (!) 97 2 °F (36 2 °C) 74 20 135/85 99 %      Temp Source Heart Rate Source Patient Position - Orthostatic VS BP Location FiO2 (%)   Tympanic Monitor Sitting Right arm --      Pain Score       No Pain           Vitals:    02/05/20 1700   BP: 135/85   Pulse: 74   Patient Position - Orthostatic VS: Sitting         Visual Acuity      ED Medications  Medications   predniSONE tablet 50 mg (has no administration in time range)       Diagnostic Studies  Results Reviewed     None                 No orders to display              Procedures  Procedures         ED Course                               MDM  Number of Diagnoses or Management Options  Rash:   Diagnosis management comments: Patient with suspected bed bugs, already has cleaned her clothing and bed sheets in hot water  Will start steroid taper and advised to take oral benadryl as cream has not been helping  Follow up with pcp next week for re-evaluation of rash  Gave patient proper education regarding diagnosis  Answered all questions  Return to ED for any worsening of symptoms otherwise follow up with primary care physician for re-evaluation  Discussed plan with patient who verbalized understanding and agreed to plan         Amount and/or Complexity of Data Reviewed  Review and summarize past medical records: yes  Discuss the patient with other providers: yes          Disposition  Final diagnoses:   Rash     Time reflects when diagnosis was documented in both MDM as applicable and the Disposition within this note     Time User Action Codes Description Comment    2/5/2020  5:20 PM Madeline De La Cruz Monclova Rash       ED Disposition     ED Disposition Condition Date/Time Comment    Discharge Stable Wed Feb 5, 2020  5:20 PM Jimy Finney discharge to home/self care  Follow-up Information     Follow up With Specialties Details Why Contact Info Additional Information    Pierce Sterling MD Family Medicine Schedule an appointment as soon as possible for a visit in 1 week If symptoms worsen [facility] Emergency Department Emergency Medicine Go to  As needed [address] [facility], [place], [place], [address]          Patient's Medications   Discharge Prescriptions    DIPHENHYDRAMINE (BENADRYL) 50 MG TABLET    Take 1 tablet (50 mg total) by mouth every 8 (eight) hours as needed for itching       Start Date: 2/5/2020  End Date: --       Order Dose: 50 mg       Quantity: 30 tablet    Refills: 0    PREDNISONE 20 MG TABLET    Take 2 5 tablets (50 mg total) by mouth daily for 2 days, THEN 2 tablets (40 mg total) daily for 3 days, THEN 1 5 tablets (30 mg total) daily for 3 days, THEN 1 tablet (20 mg total) daily for 3 days, THEN 0 5 tablets (10 mg total) daily for 3 days  Start Date: 2/5/2020  End Date: 2/19/2020       Order Dose: --       Quantity: 10 tablet    Refills: 0     No discharge procedures on file      ED Provider  Electronically Signed by           Mahnaz Howell PA-C  02/05/20 8116

## 2020-02-06 RX ORDER — TRIAMCINOLONE ACETONIDE 5 MG/G
OINTMENT TOPICAL
Qty: 30 G | Refills: 0 | Status: SHIPPED | OUTPATIENT
Start: 2020-02-06 | End: 2020-10-10

## 2020-02-12 DIAGNOSIS — M19.90 OSTEOARTHRITIS, UNSPECIFIED OSTEOARTHRITIS TYPE, UNSPECIFIED SITE: ICD-10-CM

## 2020-02-14 DIAGNOSIS — E87.6 HYPOKALEMIA: ICD-10-CM

## 2020-02-14 DIAGNOSIS — I50.42 CHRONIC COMBINED SYSTOLIC AND DIASTOLIC HEART FAILURE, NYHA CLASS 2 (HCC): ICD-10-CM

## 2020-02-14 RX ORDER — TORSEMIDE 10 MG/1
TABLET ORAL
Qty: 60 TABLET | Refills: 0 | Status: SHIPPED | OUTPATIENT
Start: 2020-02-14 | End: 2020-03-12

## 2020-02-14 RX ORDER — POTASSIUM CHLORIDE 750 MG/1
TABLET, FILM COATED, EXTENDED RELEASE ORAL
Qty: 30 TABLET | Refills: 5 | Status: SHIPPED | OUTPATIENT
Start: 2020-02-14 | End: 2020-08-07

## 2020-03-02 DIAGNOSIS — K21.9 GASTROESOPHAGEAL REFLUX DISEASE WITHOUT ESOPHAGITIS: ICD-10-CM

## 2020-03-02 RX ORDER — PANTOPRAZOLE SODIUM 40 MG/1
TABLET, DELAYED RELEASE ORAL
Qty: 90 TABLET | Refills: 0 | Status: SHIPPED | OUTPATIENT
Start: 2020-03-02 | End: 2020-05-29

## 2020-03-07 DIAGNOSIS — I48.0 PAROXYSMAL ATRIAL FIBRILLATION (HCC): ICD-10-CM

## 2020-03-08 RX ORDER — RIVAROXABAN 20 MG/1
TABLET, FILM COATED ORAL
Qty: 90 TABLET | Refills: 3 | Status: SHIPPED | OUTPATIENT
Start: 2020-03-08 | End: 2020-05-27

## 2020-03-10 DIAGNOSIS — M62.830 BACK MUSCLE SPASM: ICD-10-CM

## 2020-03-11 DIAGNOSIS — I42.0 DILATED CARDIOMYOPATHY (HCC): ICD-10-CM

## 2020-03-11 DIAGNOSIS — I10 ESSENTIAL HYPERTENSION: ICD-10-CM

## 2020-03-11 RX ORDER — TIZANIDINE 2 MG/1
TABLET ORAL
Qty: 30 TABLET | Refills: 0 | Status: SHIPPED | OUTPATIENT
Start: 2020-03-11 | End: 2020-04-21

## 2020-03-12 DIAGNOSIS — I50.42 CHRONIC COMBINED SYSTOLIC AND DIASTOLIC HEART FAILURE, NYHA CLASS 2 (HCC): ICD-10-CM

## 2020-03-12 RX ORDER — TORSEMIDE 10 MG/1
TABLET ORAL
Qty: 60 TABLET | Refills: 0 | Status: SHIPPED | OUTPATIENT
Start: 2020-03-12 | End: 2020-04-10

## 2020-03-31 ENCOUNTER — TELEPHONE (OUTPATIENT)
Dept: CARDIOLOGY CLINIC | Facility: CLINIC | Age: 65
End: 2020-03-31

## 2020-03-31 NOTE — TELEPHONE ENCOUNTER
Pt states [employer] will be sending a form for her medical gloves for Dr Olive Shanks to complete  Please get is signed asap

## 2020-04-03 NOTE — TELEPHONE ENCOUNTER
Patient called stating if we dont get the paperwork from [employer] supplies today, can we please call her and let her know   Ph # [phone]

## 2020-04-07 DIAGNOSIS — E78.5 DYSLIPIDEMIA: ICD-10-CM

## 2020-04-07 RX ORDER — PRAVASTATIN SODIUM 40 MG
TABLET ORAL
Qty: 90 TABLET | Refills: 0 | OUTPATIENT
Start: 2020-04-07

## 2020-04-07 RX ORDER — PRAVASTATIN SODIUM 40 MG
40 TABLET ORAL DAILY
Qty: 90 TABLET | Refills: 3 | Status: SHIPPED | OUTPATIENT
Start: 2020-04-07 | End: 2021-03-28

## 2020-04-10 ENCOUNTER — REMOTE DEVICE CLINIC VISIT (OUTPATIENT)
Dept: CARDIOLOGY CLINIC | Facility: CLINIC | Age: 65
End: 2020-04-10
Payer: COMMERCIAL

## 2020-04-10 DIAGNOSIS — I50.42 CHRONIC COMBINED SYSTOLIC AND DIASTOLIC HEART FAILURE, NYHA CLASS 2 (HCC): ICD-10-CM

## 2020-04-10 DIAGNOSIS — Z95.810 PRESENCE OF AUTOMATIC CARDIOVERTER/DEFIBRILLATOR (AICD): Primary | ICD-10-CM

## 2020-04-10 PROCEDURE — 93296 REM INTERROG EVL PM/IDS: CPT | Performed by: INTERNAL MEDICINE

## 2020-04-10 PROCEDURE — 93295 DEV INTERROG REMOTE 1/2/MLT: CPT | Performed by: INTERNAL MEDICINE

## 2020-04-10 RX ORDER — TORSEMIDE 10 MG/1
TABLET ORAL
Qty: 60 TABLET | Refills: 0 | Status: SHIPPED | OUTPATIENT
Start: 2020-04-10 | End: 2020-05-11

## 2020-04-21 DIAGNOSIS — M62.830 BACK MUSCLE SPASM: ICD-10-CM

## 2020-04-21 RX ORDER — TIZANIDINE 2 MG/1
TABLET ORAL
Qty: 30 TABLET | Refills: 0 | Status: SHIPPED | OUTPATIENT
Start: 2020-04-21 | End: 2020-05-23

## 2020-05-01 DIAGNOSIS — M19.90 OSTEOARTHRITIS, UNSPECIFIED OSTEOARTHRITIS TYPE, UNSPECIFIED SITE: ICD-10-CM

## 2020-05-08 ENCOUNTER — TELEPHONE (OUTPATIENT)
Dept: CARDIOLOGY CLINIC | Facility: CLINIC | Age: 65
End: 2020-05-08

## 2020-05-08 DIAGNOSIS — M19.90 OSTEOARTHRITIS, UNSPECIFIED OSTEOARTHRITIS TYPE, UNSPECIFIED SITE: ICD-10-CM

## 2020-05-11 DIAGNOSIS — I50.42 CHRONIC COMBINED SYSTOLIC AND DIASTOLIC HEART FAILURE, NYHA CLASS 2 (HCC): ICD-10-CM

## 2020-05-11 RX ORDER — TORSEMIDE 10 MG/1
TABLET ORAL
Qty: 60 TABLET | Refills: 0 | Status: SHIPPED | OUTPATIENT
Start: 2020-05-11 | End: 2020-06-08

## 2020-05-14 ENCOUNTER — TELEPHONE (OUTPATIENT)
Dept: FAMILY MEDICINE CLINIC | Facility: CLINIC | Age: 65
End: 2020-05-14

## 2020-05-21 DIAGNOSIS — M62.830 BACK MUSCLE SPASM: ICD-10-CM

## 2020-05-23 RX ORDER — TIZANIDINE 2 MG/1
TABLET ORAL
Qty: 30 TABLET | Refills: 0 | Status: SHIPPED | OUTPATIENT
Start: 2020-05-23 | End: 2020-08-24 | Stop reason: SDUPTHER

## 2020-05-27 DIAGNOSIS — K21.9 GASTROESOPHAGEAL REFLUX DISEASE WITHOUT ESOPHAGITIS: ICD-10-CM

## 2020-05-27 DIAGNOSIS — I48.0 PAROXYSMAL ATRIAL FIBRILLATION (HCC): ICD-10-CM

## 2020-05-27 RX ORDER — RIVAROXABAN 20 MG/1
TABLET, FILM COATED ORAL
Qty: 90 TABLET | Refills: 3 | Status: SHIPPED | OUTPATIENT
Start: 2020-05-27 | End: 2021-05-12

## 2020-05-29 RX ORDER — PANTOPRAZOLE SODIUM 40 MG/1
TABLET, DELAYED RELEASE ORAL
Qty: 90 TABLET | Refills: 0 | Status: SHIPPED | OUTPATIENT
Start: 2020-05-29 | End: 2020-08-24 | Stop reason: SDUPTHER

## 2020-06-08 DIAGNOSIS — I50.42 CHRONIC COMBINED SYSTOLIC AND DIASTOLIC HEART FAILURE, NYHA CLASS 2 (HCC): ICD-10-CM

## 2020-06-08 RX ORDER — TORSEMIDE 10 MG/1
TABLET ORAL
Qty: 60 TABLET | Refills: 0 | Status: SHIPPED | OUTPATIENT
Start: 2020-06-08 | End: 2020-06-30

## 2020-06-30 DIAGNOSIS — I50.42 CHRONIC COMBINED SYSTOLIC AND DIASTOLIC HEART FAILURE, NYHA CLASS 2 (HCC): ICD-10-CM

## 2020-06-30 RX ORDER — TORSEMIDE 10 MG/1
TABLET ORAL
Qty: 60 TABLET | Refills: 0 | Status: SHIPPED | OUTPATIENT
Start: 2020-06-30 | End: 2020-08-03

## 2020-07-23 ENCOUNTER — REMOTE DEVICE CLINIC VISIT (OUTPATIENT)
Dept: CARDIOLOGY CLINIC | Facility: CLINIC | Age: 65
End: 2020-07-23
Payer: COMMERCIAL

## 2020-07-23 DIAGNOSIS — Z95.810 PRESENCE OF AUTOMATIC CARDIOVERTER/DEFIBRILLATOR (AICD): Primary | ICD-10-CM

## 2020-07-23 PROCEDURE — 93295 DEV INTERROG REMOTE 1/2/MLT: CPT | Performed by: INTERNAL MEDICINE

## 2020-07-23 PROCEDURE — 93296 REM INTERROG EVL PM/IDS: CPT | Performed by: INTERNAL MEDICINE

## 2020-07-24 ENCOUNTER — TELEPHONE (OUTPATIENT)
Dept: CARDIOLOGY CLINIC | Facility: CLINIC | Age: 65
End: 2020-07-24

## 2020-07-24 NOTE — PROGRESS NOTES
Results for orders placed or performed in visit on 07/23/20   Cardiac EP device report    Narrative    SJM SINGLE ICDACTIVE SYSTEM IS MRI CONDITIONAL  MERLIN TRANSMISSION: BATTERY VOLTAGE ADEQUATE  (2 2YRS)  AP 8% BVP 79% ( <90%)  ALL AVAILABLE LEAD PARAMETERS WITHIN NORMAL LIMITS  NO SIGNIFICANT HIGH RATE EPISODES  CORVUE IMPEDANCE MONITORING WITHIN NORMAL LIMITS  NORMAL DEVICE FUNCTION  ---WALLACE

## 2020-07-24 NOTE — TELEPHONE ENCOUNTER
----- Message from Gianluca Krause MD sent at 7/24/2020 12:11 PM EDT -----  Patient's device was interrogated in our office clinic  It shows device function is normal      Please call patient

## 2020-08-03 DIAGNOSIS — I42.0 DILATED CARDIOMYOPATHY (HCC): Primary | ICD-10-CM

## 2020-08-03 RX ORDER — FUROSEMIDE 20 MG/1
20 TABLET ORAL DAILY
Qty: 30 TABLET | Refills: 5 | Status: SHIPPED | OUTPATIENT
Start: 2020-08-03 | End: 2020-09-02

## 2020-08-03 NOTE — TELEPHONE ENCOUNTER
Torsemide 10mg no longer on formulary for meds    Preferred Furosemide, bumetanide  Would you like to substitute?

## 2020-08-07 DIAGNOSIS — E87.6 HYPOKALEMIA: ICD-10-CM

## 2020-08-07 RX ORDER — POTASSIUM CHLORIDE 750 MG/1
TABLET, FILM COATED, EXTENDED RELEASE ORAL
Qty: 30 TABLET | Refills: 5 | Status: SHIPPED | OUTPATIENT
Start: 2020-08-07

## 2020-08-17 ENCOUNTER — TELEPHONE (OUTPATIENT)
Dept: CARDIOLOGY CLINIC | Facility: CLINIC | Age: 65
End: 2020-08-17

## 2020-08-17 NOTE — TELEPHONE ENCOUNTER
Tell her to take extra dose of Lasix or diuretics today and she should be compliant with her medications

## 2020-08-17 NOTE — TELEPHONE ENCOUNTER
----- Message from [facility] sent at 8/17/2020  1:03 PM EDT -----  Regarding: cor-tram crossed x 7 days  Hi Angelique,  Pts cor-tram crossed x 7 days  PT takes lasix, k-dur, xarelto, sotalol  EF: 20-25% (echo 2/20/18)  Earlene Pak   NON-BILLABLE MERLIN TRANSMISSION: BATTERY VOLTAGE ADEQUATE (2 YRS)  AP: 4 2%  BP: 84% (<90%~DDD/60)  ALL AVAILABLE LEAD PARAMETERS WITHIN NORMAL LIMITS  NO SIGNIFICANT HIGH RATE EPISODES  CORVUE IMPEDANCE THRESHOLD CROSSED  X 7 DAYS  PT TAKES LASIX, K-DUR, XARELTO, SOTALOL  EF: 20-25% (ECHO 2/20/18)  TASK TO HF TEAM   APPROPRIATELY FUNCTIONING BI-V ICD    [address]

## 2020-08-18 ENCOUNTER — TELEPHONE (OUTPATIENT)
Dept: CARDIOLOGY CLINIC | Facility: CLINIC | Age: 65
End: 2020-08-18

## 2020-08-18 NOTE — TELEPHONE ENCOUNTER
----- Message from Otilio Galdamez MD sent at 8/17/2020  4:32 PM EDT -----  Patient's device was interrogated in our office clinic  It shows device function she has evidence of volume overload  She can take extra diuretic pill today  Continue other medication as before  Please call patient

## 2020-08-24 DIAGNOSIS — K21.9 GASTROESOPHAGEAL REFLUX DISEASE WITHOUT ESOPHAGITIS: ICD-10-CM

## 2020-08-24 DIAGNOSIS — E03.4 HYPOTHYROIDISM DUE TO ACQUIRED ATROPHY OF THYROID: ICD-10-CM

## 2020-08-24 DIAGNOSIS — M62.830 BACK MUSCLE SPASM: ICD-10-CM

## 2020-08-25 RX ORDER — TIZANIDINE 2 MG/1
2 TABLET ORAL EVERY 8 HOURS PRN
Qty: 30 TABLET | Refills: 3 | Status: SHIPPED | OUTPATIENT
Start: 2020-08-25 | End: 2021-02-22

## 2020-08-25 RX ORDER — PANTOPRAZOLE SODIUM 40 MG/1
40 TABLET, DELAYED RELEASE ORAL DAILY
Qty: 90 TABLET | Refills: 3 | Status: SHIPPED | OUTPATIENT
Start: 2020-08-25 | End: 2021-08-02

## 2020-08-25 RX ORDER — LEVOTHYROXINE SODIUM 175 UG/1
175 TABLET ORAL DAILY
Qty: 90 TABLET | Refills: 3 | Status: SHIPPED | OUTPATIENT
Start: 2020-08-25 | End: 2021-07-24

## 2020-08-30 NOTE — PROGRESS NOTES
Progress Note - Cardiology Office  [facility]  Tone Bauer 72 y o  female MRN: [id number]  : 1955   Encounter: [id number]      Assessment:     1  Chronic systolic congestive heart failure, NYHA class 2 ([address] )    2  Dilated cardiomyopathy ([facility] [address] )    3  Hypothyroidism due to acquired atrophy of thyroid    4  Paroxysmal atrial fibrillation (HCC)    5  Essential hypertension    6  Dyspnea on exertion    7  Dyslipidemia    8  Moderate obesity    9  Encounter for screening colonoscopy        Discussion Summary and Plan:  1  Status post ICD shock for ventricular arrhythmias  Patient device was upgraded to biventricular device  She was also started on sotalol  QTC acceptable since then no ICD shock  Her LV lead threshold is slightly high  Her device was interrogated recently  She still have battery life of 2 5 years  No ICD shocks no occult atrial fibrillation  She was noted to have increase impedance due to fluid overload  Restarted back on Demadex 20 milligram along with Aldactone  Seems to have responded well  2  Chronic systolic heart failure [place] Heart Association class II/3  Patient has tolerated Entresto very well  No clinical evidence of heart failure or ischemia at this time  Currently she is taking sotalol, Aldactone, entresto  She is on high dose of 97/103 mg twice a day  Continue same medications  3  Status post [facility] ICD  Her device was upgraded to [facility] biventricular ICD  No more ICD shocks  Her device interrogation reviewed with her  4  Bronchial asthma  Not actively wheezing she uses p r n  Inhalers  5  History of paroxysmal at fibrillation currently in sinus rhythm  Continue antithrombotic therapy  Patient tolerating well  Continue sotalol  QTC is acceptable electrolytes are acceptable  Continue Xarelto  Check CMP CBC fasting lipid and TSH  6  Hypothyroidism  She is on levothyroxine TSH was acceptable    Currently she is taking levothyroxine 175 mcg  7  Anxiety  She's now on Lexapro     8  Dyslipidemia  She is back to taking Pravachol  She could not tolerate Zetia  9   Dyspnea on exertion  Patient had well distant exertion she has some evidence of volume overload  She does breast when she is around 260 pounds  She has gained some weight  Advised to lose weight  Diuretic increased  Follow-up 4-6 months             Counseling :  A description of the counseling  Issues related to heart failure, regular diet, weight all discussed at length  All her questions answered  Goals and Barriers  The patient has the current Goals: To stay out of heart failure  The patent has the current Barriers: Weight gain  Patient's ability to self care: Yes  Medication side effect reviewed with patient in detail and all their questions answered to their satisfaction  HPI :     Madeleine Nicolas is a 72y o  year old female who came for follow up  Patient has with past medical history significant for nonischemic dilated cardiomyopathy, hypertension, bronchial asthma, moderate obesity, dyslipidemia, hypothyroidism, status post [facility] single-chamber ICD, mild nonobstructive disease by cardiac catheterization who came for regular follow-up and interrogation of her ICD  She has been doing pretty well she's she is on coralanor, she did she denies any chest pain, any shortness of breath  Recently she gained more weight as she was on steroids for bronchial asthma  Her ICD was interrogated today  No fever, no chills, no other significant complaint       09/02/2020  Above reviewed  Patient came for follow-up  She was complaining about shortness of breath  She has gained some weight  She feel Lasix was not helping as much as torsemide  She is taking torsemide currently she is taking total dose of 20 mg  She has not taken her Aldactone  She has her biventricular pacemaker device upgraded and she was put on sotalol    She also follow the EP heart rate 89 beats per minute  No nausea no vomiting no other issues at this time  She had history of dilated cardiomyopathy, chronic systolic and diastolic heart failure currently she had gained some weight  She is back on her Pravachol  No other issues at this time  She could not tolerate Zetia  Her device was interrogated in August 2020  She has 84% biventricular paced  Her device interrogations showed that her core view impedance has shown that she had some volume overload  She is now feeling better  Review of Systems   Constitutional: Positive for unexpected weight change  Negative for activity change, chills, diaphoresis and fever  Has gained 9 lb   HENT: Negative for congestion  Eyes: Negative for discharge and redness  Respiratory: Positive for shortness of breath  Negative for cough, chest tightness and wheezing  Exertional   Cardiovascular: Positive for leg swelling  Negative for chest pain and palpitations  Gastrointestinal: Negative for abdominal pain, diarrhea and nausea  Endocrine: Negative  Genitourinary: Negative for decreased urine volume and urgency  Musculoskeletal: Negative  Negative for arthralgias, back pain and gait problem  Skin: Negative for rash and wound  Allergic/Immunologic: Negative  Neurological: Negative for dizziness, seizures, syncope, weakness, light-headedness and headaches  Hematological: Negative  Psychiatric/Behavioral: Negative for agitation and confusion  The patient is nervous/anxious          Historical Information   Past Medical History:   Diagnosis Date    A-fib ([address] )     Abnormal blood sugar     Acid reflux     Acute bronchitis     Allergic reaction     Anxiety     Arthritis     Cardiomyopathy (HCC)     Cellulitis of left lower leg     Chest pain     CHF (congestive heart failure) (HCC)     Constipation     COPD (chronic obstructive pulmonary disease) (HCC)     Depression with anxiety     Diabetes mellitus ([facility] [address] )     Disease of thyroid gland     Diverticulitis     Dyslipidemia     Dyspnea on exertion     Elbow pain     Fracture of olecranon, open     Gastritis     Generalized pain     Hematuria     History of colonoscopy     Hypertension     Hypokalemia     Hypomagnesemia     Leg cramping     Moderate obesity     Morbid obesity due to excess calories (HCC)     Myalgia     Myositis     Nausea in adult     Nephrolithiasis     Prepatellar bursitis, unspecified knee     Screening for lipid disorders     Shortness of breath     Spasm of muscle     Thyroid trouble      Past Surgical History:   Procedure Laterality Date    CARDIAC DEFIBRILLATOR PLACEMENT      LAPAROSCOPIC CHOLECYSTECTOMY      TOTAL ABDOMINAL HYSTERECTOMY W/ BILATERAL SALPINGOOPHORECTOMY       Social History     Substance and Sexual Activity   Alcohol Use Not Currently     Social History     Substance and Sexual Activity   Drug Use No     Social History     Tobacco Use   Smoking Status Never Smoker   Smokeless Tobacco Never Used     Family History:   Family History   Problem Relation Age of Onset    Hypertension Sister     Cancer Sister     Coronary artery disease Family     Diabetes type II Family     Hypertension Family        Meds/Allergies     Allergies   Allergen Reactions    Omnipaque [Iohexol] Swelling    Penicillins Swelling    Iv Dye  [Iodinated Diagnostic Agents] Throat Swelling    Shellfish-Derived Products Hives       Current Outpatient Medications:     budesonide-formoterol (SYMBICORT) 160-4 5 mcg/act inhaler, Inhale 2 puffs 2 (two) times a day Rinse mouth after use , Disp: 1 Inhaler, Rfl: 5    diclofenac sodium (VOLTAREN) 1 %, Apply 2 g topically 4 (four) times a day, Disp: 100 g, Rfl: 0    diphenhydrAMINE (BENADRYL) 50 MG tablet, Take 1 tablet (50 mg total) by mouth every 8 (eight) hours as needed for itching, Disp: 30 tablet, Rfl: 0    levalbuterol (XOPENEX HFA) 45 mcg/act inhaler, Inhale 2 puffs every 6 (six) hours as needed for shortness of breath, Disp: 1 Inhaler, Rfl: 5    levalbuterol (XOPENEX) 1 25 mg/3 mL nebulizer solution, inhale contents of 1 vial in nebulizer every 6 hours if needed, Disp: 720 mL, Rfl: 1    levothyroxine 175 mcg tablet, Take 1 tablet (175 mcg total) by mouth daily, Disp: 90 tablet, Rfl: 3    pantoprazole (PROTONIX) 40 mg tablet, Take 1 tablet (40 mg total) by mouth daily, Disp: 90 tablet, Rfl: 3    potassium chloride (K-DUR) 10 mEq tablet, take 1 tablet by mouth once daily, Disp: 30 tablet, Rfl: 5    pravastatin (PRAVACHOL) 40 mg tablet, Take 1 tablet (40 mg total) by mouth daily, Disp: 90 tablet, Rfl: 3    sacubitril-valsartan (Entresto)  MG TABS, Take 1 tablet by mouth 2 (two) times a day, Disp: 180 tablet, Rfl: 3    sotalol (BETAPACE) 80 mg tablet, Take 1 tablet (80 mg total) by mouth every 12 (twelve) hours, Disp: 180 tablet, Rfl: 3    tiZANidine (ZANAFLEX) 2 mg tablet, Take 1 tablet (2 mg total) by mouth every 8 (eight) hours as needed for muscle spasms, Disp: 30 tablet, Rfl: 3    TORSEMIDE PO, Take by mouth Patient went back on old dosage e, Disp: , Rfl:     XARELTO 20 MG tablet, take 1 tablet by mouth once daily, Disp: 90 tablet, Rfl: 3    ipratropium (ATROVENT) 0 02 % nebulizer solution, Take 2 5 mL by nebulization 4 (four) times a day for 30 days, Disp: 300 mL, Rfl: 0    methylPREDNISolone 4 MG tablet therapy pack, Use as directed on package (Patient not taking: Reported on 1/16/2020), Disp: 21 tablet, Rfl: 0    spironolactone (ALDACTONE) 25 mg tablet, Take 0 5 tablets (12 5 mg total) by mouth daily (Patient not taking: Reported on 9/2/2020), Disp: 45 tablet, Rfl: 3    triamcinolone (KENALOG) 0 5 % ointment, apply to affected area twice a day (Patient not taking: Reported on 9/2/2020), Disp: 30 g, Rfl: 0    Vitals: Blood pressure 110/78, pulse 89, temperature (!) 97 1 °F (36 2 °C), temperature source Temporal, height 5' 6" (1 676 m), weight 122 kg (269 lb), SpO2 98 %  Body mass index is 43 42 kg/m²  Vitals:    09/02/20 1529   Weight: 122 kg (269 lb)     BP Readings from Last 3 Encounters:   09/02/20 110/78   02/05/20 135/85   12/29/19 140/80       Physical Exam   Constitutional: She is oriented to person, place, and time  She appears well-developed and well-nourished  No distress  HENT:   Head: Normocephalic and atraumatic  Eyes: Pupils are equal, round, and reactive to light  Neck: Neck supple  No JVD present  No tracheal deviation present  No thyromegaly present  Cardiovascular: Normal rate, regular rhythm, S1 normal, S2 normal and intact distal pulses  Exam reveals no gallop, no S3, no S4, no distant heart sounds and no friction rub  Murmur heard  Systolic (ejection) murmur is present with a grade of 2/6  Pulmonary/Chest: Effort normal  No respiratory distress  She has no wheezes  She has no rales  She exhibits no tenderness  Bilateral air entry with coarse breath sounds decreased at bases   Abdominal: Soft  Bowel sounds are normal  She exhibits no distension  There is no abdominal tenderness  Musculoskeletal:         General: No deformity or edema  Comments: Minimal   Neurological: She is alert and oriented to person, place, and time  Skin: Skin is warm and dry  No rash noted  She is not diaphoretic  No pallor  Psychiatric: She has a normal mood and affect  Her behavior is normal  Judgment normal      Diagnostic Studies Review Cardio:    Echocardiogram/VANNESA: Echo Doppler done in February 2016 shows EF 25-30%, pacemaker in the right-sided chambers, thickened aortic valve without stenosis, moderate to severe mitral regurgitation  Repeat echo Doppler done February 20, 2018  LV is markedly dilated EF 25%, mild LVH, right atrium mildly dilated mild MR and trace TR which was insufficient to measure pulmonary artery pressure  Pacemaker Wire in right-sided chambers        Catheterization: Cardiac catheter patient done in 2013 was EF 30%, no evidence of significant obstructive coronary artery disease  This was done in outside hospital       ICD/ Pacer Interpretation Single-chamber ICD interrogation shows patient [facility] ICD is working adequately is set at a rate of the VVI 60  ECG Report:      Comparison to prior ECGs: no interval change  01/22/2018  Twelve lead EKG shows normal sinus rhythm heart rate 81 beats per minute  Poor R-wave progression  No change from old EKG  Repeat 12 lead EKG on 03/08/2018 shows normal sinus rhythm  Rare PVCs heart rate 78 beats per minute  Prolonged QTC      07/26/2018 12 lead EKG shows normal sinus rhythm heart rate 90 beats per minute  Peak PVCs noted  IVCD otherwise no other significant ST changes  Her QRS duration is 118 milliseconds  Twelve lead EKG done today 11/08/2018 shows normal sinus rhythm  Incomplete LBBB with IVCD her QS duration is 118 milliseconds  Twelve lead EKG shows atrial sense ventricular paced heart rate 75 beats per minute  She is biventricular paced now      Twelve lead EKG done 01/16/2020 shows atrial sensed ventricular paced heart rate 82 beats per minute she is biventricular pacemaker  Her device findings reviewed with her which was interrogated at [facility]    Repeat interrogation from January 2020 reviewed      Twelve lead EKG shows paced rhythm heart rate 89 beats per minute she is biventricular pacemaker  Imaging:  Chest X-Ray:   Xr Chest 2 Views    Result Date: 4/9/2017  Impression Stable cardiomegaly without active pulmonary disease   Workstation performed: [id number]     Lab Review   Lab Results   Component Value Date    WBC 11 93 (H) 07/14/2019    HGB 13 2 07/14/2019    HCT 42 4 07/14/2019    MCV 87 07/14/2019     07/14/2019     Lab Results   Component Value Date     06/06/2016    K 4 8 10/03/2019     10/03/2019    CO2 23 10/03/2019    ANIONGAP 12 9 11/01/2015    BUN 21 10/03/2019    CREATININE 1 25 (H) 10/03/2019    GLUCOSE 95 06/06/2016    CALCIUM 8 8 07/14/2019    AST 25 10/03/2019    ALT 29 10/03/2019    ALKPHOS 77 07/13/2019    PROT 6 8 06/06/2016    BILITOT 0 3 06/06/2016    EGFR 49 07/14/2019     Lab Results   Component Value Date    GLUCOSE 95 06/06/2016    CALCIUM 8 8 07/14/2019     06/06/2016    K 4 8 10/03/2019    CO2 23 10/03/2019     10/03/2019    BUN 21 10/03/2019    CREATININE 1 25 (H) 10/03/2019         Lab Results   Component Value Date    HGBA1C 6 2 (H) 06/06/2016       Dr Marcio Maldonado MD [facility]      "This note has been constructed using a voice recognition system  Therefore there may be syntax, spelling, and/or grammatical errors   Please call if you have any questions  "

## 2020-09-02 ENCOUNTER — OFFICE VISIT (OUTPATIENT)
Dept: CARDIOLOGY CLINIC | Facility: CLINIC | Age: 65
End: 2020-09-02
Payer: COMMERCIAL

## 2020-09-02 VITALS
BODY MASS INDEX: 43.23 KG/M2 | HEIGHT: 66 IN | SYSTOLIC BLOOD PRESSURE: 110 MMHG | OXYGEN SATURATION: 98 % | DIASTOLIC BLOOD PRESSURE: 78 MMHG | WEIGHT: 269 LBS | TEMPERATURE: 97.1 F | HEART RATE: 89 BPM

## 2020-09-02 DIAGNOSIS — I48.0 PAROXYSMAL ATRIAL FIBRILLATION (HCC): ICD-10-CM

## 2020-09-02 DIAGNOSIS — I10 ESSENTIAL HYPERTENSION: ICD-10-CM

## 2020-09-02 DIAGNOSIS — R06.00 DYSPNEA ON EXERTION: ICD-10-CM

## 2020-09-02 DIAGNOSIS — I42.0 DILATED CARDIOMYOPATHY (HCC): ICD-10-CM

## 2020-09-02 DIAGNOSIS — Z12.11 ENCOUNTER FOR SCREENING COLONOSCOPY: ICD-10-CM

## 2020-09-02 DIAGNOSIS — E03.4 HYPOTHYROIDISM DUE TO ACQUIRED ATROPHY OF THYROID: ICD-10-CM

## 2020-09-02 DIAGNOSIS — E78.5 DYSLIPIDEMIA: ICD-10-CM

## 2020-09-02 DIAGNOSIS — I50.22 CHRONIC SYSTOLIC CONGESTIVE HEART FAILURE, NYHA CLASS 2 (HCC): ICD-10-CM

## 2020-09-02 DIAGNOSIS — E66.8 MODERATE OBESITY: ICD-10-CM

## 2020-09-02 PROCEDURE — 93000 ELECTROCARDIOGRAM COMPLETE: CPT | Performed by: INTERNAL MEDICINE

## 2020-09-02 PROCEDURE — 99214 OFFICE O/P EST MOD 30 MIN: CPT | Performed by: INTERNAL MEDICINE

## 2020-09-04 ENCOUNTER — TELEPHONE (OUTPATIENT)
Dept: CARDIOLOGY CLINIC | Facility: CLINIC | Age: 65
End: 2020-09-04

## 2020-09-04 NOTE — TELEPHONE ENCOUNTER
PATIENT CALLED AND WANTED TO KNOW IF IT IS OK TO TAKE CBD OIL OR GUMMI TO HELP WITH ANXIETY  SINCE SHE IS ON BLOOD THINNERS SHE WANTED TO CHECK WITH YOU FIRST      PLEASE CALL PATIENT [phone]

## 2020-09-04 NOTE — TELEPHONE ENCOUNTER
There is no study to prove either way  At patient may use in a small quantity if it helps her but we do not have any study linking it benefits or harm's the heart

## 2020-09-08 ENCOUNTER — TELEMEDICINE (OUTPATIENT)
Dept: FAMILY MEDICINE CLINIC | Facility: CLINIC | Age: 65
End: 2020-09-08
Payer: COMMERCIAL

## 2020-09-08 ENCOUNTER — TELEPHONE (OUTPATIENT)
Dept: CARDIOLOGY CLINIC | Facility: CLINIC | Age: 65
End: 2020-09-08

## 2020-09-08 DIAGNOSIS — F41.9 ACUTE ANXIETY: Primary | ICD-10-CM

## 2020-09-08 PROCEDURE — 99213 OFFICE O/P EST LOW 20 MIN: CPT | Performed by: FAMILY MEDICINE

## 2020-09-08 NOTE — TELEPHONE ENCOUNTER
Pt would like a referral to another pcp - she see [facility] and is not satisfied with their coverage

## 2020-09-08 NOTE — PROGRESS NOTES
Virtual Brief Visit    Assessment/Plan:    Problem List Items Addressed This Visit     None      Visit Diagnoses     Acute anxiety    -  Primary        Patient calling to request medication for her panic attacks, specifically xanax which had worked for her in the past   I informed patient due to office policy I would be unable to prescribe short term benzodiazepines for her and recommended she get in touch with mental health provider  Provided patient with information for [facility] including their crisis line, recommended she go to the ER and she felt her symptoms worsen, should she have any thoughts of harming self or others, or if she developed any chest pain given her cardiac history  Discussed with attending physician Dr Lashell Mims         Reason for visit is   Chief Complaint   Patient presents with    Virtual Brief Visit        Encounter provider Giovanny Mclain DO    Provider located at [address] [place] [phone]    Recent Visits  No visits were found meeting these conditions  Showing recent visits within past 7 days and meeting all other requirements     Today's Visits  Date Type Provider Dept   09/08/20 Telemedicine Giovanny Mclain DO [address] today's visits and meeting all other requirements     Future Appointments  No visits were found meeting these conditions  Showing future appointments within next 150 days and meeting all other requirements        After connecting through telephone, the patient was identified by name and date of birth  Liliana Herring was informed that this is a telemedicine visit and that the visit is being conducted through telephone  My office door was closed  No one else was in the room  She acknowledged consent and understanding of privacy and security of the platform   The patient has agreed to participate and understands she can discontinue the visit at any time     Patient is aware this is a billable service  Madina Mijares is a 72 y o  female  Patient calling with complaint of acute anxiety  She states she has a history panic attacks, is not on any medication for anxiety at baseline, and has not had issues with panic attacks for the last 3-4 years  However, she states that her brother had a massive heart attack last week and is currently hospitalized in [place], he is not doing very well, that she has had multiple panic attacks since then feeling the stress and pressure of the situation  She states she has tried breathing exercises and different natural remedies as recommended by friends and the pharmacist including CBD oil, but nothing is helping  She is calling to request medication to help  She states she used to take Xanax which helped significantly in the past   She states that she lives alone by herself, she denies any thoughts of self-harm, harming others, and currently feels safe at home just very anxious      Past Medical History:   Diagnosis Date    A-fib ([place] [address] )     Abnormal blood sugar     Acid reflux     Acute bronchitis     Allergic reaction     Anxiety     Arthritis     Cardiomyopathy ([place] [address] )     Cellulitis of left lower leg     Chest pain     [facility]     Constipation     COPD (chronic obstructive pulmonary disease) ([facility])     Depression with anxiety     Diabetes mellitus ([place] [address] )     Disease of thyroid gland     Diverticulitis     Dyslipidemia     Dyspnea on exertion     Elbow pain     Fracture of olecranon, open     Gastritis     Generalized pain     Hematuria     History of colonoscopy     Hypertension     Hypokalemia     Hypomagnesemia     Leg cramping     Moderate obesity     Morbid obesity due to excess calories ([facility])     Myalgia     Myositis     Nausea in adult     Nephrolithiasis     Prepatellar bursitis, unspecified knee     Screening for lipid disorders     Shortness of breath     Spasm of muscle     Thyroid trouble        Past Surgical History:   Procedure Laterality Date    CARDIAC DEFIBRILLATOR PLACEMENT      LAPAROSCOPIC CHOLECYSTECTOMY      TOTAL ABDOMINAL HYSTERECTOMY W/ BILATERAL SALPINGOOPHORECTOMY         Current Outpatient Medications   Medication Sig Dispense Refill    budesonide-formoterol (SYMBICORT) 160-4 5 mcg/act inhaler Inhale 2 puffs 2 (two) times a day Rinse mouth after use   1 Inhaler 5    diclofenac sodium (VOLTAREN) 1 % Apply 2 g topically 4 (four) times a day 100 g 0    diphenhydrAMINE (BENADRYL) 50 MG tablet Take 1 tablet (50 mg total) by mouth every 8 (eight) hours as needed for itching 30 tablet 0    ipratropium (ATROVENT) 0 02 % nebulizer solution Take 2 5 mL by nebulization 4 (four) times a day for 30 days 300 mL 0    levalbuterol (XOPENEX HFA) 45 mcg/act inhaler Inhale 2 puffs every 6 (six) hours as needed for shortness of breath 1 Inhaler 5    levalbuterol (XOPENEX) 1 25 mg/3 mL nebulizer solution inhale contents of 1 vial in nebulizer every 6 hours if needed 720 mL 1    levothyroxine 175 mcg tablet Take 1 tablet (175 mcg total) by mouth daily 90 tablet 3    methylPREDNISolone 4 MG tablet therapy pack Use as directed on package (Patient not taking: Reported on 1/16/2020) 21 tablet 0    pantoprazole (PROTONIX) 40 mg tablet Take 1 tablet (40 mg total) by mouth daily 90 tablet 3    potassium chloride (K-DUR) 10 mEq tablet take 1 tablet by mouth once daily 30 tablet 5    pravastatin (PRAVACHOL) 40 mg tablet Take 1 tablet (40 mg total) by mouth daily 90 tablet 3    sacubitril-valsartan (Entresto)  MG TABS Take 1 tablet by mouth 2 (two) times a day 180 tablet 3    sotalol (BETAPACE) 80 mg tablet Take 1 tablet (80 mg total) by mouth every 12 (twelve) hours 180 tablet 3    spironolactone (ALDACTONE) 25 mg tablet Take 0 5 tablets (12 5 mg total) by mouth daily (Patient not taking: Reported on 9/2/2020) 45 tablet 3    tiZANidine (ZANAFLEX) 2 mg tablet Take 1 tablet (2 mg total) by mouth every 8 (eight) hours as needed for muscle spasms 30 tablet 3    TORSEMIDE PO Take by mouth Patient went back on old dosage  e      triamcinolone (KENALOG) 0 5 % ointment apply to affected area twice a day (Patient not taking: Reported on 9/2/2020) 30 g 0    XARELTO 20 MG tablet take 1 tablet by mouth once daily 90 tablet 3     No current facility-administered medications for this visit  Allergies   Allergen Reactions    Omnipaque [Iohexol] Swelling    Penicillins Swelling    Iv Dye  [Iodinated Diagnostic Agents] Throat Swelling    Shellfish-Derived Products Hives       Review of Systems   Constitutional: Negative for chills and fever  Respiratory: Negative for shortness of breath and wheezing  Cardiovascular: Negative for chest pain and palpitations  Psychiatric/Behavioral: Positive for dysphoric mood  Negative for self-injury, sleep disturbance and suicidal ideas  The patient is nervous/anxious  There were no vitals filed for this visit  I spent 15 minutes directly with the patient during this visit    [address] [facility] acknowledges that she has consented to an online visit or consultation  She understands that the online visit is based solely on information provided by her, and that, in the absence of a face-to-face physical evaluation by the physician, the diagnosis she receives is both limited and provisional in terms of accuracy and completeness  This is not intended to replace a full medical face-to-face evaluation by the physician  Lindia Nissen understands and accepts these terms

## 2020-09-08 NOTE — TELEPHONE ENCOUNTER
----- Message from [facility] sent at 9/8/2020 10:14 AM EDT -----  Regarding: cor-tram crossed x 7 days  Hi Angelique,  Pts cor-tram crossed x 7 days,  Pt takes xarelto, sotalol, aldactone, k-dur, torsemide  EF: 20-25% (echo 2/20/18)  Pasha,  Earlene   NON-BILLABLE MERLIN TRANSMISSION: BATTERY VOLTAGE ADEQUATE (2 YRS)  AP: 2 9%  BP: 80% < 90% DDD/60)  ALL AVAILABLE LEAD PARAMETERS WITHIN NORMAL LIMITS  NO SIGNIFICANT HIGH RATE EPISODES  CORVUE IMPEDANCE THRESHOLD CROSSED  X 7 DAYS  PT TAKES XARELTO, SOTALOL, ALDACTONE, K-DUR, TORSEMIDE  EF: 20-25% (ECHO 2/20/18)  TASK TO HF TEAM   APPROPRIATELY FUNCTIONING BI-V ICD    [address]

## 2020-09-08 NOTE — TELEPHONE ENCOUNTER
----- Message from Blanco Michael MD sent at 9/8/2020 10:18 AM EDT -----  Please advise patient to take extra diuretic pill today and she need to take her diuretics as prescribed

## 2020-09-09 NOTE — TELEPHONE ENCOUNTER
Patient made aware of diuretics;[facility] but patient is seeing a Dr Cosme Orantes in [place]

## 2020-10-10 ENCOUNTER — HOSPITAL ENCOUNTER (OUTPATIENT)
Facility: HOSPITAL | Age: 65
Setting detail: OBSERVATION
Discharge: HOME | End: 2020-10-12
Attending: EMERGENCY MEDICINE | Admitting: EMERGENCY MEDICINE
Payer: COMMERCIAL

## 2020-10-10 ENCOUNTER — APPOINTMENT (EMERGENCY)
Dept: RADIOLOGY | Facility: HOSPITAL | Age: 65
End: 2020-10-10
Payer: COMMERCIAL

## 2020-10-10 DIAGNOSIS — R60.0 BILATERAL LOWER EXTREMITY EDEMA: ICD-10-CM

## 2020-10-10 DIAGNOSIS — R06.01 ORTHOPNEA: ICD-10-CM

## 2020-10-10 DIAGNOSIS — I50.9 ACUTE EXACERBATION OF CHF (CONGESTIVE HEART FAILURE) (HCC): Primary | ICD-10-CM

## 2020-10-10 PROBLEM — T78.40XA ALLERGIC DRUG REACTION: Status: RESOLVED | Noted: 2019-10-21 | Resolved: 2020-10-10

## 2020-10-10 PROBLEM — D72.829 LEUKOCYTOSIS: Status: RESOLVED | Noted: 2019-07-13 | Resolved: 2020-10-10

## 2020-10-10 LAB
ALBUMIN SERPL BCP-MCNC: 3.9 G/DL (ref 3.5–5)
ALP SERPL-CCNC: 78 U/L (ref 46–116)
ALT SERPL W P-5'-P-CCNC: 20 U/L (ref 12–78)
ANION GAP SERPL CALCULATED.3IONS-SCNC: 7 MMOL/L (ref 4–13)
AST SERPL W P-5'-P-CCNC: 16 U/L (ref 5–45)
BASOPHILS # BLD AUTO: 0.04 THOUSANDS/ΜL (ref 0–0.1)
BASOPHILS NFR BLD AUTO: 1 % (ref 0–1)
BILIRUB SERPL-MCNC: 0.4 MG/DL (ref 0.2–1)
BUN SERPL-MCNC: 15 MG/DL (ref 5–25)
CALCIUM SERPL-MCNC: 9.3 MG/DL (ref 8.3–10.1)
CHLORIDE SERPL-SCNC: 105 MMOL/L (ref 100–108)
CHOLEST SERPL-MCNC: 184 MG/DL (ref 50–200)
CO2 SERPL-SCNC: 27 MMOL/L (ref 21–32)
CREAT SERPL-MCNC: 1.24 MG/DL (ref 0.6–1.3)
EOSINOPHIL # BLD AUTO: 0.31 THOUSAND/ΜL (ref 0–0.61)
EOSINOPHIL NFR BLD AUTO: 4 % (ref 0–6)
ERYTHROCYTE [DISTWIDTH] IN BLOOD BY AUTOMATED COUNT: 14.6 % (ref 11.6–15.1)
GFR SERPL CREATININE-BSD FRML MDRD: 53 ML/MIN/1.73SQ M
GLUCOSE SERPL-MCNC: 129 MG/DL (ref 65–140)
HCT VFR BLD AUTO: 41.2 % (ref 34.8–46.1)
HDLC SERPL-MCNC: 45 MG/DL
HGB BLD-MCNC: 13 G/DL (ref 11.5–15.4)
IMM GRANULOCYTES # BLD AUTO: 0.03 THOUSAND/UL (ref 0–0.2)
IMM GRANULOCYTES NFR BLD AUTO: 0 % (ref 0–2)
LDLC SERPL CALC-MCNC: 118 MG/DL (ref 0–100)
LYMPHOCYTES # BLD AUTO: 2.78 THOUSANDS/ΜL (ref 0.6–4.47)
LYMPHOCYTES NFR BLD AUTO: 33 % (ref 14–44)
MCH RBC QN AUTO: 27.7 PG (ref 26.8–34.3)
MCHC RBC AUTO-ENTMCNC: 31.6 G/DL (ref 31.4–37.4)
MCV RBC AUTO: 88 FL (ref 82–98)
MONOCYTES # BLD AUTO: 0.58 THOUSAND/ΜL (ref 0.17–1.22)
MONOCYTES NFR BLD AUTO: 7 % (ref 4–12)
NEUTROPHILS # BLD AUTO: 4.77 THOUSANDS/ΜL (ref 1.85–7.62)
NEUTS SEG NFR BLD AUTO: 55 % (ref 43–75)
NRBC BLD AUTO-RTO: 0 /100 WBCS
NT-PROBNP SERPL-MCNC: 1887 PG/ML
PLATELET # BLD AUTO: 303 THOUSANDS/UL (ref 149–390)
PLATELET # BLD AUTO: 346 THOUSANDS/UL (ref 149–390)
PMV BLD AUTO: 10.2 FL (ref 8.9–12.7)
PMV BLD AUTO: 10.4 FL (ref 8.9–12.7)
POTASSIUM SERPL-SCNC: 4.3 MMOL/L (ref 3.5–5.3)
PROT SERPL-MCNC: 7.6 G/DL (ref 6.4–8.2)
RBC # BLD AUTO: 4.69 MILLION/UL (ref 3.81–5.12)
SODIUM SERPL-SCNC: 139 MMOL/L (ref 136–145)
TRIGL SERPL-MCNC: 106 MG/DL
TROPONIN I SERPL-MCNC: <0.02 NG/ML
TSH SERPL DL<=0.05 MIU/L-ACNC: 1.37 UIU/ML (ref 0.36–3.74)
WBC # BLD AUTO: 8.51 THOUSAND/UL (ref 4.31–10.16)

## 2020-10-10 PROCEDURE — 36415 COLL VENOUS BLD VENIPUNCTURE: CPT

## 2020-10-10 PROCEDURE — 84443 ASSAY THYROID STIM HORMONE: CPT | Performed by: STUDENT IN AN ORGANIZED HEALTH CARE EDUCATION/TRAINING PROGRAM

## 2020-10-10 PROCEDURE — 80053 COMPREHEN METABOLIC PANEL: CPT

## 2020-10-10 PROCEDURE — 71045 X-RAY EXAM CHEST 1 VIEW: CPT

## 2020-10-10 PROCEDURE — 85049 AUTOMATED PLATELET COUNT: CPT | Performed by: STUDENT IN AN ORGANIZED HEALTH CARE EDUCATION/TRAINING PROGRAM

## 2020-10-10 PROCEDURE — 93005 ELECTROCARDIOGRAM TRACING: CPT

## 2020-10-10 PROCEDURE — 85025 COMPLETE CBC W/AUTO DIFF WBC: CPT

## 2020-10-10 PROCEDURE — 99285 EMERGENCY DEPT VISIT HI MDM: CPT | Performed by: EMERGENCY MEDICINE

## 2020-10-10 PROCEDURE — 96374 THER/PROPH/DIAG INJ IV PUSH: CPT

## 2020-10-10 PROCEDURE — 99285 EMERGENCY DEPT VISIT HI MDM: CPT

## 2020-10-10 PROCEDURE — 80061 LIPID PANEL: CPT | Performed by: STUDENT IN AN ORGANIZED HEALTH CARE EDUCATION/TRAINING PROGRAM

## 2020-10-10 PROCEDURE — 84484 ASSAY OF TROPONIN QUANT: CPT

## 2020-10-10 PROCEDURE — 83880 ASSAY OF NATRIURETIC PEPTIDE: CPT | Performed by: EMERGENCY MEDICINE

## 2020-10-10 RX ORDER — TIZANIDINE 2 MG/1
2 TABLET ORAL EVERY 8 HOURS PRN
Status: DISCONTINUED | OUTPATIENT
Start: 2020-10-10 | End: 2020-10-12 | Stop reason: HOSPADM

## 2020-10-10 RX ORDER — PANTOPRAZOLE SODIUM 40 MG/1
40 TABLET, DELAYED RELEASE ORAL DAILY
Status: DISCONTINUED | OUTPATIENT
Start: 2020-10-11 | End: 2020-10-12 | Stop reason: HOSPADM

## 2020-10-10 RX ORDER — FUROSEMIDE 10 MG/ML
40 INJECTION INTRAMUSCULAR; INTRAVENOUS
Status: DISCONTINUED | OUTPATIENT
Start: 2020-10-11 | End: 2020-10-11

## 2020-10-10 RX ORDER — BUDESONIDE AND FORMOTEROL FUMARATE DIHYDRATE 160; 4.5 UG/1; UG/1
2 AEROSOL RESPIRATORY (INHALATION) 2 TIMES DAILY
Status: DISCONTINUED | OUTPATIENT
Start: 2020-10-10 | End: 2020-10-12 | Stop reason: HOSPADM

## 2020-10-10 RX ORDER — SOTALOL HYDROCHLORIDE 80 MG/1
80 TABLET ORAL EVERY 12 HOURS
Status: DISCONTINUED | OUTPATIENT
Start: 2020-10-10 | End: 2020-10-12 | Stop reason: HOSPADM

## 2020-10-10 RX ORDER — POTASSIUM CHLORIDE 750 MG/1
10 TABLET, EXTENDED RELEASE ORAL DAILY
Status: DISCONTINUED | OUTPATIENT
Start: 2020-10-11 | End: 2020-10-12 | Stop reason: HOSPADM

## 2020-10-10 RX ORDER — TORSEMIDE 20 MG/1
20 TABLET ORAL DAILY
Status: DISCONTINUED | OUTPATIENT
Start: 2020-10-11 | End: 2020-10-10

## 2020-10-10 RX ORDER — TORSEMIDE 20 MG/1
20 TABLET ORAL ONCE
Status: DISCONTINUED | OUTPATIENT
Start: 2020-10-10 | End: 2020-10-10

## 2020-10-10 RX ORDER — SPIRONOLACTONE 25 MG/1
12.5 TABLET ORAL DAILY
Status: DISCONTINUED | OUTPATIENT
Start: 2020-10-11 | End: 2020-10-10

## 2020-10-10 RX ORDER — PRAVASTATIN SODIUM 40 MG
40 TABLET ORAL DAILY
Status: DISCONTINUED | OUTPATIENT
Start: 2020-10-11 | End: 2020-10-12 | Stop reason: HOSPADM

## 2020-10-10 RX ORDER — FUROSEMIDE 10 MG/ML
40 INJECTION INTRAMUSCULAR; INTRAVENOUS ONCE
Status: COMPLETED | OUTPATIENT
Start: 2020-10-10 | End: 2020-10-10

## 2020-10-10 RX ADMIN — FUROSEMIDE 40 MG: 10 INJECTION, SOLUTION INTRAMUSCULAR; INTRAVENOUS at 15:59

## 2020-10-10 RX ADMIN — DICLOFENAC SODIUM 2 G: 10 GEL TOPICAL at 21:01

## 2020-10-10 RX ADMIN — SACUBITRIL AND VALSARTAN 1 TABLET: 97; 103 TABLET, FILM COATED ORAL at 22:08

## 2020-10-10 RX ADMIN — BUDESONIDE AND FORMOTEROL FUMARATE DIHYDRATE 2 PUFF: 160; 4.5 AEROSOL RESPIRATORY (INHALATION) at 20:57

## 2020-10-10 RX ADMIN — TORSEMIDE 20 MG: 20 TABLET ORAL at 22:06

## 2020-10-11 LAB
ALBUMIN SERPL BCP-MCNC: 3.9 G/DL (ref 3.5–5)
ALP SERPL-CCNC: 73 U/L (ref 46–116)
ALT SERPL W P-5'-P-CCNC: 19 U/L (ref 12–78)
ANION GAP SERPL CALCULATED.3IONS-SCNC: 8 MMOL/L (ref 4–13)
AST SERPL W P-5'-P-CCNC: 14 U/L (ref 5–45)
BASOPHILS # BLD AUTO: 0.04 THOUSANDS/ΜL (ref 0–0.1)
BASOPHILS NFR BLD AUTO: 1 % (ref 0–1)
BILIRUB SERPL-MCNC: 0.5 MG/DL (ref 0.2–1)
BUN SERPL-MCNC: 15 MG/DL (ref 5–25)
CALCIUM SERPL-MCNC: 9 MG/DL (ref 8.3–10.1)
CHLORIDE SERPL-SCNC: 102 MMOL/L (ref 100–108)
CO2 SERPL-SCNC: 29 MMOL/L (ref 21–32)
CREAT SERPL-MCNC: 1.18 MG/DL (ref 0.6–1.3)
EOSINOPHIL # BLD AUTO: 0.28 THOUSAND/ΜL (ref 0–0.61)
EOSINOPHIL NFR BLD AUTO: 3 % (ref 0–6)
ERYTHROCYTE [DISTWIDTH] IN BLOOD BY AUTOMATED COUNT: 14.6 % (ref 11.6–15.1)
GFR SERPL CREATININE-BSD FRML MDRD: 56 ML/MIN/1.73SQ M
GLUCOSE SERPL-MCNC: 109 MG/DL (ref 65–140)
HCT VFR BLD AUTO: 39.7 % (ref 34.8–46.1)
HGB BLD-MCNC: 12.4 G/DL (ref 11.5–15.4)
IMM GRANULOCYTES # BLD AUTO: 0.09 THOUSAND/UL (ref 0–0.2)
IMM GRANULOCYTES NFR BLD AUTO: 1 % (ref 0–2)
LYMPHOCYTES # BLD AUTO: 3.13 THOUSANDS/ΜL (ref 0.6–4.47)
LYMPHOCYTES NFR BLD AUTO: 37 % (ref 14–44)
MAGNESIUM SERPL-MCNC: 2.1 MG/DL (ref 1.6–2.6)
MCH RBC QN AUTO: 27.1 PG (ref 26.8–34.3)
MCHC RBC AUTO-ENTMCNC: 31.2 G/DL (ref 31.4–37.4)
MCV RBC AUTO: 87 FL (ref 82–98)
MONOCYTES # BLD AUTO: 0.63 THOUSAND/ΜL (ref 0.17–1.22)
MONOCYTES NFR BLD AUTO: 8 % (ref 4–12)
NEUTROPHILS # BLD AUTO: 4.26 THOUSANDS/ΜL (ref 1.85–7.62)
NEUTS SEG NFR BLD AUTO: 50 % (ref 43–75)
NRBC BLD AUTO-RTO: 0 /100 WBCS
PHOSPHATE SERPL-MCNC: 4 MG/DL (ref 2.3–4.1)
PLATELET # BLD AUTO: 315 THOUSANDS/UL (ref 149–390)
PMV BLD AUTO: 10.5 FL (ref 8.9–12.7)
POTASSIUM SERPL-SCNC: 3.6 MMOL/L (ref 3.5–5.3)
PROT SERPL-MCNC: 7.6 G/DL (ref 6.4–8.2)
RBC # BLD AUTO: 4.57 MILLION/UL (ref 3.81–5.12)
SODIUM SERPL-SCNC: 139 MMOL/L (ref 136–145)
WBC # BLD AUTO: 8.43 THOUSAND/UL (ref 4.31–10.16)

## 2020-10-11 PROCEDURE — NC001 PR NO CHARGE: Performed by: FAMILY MEDICINE

## 2020-10-11 PROCEDURE — 99219 PR INITIAL OBSERVATION CARE/DAY 50 MINUTES: CPT | Performed by: FAMILY MEDICINE

## 2020-10-11 PROCEDURE — 84100 ASSAY OF PHOSPHORUS: CPT | Performed by: STUDENT IN AN ORGANIZED HEALTH CARE EDUCATION/TRAINING PROGRAM

## 2020-10-11 PROCEDURE — 85025 COMPLETE CBC W/AUTO DIFF WBC: CPT | Performed by: STUDENT IN AN ORGANIZED HEALTH CARE EDUCATION/TRAINING PROGRAM

## 2020-10-11 PROCEDURE — 80053 COMPREHEN METABOLIC PANEL: CPT | Performed by: STUDENT IN AN ORGANIZED HEALTH CARE EDUCATION/TRAINING PROGRAM

## 2020-10-11 PROCEDURE — 83735 ASSAY OF MAGNESIUM: CPT | Performed by: STUDENT IN AN ORGANIZED HEALTH CARE EDUCATION/TRAINING PROGRAM

## 2020-10-11 PROCEDURE — 99244 OFF/OP CNSLTJ NEW/EST MOD 40: CPT | Performed by: INTERNAL MEDICINE

## 2020-10-11 PROCEDURE — 94760 N-INVAS EAR/PLS OXIMETRY 1: CPT

## 2020-10-11 RX ORDER — ALBUTEROL SULFATE 2.5 MG/3ML
2.5 SOLUTION RESPIRATORY (INHALATION) EVERY 4 HOURS PRN
Status: DISCONTINUED | OUTPATIENT
Start: 2020-10-11 | End: 2020-10-11

## 2020-10-11 RX ORDER — FUROSEMIDE 10 MG/ML
20 INJECTION INTRAMUSCULAR; INTRAVENOUS
Status: DISCONTINUED | OUTPATIENT
Start: 2020-10-11 | End: 2020-10-12

## 2020-10-11 RX ORDER — LEVALBUTEROL 1.25 MG/.5ML
1.25 SOLUTION, CONCENTRATE RESPIRATORY (INHALATION) EVERY 8 HOURS PRN
Status: DISCONTINUED | OUTPATIENT
Start: 2020-10-11 | End: 2020-10-12 | Stop reason: HOSPADM

## 2020-10-11 RX ORDER — HYDROXYZINE HYDROCHLORIDE 25 MG/1
25 TABLET, FILM COATED ORAL EVERY 6 HOURS PRN
Status: DISCONTINUED | OUTPATIENT
Start: 2020-10-11 | End: 2020-10-12 | Stop reason: HOSPADM

## 2020-10-11 RX ORDER — SODIUM CHLORIDE FOR INHALATION 0.9 %
3 VIAL, NEBULIZER (ML) INHALATION EVERY 8 HOURS PRN
Status: DISCONTINUED | OUTPATIENT
Start: 2020-10-11 | End: 2020-10-12 | Stop reason: HOSPADM

## 2020-10-11 RX ADMIN — TIZANIDINE 2 MG: 2 TABLET ORAL at 00:34

## 2020-10-11 RX ADMIN — FUROSEMIDE 40 MG: 10 INJECTION, SOLUTION INTRAMUSCULAR; INTRAVENOUS at 09:31

## 2020-10-11 RX ADMIN — RIVAROXABAN 20 MG: 10 TABLET, FILM COATED ORAL at 09:32

## 2020-10-11 RX ADMIN — DICLOFENAC SODIUM 2 G: 10 GEL TOPICAL at 09:30

## 2020-10-11 RX ADMIN — FUROSEMIDE 20 MG: 10 INJECTION, SOLUTION INTRAMUSCULAR; INTRAVENOUS at 16:58

## 2020-10-11 RX ADMIN — PRAVASTATIN SODIUM 40 MG: 40 TABLET ORAL at 09:32

## 2020-10-11 RX ADMIN — DICLOFENAC SODIUM 2 G: 10 GEL TOPICAL at 12:45

## 2020-10-11 RX ADMIN — HYDROXYZINE HYDROCHLORIDE 25 MG: 25 TABLET, FILM COATED ORAL at 10:26

## 2020-10-11 RX ADMIN — SOTALOL HYDROCHLORIDE 80 MG: 80 TABLET ORAL at 09:32

## 2020-10-11 RX ADMIN — BUDESONIDE AND FORMOTEROL FUMARATE DIHYDRATE 2 PUFF: 160; 4.5 AEROSOL RESPIRATORY (INHALATION) at 17:59

## 2020-10-11 RX ADMIN — PANTOPRAZOLE SODIUM 40 MG: 40 TABLET, DELAYED RELEASE ORAL at 06:34

## 2020-10-11 RX ADMIN — SACUBITRIL AND VALSARTAN 1 TABLET: 97; 103 TABLET, FILM COATED ORAL at 09:31

## 2020-10-11 RX ADMIN — DICLOFENAC SODIUM 2 G: 10 GEL TOPICAL at 17:59

## 2020-10-11 RX ADMIN — LEVOTHYROXINE SODIUM 175 MCG: 150 TABLET ORAL at 06:33

## 2020-10-11 RX ADMIN — POTASSIUM CHLORIDE 10 MEQ: 750 TABLET, EXTENDED RELEASE ORAL at 09:32

## 2020-10-11 RX ADMIN — BUDESONIDE AND FORMOTEROL FUMARATE DIHYDRATE 2 PUFF: 160; 4.5 AEROSOL RESPIRATORY (INHALATION) at 09:32

## 2020-10-12 ENCOUNTER — APPOINTMENT (OUTPATIENT)
Dept: NON INVASIVE DIAGNOSTICS | Facility: HOSPITAL | Age: 65
End: 2020-10-12
Payer: COMMERCIAL

## 2020-10-12 VITALS
TEMPERATURE: 99 F | SYSTOLIC BLOOD PRESSURE: 95 MMHG | BODY MASS INDEX: 42.3 KG/M2 | WEIGHT: 263.23 LBS | OXYGEN SATURATION: 96 % | HEART RATE: 89 BPM | RESPIRATION RATE: 18 BRPM | HEIGHT: 66 IN | DIASTOLIC BLOOD PRESSURE: 57 MMHG

## 2020-10-12 LAB
ANION GAP SERPL CALCULATED.3IONS-SCNC: 10 MMOL/L (ref 4–13)
BASOPHILS # BLD AUTO: 0.05 THOUSANDS/ΜL (ref 0–0.1)
BASOPHILS NFR BLD AUTO: 1 % (ref 0–1)
BUN SERPL-MCNC: 22 MG/DL (ref 5–25)
CALCIUM SERPL-MCNC: 9.1 MG/DL (ref 8.3–10.1)
CHLORIDE SERPL-SCNC: 100 MMOL/L (ref 100–108)
CO2 SERPL-SCNC: 28 MMOL/L (ref 21–32)
CREAT SERPL-MCNC: 1.24 MG/DL (ref 0.6–1.3)
EOSINOPHIL # BLD AUTO: 0.36 THOUSAND/ΜL (ref 0–0.61)
EOSINOPHIL NFR BLD AUTO: 4 % (ref 0–6)
ERYTHROCYTE [DISTWIDTH] IN BLOOD BY AUTOMATED COUNT: 14.8 % (ref 11.6–15.1)
GFR SERPL CREATININE-BSD FRML MDRD: 53 ML/MIN/1.73SQ M
GLUCOSE SERPL-MCNC: 105 MG/DL (ref 65–140)
HCT VFR BLD AUTO: 39.8 % (ref 34.8–46.1)
HGB BLD-MCNC: 12.4 G/DL (ref 11.5–15.4)
IMM GRANULOCYTES # BLD AUTO: 0.03 THOUSAND/UL (ref 0–0.2)
IMM GRANULOCYTES NFR BLD AUTO: 0 % (ref 0–2)
LYMPHOCYTES # BLD AUTO: 3.26 THOUSANDS/ΜL (ref 0.6–4.47)
LYMPHOCYTES NFR BLD AUTO: 39 % (ref 14–44)
MAGNESIUM SERPL-MCNC: 2.3 MG/DL (ref 1.6–2.6)
MCH RBC QN AUTO: 27.3 PG (ref 26.8–34.3)
MCHC RBC AUTO-ENTMCNC: 31.2 G/DL (ref 31.4–37.4)
MCV RBC AUTO: 88 FL (ref 82–98)
MONOCYTES # BLD AUTO: 0.75 THOUSAND/ΜL (ref 0.17–1.22)
MONOCYTES NFR BLD AUTO: 9 % (ref 4–12)
NEUTROPHILS # BLD AUTO: 3.96 THOUSANDS/ΜL (ref 1.85–7.62)
NEUTS SEG NFR BLD AUTO: 47 % (ref 43–75)
NRBC BLD AUTO-RTO: 0 /100 WBCS
PHOSPHATE SERPL-MCNC: 4.8 MG/DL (ref 2.3–4.1)
PLATELET # BLD AUTO: 305 THOUSANDS/UL (ref 149–390)
PMV BLD AUTO: 10.7 FL (ref 8.9–12.7)
POTASSIUM SERPL-SCNC: 3.5 MMOL/L (ref 3.5–5.3)
RBC # BLD AUTO: 4.55 MILLION/UL (ref 3.81–5.12)
SODIUM SERPL-SCNC: 138 MMOL/L (ref 136–145)
WBC # BLD AUTO: 8.41 THOUSAND/UL (ref 4.31–10.16)

## 2020-10-12 PROCEDURE — 83735 ASSAY OF MAGNESIUM: CPT | Performed by: STUDENT IN AN ORGANIZED HEALTH CARE EDUCATION/TRAINING PROGRAM

## 2020-10-12 PROCEDURE — 93306 TTE W/DOPPLER COMPLETE: CPT | Performed by: INTERNAL MEDICINE

## 2020-10-12 PROCEDURE — 80048 BASIC METABOLIC PNL TOTAL CA: CPT | Performed by: STUDENT IN AN ORGANIZED HEALTH CARE EDUCATION/TRAINING PROGRAM

## 2020-10-12 PROCEDURE — 99214 OFFICE O/P EST MOD 30 MIN: CPT | Performed by: INTERNAL MEDICINE

## 2020-10-12 PROCEDURE — NC001 PR NO CHARGE: Performed by: FAMILY MEDICINE

## 2020-10-12 PROCEDURE — 85025 COMPLETE CBC W/AUTO DIFF WBC: CPT | Performed by: STUDENT IN AN ORGANIZED HEALTH CARE EDUCATION/TRAINING PROGRAM

## 2020-10-12 PROCEDURE — 94640 AIRWAY INHALATION TREATMENT: CPT

## 2020-10-12 PROCEDURE — 94760 N-INVAS EAR/PLS OXIMETRY 1: CPT

## 2020-10-12 PROCEDURE — 99217 PR OBSERVATION CARE DISCHARGE MANAGEMENT: CPT | Performed by: FAMILY MEDICINE

## 2020-10-12 PROCEDURE — 84100 ASSAY OF PHOSPHORUS: CPT | Performed by: STUDENT IN AN ORGANIZED HEALTH CARE EDUCATION/TRAINING PROGRAM

## 2020-10-12 PROCEDURE — 93306 TTE W/DOPPLER COMPLETE: CPT

## 2020-10-12 RX ORDER — TORSEMIDE 20 MG/1
40 TABLET ORAL DAILY
Qty: 60 TABLET | Refills: 1 | Status: SHIPPED | OUTPATIENT
Start: 2020-10-12 | End: 2020-10-22 | Stop reason: ALTCHOICE

## 2020-10-12 RX ORDER — POTASSIUM CHLORIDE 20 MEQ/1
20 TABLET, EXTENDED RELEASE ORAL ONCE
Status: COMPLETED | OUTPATIENT
Start: 2020-10-12 | End: 2020-10-12

## 2020-10-12 RX ORDER — TORSEMIDE 20 MG/1
40 TABLET ORAL DAILY
Status: DISCONTINUED | OUTPATIENT
Start: 2020-10-12 | End: 2020-10-12 | Stop reason: HOSPADM

## 2020-10-12 RX ADMIN — DICLOFENAC SODIUM 2 G: 10 GEL TOPICAL at 12:53

## 2020-10-12 RX ADMIN — PANTOPRAZOLE SODIUM 40 MG: 40 TABLET, DELAYED RELEASE ORAL at 05:17

## 2020-10-12 RX ADMIN — RIVAROXABAN 20 MG: 10 TABLET, FILM COATED ORAL at 09:32

## 2020-10-12 RX ADMIN — BUDESONIDE AND FORMOTEROL FUMARATE DIHYDRATE 2 PUFF: 160; 4.5 AEROSOL RESPIRATORY (INHALATION) at 09:34

## 2020-10-12 RX ADMIN — POTASSIUM CHLORIDE 20 MEQ: 1500 TABLET, EXTENDED RELEASE ORAL at 09:33

## 2020-10-12 RX ADMIN — HYDROXYZINE HYDROCHLORIDE 25 MG: 25 TABLET, FILM COATED ORAL at 14:49

## 2020-10-12 RX ADMIN — LEVOTHYROXINE SODIUM 175 MCG: 150 TABLET ORAL at 05:16

## 2020-10-12 RX ADMIN — ISODIUM CHLORIDE 3 ML: 0.03 SOLUTION RESPIRATORY (INHALATION) at 07:31

## 2020-10-12 RX ADMIN — PRAVASTATIN SODIUM 40 MG: 40 TABLET ORAL at 09:33

## 2020-10-12 RX ADMIN — LEVALBUTEROL HYDROCHLORIDE 1.25 MG: 1.25 SOLUTION, CONCENTRATE RESPIRATORY (INHALATION) at 07:31

## 2020-10-12 RX ADMIN — POTASSIUM CHLORIDE 10 MEQ: 750 TABLET, EXTENDED RELEASE ORAL at 09:33

## 2020-10-12 RX ADMIN — TORSEMIDE 40 MG: 20 TABLET ORAL at 14:49

## 2020-10-12 RX ADMIN — DICLOFENAC SODIUM 2 G: 10 GEL TOPICAL at 09:31

## 2020-10-13 LAB
ATRIAL RATE: 93 BPM
P AXIS: 41 DEGREES
PR INTERVAL: 152 MS
QRS AXIS: -76 DEGREES
QRSD INTERVAL: 154 MS
QT INTERVAL: 460 MS
QTC INTERVAL: 571 MS
T WAVE AXIS: 89 DEGREES
VENTRICULAR RATE: 93 BPM

## 2020-10-13 PROCEDURE — 93010 ELECTROCARDIOGRAM REPORT: CPT | Performed by: INTERNAL MEDICINE

## 2020-10-14 ENCOUNTER — TELEPHONE (OUTPATIENT)
Dept: CARDIOLOGY CLINIC | Facility: CLINIC | Age: 65
End: 2020-10-14

## 2020-10-15 DIAGNOSIS — I50.42 CHRONIC COMBINED SYSTOLIC AND DIASTOLIC HEART FAILURE, NYHA CLASS 2 (HCC): ICD-10-CM

## 2020-10-16 RX ORDER — SPIRONOLACTONE 25 MG/1
TABLET ORAL
Qty: 45 TABLET | Refills: 3 | Status: SHIPPED | OUTPATIENT
Start: 2020-10-16 | End: 2021-07-15

## 2020-10-20 ENCOUNTER — TELEPHONE (OUTPATIENT)
Dept: CARDIOLOGY CLINIC | Facility: CLINIC | Age: 65
End: 2020-10-20

## 2020-10-21 ENCOUNTER — TELEPHONE (OUTPATIENT)
Dept: FAMILY MEDICINE CLINIC | Facility: CLINIC | Age: 65
End: 2020-10-21

## 2020-10-22 DIAGNOSIS — I50.9 ACUTE EXACERBATION OF CHF (CONGESTIVE HEART FAILURE) (HCC): ICD-10-CM

## 2020-10-22 DIAGNOSIS — I50.22 CHRONIC SYSTOLIC CONGESTIVE HEART FAILURE, NYHA CLASS 2 (HCC): Primary | ICD-10-CM

## 2020-10-22 RX ORDER — FUROSEMIDE 80 MG
80 TABLET ORAL DAILY
Qty: 30 TABLET | Refills: 5 | Status: SHIPPED | OUTPATIENT
Start: 2020-10-22 | End: 2020-11-12

## 2020-10-23 ENCOUNTER — TELEPHONE (OUTPATIENT)
Dept: INPATIENT UNIT | Facility: HOSPITAL | Age: 65
End: 2020-10-23

## 2020-10-28 LAB
BUN SERPL-MCNC: 17 MG/DL (ref 8–27)
BUN/CREAT SERPL: 14 (ref 12–28)
CALCIUM SERPL-MCNC: 9.8 MG/DL (ref 8.7–10.3)
CHLORIDE SERPL-SCNC: 101 MMOL/L (ref 96–106)
CO2 SERPL-SCNC: 23 MMOL/L (ref 20–29)
CREAT SERPL-MCNC: 1.22 MG/DL (ref 0.57–1)
GLUCOSE SERPL-MCNC: 105 MG/DL (ref 65–99)
POTASSIUM SERPL-SCNC: 4.2 MMOL/L (ref 3.5–5.2)
SL AMB EGFR AFRICAN AMERICAN: 54 ML/MIN/1.73
SL AMB EGFR NON AFRICAN AMERICAN: 47 ML/MIN/1.73
SODIUM SERPL-SCNC: 142 MMOL/L (ref 134–144)

## 2020-10-31 DIAGNOSIS — I48.0 PAF (PAROXYSMAL ATRIAL FIBRILLATION) (HCC): ICD-10-CM

## 2020-10-31 RX ORDER — SOTALOL HYDROCHLORIDE 80 MG/1
TABLET ORAL
Qty: 180 TABLET | Refills: 3 | Status: SHIPPED | OUTPATIENT
Start: 2020-10-31 | End: 2021-10-24

## 2020-11-03 ENCOUNTER — TELEPHONE (OUTPATIENT)
Dept: CARDIOLOGY CLINIC | Facility: CLINIC | Age: 65
End: 2020-11-03

## 2020-11-06 DIAGNOSIS — I50.42 CHRONIC COMBINED SYSTOLIC AND DIASTOLIC HEART FAILURE, NYHA CLASS 2 (HCC): ICD-10-CM

## 2020-11-06 RX ORDER — TORSEMIDE 10 MG/1
TABLET ORAL
Qty: 60 TABLET | Refills: 0 | OUTPATIENT
Start: 2020-11-06

## 2020-11-11 ENCOUNTER — TELEPHONE (OUTPATIENT)
Dept: GASTROENTEROLOGY | Facility: CLINIC | Age: 65
End: 2020-11-11

## 2020-11-12 ENCOUNTER — OFFICE VISIT (OUTPATIENT)
Dept: CARDIOLOGY CLINIC | Facility: CLINIC | Age: 65
End: 2020-11-12
Payer: COMMERCIAL

## 2020-11-12 VITALS
TEMPERATURE: 98.1 F | WEIGHT: 257.6 LBS | SYSTOLIC BLOOD PRESSURE: 126 MMHG | HEART RATE: 78 BPM | BODY MASS INDEX: 41.4 KG/M2 | HEIGHT: 66 IN | DIASTOLIC BLOOD PRESSURE: 86 MMHG

## 2020-11-12 DIAGNOSIS — E03.4 HYPOTHYROIDISM DUE TO ACQUIRED ATROPHY OF THYROID: ICD-10-CM

## 2020-11-12 DIAGNOSIS — I48.0 PAROXYSMAL ATRIAL FIBRILLATION (HCC): ICD-10-CM

## 2020-11-12 DIAGNOSIS — E78.5 DYSLIPIDEMIA: ICD-10-CM

## 2020-11-12 DIAGNOSIS — I42.0 DILATED CARDIOMYOPATHY (HCC): ICD-10-CM

## 2020-11-12 DIAGNOSIS — R06.00 DYSPNEA ON EXERTION: ICD-10-CM

## 2020-11-12 DIAGNOSIS — I50.42 CHRONIC COMBINED SYSTOLIC AND DIASTOLIC CONGESTIVE HEART FAILURE (HCC): ICD-10-CM

## 2020-11-12 DIAGNOSIS — I10 ESSENTIAL HYPERTENSION: ICD-10-CM

## 2020-11-12 PROCEDURE — 99214 OFFICE O/P EST MOD 30 MIN: CPT | Performed by: INTERNAL MEDICINE

## 2020-11-12 RX ORDER — TORSEMIDE 20 MG/1
40 TABLET ORAL DAILY
Qty: 180 TABLET | Refills: 1 | Status: SHIPPED | OUTPATIENT
Start: 2020-11-12 | End: 2021-05-06

## 2020-12-02 ENCOUNTER — TELEPHONE (OUTPATIENT)
Dept: CARDIOLOGY CLINIC | Facility: CLINIC | Age: 65
End: 2020-12-02

## 2020-12-03 ENCOUNTER — TELEPHONE (OUTPATIENT)
Dept: CARDIOLOGY CLINIC | Facility: CLINIC | Age: 65
End: 2020-12-03

## 2020-12-03 ENCOUNTER — REMOTE DEVICE CLINIC VISIT (OUTPATIENT)
Dept: CARDIOLOGY CLINIC | Facility: CLINIC | Age: 65
End: 2020-12-03
Payer: COMMERCIAL

## 2020-12-03 DIAGNOSIS — Z95.810 AICD (AUTOMATIC CARDIOVERTER/DEFIBRILLATOR) PRESENT: Primary | ICD-10-CM

## 2020-12-03 PROCEDURE — 93297 REM INTERROG DEV EVAL ICPMS: CPT | Performed by: INTERNAL MEDICINE

## 2020-12-03 PROCEDURE — G2066 INTER DEVC REMOTE 30D: HCPCS | Performed by: INTERNAL MEDICINE

## 2020-12-04 ENCOUNTER — TELEPHONE (OUTPATIENT)
Dept: CARDIOLOGY CLINIC | Facility: CLINIC | Age: 65
End: 2020-12-04

## 2020-12-22 ENCOUNTER — TELEPHONE (OUTPATIENT)
Dept: CARDIOLOGY CLINIC | Facility: CLINIC | Age: 65
End: 2020-12-22

## 2021-01-07 ENCOUNTER — TELEPHONE (OUTPATIENT)
Dept: CARDIOLOGY CLINIC | Facility: CLINIC | Age: 66
End: 2021-01-07

## 2021-01-07 ENCOUNTER — REMOTE DEVICE CLINIC VISIT (OUTPATIENT)
Dept: CARDIOLOGY CLINIC | Facility: CLINIC | Age: 66
End: 2021-01-07
Payer: COMMERCIAL

## 2021-01-07 DIAGNOSIS — Z95.810 PRESENCE OF AUTOMATIC CARDIOVERTER/DEFIBRILLATOR (AICD): Primary | ICD-10-CM

## 2021-01-07 PROCEDURE — 93295 DEV INTERROG REMOTE 1/2/MLT: CPT | Performed by: INTERNAL MEDICINE

## 2021-01-07 PROCEDURE — 93296 REM INTERROG EVL PM/IDS: CPT | Performed by: INTERNAL MEDICINE

## 2021-01-07 NOTE — TELEPHONE ENCOUNTER
----- Message from Kirsten Morales MD sent at 1/7/2021  8:51 AM EST -----  Patient's device was interrogated in our office clinic  It shows device function is normal      Please call patient

## 2021-01-07 NOTE — PROGRESS NOTES
Results for orders placed or performed in visit on 01/07/21   Cardiac EP device report    Narrative    SJM SINGLE ICDACTIVE SYSTEM IS MRI CONDITIONAL  MERLIN TRANSMISSION: BATTERY VOLTAGE ADEQUATE  (1 6 YRS)  AP 3% BVP 83% ( <90%)  ALL AVAILABLE LEAD PARAMETERS WITHIN NORMAL LIMITS  NO SIGNIFICANT HIGH RATE EPISODES  CORVUE IMPEDANCE MONITORING WITHIN NORMAL LIMITS  NORMAL DEVICE FUNCTION  ---WALLACE

## 2021-01-08 ENCOUNTER — TELEPHONE (OUTPATIENT)
Dept: CARDIOLOGY CLINIC | Facility: CLINIC | Age: 66
End: 2021-01-08

## 2021-01-08 NOTE — TELEPHONE ENCOUNTER
----- Message from Milka Eugene MD sent at 1/7/2021  8:54 AM EST -----  Patient labs are acceptable  Continue same medications  Patient is advised to follow up  appointment regularly  Please call patient about the  about results

## 2021-02-17 DIAGNOSIS — M62.830 BACK MUSCLE SPASM: ICD-10-CM

## 2021-02-18 RX ORDER — TIZANIDINE 2 MG/1
TABLET ORAL
Qty: 30 TABLET | Refills: 3 | OUTPATIENT
Start: 2021-02-18

## 2021-02-22 DIAGNOSIS — M62.830 BACK MUSCLE SPASM: ICD-10-CM

## 2021-02-22 RX ORDER — TIZANIDINE 2 MG/1
TABLET ORAL
Qty: 21 TABLET | Refills: 3 | Status: SHIPPED | OUTPATIENT
Start: 2021-02-22

## 2021-02-23 ENCOUNTER — TELEPHONE (OUTPATIENT)
Dept: CARDIOLOGY CLINIC | Facility: CLINIC | Age: 66
End: 2021-02-23

## 2021-02-23 NOTE — TELEPHONE ENCOUNTER
----- Message from Marilynn Lott MD sent at 2/23/2021  6:56 AM EST -----  Patient's device was interrogated in our office clinic  It shows device function is normal       Please call patient

## 2021-02-24 ENCOUNTER — OFFICE VISIT (OUTPATIENT)
Dept: CARDIOLOGY CLINIC | Facility: CLINIC | Age: 66
End: 2021-02-24
Payer: COMMERCIAL

## 2021-02-24 VITALS
TEMPERATURE: 98.6 F | HEIGHT: 66 IN | SYSTOLIC BLOOD PRESSURE: 130 MMHG | DIASTOLIC BLOOD PRESSURE: 78 MMHG | BODY MASS INDEX: 40.5 KG/M2 | HEART RATE: 80 BPM | WEIGHT: 252 LBS | OXYGEN SATURATION: 98 %

## 2021-02-24 DIAGNOSIS — J45.20 MILD INTERMITTENT ASTHMA WITHOUT COMPLICATION: ICD-10-CM

## 2021-02-24 DIAGNOSIS — E66.8 MODERATE OBESITY: ICD-10-CM

## 2021-02-24 DIAGNOSIS — I10 ESSENTIAL HYPERTENSION: ICD-10-CM

## 2021-02-24 DIAGNOSIS — E03.4 HYPOTHYROIDISM DUE TO ACQUIRED ATROPHY OF THYROID: ICD-10-CM

## 2021-02-24 DIAGNOSIS — R06.00 DYSPNEA ON EXERTION: ICD-10-CM

## 2021-02-24 DIAGNOSIS — I48.0 PAROXYSMAL ATRIAL FIBRILLATION (HCC): ICD-10-CM

## 2021-02-24 DIAGNOSIS — E78.5 DYSLIPIDEMIA: ICD-10-CM

## 2021-02-24 DIAGNOSIS — I50.22 CHRONIC SYSTOLIC CONGESTIVE HEART FAILURE, NYHA CLASS 2 (HCC): ICD-10-CM

## 2021-02-24 DIAGNOSIS — I42.0 DILATED CARDIOMYOPATHY (HCC): ICD-10-CM

## 2021-02-24 PROCEDURE — 1160F RVW MEDS BY RX/DR IN RCRD: CPT | Performed by: INTERNAL MEDICINE

## 2021-02-24 PROCEDURE — 1036F TOBACCO NON-USER: CPT | Performed by: INTERNAL MEDICINE

## 2021-02-24 PROCEDURE — 3008F BODY MASS INDEX DOCD: CPT | Performed by: INTERNAL MEDICINE

## 2021-02-24 PROCEDURE — 93000 ELECTROCARDIOGRAM COMPLETE: CPT | Performed by: INTERNAL MEDICINE

## 2021-02-24 PROCEDURE — 3078F DIAST BP <80 MM HG: CPT | Performed by: INTERNAL MEDICINE

## 2021-02-24 PROCEDURE — 99214 OFFICE O/P EST MOD 30 MIN: CPT | Performed by: INTERNAL MEDICINE

## 2021-02-24 NOTE — PROGRESS NOTES
Progress Note - Cardiology Office  [facility]  Kareem Segundo 72 y o  female MRN: [id number]  : 1955   Encounter: [id number]      Assessment:     1  Paroxysmal atrial fibrillation (HCC)    2  Chronic systolic congestive heart failure, NYHA class 2 ([facility] [address] )    3  Dilated cardiomyopathy ([facility] [address] )    4  Essential hypertension    5  Mild intermittent asthma without complication    6  Hypothyroidism due to acquired atrophy of thyroid    7  Dyspnea on exertion    8  Dyslipidemia    9  Moderate obesity        Discussion Summary and Plan:  1  Status post ICD shock for ventricular arrhythmias  Patient device was upgraded to biventricular device  She was also started on sotalol  QTC acceptable since then no ICD shock  Her LV lead threshold is slightly high  Her device was interrogated recently  Her device is still has 44% of total battery life left  2  Chronic systolic heart failure New [place] Heart Association class II/3  Patient has tolerated Entresto very well  No clinical evidence of heart failure or ischemia at this time  Currently she is taking sotalol, Aldactone, entresto  She is on high dose of 97/103 mg twice a day  Torsemide is 40 mg daily along with Aldactone 25 mg daily  Continue same medication electrolytes are acceptable  Just labs done 2021 were acceptable  Kidney function acceptable  3  Status post [employer] ICD  Her device was upgraded to [facility] biventricular ICD  No more ICD shocks  Her device interrogation reviewed with her  Device was just interrogated in 2020    4  Bronchial asthma  Not actively wheezing she uses p r n  Inhalers  Advised to follow up with Pulmonary    5  History of paroxysmal at fibrillation currently in sinus rhythm  Continue antithrombotic therapy  Patient tolerating well  Continue sotalol  QTC is acceptable heart rate 80 beats per minute  She is on Xarelto for antithrombotic therapy  6  Hypothyroidism    She is on levothyroxine TSH was acceptable  Currently she is taking levothyroxine 175 mcg  7  Anxiety  She's now on Lexapro     8  Dyslipidemia  She is back to taking Pravachol  Continue Pravachol   Advised to be compliant with statins      9  Dyspnea on exertion  Patient has chronic There is no overt evidence of volume overload  She has history of obesity with BMI around 41 which may be contributing to deconditioning along with decreased cardiac output  Patient encouraged to start walking  Lose weight and continue current therapy  Electrolyte ordered  Follow-up 4-6 months             Counseling :  A description of the counseling  Issues related to heart failure, regular diet, weight all discussed at length  All her questions answered  Goals and Barriers  The patient has the current Goals: To stay out of heart failure  The patent has the current Barriers: Weight gain  Patient's ability to self care: Yes  Medication side effect reviewed with patient in detail and all their questions answered to their satisfaction  HPI :     Jaylen Armando is a 72y o  year old female who came for follow up  Patient has with past medical history significant for nonischemic dilated cardiomyopathy, hypertension, bronchial asthma, moderate obesity, dyslipidemia, hypothyroidism, status post [employer] single-chamber ICD, mild nonobstructive disease by cardiac catheterization who came for regular follow-up and interrogation of her ICD  She has been doing pretty well she's she is on coralanor, she did she denies any chest pain, any shortness of breath  Recently she gained more weight as she was on steroids for bronchial asthma  Her ICD was interrogated today  No fever, no chills, no other significant complaint       02/24/2021   above reviewed  Patient came for follow-up    She had a medical history significant for chronic systolic and diastolic heart failure, history of nonischemic cardiomyopathy status post biventricular pacemaker and ICD, paroxysmal atrial fibrillation on sotalol, history of NSVT, history of dyslipidemia, history of anxiety who was admitted last year with heart failure and has been doing well  Two days ago she felt heart rate was racing she did send us a reading by her device which shows her device is functioning adequately and there was no evidence of any volume overload  She has today heart rate of 80 beats per minute and it is atrial sensed and ventricular paced  No nausea no vomiting  Today her weight is 252 lb  She does good when her weight is around between 250 - 255  She still has some exertional shortness of breath which is chronic not changed  She get easily nervous she had some back pain other problems  She had a blood test done October 2020 which were acceptable    Review of Systems   Constitutional: Negative for activity change, chills, diaphoresis, fever and unexpected weight change  HENT: Negative for congestion  Eyes: Negative for discharge and redness  Respiratory: Positive for shortness of breath  Negative for cough, chest tightness and wheezing  Chronic   Cardiovascular: Negative  Negative for chest pain, palpitations and leg swelling  Gastrointestinal: Negative for abdominal pain, diarrhea and nausea  Endocrine: Negative  Genitourinary: Negative for decreased urine volume and urgency  Musculoskeletal: Positive for arthralgias and back pain  Negative for gait problem  Skin: Negative for rash and wound  Allergic/Immunologic: Negative  Neurological: Negative for dizziness, seizures, syncope, weakness, light-headedness and headaches  Hematological: Negative  Psychiatric/Behavioral: Negative for agitation and confusion  The patient is nervous/anxious          Historical Information   Past Medical History:   Diagnosis Date    A[facility]fib ([facility] )     Abnormal blood sugar     Acid reflux     Acute bronchitis     Allergic reaction     Anxiety     Arthritis     Asthma     Cardiomyopathy ([place] [address] )     Cellulitis of left lower leg     Chest pain     [facility]congest[facility] failure) [facility])     Constipation     Depression with anxiety     Diabetes mellitus ([place] [address] )     Disease of thyroid gland     Diverticulitis     Dyslipidemia     Dyspnea on exertion     Elbow pain     Fracture of olecranon, open     Gastritis     Generalized pain     Hematuria     History of colonoscopy     Hypertension     Hypokalemia     Hypomagnesemia     Leg cramping     Moderate obesity     Morbid obesity due to excess calories ([facility])     Myalgia     Myositis     Nausea in adult     Nephrolithiasis     Prepatellar bursitis, unspecified knee     Screening for lipid disorders     Shortness of breath     Spasm of muscle     Thyroid trouble      Past Surgical History:   Procedure Laterality Date    CARDIAC DEFIBRILLATOR PLACEMENT      LAPAROSCOPIC CHOLECYSTECTOMY      TOTAL ABDOMINAL HYSTERECTOMY W/ BILATERAL SALPINGOOPHORECTOMY       Social History     Substance and Sexual Activity   Alcohol Use Yes    Frequency: Monthly or less    Drinks per session: 1 or 2     Social History     Substance and Sexual Activity   Drug Use No     Social History     Tobacco Use   Smoking Status Never Smoker   Smokeless Tobacco Never Used     Family History:   Family History   Problem Relation Age of Onset    Hypertension Sister     Cancer Sister     Coronary artery disease Family     Diabetes type II Family     Hypertension Family        Meds/Allergies     Allergies   Allergen Reactions    Omnipaque [Iohexol] Swelling    Penicillins Swelling    Iv Dye  [Iodinated Diagnostic Agents] Throat Swelling    Shellfish-Derived Products Hives       Current Outpatient Medications:     budesonide-formoterol (SYMBICORT) 160-4 5 mcg/act inhaler, Inhale 2 puffs 2 (two) times a day Rinse mouth after use , Disp: 1 Inhaler, Rfl: 5    diclofenac sodium (VOLTAREN) 1 %, Apply 2 g topically 4 (four) times a day, Disp: 100 g, Rfl: 0    levalbuterol (XOPENEX HFA) 45 mcg/act inhaler, Inhale 2 puffs every 6 (six) hours as needed for shortness of breath, Disp: 1 Inhaler, Rfl: 5    levalbuterol (XOPENEX) 1 25 mg/3 mL nebulizer solution, inhale contents of 1 vial in nebulizer every 6 hours if needed, Disp: 720 mL, Rfl: 1    levothyroxine 175 mcg tablet, Take 1 tablet (175 mcg total) by mouth daily, Disp: 90 tablet, Rfl: 3    pantoprazole (PROTONIX) 40 mg tablet, Take 1 tablet (40 mg total) by mouth daily, Disp: 90 tablet, Rfl: 3    potassium chloride (K-DUR) 10 mEq tablet, take 1 tablet by mouth once daily, Disp: 30 tablet, Rfl: 5    pravastatin (PRAVACHOL) 40 mg tablet, Take 1 tablet (40 mg total) by mouth daily, Disp: 90 tablet, Rfl: 3    sacubitril-valsartan (Entresto)  MG TABS, Take 1 tablet by mouth 2 (two) times a day, Disp: 180 tablet, Rfl: 3    sotalol (BETAPACE) 80 mg tablet, TAKE 1 TABLET BY MOUTH EVERY 12 HOURS, Disp: 180 tablet, Rfl: 3    spironolactone (ALDACTONE) 25 mg tablet, TAKE 1/2 TABLET BY MOUTH DAILY, Disp: 45 tablet, Rfl: 3    tiZANidine (ZANAFLEX) 2 mg tablet, take 1 tablet by mouth every 8 hours if needed for muscle spasm, Disp: 21 tablet, Rfl: 3    torsemide (DEMADEX) 20 mg tablet, Take 2 tablets (40 mg total) by mouth daily, Disp: 180 tablet, Rfl: 1    XARELTO 20 MG tablet, take 1 tablet by mouth once daily, Disp: 90 tablet, Rfl: 3    diphenhydrAMINE (BENADRYL) 50 MG tablet, Take 1 tablet (50 mg total) by mouth every 8 (eight) hours as needed for itching (Patient not taking: Reported on 11/12/2020), Disp: 30 tablet, Rfl: 0    ipratropium (ATROVENT) 0 02 % nebulizer solution, Take 2 5 mL by nebulization 4 (four) times a day for 30 days, Disp: 300 mL, Rfl: 0    Vitals: Blood pressure 130/78, pulse 80, temperature 98 6 °F (37 °C), temperature source Temporal, height 5' 6" (1 676 m), weight 114 kg (252 lb), SpO2 98 %  Body mass index is 40 67 kg/m²    Vitals:    02/24/21 1108 Weight: 114 kg (252 lb)     BP Readings from Last 3 Encounters:   02/24/21 130/78   11/12/20 126/86   10/12/20 95/57       Physical Exam   Constitutional: She is oriented to person, place, and time  She appears well-developed and well-nourished  No distress  HENT:   Head: Normocephalic and atraumatic  Eyes: Pupils are equal, round, and reactive to light  Neck: Neck supple  No JVD present  No tracheal deviation present  No thyromegaly present  Cardiovascular: Normal rate, regular rhythm, S1 normal, S2 normal and intact distal pulses  Exam reveals no gallop, no S3, no S4, no distant heart sounds and no friction rub  Murmur heard  Systolic (ejection) murmur is present with a grade of 2/6  Pulmonary/Chest: Effort normal and breath sounds normal  No respiratory distress  She has no wheezes  She has no rales  She exhibits no tenderness  Abdominal: Soft  Bowel sounds are normal  She exhibits no distension  There is no abdominal tenderness  Musculoskeletal:         General: No deformity or edema  Neurological: She is alert and oriented to person, place, and time  Skin: Skin is warm and dry  No rash noted  She is not diaphoretic  No pallor  Psychiatric: She has a normal mood and affect  Her behavior is normal  Judgment normal      Diagnostic Studies Review Cardio:    Echocardiogram/VANNESA: Echo Doppler done in February 2016 shows EF 25-30%, pacemaker in the right-sided chambers, thickened aortic valve without stenosis, moderate to severe mitral regurgitation  Repeat echo Doppler done February 20, 2018  LV is markedly dilated EF 25%, mild LVH, right atrium mildly dilated mild MR and trace TR which was insufficient to measure pulmonary artery pressure  Pacemaker Wire in right-sided chambers        Repeat echo Doppler done 10/12/2020 shows patient's EF is around 72%, grade 3 diastolic dysfunction, mild mitral regurgitation      Catheterization: Cardiac catheter patient done in 2013 was EF 30%, no evidence of significant obstructive coronary artery disease  This was done in outside hospital       ICD/ Pacer Interpretation Single-chamber ICD interrogation shows patient [facility] ICD is working adequately is set at a rate of the VVI 60  ECG Report:      Comparison to prior ECGs: no interval change  01/22/2018  Twelve lead EKG shows normal sinus rhythm heart rate 81 beats per minute  Poor R-wave progression  No change from old EKG  Repeat 12 lead EKG on 03/08/2018 shows normal sinus rhythm  Rare PVCs heart rate 78 beats per minute  Prolonged QTC      07/26/2018 12 lead EKG shows normal sinus rhythm heart rate 90 beats per minute  Peak PVCs noted  IVCD otherwise no other significant ST changes  Her QRS duration is 118 milliseconds  Twelve lead EKG done today 11/08/2018 shows normal sinus rhythm  Incomplete LBBB with IVCD her QS duration is 118 milliseconds  Twelve lead EKG shows atrial sense ventricular paced heart rate 75 beats per minute  She is biventricular paced now      Twelve lead EKG done 01/16/2020 shows atrial sensed ventricular paced heart rate 82 beats per minute she is biventricular pacemaker  Her device findings reviewed with her which was interrogated [facility]    Repeat interrogation from January 2020 reviewed      Twelve lead EKG shows paced rhythm heart rate 89 beats per minute she is biventricular pacemaker  Twelve lead EKG 11/12/2020 shows atrial sensed ventricular paced heart rate 79 beats per minute she has a biventricular pacemaker  Twelve lead EKG 02/24/2021 shows atrial sense ventricular paced heart rate 80 beats per minute  Imaging:  Chest X-Ray:   Xr Chest 2 Views    Result Date: 4/9/2017  Impression Stable cardiomegaly without active pulmonary disease   Workstation performed: [id number]     Lab Review   Lab Results   Component Value Date    WBC 8 41 10/12/2020    HGB 12 4 10/12/2020    HCT 39 8 10/12/2020    MCV 88 10/12/2020     10/12/2020     Lab Results   Component Value Date     06/06/2016    K 4 2 10/27/2020     10/27/2020    CO2 23 10/27/2020    ANIONGAP 12 9 11/01/2015    BUN 17 10/27/2020    CREATININE 1 22 (H) 10/27/2020    GLUCOSE 95 06/06/2016    CALCIUM 9 1 10/12/2020    AST 14 10/11/2020    ALT 19 10/11/2020    ALKPHOS 73 10/11/2020    PROT 6 8 06/06/2016    BILITOT 0 3 06/06/2016    EGFR 53 10/12/2020     Lab Results   Component Value Date    GLUCOSE 95 06/06/2016    CALCIUM 9 1 10/12/2020     06/06/2016    K 4 2 10/27/2020    CO2 23 10/27/2020     10/27/2020    BUN 17 10/27/2020    CREATININE 1 22 (H) 10/27/2020         Lab Results   Component Value Date    HGBA1C 6 2 (H) 06/06/2016       Dr Peggy Vasquez MD [facility]      "This note has been constructed using a voice recognition system  Therefore there may be syntax, spelling, and/or grammatical errors   Please call if you have any questions  "

## 2021-03-14 DIAGNOSIS — I42.0 DILATED CARDIOMYOPATHY (HCC): ICD-10-CM

## 2021-03-14 DIAGNOSIS — I10 ESSENTIAL HYPERTENSION: ICD-10-CM

## 2021-03-14 RX ORDER — SACUBITRIL AND VALSARTAN 97; 103 MG/1; MG/1
TABLET, FILM COATED ORAL
Qty: 180 TABLET | Refills: 3 | Status: SHIPPED | OUTPATIENT
Start: 2021-03-14 | End: 2022-03-14

## 2021-03-28 DIAGNOSIS — E78.5 DYSLIPIDEMIA: ICD-10-CM

## 2021-03-28 RX ORDER — PRAVASTATIN SODIUM 40 MG
TABLET ORAL
Qty: 90 TABLET | Refills: 3 | Status: SHIPPED | OUTPATIENT
Start: 2021-03-28 | End: 2022-02-28

## 2021-03-31 ENCOUNTER — TELEPHONE (OUTPATIENT)
Dept: CARDIOLOGY CLINIC | Facility: CLINIC | Age: 66
End: 2021-03-31

## 2021-03-31 NOTE — TELEPHONE ENCOUNTER
I spoke with patient, made aware of results of device check  She will take an additional water pill x2 days  She will call with any issues

## 2021-03-31 NOTE — TELEPHONE ENCOUNTER
----- Message from Matias De La Torre MD sent at 3/30/2021  5:48 PM EDT -----  Patient's device interrogation shows that she has volume buildup  She should take an extra diuretics for 2 days  Please call the patient    She need to be compliant with her medication

## 2021-04-30 ENCOUNTER — TELEPHONE (OUTPATIENT)
Dept: CARDIOLOGY CLINIC | Facility: CLINIC | Age: 66
End: 2021-04-30

## 2021-04-30 ENCOUNTER — REMOTE DEVICE CLINIC VISIT (OUTPATIENT)
Dept: CARDIOLOGY CLINIC | Facility: CLINIC | Age: 66
End: 2021-04-30
Payer: COMMERCIAL

## 2021-04-30 DIAGNOSIS — Z95.810 PRESENCE OF AUTOMATIC CARDIOVERTER/DEFIBRILLATOR (AICD): Primary | ICD-10-CM

## 2021-04-30 PROCEDURE — 93296 REM INTERROG EVL PM/IDS: CPT | Performed by: INTERNAL MEDICINE

## 2021-04-30 PROCEDURE — 93295 DEV INTERROG REMOTE 1/2/MLT: CPT | Performed by: INTERNAL MEDICINE

## 2021-04-30 NOTE — TELEPHONE ENCOUNTER
----- Message from Parisa Amaral MD sent at 4/30/2021  9:42 AM EDT -----  Patient's device was interrogated in our office clinic  It shows device function is normal   It has a battery life of 1 5 years      Please call patient

## 2021-04-30 NOTE — PROGRESS NOTES
Results for orders placed or performed in visit on 04/30/21   Cardiac EP device report    Narrative    SUNI SINGLE [facility]  (1 4 YRS)  AP 5% BVP 80% ( <90%)  ALL AVAILABLE LEAD PARAMETERS WITHIN NORMAL LIMITS  NO SIGNIFICANT HIGH RATE EPISODES  CORVUE IMPEDANCE MONITORING WITHIN NORMAL LIMITS  NORMAL DEVICE FUNCTION  ---WALLACE

## 2021-05-06 DIAGNOSIS — I48.0 PAROXYSMAL ATRIAL FIBRILLATION (HCC): ICD-10-CM

## 2021-05-06 DIAGNOSIS — I42.0 DILATED CARDIOMYOPATHY (HCC): ICD-10-CM

## 2021-05-06 DIAGNOSIS — I50.42 CHRONIC COMBINED SYSTOLIC AND DIASTOLIC CONGESTIVE HEART FAILURE (HCC): ICD-10-CM

## 2021-05-06 DIAGNOSIS — E03.4 HYPOTHYROIDISM DUE TO ACQUIRED ATROPHY OF THYROID: ICD-10-CM

## 2021-05-06 DIAGNOSIS — E78.5 DYSLIPIDEMIA: ICD-10-CM

## 2021-05-06 DIAGNOSIS — I10 ESSENTIAL HYPERTENSION: ICD-10-CM

## 2021-05-06 DIAGNOSIS — R06.00 DYSPNEA ON EXERTION: ICD-10-CM

## 2021-05-06 RX ORDER — TORSEMIDE 20 MG/1
TABLET ORAL
Qty: 180 TABLET | Refills: 1 | Status: SHIPPED | OUTPATIENT
Start: 2021-05-06 | End: 2021-10-07 | Stop reason: SDUPTHER

## 2021-05-12 DIAGNOSIS — I48.0 PAROXYSMAL ATRIAL FIBRILLATION (HCC): ICD-10-CM

## 2021-05-12 RX ORDER — RIVAROXABAN 20 MG/1
TABLET, FILM COATED ORAL
Qty: 90 TABLET | Refills: 3 | Status: SHIPPED | OUTPATIENT
Start: 2021-05-12 | End: 2022-05-28

## 2021-05-21 ENCOUNTER — HOSPITAL ENCOUNTER (EMERGENCY)
Facility: HOSPITAL | Age: 66
Discharge: HOME | End: 2021-05-21
Attending: EMERGENCY MEDICINE
Payer: COMMERCIAL

## 2021-05-21 VITALS
HEART RATE: 65 BPM | DIASTOLIC BLOOD PRESSURE: 75 MMHG | OXYGEN SATURATION: 100 % | SYSTOLIC BLOOD PRESSURE: 125 MMHG | TEMPERATURE: 96.9 F | RESPIRATION RATE: 16 BRPM

## 2021-05-21 DIAGNOSIS — M79.10 MYALGIA: Primary | ICD-10-CM

## 2021-05-21 DIAGNOSIS — M62.838 SPASM OF MUSCLE: ICD-10-CM

## 2021-05-21 DIAGNOSIS — M25.50 JOINT ACHE: ICD-10-CM

## 2021-05-21 LAB
ALBUMIN SERPL BCP-MCNC: 3.7 G/DL (ref 3.5–5)
ALP SERPL-CCNC: 79 U/L (ref 46–116)
ALT SERPL W P-5'-P-CCNC: 28 U/L (ref 12–78)
ANION GAP SERPL CALCULATED.3IONS-SCNC: 8 MMOL/L (ref 4–13)
AST SERPL W P-5'-P-CCNC: 26 U/L (ref 5–45)
BASOPHILS # BLD AUTO: 0.05 THOUSANDS/ΜL (ref 0–0.1)
BASOPHILS NFR BLD AUTO: 1 % (ref 0–1)
BILIRUB SERPL-MCNC: 0.46 MG/DL (ref 0.2–1)
BUN SERPL-MCNC: 22 MG/DL (ref 5–25)
CALCIUM SERPL-MCNC: 9.2 MG/DL (ref 8.3–10.1)
CHLORIDE SERPL-SCNC: 102 MMOL/L (ref 100–108)
CK SERPL-CCNC: 64 U/L (ref 26–192)
CO2 SERPL-SCNC: 31 MMOL/L (ref 21–32)
CREAT SERPL-MCNC: 1.29 MG/DL (ref 0.6–1.3)
EOSINOPHIL # BLD AUTO: 0.13 THOUSAND/ΜL (ref 0–0.61)
EOSINOPHIL NFR BLD AUTO: 2 % (ref 0–6)
ERYTHROCYTE [DISTWIDTH] IN BLOOD BY AUTOMATED COUNT: 15.4 % (ref 11.6–15.1)
GFR SERPL CREATININE-BSD FRML MDRD: 50 ML/MIN/1.73SQ M
GLUCOSE SERPL-MCNC: 112 MG/DL (ref 65–140)
HCT VFR BLD AUTO: 39.7 % (ref 34.8–46.1)
HGB BLD-MCNC: 12.4 G/DL (ref 11.5–15.4)
IMM GRANULOCYTES # BLD AUTO: 0.02 THOUSAND/UL (ref 0–0.2)
IMM GRANULOCYTES NFR BLD AUTO: 0 % (ref 0–2)
LYMPHOCYTES # BLD AUTO: 1.95 THOUSANDS/ΜL (ref 0.6–4.47)
LYMPHOCYTES NFR BLD AUTO: 27 % (ref 14–44)
MCH RBC QN AUTO: 26.8 PG (ref 26.8–34.3)
MCHC RBC AUTO-ENTMCNC: 31.2 G/DL (ref 31.4–37.4)
MCV RBC AUTO: 86 FL (ref 82–98)
MONOCYTES # BLD AUTO: 0.61 THOUSAND/ΜL (ref 0.17–1.22)
MONOCYTES NFR BLD AUTO: 9 % (ref 4–12)
NEUTROPHILS # BLD AUTO: 4.36 THOUSANDS/ΜL (ref 1.85–7.62)
NEUTS SEG NFR BLD AUTO: 61 % (ref 43–75)
NRBC BLD AUTO-RTO: 0 /100 WBCS
PLATELET # BLD AUTO: 319 THOUSANDS/UL (ref 149–390)
PMV BLD AUTO: 10.2 FL (ref 8.9–12.7)
POTASSIUM SERPL-SCNC: 3.9 MMOL/L (ref 3.5–5.3)
PROT SERPL-MCNC: 7.3 G/DL (ref 6.4–8.2)
RBC # BLD AUTO: 4.62 MILLION/UL (ref 3.81–5.12)
SODIUM SERPL-SCNC: 141 MMOL/L (ref 136–145)
WBC # BLD AUTO: 7.12 THOUSAND/UL (ref 4.31–10.16)

## 2021-05-21 PROCEDURE — 82550 ASSAY OF CK (CPK): CPT | Performed by: EMERGENCY MEDICINE

## 2021-05-21 PROCEDURE — 85025 COMPLETE CBC W/AUTO DIFF WBC: CPT | Performed by: EMERGENCY MEDICINE

## 2021-05-21 PROCEDURE — 99284 EMERGENCY DEPT VISIT MOD MDM: CPT | Performed by: EMERGENCY MEDICINE

## 2021-05-21 PROCEDURE — 80053 COMPREHEN METABOLIC PANEL: CPT | Performed by: EMERGENCY MEDICINE

## 2021-05-21 PROCEDURE — 99283 EMERGENCY DEPT VISIT LOW MDM: CPT

## 2021-05-21 PROCEDURE — 36415 COLL VENOUS BLD VENIPUNCTURE: CPT | Performed by: EMERGENCY MEDICINE

## 2021-05-21 RX ORDER — TRAMADOL HYDROCHLORIDE 50 MG/1
50 TABLET ORAL ONCE
Status: COMPLETED | OUTPATIENT
Start: 2021-05-21 | End: 2021-05-21

## 2021-05-21 RX ORDER — BACLOFEN 10 MG/1
10 TABLET ORAL ONCE
Status: COMPLETED | OUTPATIENT
Start: 2021-05-21 | End: 2021-05-21

## 2021-05-21 RX ADMIN — BACLOFEN 10 MG: 10 TABLET ORAL at 06:59

## 2021-05-21 RX ADMIN — TRAMADOL HYDROCHLORIDE 50 MG: 50 TABLET, FILM COATED ORAL at 07:59

## 2021-05-21 NOTE — ED NOTES
PATIENT STATES "I HAVE HAD JOINT PAIN, MUSCLE ACHES, MUSCLE CRAMPS FOR THE LAST 4 DAYS, ITS NOW TO THE POINT THAT IM NOT SLEEPING AT NIGHT WHICH IS WHY I CAME IN  I HAVE A HISTORY OF RHABDOMYLOSIS AND IT FEELS LIKE THE SAME AS LAST TIME " PATIENT AOX4, PLEASANT AND COOPERATIVE  PATIENT RATES OVERALL PAIN 9/10  RESPIRATIONS EVEN AND UNLABORED  VSS        Janay Chu RN  05/21/21 3782

## 2021-05-22 ENCOUNTER — HOSPITAL ENCOUNTER (EMERGENCY)
Facility: HOSPITAL | Age: 66
Discharge: HOME | End: 2021-05-22
Attending: EMERGENCY MEDICINE | Admitting: EMERGENCY MEDICINE
Payer: COMMERCIAL

## 2021-05-22 ENCOUNTER — APPOINTMENT (EMERGENCY)
Dept: RADIOLOGY | Facility: HOSPITAL | Age: 66
End: 2021-05-22
Payer: COMMERCIAL

## 2021-05-22 VITALS
WEIGHT: 243 LBS | BODY MASS INDEX: 39.05 KG/M2 | TEMPERATURE: 97.4 F | OXYGEN SATURATION: 97 % | RESPIRATION RATE: 19 BRPM | HEIGHT: 66 IN | DIASTOLIC BLOOD PRESSURE: 65 MMHG | SYSTOLIC BLOOD PRESSURE: 121 MMHG | HEART RATE: 88 BPM

## 2021-05-22 DIAGNOSIS — R11.10 VOMITING: Primary | ICD-10-CM

## 2021-05-22 DIAGNOSIS — M79.10 MYALGIA: ICD-10-CM

## 2021-05-22 LAB
ALBUMIN SERPL BCP-MCNC: 4.1 G/DL (ref 3.5–5)
ALP SERPL-CCNC: 84 U/L (ref 46–116)
ALT SERPL W P-5'-P-CCNC: 26 U/L (ref 12–78)
ANION GAP SERPL CALCULATED.3IONS-SCNC: 11 MMOL/L (ref 4–13)
APTT PPP: 37 SECONDS (ref 23–37)
AST SERPL W P-5'-P-CCNC: 16 U/L (ref 5–45)
BASOPHILS # BLD AUTO: 0.05 THOUSANDS/ΜL (ref 0–0.1)
BASOPHILS NFR BLD AUTO: 1 % (ref 0–1)
BILIRUB SERPL-MCNC: 0.59 MG/DL (ref 0.2–1)
BUN SERPL-MCNC: 22 MG/DL (ref 5–25)
CALCIUM SERPL-MCNC: 9.6 MG/DL (ref 8.3–10.1)
CHLORIDE SERPL-SCNC: 101 MMOL/L (ref 100–108)
CK SERPL-CCNC: 51 U/L (ref 26–192)
CO2 SERPL-SCNC: 29 MMOL/L (ref 21–32)
CREAT SERPL-MCNC: 1.3 MG/DL (ref 0.6–1.3)
EOSINOPHIL # BLD AUTO: 0.14 THOUSAND/ΜL (ref 0–0.61)
EOSINOPHIL NFR BLD AUTO: 2 % (ref 0–6)
ERYTHROCYTE [DISTWIDTH] IN BLOOD BY AUTOMATED COUNT: 15.3 % (ref 11.6–15.1)
GFR SERPL CREATININE-BSD FRML MDRD: 49 ML/MIN/1.73SQ M
GLUCOSE SERPL-MCNC: 131 MG/DL (ref 65–140)
HCT VFR BLD AUTO: 41.7 % (ref 34.8–46.1)
HGB BLD-MCNC: 13.4 G/DL (ref 11.5–15.4)
IMM GRANULOCYTES # BLD AUTO: 0.02 THOUSAND/UL (ref 0–0.2)
IMM GRANULOCYTES NFR BLD AUTO: 0 % (ref 0–2)
INR PPP: 1.53 (ref 0.84–1.19)
LACTATE SERPL-SCNC: 1.5 MMOL/L (ref 0.5–2)
LIPASE SERPL-CCNC: 50 U/L (ref 73–393)
LYMPHOCYTES # BLD AUTO: 2.25 THOUSANDS/ΜL (ref 0.6–4.47)
LYMPHOCYTES NFR BLD AUTO: 29 % (ref 14–44)
MCH RBC QN AUTO: 27.2 PG (ref 26.8–34.3)
MCHC RBC AUTO-ENTMCNC: 32.1 G/DL (ref 31.4–37.4)
MCV RBC AUTO: 85 FL (ref 82–98)
MONOCYTES # BLD AUTO: 0.61 THOUSAND/ΜL (ref 0.17–1.22)
MONOCYTES NFR BLD AUTO: 8 % (ref 4–12)
NEUTROPHILS # BLD AUTO: 4.76 THOUSANDS/ΜL (ref 1.85–7.62)
NEUTS SEG NFR BLD AUTO: 60 % (ref 43–75)
NRBC BLD AUTO-RTO: 0 /100 WBCS
NT-PROBNP SERPL-MCNC: 1641 PG/ML
PLATELET # BLD AUTO: 373 THOUSANDS/UL (ref 149–390)
PMV BLD AUTO: 10.5 FL (ref 8.9–12.7)
POTASSIUM SERPL-SCNC: 3.8 MMOL/L (ref 3.5–5.3)
PROT SERPL-MCNC: 7.9 G/DL (ref 6.4–8.2)
PROTHROMBIN TIME: 18.2 SECONDS (ref 11.6–14.5)
RBC # BLD AUTO: 4.93 MILLION/UL (ref 3.81–5.12)
SARS-COV-2 RNA RESP QL NAA+PROBE: NEGATIVE
SODIUM SERPL-SCNC: 141 MMOL/L (ref 136–145)
TROPONIN I SERPL-MCNC: <0.02 NG/ML
WBC # BLD AUTO: 7.83 THOUSAND/UL (ref 4.31–10.16)

## 2021-05-22 PROCEDURE — 84484 ASSAY OF TROPONIN QUANT: CPT | Performed by: EMERGENCY MEDICINE

## 2021-05-22 PROCEDURE — 71045 X-RAY EXAM CHEST 1 VIEW: CPT

## 2021-05-22 PROCEDURE — 99284 EMERGENCY DEPT VISIT MOD MDM: CPT

## 2021-05-22 PROCEDURE — 85025 COMPLETE CBC W/AUTO DIFF WBC: CPT | Performed by: EMERGENCY MEDICINE

## 2021-05-22 PROCEDURE — 99284 EMERGENCY DEPT VISIT MOD MDM: CPT | Performed by: EMERGENCY MEDICINE

## 2021-05-22 PROCEDURE — 85610 PROTHROMBIN TIME: CPT | Performed by: EMERGENCY MEDICINE

## 2021-05-22 PROCEDURE — 80053 COMPREHEN METABOLIC PANEL: CPT | Performed by: EMERGENCY MEDICINE

## 2021-05-22 PROCEDURE — U0003 INFECTIOUS AGENT DETECTION BY NUCLEIC ACID (DNA OR RNA); SEVERE ACUTE RESPIRATORY SYNDROME CORONAVIRUS 2 (SARS-COV-2) (CORONAVIRUS DISEASE [COVID-19]), AMPLIFIED PROBE TECHNIQUE, MAKING USE OF HIGH THROUGHPUT TECHNOLOGIES AS DESCRIBED BY CMS-2020-01-R: HCPCS | Performed by: EMERGENCY MEDICINE

## 2021-05-22 PROCEDURE — 82550 ASSAY OF CK (CPK): CPT | Performed by: EMERGENCY MEDICINE

## 2021-05-22 PROCEDURE — 83605 ASSAY OF LACTIC ACID: CPT | Performed by: EMERGENCY MEDICINE

## 2021-05-22 PROCEDURE — U0005 INFEC AGEN DETEC AMPLI PROBE: HCPCS | Performed by: EMERGENCY MEDICINE

## 2021-05-22 PROCEDURE — 96374 THER/PROPH/DIAG INJ IV PUSH: CPT

## 2021-05-22 PROCEDURE — 87040 BLOOD CULTURE FOR BACTERIA: CPT | Performed by: EMERGENCY MEDICINE

## 2021-05-22 PROCEDURE — 94640 AIRWAY INHALATION TREATMENT: CPT

## 2021-05-22 PROCEDURE — 85730 THROMBOPLASTIN TIME PARTIAL: CPT | Performed by: EMERGENCY MEDICINE

## 2021-05-22 PROCEDURE — 93005 ELECTROCARDIOGRAM TRACING: CPT

## 2021-05-22 PROCEDURE — 83880 ASSAY OF NATRIURETIC PEPTIDE: CPT | Performed by: EMERGENCY MEDICINE

## 2021-05-22 PROCEDURE — 36415 COLL VENOUS BLD VENIPUNCTURE: CPT | Performed by: EMERGENCY MEDICINE

## 2021-05-22 PROCEDURE — 83690 ASSAY OF LIPASE: CPT | Performed by: EMERGENCY MEDICINE

## 2021-05-22 RX ORDER — BACLOFEN 20 MG/1
20 TABLET ORAL 3 TIMES DAILY
Qty: 30 TABLET | Refills: 0 | Status: SHIPPED | OUTPATIENT
Start: 2021-05-22 | End: 2022-04-27 | Stop reason: HOSPADM

## 2021-05-22 RX ORDER — ONDANSETRON 4 MG/1
4 TABLET, ORALLY DISINTEGRATING ORAL EVERY 6 HOURS PRN
Qty: 20 TABLET | Refills: 0 | Status: SHIPPED | OUTPATIENT
Start: 2021-05-22 | End: 2022-04-27 | Stop reason: HOSPADM

## 2021-05-22 RX ORDER — BACLOFEN 10 MG/1
20 TABLET ORAL ONCE
Status: COMPLETED | OUTPATIENT
Start: 2021-05-22 | End: 2021-05-22

## 2021-05-22 RX ORDER — ONDANSETRON 2 MG/ML
4 INJECTION INTRAMUSCULAR; INTRAVENOUS ONCE
Status: COMPLETED | OUTPATIENT
Start: 2021-05-22 | End: 2021-05-22

## 2021-05-22 RX ORDER — IPRATROPIUM BROMIDE AND ALBUTEROL SULFATE 2.5; .5 MG/3ML; MG/3ML
3 SOLUTION RESPIRATORY (INHALATION) ONCE
Status: COMPLETED | OUTPATIENT
Start: 2021-05-22 | End: 2021-05-22

## 2021-05-22 RX ADMIN — BACLOFEN 20 MG: 10 TABLET ORAL at 08:34

## 2021-05-22 RX ADMIN — ONDANSETRON 4 MG: 2 INJECTION INTRAMUSCULAR; INTRAVENOUS at 07:15

## 2021-05-22 RX ADMIN — IPRATROPIUM BROMIDE AND ALBUTEROL SULFATE 3 ML: 2.5; .5 SOLUTION RESPIRATORY (INHALATION) at 07:15

## 2021-05-22 NOTE — ED PROVIDER NOTES
History  Chief Complaint   Patient presents with    Nausea    Nausea     Vomiting since yesterday after ED visit     76yoF hx CHF, asthma, AFib on xarelto, rhabdo from mycardis, c/o body aches x 1 week  Seen here yesterday for same, labs were ok, discharged home after a dose of baclofen and tramadol  Today feels worse, +chills +vomiting x 1  Has had both covid vaccine doses since March, no known sick contacts  Denies cough, chest pain, diarrhea  Weight has been steady  Prior to Admission Medications   Prescriptions Last Dose Informant Patient Reported? Taking? Entresto  MG TABS 5/21/2021 at 0800  No Yes   Sig: take 1 tablet by mouth twice a day   Xarelto 20 MG tablet 5/21/2021 at 0800  No Yes   Sig: take 1 tablet by mouth once daily   budesonide-formoterol (SYMBICORT) 160-4 5 mcg/act inhaler 5/21/2021 at 0800 Self No Yes   Sig: Inhale 2 puffs 2 (two) times a day Rinse mouth after use     diclofenac sodium (VOLTAREN) 1 % 5/21/2021 at 0800 Self No Yes   Sig: Apply 2 g topically 4 (four) times a day   diphenhydrAMINE (BENADRYL) 50 MG tablet 5/21/2021 at 0800 Self No Yes   Sig: Take 1 tablet (50 mg total) by mouth every 8 (eight) hours as needed for itching   ipratropium (ATROVENT) 0 02 % nebulizer solution  Self No No   Sig: Take 2 5 mL by nebulization 4 (four) times a day for 30 days   levalbuterol (XOPENEX HFA) 45 mcg/act inhaler 5/21/2021 at 0800 Self No Yes   Sig: Inhale 2 puffs every 6 (six) hours as needed for shortness of breath   levalbuterol (XOPENEX) 1 25 mg/3 mL nebulizer solution 5/21/2021 at 0800 Self No Yes   Sig: inhale contents of 1 vial in nebulizer every 6 hours if needed   levothyroxine 175 mcg tablet 5/21/2021 at 0800 Self No Yes   Sig: Take 1 tablet (175 mcg total) by mouth daily   pantoprazole (PROTONIX) 40 mg tablet 5/21/2021 at 0800 Self No Yes   Sig: Take 1 tablet (40 mg total) by mouth daily   potassium chloride (K-DUR) 10 mEq tablet 5/21/2021 at 0800 Self No Yes Sig: take 1 tablet by mouth once daily   pravastatin (PRAVACHOL) 40 mg tablet 5/21/2021 at 0800  No Yes   Sig: take 1 tablet by mouth once daily   sotalol (BETAPACE) 80 mg tablet 5/21/2021 at 0800 Self No Yes   Sig: TAKE 1 TABLET BY MOUTH EVERY 12 HOURS   spironolactone (ALDACTONE) 25 mg tablet 5/21/2021 at 0800 Self No Yes   Sig: TAKE 1/2 TABLET BY MOUTH DAILY   tiZANidine (ZANAFLEX) 2 mg tablet 5/21/2021 at 0800 Self No Yes   Sig: take 1 tablet by mouth every 8 hours if needed for muscle spasm   torsemide (DEMADEX) 20 mg tablet 5/21/2021 at 0800  No Yes   Sig: take 2 tablets by mouth once daily      Facility-Administered Medications: None       Past Medical History:   Diagnosis Date    A-fib ([place] [address] )     Abnormal blood sugar     Acid reflux     Acute bronchitis     Allergic reaction     Anxiety     Arthritis     Asthma     Cardiomyopathy ([place] [address] )     Cellulitis of left lower leg     Chest pain     [facility]     Constipation     Depression with anxiety     Diabetes mellitus ([place] [address] )     Disease of thyroid gland     Diverticulitis     Dyslipidemia     Dyspnea on exertion     Elbow pain     Fracture of olecranon, open     Gastritis     Generalized pain     Hematuria     History of colonoscopy     Hypertension     Hypokalemia     Hypomagnesemia     Leg cramping     Moderate obesity     Morbid obesity due to excess calories ([facility])     Myalgia     Myositis     Nausea in adult     Nephrolithiasis     Prepatellar bursitis, unspecified knee     Screening for lipid disorders     Shortness of breath     Spasm of muscle     Thyroid trouble        Past Surgical History:   Procedure Laterality Date    CARDIAC DEFIBRILLATOR PLACEMENT      LAPAROSCOPIC CHOLECYSTECTOMY      TOTAL ABDOMINAL HYSTERECTOMY W/ BILATERAL SALPINGOOPHORECTOMY         Family History   Problem Relation Age of Onset    Hypertension Sister     Cancer Sister     Coronary artery disease Family  Diabetes type II Family     Hypertension Family      I have reviewed and agree with the history as documented  E-Cigarette/Vaping    E-Cigarette Use Never User      E-Cigarette/Vaping Substances     Social History     Tobacco Use    Smoking status: Never Smoker    Smokeless tobacco: Never Used   Substance Use Topics    Alcohol use: Yes     Frequency: Monthly or less     Drinks per session: 1 or 2    Drug use: No       Review of Systems   Constitutional: Positive for chills  Negative for fever  Respiratory: Positive for wheezing  Negative for cough  Gastrointestinal: Positive for vomiting  Negative for abdominal pain, constipation and diarrhea  Musculoskeletal: Positive for back pain  Neurological: Negative for headaches  All other systems reviewed and are negative  Physical Exam  Physical Exam  Vitals signs reviewed  Constitutional:       Appearance: She is well-developed  HENT:      Head: Normocephalic and atraumatic  Right Ear: External ear normal       Left Ear: External ear normal       Nose: Nose normal  No rhinorrhea  Mouth/Throat:      Mouth: Mucous membranes are moist    Eyes:      Conjunctiva/sclera: Conjunctivae normal    Neck:      Musculoskeletal: Neck supple  Cardiovascular:      Rate and Rhythm: Normal rate and regular rhythm  Pulmonary:      Effort: Pulmonary effort is normal       Breath sounds: Wheezing (scattered) present  No rales  Comments: Mildly tachypneic (it's from my anxiety)  Abdominal:      Palpations: Abdomen is soft  Tenderness: There is no abdominal tenderness  Musculoskeletal:      Right lower leg: No edema  Left lower leg: No edema  Skin:     General: Skin is warm and dry  Neurological:      Mental Status: She is alert and oriented to person, place, and time     Psychiatric:         Mood and Affect: Mood normal          Vital Signs  ED Triage Vitals [05/22/21 0630]   Temperature Pulse Respirations Blood Pressure SpO2 Alfonzo Saravia ) 97 4 °F (36 3 °C) (!) 107 20 117/91 97 %      Temp Source Heart Rate Source Patient Position - Orthostatic VS BP Location FiO2 (%)   Tympanic Monitor Sitting Right arm --      Pain Score       8           Vitals:    05/22/21 0630   BP: 117/91   Pulse: (!) 107   Patient Position - Orthostatic VS: Sitting         Visual Acuity      ED Medications  Medications   ipratropium-albuterol (DUO-NEB) 0 5-2 5 mg/3 mL inhalation solution 3 mL (has no administration in time range)       Diagnostic Studies  Results Reviewed     Procedure Component Value Units Date/Time    APTT [[id number]]     Lab Status: No result Specimen: Blood     CK Total with Reflex CKMB [[id number]]     Lab Status: No result Specimen: Blood     Lactic acid [[id number]]     Lab Status: No result Specimen: Blood     Blood culture #1 [[id number]]     Lab Status: No result Specimen: Blood     Blood culture #2 [[id number]]     Lab Status: No result Specimen: Blood     UA (URINE) with reflex to Scope [[id number]]     Lab Status: No result Specimen: Urine     Urine culture [[id number]]     Lab Status: No result Specimen: Urine, Clean Catch     CBC and differential [[id number]]     Lab Status: No result Specimen: Blood     Comprehensive metabolic panel [[id number]]     Lab Status: No result Specimen: Blood     Lipase [[id number]]     Lab Status: No result Specimen: Blood     Troponin I [[id number]]     Lab Status: No result Specimen: Blood     NT-BNP PRO [[id number]]     Lab Status: No result Specimen: Blood     Novel Coronavirus (Covid-19),PCR [facility] - 2 Hour Stat [[id number]]     Lab Status: No result Specimen: Nares from Nasopharyngeal Swab     Protime-INR [[id number]]     Lab Status: No result Specimen: Blood                  No orders to display              Procedures  Procedures         ED Course                                           MDM  Number of Diagnoses or Management Options  Diagnosis management comments: EKG paced  Duoneb x 1 ordered for slight wheeze  CXR, covid swab, labs ordered  Case signed out to Dr Wade Srinivasan at [address] [place] [address] pending diagnostics  Disposition  Final diagnoses:   None     ED Disposition     None      Follow-up Information    None         Patient's Medications   Discharge Prescriptions    No medications on file     No discharge procedures on file      PDMP Review     None          ED Provider  Electronically Signed by           Christian Rehman DO  05/22/21 8877

## 2021-05-23 ENCOUNTER — APPOINTMENT (EMERGENCY)
Dept: RADIOLOGY | Facility: HOSPITAL | Age: 66
End: 2021-05-23
Payer: COMMERCIAL

## 2021-05-23 ENCOUNTER — HOSPITAL ENCOUNTER (EMERGENCY)
Facility: HOSPITAL | Age: 66
Discharge: HOME | End: 2021-05-23
Attending: EMERGENCY MEDICINE | Admitting: EMERGENCY MEDICINE
Payer: COMMERCIAL

## 2021-05-23 ENCOUNTER — APPOINTMENT (EMERGENCY)
Dept: RADIOLOGY | Facility: HOSPITAL | Age: 66
End: 2021-05-23
Attending: EMERGENCY MEDICINE
Payer: COMMERCIAL

## 2021-05-23 VITALS
SYSTOLIC BLOOD PRESSURE: 111 MMHG | DIASTOLIC BLOOD PRESSURE: 64 MMHG | WEIGHT: 243 LBS | RESPIRATION RATE: 20 BRPM | TEMPERATURE: 97.7 F | OXYGEN SATURATION: 96 % | HEIGHT: 66 IN | BODY MASS INDEX: 39.05 KG/M2 | HEART RATE: 70 BPM

## 2021-05-23 DIAGNOSIS — F41.9 ANXIETY: ICD-10-CM

## 2021-05-23 DIAGNOSIS — R53.1 WEAKNESS: Primary | ICD-10-CM

## 2021-05-23 LAB
ALBUMIN SERPL BCP-MCNC: 4.1 G/DL (ref 3.5–5)
ALP SERPL-CCNC: 79 U/L (ref 46–116)
ALT SERPL W P-5'-P-CCNC: 27 U/L (ref 12–78)
ANION GAP SERPL CALCULATED.3IONS-SCNC: 12 MMOL/L (ref 4–13)
AST SERPL W P-5'-P-CCNC: 20 U/L (ref 5–45)
BASOPHILS # BLD AUTO: 0.05 THOUSANDS/ΜL (ref 0–0.1)
BASOPHILS NFR BLD AUTO: 1 % (ref 0–1)
BILIRUB SERPL-MCNC: 0.61 MG/DL (ref 0.2–1)
BUN SERPL-MCNC: 19 MG/DL (ref 5–25)
CALCIUM SERPL-MCNC: 9.6 MG/DL (ref 8.3–10.1)
CHLORIDE SERPL-SCNC: 104 MMOL/L (ref 100–108)
CO2 SERPL-SCNC: 28 MMOL/L (ref 21–32)
CREAT SERPL-MCNC: 1.35 MG/DL (ref 0.6–1.3)
EOSINOPHIL # BLD AUTO: 0.13 THOUSAND/ΜL (ref 0–0.61)
EOSINOPHIL NFR BLD AUTO: 1 % (ref 0–6)
ERYTHROCYTE [DISTWIDTH] IN BLOOD BY AUTOMATED COUNT: 15.3 % (ref 11.6–15.1)
GFR SERPL CREATININE-BSD FRML MDRD: 47 ML/MIN/1.73SQ M
GLUCOSE SERPL-MCNC: 160 MG/DL (ref 65–140)
HCT VFR BLD AUTO: 39.3 % (ref 34.8–46.1)
HGB BLD-MCNC: 12.4 G/DL (ref 11.5–15.4)
IMM GRANULOCYTES # BLD AUTO: 0.04 THOUSAND/UL (ref 0–0.2)
IMM GRANULOCYTES NFR BLD AUTO: 0 % (ref 0–2)
LIPASE SERPL-CCNC: 38 U/L (ref 73–393)
LYMPHOCYTES # BLD AUTO: 1.83 THOUSANDS/ΜL (ref 0.6–4.47)
LYMPHOCYTES NFR BLD AUTO: 19 % (ref 14–44)
MAGNESIUM SERPL-MCNC: 2.2 MG/DL (ref 1.6–2.6)
MCH RBC QN AUTO: 26.8 PG (ref 26.8–34.3)
MCHC RBC AUTO-ENTMCNC: 31.6 G/DL (ref 31.4–37.4)
MCV RBC AUTO: 85 FL (ref 82–98)
MONOCYTES # BLD AUTO: 0.66 THOUSAND/ΜL (ref 0.17–1.22)
MONOCYTES NFR BLD AUTO: 7 % (ref 4–12)
NEUTROPHILS # BLD AUTO: 7.01 THOUSANDS/ΜL (ref 1.85–7.62)
NEUTS SEG NFR BLD AUTO: 72 % (ref 43–75)
NRBC BLD AUTO-RTO: 0 /100 WBCS
NT-PROBNP SERPL-MCNC: 2169 PG/ML
PLATELET # BLD AUTO: 361 THOUSANDS/UL (ref 149–390)
PMV BLD AUTO: 11 FL (ref 8.9–12.7)
POTASSIUM SERPL-SCNC: 3.6 MMOL/L (ref 3.5–5.3)
PROT SERPL-MCNC: 7.8 G/DL (ref 6.4–8.2)
RBC # BLD AUTO: 4.62 MILLION/UL (ref 3.81–5.12)
SODIUM SERPL-SCNC: 144 MMOL/L (ref 136–145)
TROPONIN I SERPL-MCNC: <0.02 NG/ML
WBC # BLD AUTO: 9.72 THOUSAND/UL (ref 4.31–10.16)

## 2021-05-23 PROCEDURE — 71045 X-RAY EXAM CHEST 1 VIEW: CPT

## 2021-05-23 PROCEDURE — 83880 ASSAY OF NATRIURETIC PEPTIDE: CPT | Performed by: EMERGENCY MEDICINE

## 2021-05-23 PROCEDURE — 85025 COMPLETE CBC W/AUTO DIFF WBC: CPT | Performed by: EMERGENCY MEDICINE

## 2021-05-23 PROCEDURE — 93005 ELECTROCARDIOGRAM TRACING: CPT

## 2021-05-23 PROCEDURE — 96374 THER/PROPH/DIAG INJ IV PUSH: CPT

## 2021-05-23 PROCEDURE — 99285 EMERGENCY DEPT VISIT HI MDM: CPT

## 2021-05-23 PROCEDURE — 80053 COMPREHEN METABOLIC PANEL: CPT | Performed by: EMERGENCY MEDICINE

## 2021-05-23 PROCEDURE — 83735 ASSAY OF MAGNESIUM: CPT | Performed by: EMERGENCY MEDICINE

## 2021-05-23 PROCEDURE — 83690 ASSAY OF LIPASE: CPT | Performed by: EMERGENCY MEDICINE

## 2021-05-23 PROCEDURE — 84484 ASSAY OF TROPONIN QUANT: CPT | Performed by: EMERGENCY MEDICINE

## 2021-05-23 PROCEDURE — 70450 CT HEAD/BRAIN W/O DYE: CPT

## 2021-05-23 PROCEDURE — 36415 COLL VENOUS BLD VENIPUNCTURE: CPT | Performed by: EMERGENCY MEDICINE

## 2021-05-23 PROCEDURE — 99285 EMERGENCY DEPT VISIT HI MDM: CPT | Performed by: EMERGENCY MEDICINE

## 2021-05-23 RX ORDER — ESCITALOPRAM OXALATE 10 MG/1
10 TABLET ORAL DAILY
Qty: 30 TABLET | Refills: 0 | Status: SHIPPED | OUTPATIENT
Start: 2021-05-23 | End: 2022-04-27 | Stop reason: HOSPADM

## 2021-05-23 RX ORDER — ONDANSETRON 2 MG/ML
4 INJECTION INTRAMUSCULAR; INTRAVENOUS ONCE
Status: COMPLETED | OUTPATIENT
Start: 2021-05-23 | End: 2021-05-23

## 2021-05-23 RX ADMIN — ONDANSETRON 4 MG: 2 INJECTION INTRAMUSCULAR; INTRAVENOUS at 06:57

## 2021-05-23 NOTE — ED PROCEDURE NOTE
PROCEDURE  ECG 12 Lead Documentation Only    Date/Time: 5/23/2021 6:29 AM  Performed by: Wilfredo Mckeon DO  Authorized by: Wilfredo Mckeon DO     ECG reviewed by me, the ED Provider: yes    Patient location:  ED  Interpretation:     Interpretation: abnormal    Rate:     ECG rate:  78    ECG rate assessment: normal    Rhythm:     Rhythm: sinus rhythm and paced    Pacing:     Type of pacing:  Ventricular  Ectopy:     Ectopy: none           Wilfredo Mckeon DO  05/23/21 9567

## 2021-05-23 NOTE — ED TRIAGE NOTES
PATIENT REPORTS WEAKNESS STATING "MY LEGS WANT TO GIVE OUT WHEN I STAND"  SHE REPORTS SHE TRIED TO DRIVE HERE BUT PULLED OFF AT THE EMS STATION    CO GENERALIZED WEAKNESS, NAUSEA AND MUSCLE SPASMS THAT HAVE EXACERBATED SINCE YESTERDAYS VISIT

## 2021-05-23 NOTE — ED CARE HANDOFF
Emergency Department Sign Out Note        Sign out and transfer of care from Dr Tyron Miller  See Separate Emergency Department note  The patient, Sherice aBron, was evaluated by the previous provider for weakness  Workup Completed:  Multiple laboratory studies and CT scan of the head    ED Course / Workup Pending (followup): By the time of my examination the patient states she is back to her baseline  States she could not move her legs before however now she can move them normally  Patient states she took a Xanax after symptoms started is not sure that is what helped her  She has been on Xanax for some time  Does not take other anti anxiety medications there are more suitable for long-term  We discussed these options and will try placing her on 1                                     Procedures  MDM    Disposition  Final diagnoses:   None     ED Disposition     ED Disposition Condition Date/Time Comment    Discharge Stable Sun May 23, 2021  8:05 AM Sherice Baron discharge to home/self care  Follow-up Information    None       Patient's Medications   Discharge Prescriptions    No medications on file     No discharge procedures on file         ED Provider  Electronically Signed by     Mina Womack MD  05/23/21 4470

## 2021-05-23 NOTE — ED PROVIDER NOTES
History  Chief Complaint   Patient presents with    Weakness - Generalized     Patient presents for evaluation of pain all over  Patient states she has muscle cramps and pain her entire body and does not feel like she has control of her body  Reports generalized fatigue  No apparent modifying factors for her symptoms  Denies any injury or trauma  States has been going on for last week and this is her 3rd visit to the ER and she does not feel any better  History provided by:  Patient   used: No        Prior to Admission Medications   Prescriptions Last Dose Informant Patient Reported? Taking? Entresto  MG TABS   No No   Sig: take 1 tablet by mouth twice a day   Xarelto 20 MG tablet   No No   Sig: take 1 tablet by mouth once daily   baclofen 20 mg tablet   No No   Sig: Take 1 tablet (20 mg total) by mouth 3 (three) times a day   budesonide-formoterol (SYMBICORT) 160-4 5 mcg/act inhaler  Self No No   Sig: Inhale 2 puffs 2 (two) times a day Rinse mouth after use     diclofenac sodium (VOLTAREN) 1 %  Self No No   Sig: Apply 2 g topically 4 (four) times a day   diphenhydrAMINE (BENADRYL) 50 MG tablet  Self No No   Sig: Take 1 tablet (50 mg total) by mouth every 8 (eight) hours as needed for itching   ipratropium (ATROVENT) 0 02 % nebulizer solution  Self No No   Sig: Take 2 5 mL by nebulization 4 (four) times a day for 30 days   levalbuterol (XOPENEX HFA) 45 mcg/act inhaler  Self No No   Sig: Inhale 2 puffs every 6 (six) hours as needed for shortness of breath   levalbuterol (XOPENEX) 1 25 mg/3 mL nebulizer solution  Self No No   Sig: inhale contents of 1 vial in nebulizer every 6 hours if needed   levothyroxine 175 mcg tablet  Self No No   Sig: Take 1 tablet (175 mcg total) by mouth daily   ondansetron (ZOFRAN-ODT) 4 mg disintegrating tablet   No No   Sig: Take 1 tablet (4 mg total) by mouth every 6 (six) hours as needed for nausea or vomiting   pantoprazole (PROTONIX) 40 mg tablet Self No No   Sig: Take 1 tablet (40 mg total) by mouth daily   potassium chloride (K-DUR) 10 mEq tablet  Self No No   Sig: take 1 tablet by mouth once daily   pravastatin (PRAVACHOL) 40 mg tablet   No No   Sig: take 1 tablet by mouth once daily   sotalol (BETAPACE) 80 mg tablet  Self No No   Sig: TAKE 1 TABLET BY MOUTH EVERY 12 HOURS   spironolactone (ALDACTONE) 25 mg tablet  Self No No   Sig: TAKE 1/2 TABLET BY MOUTH DAILY   tiZANidine (ZANAFLEX) 2 mg tablet  Self No No   Sig: take 1 tablet by mouth every 8 hours if needed for muscle spasm   torsemide (DEMADEX) 20 mg tablet   No No   Sig: take 2 tablets by mouth once daily      Facility-Administered Medications: None       Past Medical History:   Diagnosis Date    A[facility]fib ([facility])     Abnormal blood sugar     Acid reflux     Acute bronchitis     Allergic reaction     Anxiety     Arthritis     Asthma     [facility]omy[facility] [address] [facility]     Cellulitis of left lower leg     Chest pain     CHF (congestive heart failure) ([facility])     Constipation     Depression with anxiety     Diabetes mellitus ([facility])     Disease of thyroid gland     Diverticulitis     Dyslipidemia     Dyspnea on exertion     Elbow pain     Fracture of olecranon, open     Gastritis     Generalized pain     Hematuria     History of colonoscopy     Hypertension     Hypokalemia     Hypomagnesemia     Leg cramping     Moderate obesity     Morbid obesity due to excess calories ([facility])     Myalgia     Myositis     Nausea in adult     Nephrolithiasis     Prepatellar bursitis, unspecified knee     Screening for lipid disorders     Shortness of breath     Spasm of muscle     Thyroid trouble        Past Surgical History:   Procedure Laterality Date    CARDIAC DEFIBRILLATOR PLACEMENT      LAPAROSCOPIC CHOLECYSTECTOMY      TOTAL ABDOMINAL HYSTERECTOMY W/ BILATERAL SALPINGOOPHORECTOMY         Family History   Problem Relation Age of Onset    Hypertension Sister     Cancer Sister     Coronary artery disease Family     Diabetes type II Family     Hypertension Family      I have reviewed and agree with the history as documented  E-Cigarette/Vaping    E-Cigarette Use Never User      E-Cigarette/Vaping Substances     Social History     Tobacco Use    Smoking status: Never Smoker    Smokeless tobacco: Never Used   Substance Use Topics    Alcohol use: Yes     Frequency: Monthly or less     Drinks per session: 1 or 2    Drug use: No       Review of Systems   Constitutional: Negative for chills and fever  HENT: Negative for congestion and sore throat  Respiratory: Negative for cough and shortness of breath  Cardiovascular: Negative for chest pain  Gastrointestinal: Positive for nausea  Negative for abdominal pain and vomiting  Musculoskeletal: Positive for arthralgias and myalgias  Neurological: Positive for dizziness, tremors and light-headedness  Negative for seizures, syncope, facial asymmetry, speech difficulty, weakness, numbness and headaches  All other systems reviewed and are negative  Physical Exam  Physical Exam  Vitals signs and nursing note reviewed  Constitutional:       General: She is not in acute distress  Appearance: Normal appearance  She is obese  HENT:      Head: Atraumatic  Right Ear: External ear normal       Left Ear: External ear normal       Nose: Nose normal       Mouth/Throat:      Mouth: Mucous membranes are moist       Pharynx: Oropharynx is clear  Eyes:      General: No scleral icterus  Extraocular Movements: Extraocular movements intact  Conjunctiva/sclera: Conjunctivae normal       Pupils: Pupils are equal, round, and reactive to light  Cardiovascular:      Rate and Rhythm: Normal rate and regular rhythm  Pulses: Normal pulses  Pulmonary:      Effort: Pulmonary effort is normal  No respiratory distress  Breath sounds: Normal breath sounds  No wheezing, rhonchi or rales  Abdominal:      General: Abdomen is flat  Bowel sounds are normal  There is no distension  Palpations: Abdomen is soft  Tenderness: There is no abdominal tenderness  There is no rebound  Musculoskeletal: Normal range of motion  General: No deformity  Right lower leg: No edema  Left lower leg: No edema  Skin:     Capillary Refill: Capillary refill takes less than 2 seconds  Findings: No rash  Neurological:      General: No focal deficit present  Mental Status: She is alert and oriented to person, place, and time           Vital Signs  ED Triage Vitals [05/23/21 0612]   Temperature Pulse Respirations Blood Pressure SpO2   97 7 °F (36 5 °C) 75 16 106/66 97 %      Temp Source Heart Rate Source Patient Position - Orthostatic VS BP Location FiO2 (%)   Oral Monitor Lying Right arm --      Pain Score       7           Vitals:    05/23/21 0612   BP: 106/66   Pulse: 75   Patient Position - Orthostatic VS: Lying         Visual Acuity      ED Medications  Medications   ondansetron (ZOFRAN) injection 4 mg (has no administration in time range)       Diagnostic Studies  Results Reviewed     Procedure Component Value Units Date/Time    Troponin I [[id number]]     Lab Status: No result Specimen: Blood     NT-BNP PRO [[id number]]     Lab Status: No result Specimen: Blood     UA (URINE) with reflex to Scope [[id number]]     Lab Status: No result Specimen: Urine     Lipase [[id number]]     Lab Status: No result Specimen: Blood     Magnesium [[id number]]     Lab Status: No result Specimen: Blood     CBC and differential [[id number]]     Lab Status: No result Specimen: Blood     Comprehensive metabolic panel [[id number]]     Lab Status: No result Specimen: Blood                  XR chest 1 view portable    (Results Pending)   CT head without contrast    (Results Pending)              Procedures  Procedures         ED Course                             SBIRT 20yo+      Most Recent Value   SBIRT (24 yo +)   In order to provide better care to our patients, we are screening all of our patients for alcohol and drug use  Would it be okay to ask you these screening questions? Yes Filed at: 05/23/2021 5722   Initial Alcohol Screen: US AUDIT-C    1  How often do you have a drink containing alcohol?  0 Filed at: 05/23/2021 0625   2  How many drinks containing alcohol do you have on a typical day you are drinking? 0 Filed at: 05/23/2021 0625   3a  Male UNDER 65: How often do you have five or more drinks on one occasion? 0 Filed at: 05/23/2021 0625   3b  FEMALE Any Age, or MALE 65+: How often do you have 4 or more drinks on one occassion? 0 Filed at: 05/23/2021 9119   Audit-C Score  0 Filed at: 05/23/2021 1202   SORAYA: How many times in the past year have you    Used an illegal drug or used a prescription medication for non-medical reasons? Never Filed at: 05/23/2021 2391                    MDM  Number of Diagnoses or Management Options  Anxiety:   Weakness:   Diagnosis management comments: Pulse ox 96% on room air indicating adequate oxygenation  Patient signed out next provider Dr Sienna Walters pending lab results  Amount and/or Complexity of Data Reviewed  Clinical lab tests: ordered  Decide to obtain previous medical records or to obtain history from someone other than the patient: yes  Review and summarize past medical records: yes  Discuss the patient with other providers: yes    Patient Progress  Patient progress: stable      Disposition  Final diagnoses:   None     ED Disposition     None      Follow-up Information    None         Patient's Medications   Discharge Prescriptions    No medications on file     No discharge procedures on file      PDMP Review     None          ED Provider  Electronically Signed by           Dillon Kwon DO  05/24/21 4526

## 2021-05-25 LAB
ATRIAL RATE: 109 BPM
ATRIAL RATE: 78 BPM
P AXIS: 42 DEGREES
P AXIS: 57 DEGREES
PR INTERVAL: 140 MS
PR INTERVAL: 178 MS
QRS AXIS: -23 DEGREES
QRS AXIS: -77 DEGREES
QRSD INTERVAL: 162 MS
QRSD INTERVAL: 166 MS
QT INTERVAL: 406 MS
QT INTERVAL: 490 MS
QTC INTERVAL: 546 MS
QTC INTERVAL: 558 MS
T WAVE AXIS: 119 DEGREES
T WAVE AXIS: 90 DEGREES
VENTRICULAR RATE: 109 BPM
VENTRICULAR RATE: 78 BPM

## 2021-05-25 PROCEDURE — 93010 ELECTROCARDIOGRAM REPORT: CPT | Performed by: INTERNAL MEDICINE

## 2021-05-27 LAB
BACTERIA BLD CULT: NORMAL
BACTERIA BLD CULT: NORMAL

## 2021-06-01 NOTE — ED PROVIDER NOTES
History  Chief Complaint   Patient presents with    Generalized Body Aches     "FOR THE PAST 4 DAYS I HAVE HAD MUSCLE ACHES, MUSCLE SPASMS, JOINT PAIN AND I HAVE HAD A HISTORY OF RHABDOMYLOSIS AND IT FEELS LIKE THAT AGAIN"       History provided by:  Patient   used: No    Generalized Body Aches  Location:  Diffuse  Quality:  Aches  Severity:  Moderate  Onset quality:  Gradual  Duration:  4 days  Timing:  Constant  Progression:  Unchanged  Chronicity:  Recurrent  Context:  H/o rhadomyolysis  Relieved by:  Nothing  Worsened by:  Nothing  Ineffective treatments:  Lidoderm patches, warm compresses  Associated symptoms: fatigue and myalgias    Associated symptoms: no abdominal pain, no chest pain, no congestion, no cough, no diarrhea, no fever, no headaches, no nausea, no rash, no shortness of breath, no sore throat and no vomiting    Fatigue:     Severity:  Moderate    Duration:  4 days    Timing:  Constant    Progression:  Unchanged  Myalgias:     Location:  Generalized    Quality:  Aching    Severity:  Moderate    Onset quality:  Gradual    Duration:  4 weeks    Timing:  Constant    Progression:  Unchanged  Risk factors:  See PMH      Prior to Admission Medications   Prescriptions Last Dose Informant Patient Reported? Taking? Entresto  MG TABS 5/21/2021 at Unknown time  No Yes   Sig: take 1 tablet by mouth twice a day   Xarelto 20 MG tablet 5/21/2021 at Unknown time  No Yes   Sig: take 1 tablet by mouth once daily   budesonide-formoterol (SYMBICORT) 160-4 5 mcg/act inhaler 5/21/2021 at Unknown time Self No Yes   Sig: Inhale 2 puffs 2 (two) times a day Rinse mouth after use     diclofenac sodium (VOLTAREN) 1 % 5/21/2021 at Unknown time Self No Yes   Sig: Apply 2 g topically 4 (four) times a day   diphenhydrAMINE (BENADRYL) 50 MG tablet  Self No No   Sig: Take 1 tablet (50 mg total) by mouth every 8 (eight) hours as needed for itching   ipratropium (ATROVENT) 0 02 % nebulizer solution  Self No No   Sig: Take 2 5 mL by nebulization 4 (four) times a day for 30 days   levalbuterol (XOPENEX HFA) 45 mcg/act inhaler 5/21/2021 at Unknown time Self No Yes   Sig: Inhale 2 puffs every 6 (six) hours as needed for shortness of breath   levalbuterol (XOPENEX) 1 25 mg/3 mL nebulizer solution  Self No No   Sig: inhale contents of 1 vial in nebulizer every 6 hours if needed   levothyroxine 175 mcg tablet 5/21/2021 at Unknown time Self No Yes   Sig: Take 1 tablet (175 mcg total) by mouth daily   pantoprazole (PROTONIX) 40 mg tablet 5/21/2021 at Unknown time Self No Yes   Sig: Take 1 tablet (40 mg total) by mouth daily   potassium chloride (K-DUR) 10 mEq tablet 5/21/2021 at Unknown time Self No Yes   Sig: take 1 tablet by mouth once daily   pravastatin (PRAVACHOL) 40 mg tablet 5/21/2021 at Unknown time  No Yes   Sig: take 1 tablet by mouth once daily   sotalol (BETAPACE) 80 mg tablet 5/21/2021 at Unknown time Self No Yes   Sig: TAKE 1 TABLET BY MOUTH EVERY 12 HOURS   spironolactone (ALDACTONE) 25 mg tablet 5/21/2021 at Unknown time Self No Yes   Sig: TAKE 1/2 TABLET BY MOUTH DAILY   tiZANidine (ZANAFLEX) 2 mg tablet 5/21/2021 at Unknown time Self No Yes   Sig: take 1 tablet by mouth every 8 hours if needed for muscle spasm   torsemide (DEMADEX) 20 mg tablet 5/21/2021 at Unknown time  No Yes   Sig: take 2 tablets by mouth once daily      Facility-Administered Medications: None       Past Medical History:   Diagnosis Date    A-fib ([place] [address] )     Abnormal blood sugar     Acid reflux     Acute bronchitis     Allergic reaction     Anxiety     Arthritis     Asthma     Cardiomyopathy ([place] [address] )     Cellulitis of left lower leg     Chest pain     CHF (congestive heart failure) (HCC)     Constipation     Depression with anxiety     Diabetes mellitus ([place] [address] )     Disease of thyroid gland     Diverticulitis     Dyslipidemia     Dyspnea on exertion     Elbow pain     Fracture of olecranon, open     Gastritis     Generalized pain     Hematuria     History of colonoscopy     Hypertension     Hypokalemia     Hypomagnesemia     Leg cramping     Moderate obesity     Morbid obesity due to excess calories (HCC)     Myalgia     Myositis     Nausea in adult     Nephrolithiasis     Prepatellar bursitis, unspecified knee     Screening for lipid disorders     Shortness of breath     Spasm of muscle     Thyroid trouble        Past Surgical History:   Procedure Laterality Date    CARDIAC DEFIBRILLATOR PLACEMENT      LAPAROSCOPIC CHOLECYSTECTOMY      TOTAL ABDOMINAL HYSTERECTOMY W/ BILATERAL SALPINGOOPHORECTOMY         Family History   Problem Relation Age of Onset    Hypertension Sister     Cancer Sister     Coronary artery disease Family     Diabetes type II Family     Hypertension Family      I have reviewed and agree with the history as documented  E-Cigarette/Vaping    E-Cigarette Use Never User      E-Cigarette/Vaping Substances     Social History     Tobacco Use    Smoking status: Never Smoker    Smokeless tobacco: Never Used   Substance Use Topics    Alcohol use: Yes     Frequency: Monthly or less     Drinks per session: 1 or 2    Drug use: No       Review of Systems   Constitutional: Positive for activity change and fatigue  Negative for appetite change, chills, diaphoresis and fever  HENT: Negative for congestion, sore throat, trouble swallowing and voice change  Eyes: Negative for photophobia, pain and redness  Respiratory: Positive for chest tightness  Negative for cough and shortness of breath  Cardiovascular: Negative for chest pain, palpitations and leg swelling  Gastrointestinal: Negative for abdominal pain, diarrhea, nausea and vomiting  Genitourinary: Negative for difficulty urinating, dysuria, flank pain and hematuria  Musculoskeletal: Positive for arthralgias, back pain and myalgias  Negative for joint swelling, neck pain and neck stiffness     Skin: Negative for color change, pallor, rash and wound  Neurological: Negative for dizziness, weakness, light-headedness and headaches  Psychiatric/Behavioral: The patient is nervous/anxious  Physical Exam  Physical Exam  Vitals signs and nursing note reviewed  Constitutional:       General: She is not in acute distress  Appearance: Normal appearance  She is well-developed  She is obese  She is not ill-appearing, toxic-appearing or diaphoretic  HENT:      Head: Normocephalic and atraumatic  Nose: Nose normal       Mouth/Throat:      Mouth: Mucous membranes are moist    Eyes:      Extraocular Movements: Extraocular movements intact  Pupils: Pupils are equal, round, and reactive to light  Neck:      Musculoskeletal: Normal range of motion and neck supple  Cardiovascular:      Rate and Rhythm: Normal rate and regular rhythm  Pulmonary:      Effort: Pulmonary effort is normal       Breath sounds: Normal breath sounds  Abdominal:      General: There is no distension  Palpations: Abdomen is soft  Tenderness: There is no abdominal tenderness  Musculoskeletal: Normal range of motion  General: No tenderness or signs of injury  Right lower leg: No edema  Left lower leg: No edema  Skin:     General: Skin is warm and dry  Capillary Refill: Capillary refill takes less than 2 seconds  Neurological:      General: No focal deficit present  Mental Status: She is alert and oriented to person, place, and time  Psychiatric:      Comments:  Mod anxious         Vital Signs  ED Triage Vitals   Temperature Pulse Respirations Blood Pressure SpO2   05/21/21 0802 05/21/21 0559 05/21/21 0559 05/21/21 0559 05/21/21 0559   (!) 96 8 °F (36 °C) 80 18 109/69 96 %      Temp Source Heart Rate Source Patient Position - Orthostatic VS BP Location FiO2 (%)   05/21/21 0802 05/21/21 0559 05/21/21 0559 05/21/21 0559 --   Tympanic Right;Radial Sitting Right arm       Pain Score       05/21/21 0559 9           Vitals:    05/21/21 0559 05/21/21 0706 05/21/21 0828   BP: 109/69 124/72 125/75   Pulse: 80 62 65   Patient Position - Orthostatic VS: Sitting Sitting Sitting         Visual Acuity      ED Medications  Medications   baclofen tablet 10 mg (10 mg Oral Given 5/21/21 0659)   traMADol (ULTRAM) tablet 50 mg (50 mg Oral Given 5/21/21 0759)       Diagnostic Studies  Results Reviewed     Procedure Component Value Units Date/Time    Comprehensive metabolic panel [[id number]] Collected: 05/21/21 0629    Lab Status: Final result Specimen: Blood from Arm, Left Updated: 05/21/21 0655     Sodium 141 mmol/L      Potassium 3 9 mmol/L      Chloride 102 mmol/L      CO2 31 mmol/L      ANION GAP 8 mmol/L      BUN 22 mg/dL      Creatinine 1 29 mg/dL      Glucose 112 mg/dL      Calcium 9 2 mg/dL      AST 26 U/L      ALT 28 U/L      Alkaline Phosphatase 79 U/L      Total Protein 7 3 g/dL      Albumin 3 7 g/dL      Total Bilirubin 0 46 mg/dL      eGFR 50 ml/min/1 73sq m     Narrative:      Nadine guidelines for Chronic Kidney Disease (CKD):     Stage 1 with normal or high GFR (GFR > 90 mL/min/1 73 square meters)    Stage 2 Mild CKD (GFR = 60-89 mL/min/1 73 square meters)    Stage 3A Moderate CKD (GFR = 45-59 mL/min/1 73 square meters)    Stage 3B Moderate CKD (GFR = 30-44 mL/min/1 73 square meters)    Stage 4 Severe CKD (GFR = 15-29 mL/min/1 73 square meters)    Stage 5 End Stage CKD (GFR <15 mL/min/1 73 square meters)  Note: GFR calculation is accurate only with a steady state creatinine    CK (with reflex to MB) [[id number]]  (Normal) Collected: 05/21/21 0629    Lab Status: Final result Specimen: Blood from Arm, Left Updated: 05/21/21 0655     Total CK 64 U/L     CBC and differential [[id number]]  (Abnormal) Collected: 05/21/21 0629    Lab Status: Final result Specimen: Blood from Arm, Left Updated: 05/21/21 0638     WBC 7 12 Thousand/uL      RBC 4 62 Million/uL      Hemoglobin 12 4 g/dL Hematocrit 39 7 %      MCV 86 fL      MCH 26 8 pg      MCHC 31 2 g/dL      RDW 15 4 %      MPV 10 2 fL      Platelets 649 Thousands/uL      nRBC 0 /100 WBCs      Neutrophils Relative 61 %      Immat GRANS % 0 %      Lymphocytes Relative 27 %      Monocytes Relative 9 %      Eosinophils Relative 2 %      Basophils Relative 1 %      Neutrophils Absolute 4 36 Thousands/µL      Immature Grans Absolute 0 02 Thousand/uL      Lymphocytes Absolute 1 95 Thousands/µL      Monocytes Absolute 0 61 Thousand/µL      Eosinophils Absolute 0 13 Thousand/µL      Basophils Absolute 0 05 Thousands/µL                  No orders to display              Procedures  Procedures         ED Course                             SBIRT 20yo+      Most Recent Value   SBIRT (24 yo +)   In order to provide better care to our patients, we are screening all of our patients for alcohol and drug use  Would it be okay to ask you these screening questions? Yes Filed at: 05/21/2021 1759   Initial Alcohol Screen: US AUDIT-C    1  How often do you have a drink containing alcohol?  0 Filed at: 05/21/2021 0603   2  How many drinks containing alcohol do you have on a typical day you are drinking? 0 Filed at: 05/21/2021 0603   3a  Male UNDER 65: How often do you have five or more drinks on one occasion? 0 Filed at: 05/21/2021 0603   3b  FEMALE Any Age, or MALE 65+: How often do you have 4 or more drinks on one occassion? 0 Filed at: 05/21/2021 0603   Audit-C Score  0 Filed at: 05/21/2021 0558   SORAYA: How many times in the past year have you    Used an illegal drug or used a prescription medication for non-medical reasons?   Never Filed at: 05/21/2021 0603                    MDM  Number of Diagnoses or Management Options  Joint ache: established and worsening  Myalgia: established and worsening  Spasm of muscle: established and worsening  Diagnosis management comments: R/o recurrent rhabdo in pt with h/o, vs dehydration vs metabolic/electrolyte abdnomality; non-specific myalgia/arthralgias, rheumatological; anxiety state       Amount and/or Complexity of Data Reviewed  Clinical lab tests: ordered and reviewed  Tests in the medicine section of CPT®: reviewed and ordered  Decide to obtain previous medical records or to obtain history from someone other than the patient: yes  Review and summarize past medical records: yes  Independent visualization of images, tracings, or specimens: yes    Risk of Complications, Morbidity, and/or Mortality  Presenting problems: low  Diagnostic procedures: minimal  Management options: low    Patient Progress  Patient progress: improved      Disposition  Final diagnoses:   Myalgia   Spasm of muscle   Joint ache     Time reflects when diagnosis was documented in both MDM as applicable and the Disposition within this note     Time User Action Codes Description Comment    5/21/2021  7:29 AM Devon [place] Add [M79 10] Myalgia     5/21/2021  7:29 AM Devon [place] Add [U55 342] Muscle spasms of lower extremity     5/21/2021  7:29 AM Devon [place] Add [M52 823] Spasm of muscle     5/21/2021  7:29 AM Devon [place] Remove [E06 098] Muscle spasms of lower extremity     5/21/2021  7:30 AM Devon [place] Add [M25 50] Joint ache       ED Disposition     ED Disposition Condition Date/Time Comment    Discharge Stable Fri May 21, 2021 [facility] discharge to home/self care  Follow-up Information     Follow up With Specialties Details Why [address] [place] [address], MD Family Medicine Schedule an appointment as soon as possible for a visit in 3 days For re-evaluation and further management   [facility]  [phone]            Discharge Medication List as of 5/21/2021  7:32 AM      CONTINUE these medications which have NOT CHANGED    Details   budesonide-formoterol (SYMBICORT) 160-4 5 mcg/act inhaler Inhale 2 puffs 2 (two) times a day Rinse mouth after use , Starting Thu 10/4/2018, Normal diclofenac sodium (VOLTAREN) 1 % Apply 2 g topically 4 (four) times a day, Starting Fri 5/8/2020, Normal      Entresto  MG TABS take 1 tablet by mouth twice a day, Normal      levalbuterol (XOPENEX HFA) 45 mcg/act inhaler Inhale 2 puffs every 6 (six) hours as needed for shortness of breath, Starting u 10/4/2018, Normal      levothyroxine 175 mcg tablet Take 1 tablet (175 mcg total) by mouth daily, Starting Tue 8/25/2020, Normal      pantoprazole (PROTONIX) 40 mg tablet Take 1 tablet (40 mg total) by mouth daily, Starting Tue 8/25/2020, Normal      potassium chloride (K-DUR) 10 mEq tablet take 1 tablet by mouth once daily, Normal      pravastatin (PRAVACHOL) 40 mg tablet take 1 tablet by mouth once daily, Normal      sotalol (BETAPACE) 80 mg tablet TAKE 1 TABLET BY MOUTH EVERY 12 HOURS, Normal      spironolactone (ALDACTONE) 25 mg tablet TAKE 1/2 TABLET BY MOUTH DAILY, Normal      tiZANidine (ZANAFLEX) 2 mg tablet take 1 tablet by mouth every 8 hours if needed for muscle spasm, Normal      torsemide (DEMADEX) 20 mg tablet take 2 tablets by mouth once daily, Normal      Xarelto 20 MG tablet take 1 tablet by mouth once daily, Normal      diphenhydrAMINE (BENADRYL) 50 MG tablet Take 1 tablet (50 mg total) by mouth every 8 (eight) hours as needed for itching, Starting Wed 2/5/2020, Print      ipratropium (ATROVENT) 0 02 % nebulizer solution Take 2 5 mL by nebulization 4 (four) times a day for 30 days, Starting Sun 10/23/2016, Until Tue 5/14/2019, Print      levalbuterol (XOPENEX) 1 25 mg/3 mL nebulizer solution inhale contents of 1 vial in nebulizer every 6 hours if needed, Normal           No discharge procedures on file      PDMP Review     None          ED Provider  Electronically Signed by           Camden Grace MD  06/01/21 [id number]

## 2021-06-08 ENCOUNTER — OFFICE VISIT (OUTPATIENT)
Dept: CARDIOLOGY CLINIC | Facility: CLINIC | Age: 66
End: 2021-06-08
Payer: COMMERCIAL

## 2021-06-08 VITALS
HEART RATE: 87 BPM | TEMPERATURE: 98.6 F | WEIGHT: 246 LBS | SYSTOLIC BLOOD PRESSURE: 118 MMHG | HEIGHT: 66 IN | DIASTOLIC BLOOD PRESSURE: 68 MMHG | OXYGEN SATURATION: 97 % | BODY MASS INDEX: 39.53 KG/M2

## 2021-06-08 DIAGNOSIS — J45.20 MILD INTERMITTENT ASTHMA WITHOUT COMPLICATION: ICD-10-CM

## 2021-06-08 DIAGNOSIS — I48.0 PAROXYSMAL ATRIAL FIBRILLATION (HCC): ICD-10-CM

## 2021-06-08 DIAGNOSIS — E03.4 HYPOTHYROIDISM DUE TO ACQUIRED ATROPHY OF THYROID: ICD-10-CM

## 2021-06-08 DIAGNOSIS — R06.00 DYSPNEA ON EXERTION: ICD-10-CM

## 2021-06-08 DIAGNOSIS — I42.0 DILATED CARDIOMYOPATHY (HCC): ICD-10-CM

## 2021-06-08 DIAGNOSIS — E66.8 MODERATE OBESITY: ICD-10-CM

## 2021-06-08 DIAGNOSIS — E78.5 DYSLIPIDEMIA: ICD-10-CM

## 2021-06-08 DIAGNOSIS — I10 ESSENTIAL HYPERTENSION: ICD-10-CM

## 2021-06-08 DIAGNOSIS — I50.22 CHRONIC SYSTOLIC CONGESTIVE HEART FAILURE, NYHA CLASS 2 (HCC): ICD-10-CM

## 2021-06-08 PROCEDURE — 3078F DIAST BP <80 MM HG: CPT | Performed by: INTERNAL MEDICINE

## 2021-06-08 PROCEDURE — 93000 ELECTROCARDIOGRAM COMPLETE: CPT | Performed by: INTERNAL MEDICINE

## 2021-06-08 PROCEDURE — 99214 OFFICE O/P EST MOD 30 MIN: CPT | Performed by: INTERNAL MEDICINE

## 2021-06-08 PROCEDURE — 3074F SYST BP LT 130 MM HG: CPT | Performed by: INTERNAL MEDICINE

## 2021-06-08 PROCEDURE — 3008F BODY MASS INDEX DOCD: CPT | Performed by: INTERNAL MEDICINE

## 2021-06-08 PROCEDURE — 1160F RVW MEDS BY RX/DR IN RCRD: CPT | Performed by: INTERNAL MEDICINE

## 2021-06-08 PROCEDURE — 1036F TOBACCO NON-USER: CPT | Performed by: INTERNAL MEDICINE

## 2021-06-08 NOTE — PROGRESS NOTES
Progress Note - Cardiology Office  [facility]  Florencia Hester 77 y o  female MRN: [id number]  : 1955   Encounter: [id number]      Assessment:     1  Chronic systolic congestive heart failure, NYHA class 2 ([address] )    2  Dyspnea on exertion    3  Paroxysmal atrial fibrillation (HCC)    4  Dilated cardiomyopathy ([facility] [address] )    5  Essential hypertension    6  Dyslipidemia    7  Moderate obesity    8  Mild intermittent asthma without complication    9  Hypothyroidism due to acquired atrophy of thyroid        Discussion Summary and Plan:  1  Status post ICD shock for ventricular arrhythmias  Patient device was upgraded to biventricular device  She was also started on sotalol  QTC acceptable since then no ICD shock  Her LV lead threshold is slightly high  Her device was interrogated recently  Still has enough battery  Continue current Rx    2  Chronic systolic heart failure [place] Heart Association class II/3  Patient has tolerated Entresto very well  No clinical evidence of heart failure or ischemia at this time  Currently she is taking sotalol, Aldactone, entresto  She is on high dose of 97/103 mg twice a day  Torsemide is 40 mg daily along with Aldactone 25 mg daily  Continue same medication electrolytes are acceptable  Labs done from May 2021 reviewed  3  Status post [employer] ICD  Her device was upgraded to [facility] biventricular ICD  No more ICD shocks  Her device interrogation reviewed with her  Device was interrogated in 2021  No events noted  Patient has battery life of 1 5 years    4  Bronchial asthma  Not actively wheezing she uses p r n  Inhalers  Advised to follow up with Pulmonary    5  History of paroxysmal at fibrillation currently in sinus rhythm  Continue antithrombotic therapy  Patient tolerating well  Continue sotalol  Heart rate 80 beats per minute  She is on Xarelto for antithrombotic therapy  Continue same medications    She has been evaluated by EP  6  Hypothyroidism  She is on levothyroxine TSH was acceptable  Currently she is taking levothyroxine 175 mcg  7  Anxiety  She's now on Lexapro     8  Dyslipidemia  She is back to taking Pravachol  Continue Pravachol   Advised to be compliant with statins      9  Dyspnea on exertion  Patient has chronic There is no overt evidence of volume overload  She has history of obesity with BMI around   Forty-one  Encouraged her to keep losing weight  Continue same Rx  We will discussed with the device company about compatibility of device with Ms  Starting device and let  Her know  Follow-up 4-6 months             Counseling :  A description of the counseling  Issues related to heart failure, regular diet, weight all discussed at length  All her questions answered  Goals and Barriers  The patient has the current Goals: To stay out of heart failure  The patent has the current Barriers: Weight gain  Patient's ability to self care: Yes  Medication side effect reviewed with patient in detail and all their questions answered to their satisfaction  HPI :     Brynn Rivas is a 77y o  year old female who came for follow up  Patient has with past medical history significant for nonischemic dilated cardiomyopathy, hypertension, bronchial asthma, moderate obesity, dyslipidemia, hypothyroidism, status post [employer] single-chamber ICD, mild nonobstructive disease by cardiac catheterization who came for regular follow-up and interrogation of her ICD  She has been doing pretty well she's she is on coralanor, she did she denies any chest pain, any shortness of breath  Recently she gained more weight as she was on steroids for bronchial asthma  Her ICD was interrogated today  No fever, no chills, no other significant complaint       02/24/2021   above reviewed  Patient came for follow-up    She had a medical history significant for chronic systolic and diastolic heart failure, history of nonischemic cardiomyopathy status post biventricular pacemaker and ICD, paroxysmal atrial fibrillation on sotalol, history of NSVT, history of dyslipidemia, history of anxiety who was admitted last year with heart failure and has been doing well  Two days ago she felt heart rate was racing she did send us a reading by her device which shows her device is functioning adequately and there was no evidence of any volume overload  She has today heart rate of 80 beats per minute and it is atrial sensed and ventricular paced  No nausea no vomiting  Today her weight is 252 lb  She does good when her weight is around between 250 - 255  She still has some exertional shortness of breath which is chronic not changed  She get easily nervous she had some back pain other problems  She had a blood test done October 2020 which were acceptable  06/08/2021  Above reviewed  Patient came for follow-up  She was in the emergency room 3 times last month due to shaking and pain all over and not feeling well  It was later on found related to stress as well as her medications baclofen  She was started on gabapentin for which she has allergic reaction now she is feeling better  She is recommended rehab and want a machine to do a relaxation therapy  Patient has medical history significant for chronic systolic and diastolic heart failure, history of nonischemic cardiomyopathy status post biventricular pacemaker and ICD, paroxysmal atrial fibrillation on sotalol, history of NSVT, dyslipidemia, anxiety and recurrent heart failures  She is doing well today heart rate 87 beats per minute with atrial sensed and ventricular paced rare PVCs  Her weight is 246 lb  She does well when her weight is less than 250 lb  No nausea no vomiting no other issues at this time labs done from May 2021 reviewed  Review of Systems   Constitutional: Negative for activity change, chills, diaphoresis, fever and unexpected weight change  HENT: Negative for congestion  Eyes: Negative for discharge and redness  Respiratory: Positive for shortness of breath  Negative for cough, chest tightness and wheezing  Chronic exertional not changed   Cardiovascular: Negative  Negative for chest pain, palpitations and leg swelling  Gastrointestinal: Negative for abdominal pain, diarrhea and nausea  Endocrine: Negative  Genitourinary: Negative for decreased urine volume and urgency  Musculoskeletal: Positive for arthralgias and back pain  Negative for gait problem  Skin: Negative for rash and wound  Allergic/Immunologic: Negative  Neurological: Negative for dizziness, seizures, syncope, weakness, light-headedness and headaches  Hematological: Negative  Psychiatric/Behavioral: Negative for agitation and confusion  The patient is nervous/anxious          Historical Information   Past Medical History:   Diagnosis Date    A-fib ([place] [address] )     Abnormal blood sugar     Acid reflux     Acute bronchitis     Allergic reaction     Anxiety     Arthritis     Asthma     Cardiomyopathy ([place] [address] )     Cellulitis of left lower leg     Chest pain     CH[facility]est[facility] failure) [facility])     Constipation     Depression with anxiety     Diabetes mellitus ([place] [address] )     Disease of thyroid gland     Diverticulitis     Dyslipidemia     Dyspnea on exertion     Elbow pain     Fracture of olecranon, open     Gastritis     Generalized pain     Hematuria     History of colonoscopy     Hypertension     Hypokalemia     Hypomagnesemia     Leg cramping     Moderate obesity     Morbid obesity due to excess calories ([facility])     Myalgia     Myositis     Nausea in adult     Nephrolithiasis     Prepatellar bursitis, unspecified knee     Screening for lipid disorders     Shortness of breath     Spasm of muscle     Thyroid trouble      Past Surgical History:   Procedure Laterality Date    CARDIAC DEFIBRILLATOR PLACEMENT      LAPAROSCOPIC CHOLECYSTECTOMY      TOTAL ABDOMINAL HYSTERECTOMY W/ BILATERAL SALPINGOOPHORECTOMY       Social History     Substance and Sexual Activity   Alcohol Use Yes    Frequency: Monthly or less    Drinks per session: 1 or 2     Social History     Substance and Sexual Activity   Drug Use No     Social History     Tobacco Use   Smoking Status Never Smoker   Smokeless Tobacco Never Used     Family History:   Family History   Problem Relation Age of Onset    Hypertension Sister     Cancer Sister     Coronary artery disease Family     Diabetes type II Family     Hypertension Family        Meds/Allergies     Allergies   Allergen Reactions    Omnipaque [Iohexol] Swelling    Penicillins Swelling    Iv Dye  [Iodinated Diagnostic Agents] Throat Swelling    Shellfish-Derived Products - Food Allergy Hives       Current Outpatient Medications:     budesonide-formoterol (SYMBICORT) 160-4 5 mcg/act inhaler, Inhale 2 puffs 2 (two) times a day Rinse mouth after use , Disp: 1 Inhaler, Rfl: 5    diclofenac sodium (VOLTAREN) 1 %, Apply 2 g topically 4 (four) times a day, Disp: 100 g, Rfl: 0    diphenhydrAMINE (BENADRYL) 50 MG tablet, Take 1 tablet (50 mg total) by mouth every 8 (eight) hours as needed for itching, Disp: 30 tablet, Rfl: 0    Entresto  MG TABS, take 1 tablet by mouth twice a day, Disp: 180 tablet, Rfl: 3    ipratropium (ATROVENT) 0 02 % nebulizer solution, Take 2 5 mL by nebulization 4 (four) times a day for 30 days, Disp: 300 mL, Rfl: 0    levalbuterol (XOPENEX HFA) 45 mcg/act inhaler, Inhale 2 puffs every 6 (six) hours as needed for shortness of breath, Disp: 1 Inhaler, Rfl: 5    levalbuterol (XOPENEX) 1 25 mg/3 mL nebulizer solution, inhale contents of 1 vial in nebulizer every 6 hours if needed, Disp: 720 mL, Rfl: 1    levothyroxine 175 mcg tablet, Take 1 tablet (175 mcg total) by mouth daily, Disp: 90 tablet, Rfl: 3    ondansetron (ZOFRAN-ODT) 4 mg disintegrating tablet, Take 1 tablet (4 mg total) by mouth every 6 (six) hours as needed for nausea or vomiting, Disp: 20 tablet, Rfl: 0    pantoprazole (PROTONIX) 40 mg tablet, Take 1 tablet (40 mg total) by mouth daily, Disp: 90 tablet, Rfl: 3    potassium chloride (K-DUR) 10 mEq tablet, take 1 tablet by mouth once daily, Disp: 30 tablet, Rfl: 5    pravastatin (PRAVACHOL) 40 mg tablet, take 1 tablet by mouth once daily, Disp: 90 tablet, Rfl: 3    sotalol (BETAPACE) 80 mg tablet, TAKE 1 TABLET BY MOUTH EVERY 12 HOURS, Disp: 180 tablet, Rfl: 3    spironolactone (ALDACTONE) 25 mg tablet, TAKE 1/2 TABLET BY MOUTH DAILY, Disp: 45 tablet, Rfl: 3    tiZANidine (ZANAFLEX) 2 mg tablet, take 1 tablet by mouth every 8 hours if needed for muscle spasm, Disp: 21 tablet, Rfl: 3    torsemide (DEMADEX) 20 mg tablet, take 2 tablets by mouth once daily, Disp: 180 tablet, Rfl: 1    Xarelto 20 MG tablet, take 1 tablet by mouth once daily, Disp: 90 tablet, Rfl: 3    baclofen 20 mg tablet, Take 1 tablet (20 mg total) by mouth 3 (three) times a day (Patient not taking: Reported on 6/8/2021), Disp: 30 tablet, Rfl: 0    escitalopram (LEXAPRO) 10 mg tablet, Take 1 tablet (10 mg total) by mouth daily (Patient not taking: Reported on 6/8/2021), Disp: 30 tablet, Rfl: 0    Vitals: Blood pressure 118/68, pulse 87, temperature 98 6 °F (37 °C), height 5' 6" (1 676 m), weight 112 kg (246 lb), SpO2 97 %  Body mass index is 39 71 kg/m²  Vitals:    06/08/21 1313   Weight: 112 kg (246 lb)     BP Readings from Last 3 Encounters:   06/08/21 118/68   05/23/21 111/64   05/22/21 121/65       Physical Exam   Constitutional: She is oriented to person, place, and time  She appears well-developed and well-nourished  No distress  HENT:   Head: Normocephalic and atraumatic  Eyes: Pupils are equal, round, and reactive to light  Neck: Neck supple  No JVD present  No tracheal deviation present  No thyromegaly present     Cardiovascular: Normal rate, regular rhythm, S1 normal and S2 normal  Exam reveals no gallop, no S3, no S4, no distant heart sounds and no friction rub  Murmur heard  Systolic (ejection) murmur is present with a grade of 2/6  S1-S2 regular with 2/6 as murmur S4 present   Pulmonary/Chest: Effort normal and breath sounds normal  No respiratory distress  She has no wheezes  She has no rales  She exhibits no tenderness  Abdominal: Soft  Bowel sounds are normal  She exhibits no distension  There is no abdominal tenderness  Musculoskeletal:         General: No deformity or edema  Neurological: She is alert and oriented to person, place, and time  Skin: Skin is warm and dry  No rash noted  She is not diaphoretic  No pallor  Psychiatric: She has a normal mood and affect  Her behavior is normal  Judgment normal      Diagnostic Studies Review Cardio:    Echocardiogram/VANNESA: Echo Doppler done in February 2016 shows EF 25-30%, pacemaker in the right-sided chambers, thickened aortic valve without stenosis, moderate to severe mitral regurgitation  Repeat echo Doppler done February 20, 2018  LV is markedly dilated EF 25%, mild LVH, right atrium mildly dilated mild MR and trace TR which was insufficient to measure pulmonary artery pressure  Pacemaker Wire in right-sided chambers  Repeat echo Doppler done 10/12/2020 shows patient's EF is around 60%, grade 3 diastolic dysfunction, mild mitral regurgitation      Catheterization: Cardiac catheter patient done in 2013 was EF 30%, no evidence of significant obstructive coronary artery disease  This was done in outside hospital       ICD/ Pacer Interpretation Single-chamber ICD interrogation shows patient [facility] ICD is working adequately is set at a rate of the VVI 60  ECG Report:      Comparison to prior ECGs: no interval change  01/22/2018  Twelve lead EKG shows normal sinus rhythm heart rate 81 beats per minute  Poor R-wave progression  No change from old EKG  Repeat 12 lead EKG on 03/08/2018 shows normal sinus rhythm    Rare PVCs heart rate 78 beats per minute  Prolonged QTC      07/26/2018 12 lead EKG shows normal sinus rhythm heart rate 90 beats per minute  Peak PVCs noted  IVCD otherwise no other significant ST changes  Her QRS duration is 118 milliseconds  Twelve lead EKG done today 11/08/2018 shows normal sinus rhythm  Incomplete LBBB with IVCD her QS duration is 118 milliseconds  Twelve lead EKG shows atrial sense ventricular paced heart rate 75 beats per minute  She is biventricular paced now      Twelve lead EKG done 01/16/2020 shows atrial sensed ventricular paced heart rate 82 beats per minute she is biventricular pacemaker  Her device findings reviewed with her which was interrogated [facility]    Repeat interrogation from January 2020 reviewed      Twelve lead EKG shows paced rhythm heart rate 89 beats per minute she is biventricular pacemaker  Twelve lead EKG 11/12/2020 shows atrial sensed ventricular paced heart rate 79 beats per minute she has a biventricular pacemaker  Twelve lead EKG 02/24/2021 shows atrial sense ventricular paced heart rate 80 beats per minute  Twelve lead EKG 06/08/2021 shows atrial sensed ventricular paced heart rate around 87 beats per minute  Few PVCs noted    Imaging:  Chest X-Ray:   Xr Chest 2 Views    Result Date: 4/9/2017  Impression Stable cardiomegaly without active pulmonary disease   Workstation performed: [id number]     Lab Review   Lab Results   Component Value Date    WBC 9 72 05/23/2021    HGB 12 4 05/23/2021    HCT 39 3 05/23/2021    MCV 85 05/23/2021     05/23/2021     Lab Results   Component Value Date     06/06/2016    K 3 6 05/23/2021     05/23/2021    CO2 28 05/23/2021    ANIONGAP 12 9 11/01/2015    BUN 19 05/23/2021    CREATININE 1 35 (H) 05/23/2021    GLUCOSE 95 06/06/2016    CALCIUM 9 6 05/23/2021    AST 20 05/23/2021    ALT 27 05/23/2021    ALKPHOS 79 05/23/2021    PROT 6 8 06/06/2016    BILITOT 0 3 06/06/2016    EGFR 47 05/23/2021     Lab Results   Component Value Date    GLUCOSE 95 06/06/2016    CALCIUM 9 6 05/23/2021     06/06/2016    K 3 6 05/23/2021    CO2 28 05/23/2021     05/23/2021    BUN 19 05/23/2021    CREATININE 1 35 (H) 05/23/2021         Lab Results   Component Value Date    HGBA1C 6 2 (H) 06/06/2016       Dr Elodia Reynoso MD [facility]      "This note has been constructed using a voice recognition system  Therefore there may be syntax, spelling, and/or grammatical errors   Please call if you have any questions  "

## 2021-06-09 ENCOUNTER — TELEPHONE (OUTPATIENT)
Dept: CARDIOLOGY CLINIC | Facility: CLINIC | Age: 66
End: 2021-06-09

## 2021-06-09 NOTE — TELEPHONE ENCOUNTER
Pre  Op  Clearance note- Cardiology    Renetta Lo   77 y o   female  1955      [facility]     Vickie Ulysses Reiter :   Patient's chart was reviewed for preop clearance  Patient was seen in our office on  06/08/2021  Patient has past medical history significant for  Nonischemic cardiomyopathy, chronic systolic and diastolic heart failure history of [employer] biventricular pacemaker and ICD  Patient is now scheduled for an electrotherapy device  Patient has no clinical evidence of heart failure or ischemia or active arrhythmia  Patient's last cardiac workup including  Echo and cath report reviewed  reports were reviewed and it shows  Severe LV dysfunction  She has no recent ICD shocks,  I have discussed with company representative  There is no contraindication to using the   Electro device therapy but please keep a safe distance from the device  If the too many bifurcation close to the device it could lead to false interpretation of those bifurcation as cardiac arrhythmia and device can shock the patient  If safe distance his practiced I believe device can be used without above issues  If there is any further issues please call our office or you can contact [address] [place] [address] Avenue he presented of whose number is provided on patient's  Device card  If you have any question please do not hesitate to call us at our office of [facility]  Phone # [phone]  Lab Results   Component Value Date    WBC 9 72 05/23/2021    HGB 12 4 05/23/2021    HCT 39 3 05/23/2021    MCV 85 05/23/2021     05/23/2021     Lab Results   Component Value Date    CREATININE 1 35 (H) 05/23/2021     No results found for: MAMIE Dang   6/9/2021  9:38 AM

## 2021-06-17 ENCOUNTER — TELEPHONE (OUTPATIENT)
Dept: CARDIOLOGY CLINIC | Facility: CLINIC | Age: 66
End: 2021-06-17

## 2021-06-17 NOTE — TELEPHONE ENCOUNTER
Received call from patient stating regarding tens unit approval from  [facility]  re-faxed clearance note to [facility] [phone]

## 2021-07-06 DIAGNOSIS — K21.9 GASTROESOPHAGEAL REFLUX DISEASE WITHOUT ESOPHAGITIS: ICD-10-CM

## 2021-07-15 DIAGNOSIS — I50.42 CHRONIC COMBINED SYSTOLIC AND DIASTOLIC HEART FAILURE, NYHA CLASS 2 (HCC): ICD-10-CM

## 2021-07-15 RX ORDER — SPIRONOLACTONE 25 MG/1
TABLET ORAL
Qty: 45 TABLET | Refills: 3 | Status: SHIPPED | OUTPATIENT
Start: 2021-07-15 | End: 2022-06-02

## 2021-07-22 ENCOUNTER — TELEPHONE (OUTPATIENT)
Dept: CARDIOLOGY CLINIC | Facility: CLINIC | Age: 66
End: 2021-07-22

## 2021-07-22 NOTE — TELEPHONE ENCOUNTER
----- Message from Ailyn Watson MD sent at 7/22/2021  8:30 AM EDT -----  Patient's device was interrogated in our office clinic  It shows device function is normal   She has some fluid buildup she should take an extra torsemide today and tomorrow  Make sure she takes her water pills adequately  Please call patient

## 2021-07-22 NOTE — TELEPHONE ENCOUNTER
Spoke with patient, message given per Dr Hadley Fitting  Patient verbalized understanding  Patient reported feeling dizzy and lightheaded on Sunday, however feels good today  Patient reports she has been taking liquid IV OTC ( 500mg sodium)  in her water and drinking Smart water to build up her electrolytes  Patient reports her BP this am 90/58, rechecked after drinking a bottle of Smart water and /68  Patient did not know her HR      today, denies chest pain, tightness, sob, palpitations, dizziness or lightheadedness

## 2021-07-24 DIAGNOSIS — E03.4 HYPOTHYROIDISM DUE TO ACQUIRED ATROPHY OF THYROID: ICD-10-CM

## 2021-07-24 RX ORDER — LEVOTHYROXINE SODIUM 175 UG/1
TABLET ORAL
Qty: 90 TABLET | Refills: 3 | Status: SHIPPED | OUTPATIENT
Start: 2021-07-24 | End: 2022-05-20

## 2021-08-02 RX ORDER — PANTOPRAZOLE SODIUM 40 MG/1
TABLET, DELAYED RELEASE ORAL
Qty: 90 TABLET | Refills: 3 | Status: SHIPPED | OUTPATIENT
Start: 2021-08-02

## 2021-08-05 ENCOUNTER — REMOTE DEVICE CLINIC VISIT (OUTPATIENT)
Dept: CARDIOLOGY CLINIC | Facility: CLINIC | Age: 66
End: 2021-08-05
Payer: COMMERCIAL

## 2021-08-05 DIAGNOSIS — Z95.810 PRESENCE OF AUTOMATIC CARDIOVERTER/DEFIBRILLATOR (AICD): Primary | ICD-10-CM

## 2021-08-05 PROCEDURE — 93295 DEV INTERROG REMOTE 1/2/MLT: CPT | Performed by: INTERNAL MEDICINE

## 2021-08-05 PROCEDURE — 93296 REM INTERROG EVL PM/IDS: CPT | Performed by: INTERNAL MEDICINE

## 2021-08-06 NOTE — PROGRESS NOTES
SUNI BI-V ICD/ACTIVE SYSTEM IS MRI CONDITIONAL   MERLIN TRANSMISSION:BATTERY VOLTAGE ADEQUATE  (1 2 YRS)  AP 5% BVP 86% ( <90%)  ALL AVAILABLE LEAD PARAMETERS WITHIN NORMAL LIMITS  NO SIGNIFICANT HIGH RATE EPISODES  [address] [facility]  NOISE REVERSION ALSO NOTED ON JULY 19TH  [facility] MONITORING WITHIN NORMAL LIMITS  NORMAL DEVICE FUNCTION  ---[place]

## 2021-08-09 ENCOUNTER — TELEPHONE (OUTPATIENT)
Dept: CARDIOLOGY CLINIC | Facility: CLINIC | Age: 66
End: 2021-08-09

## 2021-08-09 NOTE — TELEPHONE ENCOUNTER
----- Message from [place] MD sent at 8/6/2021  5:16 PM EDT -----  Patient's device was interrogated in our office clinic  It shows device function is normal      Please call patient

## 2021-08-14 ENCOUNTER — HOSPITAL ENCOUNTER (EMERGENCY)
Facility: HOSPITAL | Age: 66
Discharge: HOME | End: 2021-08-14
Attending: EMERGENCY MEDICINE | Admitting: EMERGENCY MEDICINE
Payer: COMMERCIAL

## 2021-08-14 VITALS
RESPIRATION RATE: 17 BRPM | SYSTOLIC BLOOD PRESSURE: 118 MMHG | DIASTOLIC BLOOD PRESSURE: 76 MMHG | TEMPERATURE: 96 F | HEART RATE: 75 BPM | OXYGEN SATURATION: 97 %

## 2021-08-14 DIAGNOSIS — T78.40XA ALLERGIC REACTION: Primary | ICD-10-CM

## 2021-08-14 PROCEDURE — 99283 EMERGENCY DEPT VISIT LOW MDM: CPT

## 2021-08-14 PROCEDURE — 99284 EMERGENCY DEPT VISIT MOD MDM: CPT | Performed by: EMERGENCY MEDICINE

## 2021-08-14 RX ORDER — PREDNISONE 20 MG/1
20 TABLET ORAL 2 TIMES DAILY
Qty: 10 TABLET | Refills: 0 | Status: SHIPPED | OUTPATIENT
Start: 2021-08-14 | End: 2021-08-19

## 2021-08-14 RX ORDER — PREDNISONE 20 MG/1
20 TABLET ORAL ONCE
Status: COMPLETED | OUTPATIENT
Start: 2021-08-14 | End: 2021-08-14

## 2021-08-14 RX ORDER — FAMOTIDINE 20 MG/1
20 TABLET, FILM COATED ORAL ONCE
Status: COMPLETED | OUTPATIENT
Start: 2021-08-14 | End: 2021-08-14

## 2021-08-14 RX ADMIN — FAMOTIDINE 20 MG: 20 TABLET ORAL at 08:46

## 2021-08-14 RX ADMIN — PREDNISONE 20 MG: 20 TABLET ORAL at 08:46

## 2021-08-14 NOTE — ED PROVIDER NOTES
History  Chief Complaint   Patient presents with    Allergic Reaction     pt reports has increased lip swelling and feels "tingling thickness" in back of throat- reports had fresh herbs on pasta salad yesterday and thinks she could be having reaction; no uvula swelling noted in triage     HPI  Patient is a 78-year-old female history of AFib, asthma cardiomyopathy with CHF presenting for evaluation of concern for allergic reaction  Patient states that she woke up this morning with swelling of her lips and a sensation of tingling in the back of the throat  Patient is concerned that she is a reaction to fresh herbs that she put a pasta salad yesterday  Patient denies any pruritus, nausea, vomiting, or additional GI symptoms, states that she had some intermittent wheezing consistent with prior asthma exacerbation  Patient not wheezing in the room at time of examination  Patient denies prior similar event for herself, denies family history of angioedema, denies any recent medication changes  Prior to Admission Medications   Prescriptions Last Dose Informant Patient Reported? Taking? Entresto  MG TABS  Self No No   Sig: take 1 tablet by mouth twice a day   Xarelto 20 MG tablet  Self No No   Sig: take 1 tablet by mouth once daily   baclofen 20 mg tablet  Self No No   Sig: Take 1 tablet (20 mg total) by mouth 3 (three) times a day   Patient not taking: Reported on 6/8/2021   budesonide-formoterol (SYMBICORT) 160-4 5 mcg/act inhaler  Self No No   Sig: Inhale 2 puffs 2 (two) times a day Rinse mouth after use     diclofenac sodium (VOLTAREN) 1 %  Self No No   Sig: Apply 2 g topically 4 (four) times a day   diphenhydrAMINE (BENADRYL) 50 MG tablet  Self No No   Sig: Take 1 tablet (50 mg total) by mouth every 8 (eight) hours as needed for itching   escitalopram (LEXAPRO) 10 mg tablet  Self No No   Sig: Take 1 tablet (10 mg total) by mouth daily   Patient not taking: Reported on 6/8/2021   ipratropium (ATROVENT) 0 02 % nebulizer solution  Self No No   Sig: Take 2 5 mL by nebulization 4 (four) times a day for 30 days   levalbuterol (XOPENEX HFA) 45 mcg/act inhaler  Self No No   Sig: Inhale 2 puffs every 6 (six) hours as needed for shortness of breath   levalbuterol (XOPENEX) 1 25 mg/3 mL nebulizer solution  Self No No   Sig: inhale contents of 1 vial in nebulizer every 6 hours if needed   levothyroxine 175 mcg tablet   No No   Sig: take 1 tablet by mouth once daily   ondansetron (ZOFRAN-ODT) 4 mg disintegrating tablet  Self No No   Sig: Take 1 tablet (4 mg total) by mouth every 6 (six) hours as needed for nausea or vomiting   pantoprazole (PROTONIX) 40 mg tablet   No No   Sig: take 1 tablet by mouth once daily   potassium chloride (K-DUR) 10 mEq tablet  Self No No   Sig: take 1 tablet by mouth once daily   pravastatin (PRAVACHOL) 40 mg tablet  Self No No   Sig: take 1 tablet by mouth once daily   sotalol (BETAPACE) 80 mg tablet  Self No No   Sig: TAKE 1 TABLET BY MOUTH EVERY 12 HOURS   spironolactone (ALDACTONE) 25 mg tablet   No No   Sig: TAKE 1/2 TABLET BY MOUTH DAILY   tiZANidine (ZANAFLEX) 2 mg tablet  Self No No   Sig: take 1 tablet by mouth every 8 hours if needed for muscle spasm   torsemide (DEMADEX) 20 mg tablet  Self No No   Sig: take 2 tablets by mouth once daily      Facility-Administered Medications: None       Past Medical History:   Diagnosis Date    [facility] ([facility])     Abnormal blood sugar     Acid reflux     Acute bronchitis     Allergic reaction     Anxiety     Arthritis     Asthma     Cardiomyopathy ([facility])     Cellulitis of left lower leg     Chest pain     CHF (congestive heart failure) ([facility])     Constipation     Depression with anxiety     Diabetes mellitus ([facility])     Disease of thyroid gland     Diverticulitis     Dyslipidemia     Dyspnea on exertion     Elbow pain     Fracture of olecranon, open     Gastritis     Generalized pain     Hematuria     History of colonoscopy     Hypertension  Hypokalemia     Hypomagnesemia     Leg cramping     Moderate obesity     Morbid obesity due to excess calories (HCC)     Myalgia     Myositis     Nausea in adult     Nephrolithiasis     Prepatellar bursitis, unspecified knee     Screening for lipid disorders     Shortness of breath     Spasm of muscle     Thyroid trouble        Past Surgical History:   Procedure Laterality Date    CARDIAC DEFIBRILLATOR PLACEMENT      LAPAROSCOPIC CHOLECYSTECTOMY      TOTAL ABDOMINAL HYSTERECTOMY W/ BILATERAL SALPINGOOPHORECTOMY         Family History   Problem Relation Age of Onset    Hypertension Sister     Cancer Sister     Coronary artery disease Family     Diabetes type II Family     Hypertension Family      I have reviewed and agree with the history as documented  E-Cigarette/Vaping    E-Cigarette Use Never User      E-Cigarette/Vaping Substances     Social History     Tobacco Use    Smoking status: Never Smoker    Smokeless tobacco: Never Used   Vaping Use    Vaping Use: Never used   Substance Use Topics    Alcohol use: Yes    Drug use: No       Review of Systems   Constitutional: Negative for chills, fatigue and fever  HENT: Positive for facial swelling  Negative for sore throat, trouble swallowing and voice change  Eyes: Negative for visual disturbance  Respiratory: Positive for shortness of breath  Negative for cough and chest tightness  Cardiovascular: Negative for chest pain  Gastrointestinal: Negative for abdominal distention, abdominal pain, constipation, diarrhea, nausea and vomiting  Endocrine: Negative for polydipsia and polyuria  Genitourinary: Negative for dysuria and hematuria  Musculoskeletal: Negative for arthralgias, joint swelling, myalgias and neck pain  Skin: Negative for color change and rash  Neurological: Negative for dizziness and headaches  Psychiatric/Behavioral: Negative for confusion     All other systems reviewed and are negative  Physical Exam  Physical Exam  Vitals and nursing note reviewed  Constitutional:       General: She is not in acute distress  Appearance: Normal appearance  She is not ill-appearing, toxic-appearing or diaphoretic  Comments: Well-appearing, nondistressed   HENT:      Head: Normocephalic and atraumatic  Comments: No significant lip, tongue, uvular swelling  Normal voice without hoarseness  Right Ear: External ear normal       Left Ear: External ear normal    Eyes:      General:         Right eye: No discharge  Left eye: No discharge  Pulmonary:      Effort: No respiratory distress  Comments: Lungs clear to auscultation bilaterally without wheezing  No increased work of breathing  Abdominal:      General: There is no distension  Musculoskeletal:         General: No deformity  Cervical back: Normal range of motion  Skin:     Findings: No lesion or rash  Neurological:      Mental Status: She is alert and oriented to person, place, and time  Mental status is at baseline     Psychiatric:         Mood and Affect: Mood and affect normal          Vital Signs  ED Triage Vitals   Temperature Pulse Respirations Blood Pressure SpO2   08/14/21 0727 08/14/21 0727 08/14/21 0727 08/14/21 0727 08/14/21 0727   (!) 96 °F (35 6 °C) 97 18 138/94 99 %      Temp Source Heart Rate Source Patient Position - Orthostatic VS BP Location FiO2 (%)   08/14/21 0727 08/14/21 0934 08/14/21 0727 08/14/21 0727 --   Tympanic Monitor Sitting Left arm       Pain Score       --                  Vitals:    08/14/21 0727 08/14/21 0934   BP: 138/94 118/76   Pulse: 97 75   Patient Position - Orthostatic VS: Sitting          Visual Acuity      ED Medications  Medications   famotidine (PEPCID) tablet 20 mg (20 mg Oral Given 8/14/21 0846)   predniSONE tablet 20 mg (20 mg Oral Given 8/14/21 0846)       Diagnostic Studies  Results Reviewed     None                 No orders to display Procedures  Procedures         ED Course                             SBIRT 20yo+      Most Recent Value   SBIRT (22 yo +)   In order to provide better care to our patients, we are screening all of our patients for alcohol and drug use  Would it be okay to ask you these screening questions? No Filed at: 08/14/2021 0748                    MDM  Number of Diagnoses or Management Options  Allergic reaction  Diagnosis management comments: Grossly benign examination, no significant appreciable lip swelling or additional oraopharyngeal swelling, no history of Ace inhibitor usage, no family history of angioedema, no additional symptoms of anaphylaxis, flank to observe for any kind of progression of lip swelling, patient signed out to Dr Gee Pittman pending observation period, ultimately discharged       Disposition  Final diagnoses: Allergic reaction     Time reflects when diagnosis was documented in both MDM as applicable and the Disposition within this note     Time User Action Codes Description Comment    8/14/2021  9:41 AM Letty Nielsen Add [T78 [id number]] Allergic reaction       ED Disposition     ED Disposition Condition Date/Time Comment    Discharge Stable Sat Aug 14, 2021  9:41 AM Dav Flaherty discharge to home/self care              Follow-up Information     Follow up With Specialties Details Why [address] [place] [address],[facility], MD Family Medicine Schedule an appointment as soon as possible for a visit   Sam Olivo [address]  [phone]            Discharge Medication List as of 8/14/2021  9:42 AM      START taking these medications    Details   predniSONE 20 mg tablet Take 1 tablet (20 mg total) by mouth 2 (two) times a day for 5 days, Starting Sat 8/14/2021, Until Thu 8/19/2021, Normal         CONTINUE these medications which have NOT CHANGED    Details   baclofen 20 mg tablet Take 1 tablet (20 mg total) by mouth 3 (three) times a day, Starting Sat 5/22/2021, Normal budesonide-formoterol (SYMBICORT) 160-4 5 mcg/act inhaler Inhale 2 puffs 2 (two) times a day Rinse mouth after use , Starting u 10/4/2018, Normal      diclofenac sodium (VOLTAREN) 1 % Apply 2 g topically 4 (four) times a day, Starting Fri 5/8/2020, Normal      diphenhydrAMINE (BENADRYL) 50 MG tablet Take 1 tablet (50 mg total) by mouth every 8 (eight) hours as needed for itching, Starting Wed 2/5/2020, Print      Entresto  MG TABS take 1 tablet by mouth twice a day, Normal      escitalopram (LEXAPRO) 10 mg tablet Take 1 tablet (10 mg total) by mouth daily, Starting Sun 5/23/2021, Normal      ipratropium (ATROVENT) 0 02 % nebulizer solution Take 2 5 mL by nebulization 4 (four) times a day for 30 days, Starting Sun 10/23/2016, Until Tue 6/8/2021, Print      levalbuterol (XOPENEX HFA) 45 mcg/act inhaler Inhale 2 puffs every 6 (six) hours as needed for shortness of breath, Starting Thu 10/4/2018, Normal      levalbuterol (XOPENEX) 1 25 mg/3 mL nebulizer solution inhale contents of 1 vial in nebulizer every 6 hours if needed, Normal      levothyroxine 175 mcg tablet take 1 tablet by mouth once daily, Normal      ondansetron (ZOFRAN-ODT) 4 mg disintegrating tablet Take 1 tablet (4 mg total) by mouth every 6 (six) hours as needed for nausea or vomiting, Starting Sat 5/22/2021, Normal      pantoprazole (PROTONIX) 40 mg tablet take 1 tablet by mouth once daily, Normal      potassium chloride (K-DUR) 10 mEq tablet take 1 tablet by mouth once daily, Normal      pravastatin (PRAVACHOL) 40 mg tablet take 1 tablet by mouth once daily, Normal      sotalol (BETAPACE) 80 mg tablet TAKE 1 TABLET BY MOUTH EVERY 12 HOURS, Normal      spironolactone (ALDACTONE) 25 mg tablet TAKE 1/2 TABLET BY MOUTH DAILY, Normal      tiZANidine (ZANAFLEX) 2 mg tablet take 1 tablet by mouth every 8 hours if needed for muscle spasm, Normal      torsemide (DEMADEX) 20 mg tablet take 2 tablets by mouth once daily, Normal      Xarelto 20 MG tablet take 1 tablet by mouth once daily, Normal           No discharge procedures on file      PDMP Review     None          ED Provider  Electronically Signed by           Yvette Steven MD  08/14/21 1466

## 2021-09-05 ENCOUNTER — HOSPITAL ENCOUNTER (EMERGENCY)
Facility: HOSPITAL | Age: 66
Discharge: HOME | End: 2021-09-05
Attending: EMERGENCY MEDICINE
Payer: COMMERCIAL

## 2021-09-05 ENCOUNTER — APPOINTMENT (EMERGENCY)
Dept: RADIOLOGY | Facility: HOSPITAL | Age: 66
End: 2021-09-05
Payer: COMMERCIAL

## 2021-09-05 VITALS
SYSTOLIC BLOOD PRESSURE: 107 MMHG | DIASTOLIC BLOOD PRESSURE: 80 MMHG | HEART RATE: 88 BPM | RESPIRATION RATE: 20 BRPM | OXYGEN SATURATION: 99 % | TEMPERATURE: 98.4 F

## 2021-09-05 DIAGNOSIS — M25.561 ACUTE PAIN OF RIGHT KNEE: Primary | ICD-10-CM

## 2021-09-05 PROCEDURE — 99282 EMERGENCY DEPT VISIT SF MDM: CPT | Performed by: EMERGENCY MEDICINE

## 2021-09-05 PROCEDURE — 93971 EXTREMITY STUDY: CPT

## 2021-09-05 PROCEDURE — 93971 EXTREMITY STUDY: CPT | Performed by: SURGERY

## 2021-09-05 PROCEDURE — 73564 X-RAY EXAM KNEE 4 OR MORE: CPT

## 2021-09-05 PROCEDURE — 99284 EMERGENCY DEPT VISIT MOD MDM: CPT

## 2021-09-05 NOTE — ED PROVIDER NOTES
History  Chief Complaint   Patient presents with   Osbaldo Shipley     states fell down a couple of steps two days ago  did not hit head  noted short time after that a lump behind R knee afraid of a blood clot     68-year-old female states she fell down a couple stairs 2 days ago did not hit her head no loss of consciousness did have most of her weight gone to her right lower extremity  States she now has some pain behind the right knee today with small lump there  Ambulatory with some pain  She is more concerned about possibility of bleeding or clot in that popliteal fossa in the back  History provided by:  Patient   used: No        Prior to Admission Medications   Prescriptions Last Dose Informant Patient Reported? Taking? Entresto  MG TABS  Self No No   Sig: take 1 tablet by mouth twice a day   Xarelto 20 MG tablet  Self No No   Sig: take 1 tablet by mouth once daily   baclofen 20 mg tablet  Self No No   Sig: Take 1 tablet (20 mg total) by mouth 3 (three) times a day   Patient not taking: Reported on 6/8/2021   budesonide-formoterol (SYMBICORT) 160-4 5 mcg/act inhaler  Self No No   Sig: Inhale 2 puffs 2 (two) times a day Rinse mouth after use     diclofenac sodium (VOLTAREN) 1 %  Self No No   Sig: Apply 2 g topically 4 (four) times a day   diphenhydrAMINE (BENADRYL) 50 MG tablet  Self No No   Sig: Take 1 tablet (50 mg total) by mouth every 8 (eight) hours as needed for itching   escitalopram (LEXAPRO) 10 mg tablet  Self No No   Sig: Take 1 tablet (10 mg total) by mouth daily   Patient not taking: Reported on 6/8/2021   ipratropium (ATROVENT) 0 02 % nebulizer solution  Self No No   Sig: Take 2 5 mL by nebulization 4 (four) times a day for 30 days   levalbuterol (XOPENEX HFA) 45 mcg/act inhaler  Self No No   Sig: Inhale 2 puffs every 6 (six) hours as needed for shortness of breath   levalbuterol (XOPENEX) 1 25 mg/3 mL nebulizer solution  Self No No   Sig: inhale contents of 1 vial in nebulizer every 6 hours if needed   levothyroxine 175 mcg tablet   No No   Sig: take 1 tablet by mouth once daily   ondansetron (ZOFRAN-ODT) 4 mg disintegrating tablet  Self No No   Sig: Take 1 tablet (4 mg total) by mouth every 6 (six) hours as needed for nausea or vomiting   pantoprazole (PROTONIX) 40 mg tablet   No No   Sig: take 1 tablet by mouth once daily   potassium chloride (K-DUR) 10 mEq tablet  Self No No   Sig: take 1 tablet by mouth once daily   pravastatin (PRAVACHOL) 40 mg tablet  Self No No   Sig: take 1 tablet by mouth once daily   sotalol (BETAPACE) 80 mg tablet  Self No No   Sig: TAKE 1 TABLET BY MOUTH EVERY 12 HOURS   spironolactone (ALDACTONE) 25 mg tablet   No No   Sig: TAKE 1/2 TABLET BY MOUTH DAILY   tiZANidine (ZANAFLEX) 2 mg tablet  Self No No   Sig: take 1 tablet by mouth every 8 hours if needed for muscle spasm   torsemide (DEMADEX) 20 mg tablet  Self No No   Sig: take 2 tablets by mouth once daily      Facility-Administered Medications: None       Past Medical History:   Diagnosis Date    A-fib ([facility])     Abnormal blood sugar     Acid reflux     Acute bronchitis     Allergic reaction     Anxiety     Arthritis     Asthma     Cardiomyopathy ([address] )     Cellulitis of left lower leg     Chest pain     CHF (congestive heart failure) ([facility])     Constipation     Depression with anxiety     Diabetes mellitus ([facility])     Disease of thyroid gland     Diverticulitis     Dyslipidemia     Dyspnea on exertion     Elbow pain     Fracture of olecranon, open     Gastritis     Generalized pain     Hematuria     History of colonoscopy     Hypertension     Hypokalemia     Hypomagnesemia     Leg cramping     Moderate obesity     Morbid obesity due to excess calories ([facility])     Myalgia     Myositis     Nausea in adult     Nephrolithiasis     Prepatellar bursitis, unspecified knee     Screening for lipid disorders     Shortness of breath     Spasm of muscle     Thyroid trouble Past Surgical History:   Procedure Laterality Date    CARDIAC DEFIBRILLATOR PLACEMENT      LAPAROSCOPIC CHOLECYSTECTOMY      TOTAL ABDOMINAL HYSTERECTOMY W/ BILATERAL SALPINGOOPHORECTOMY         Family History   Problem Relation Age of Onset    Hypertension Sister     Cancer Sister     Coronary artery disease Family     Diabetes type II Family     Hypertension Family      I have reviewed and agree with the history as documented  E-Cigarette/Vaping    E-Cigarette Use Never User      E-Cigarette/Vaping Substances     Social History     Tobacco Use    Smoking status: Never Smoker    Smokeless tobacco: Never Used   Vaping Use    Vaping Use: Never used   Substance Use Topics    Alcohol use: Yes    Drug use: No       Review of Systems   Constitutional: Negative for activity change, chills, diaphoresis and fever  HENT: Negative for congestion, ear pain, nosebleeds, sore throat, trouble swallowing and voice change  Eyes: Negative for pain, discharge and redness  Respiratory: Negative for apnea, cough, choking, shortness of breath, wheezing and stridor  Cardiovascular: Negative for chest pain and palpitations  Gastrointestinal: Negative for abdominal distention, abdominal pain, constipation, diarrhea, nausea and vomiting  Endocrine: Negative for polydipsia  Genitourinary: Negative for difficulty urinating, dysuria, flank pain, frequency, hematuria and urgency  Musculoskeletal: Positive for arthralgias and gait problem  Negative for back pain, joint swelling, myalgias, neck pain and neck stiffness  Skin: Negative for pallor and rash  Neurological: Negative for dizziness, tremors, syncope, speech difficulty, weakness, numbness and headaches  Hematological: Negative for adenopathy  Psychiatric/Behavioral: Negative for confusion, hallucinations, self-injury and suicidal ideas  The patient is not nervous/anxious          Physical Exam  Physical Exam  Vitals and nursing note reviewed  Constitutional:       General: She is not in acute distress  Appearance: She is well-developed  She is not diaphoretic  HENT:      Head: Normocephalic and atraumatic  Right Ear: External ear normal       Left Ear: External ear normal       Nose: Nose normal    Eyes:      Conjunctiva/sclera: Conjunctivae normal       Pupils: Pupils are equal, round, and reactive to light  Cardiovascular:      Rate and Rhythm: Normal rate and regular rhythm  Heart sounds: Normal heart sounds  Pulmonary:      Effort: Pulmonary effort is normal       Breath sounds: Normal breath sounds  Abdominal:      General: Bowel sounds are normal       Palpations: Abdomen is soft  Musculoskeletal:         General: Swelling, tenderness and signs of injury present  Normal range of motion  Cervical back: Normal range of motion and neck supple  Comments: Patient does have a slight lump in the popliteal fossa on the right side distal aspect behind the knee  Skin:     General: Skin is warm and dry  Neurological:      Mental Status: She is alert and oriented to person, place, and time  Deep Tendon Reflexes: Reflexes are normal and symmetric     Psychiatric:         Mood and Affect: Mood normal          Vital Signs  ED Triage Vitals [09/05/21 1501]   Temperature Pulse Respirations Blood Pressure SpO2   98 4 °F (36 9 °C) 88 20 107/80 99 %      Temp Source Heart Rate Source Patient Position - Orthostatic VS BP Location FiO2 (%)   Tympanic Monitor Sitting Right arm --      Pain Score       7           Vitals:    09/05/21 1501   BP: 107/80   Pulse: 88   Patient Position - Orthostatic VS: Sitting         Visual Acuity      ED Medications  Medications - No data to display    Diagnostic Studies  Results Reviewed     None                 VAS lower limb venous duplex study, unilateral/limited    (Results Pending)   XR knee 3 views Right non injury    (Results Pending)              Procedures  Procedures ED Course  ED Course as of Sep 05 1642   Sun Sep 05, 2021   1640 XR knee 3 views Right non injury                                           MDM    Disposition  Final diagnoses:   Acute pain of right knee     Time reflects when diagnosis was documented in both MDM as applicable and the Disposition within this note     Time User Action Codes Description Comment    9/5/2021  4:41 PM Racheal Aguilar Add [[id number] [id number]] Acute pain of right knee       ED Disposition     ED Disposition Condition Date/Time Comment    Discharge Stable Sun Sep 5, 2021  4:41 PM Marlon Saleem discharge to home/self care  Follow-up Information     Follow up With Specialties Details Why Contact Info    Melchor Jeronimo MD Family Medicine Schedule an appointment as soon as possible for a visit  As needed [facility] [address]  [address]  [address]  [phone]            Patient's Medications   Discharge Prescriptions    No medications on file     No discharge procedures on file      PDMP Review     None          ED Provider  Electronically Signed by           Rama Thomas DO  09/05/21 3376

## 2021-09-05 NOTE — Clinical Note
Luan Morales was seen and treated in our emergency department on 9/5/2021  Diagnosis:     Stella Rosenberg  may return to work on return date  She may return on this date: 09/08/2021         If you have any questions or concerns, please don't hesitate to call        Niko Waldrop DO    ______________________________           _______________          _______________  Hospital Representative                              Date                                Time

## 2021-09-23 ENCOUNTER — OFFICE VISIT (OUTPATIENT)
Dept: CARDIOLOGY CLINIC | Facility: CLINIC | Age: 66
End: 2021-09-23
Payer: COMMERCIAL

## 2021-09-23 VITALS
SYSTOLIC BLOOD PRESSURE: 110 MMHG | HEIGHT: 66 IN | BODY MASS INDEX: 39.21 KG/M2 | HEART RATE: 89 BPM | OXYGEN SATURATION: 99 % | WEIGHT: 244 LBS | DIASTOLIC BLOOD PRESSURE: 76 MMHG | TEMPERATURE: 98.2 F

## 2021-09-23 DIAGNOSIS — I47.2 NSVT (NONSUSTAINED VENTRICULAR TACHYCARDIA) (HCC): ICD-10-CM

## 2021-09-23 DIAGNOSIS — I50.22 CHRONIC SYSTOLIC CONGESTIVE HEART FAILURE, NYHA CLASS 2 (HCC): ICD-10-CM

## 2021-09-23 DIAGNOSIS — I42.0 DILATED CARDIOMYOPATHY (HCC): ICD-10-CM

## 2021-09-23 DIAGNOSIS — I10 ESSENTIAL HYPERTENSION: ICD-10-CM

## 2021-09-23 DIAGNOSIS — Z95.810 AICD (AUTOMATIC CARDIOVERTER/DEFIBRILLATOR) PRESENT: ICD-10-CM

## 2021-09-23 DIAGNOSIS — I48.0 PAROXYSMAL ATRIAL FIBRILLATION (HCC): ICD-10-CM

## 2021-09-23 DIAGNOSIS — E78.5 DYSLIPIDEMIA: ICD-10-CM

## 2021-09-23 DIAGNOSIS — R06.00 DYSPNEA ON EXERTION: ICD-10-CM

## 2021-09-23 DIAGNOSIS — E66.8 MODERATE OBESITY: ICD-10-CM

## 2021-09-23 PROBLEM — I47.29 NSVT (NONSUSTAINED VENTRICULAR TACHYCARDIA): Status: ACTIVE | Noted: 2021-01-01

## 2021-09-23 PROCEDURE — 3074F SYST BP LT 130 MM HG: CPT | Performed by: INTERNAL MEDICINE

## 2021-09-23 PROCEDURE — 1160F RVW MEDS BY RX/DR IN RCRD: CPT | Performed by: INTERNAL MEDICINE

## 2021-09-23 PROCEDURE — 3078F DIAST BP <80 MM HG: CPT | Performed by: INTERNAL MEDICINE

## 2021-09-23 PROCEDURE — 99214 OFFICE O/P EST MOD 30 MIN: CPT | Performed by: INTERNAL MEDICINE

## 2021-09-23 PROCEDURE — 93000 ELECTROCARDIOGRAM COMPLETE: CPT | Performed by: INTERNAL MEDICINE

## 2021-09-23 PROCEDURE — 3008F BODY MASS INDEX DOCD: CPT | Performed by: INTERNAL MEDICINE

## 2021-09-23 PROCEDURE — 1036F TOBACCO NON-USER: CPT | Performed by: INTERNAL MEDICINE

## 2021-09-23 NOTE — PROGRESS NOTES
Progress Note - Cardiology Office  [facility]  Dana Ford 77 y o  female MRN: [id number]  : 1955   Encounter: [id number]      Assessment:     1  Dyspnea on exertion    2  Chronic systolic congestive heart failure, NYHA class 2 ([facility] [address] )    3  Essential hypertension    4  Paroxysmal atrial fibrillation (HCC)    5  Dyslipidemia    6  Dilated cardiomyopathy ([facility] [address] )    7  Moderate obesity    8  AICD (automatic cardioverter/defibrillator) present    9  NSVT (nonsustained ventricular tachycardia) ([facility] )        Discussion Summary and Plan:  1  Status post ICD shock for ventricular arrhythmias  Patient device was upgraded to biventricular device  She was also started on sotalol  QTC acceptable since then no ICD shock  Her LV lead threshold is slightly high  Her device was interrogated in 2020  No ventricular arrhythmias noted she is on sotalol  2  Chronic systolic heart failure [place] Heart Association class II/3  Patient has tolerated Entresto very well  No clinical evidence of heart failure or ischemia at this time  Currently she is taking sotalol, Aldactone, entresto  She is on high dose of 97/103 mg twice a day  Torsemide is 40 mg daily along with Aldactone 25 mg daily  Continue same medication electrolytes are acceptable  Labs from May 2021 acceptable  3  Status post [facility] ICD  Her device was upgraded to [facility] biventricular ICD  No more ICD shocks  Her device interrogation reviewed with her  Device was interrogated in 2021  No events noted  Patient has battery life of 1 2 years    4  Bronchial asthma  Not actively wheezing she uses p r n  Inhalers  Advised to follow up with Pulmonary    5  History of paroxysmal at fibrillation currently in sinus rhythm  Continue antithrombotic therapy  Patient tolerating well  Continue sotalol  QTC is acceptable she is paced  She is maintaining sinus rhythm    6  Hypothyroidism    She is on levothyroxine TSH was acceptable  Currently she is taking levothyroxine 175 mcg  Monitor TSH    7  Anxiety  She's now on Lexapro     8  Dyslipidemia  She is back to taking Pravachol  Continue Pravachol   Advised to be compliant with statins  Continue statin      9  Dyspnea on exertion  Patient has chronic There is no overt evidence of volume overload  She has history of obesity with BMI around 39 and her weight has been stable around 244 lb  Continue current Rx  Encouraged hard to keep healthy weight and follow-up in 6 months  Counseling :  A description of the counseling  Issues related to heart failure, regular diet, weight all discussed at length  All her questions answered  Goals and Barriers  The patient has the current Goals: To stay out of heart failure  The patent has the current Barriers: Weight gain  Patient's ability to self care: Yes  Medication side effect reviewed with patient in detail and all their questions answered to their satisfaction  HPI :     Katiana Baumann is a 77y o  year old female who came for follow up  Patient has with past medical history significant for nonischemic dilated cardiomyopathy, hypertension, bronchial asthma, moderate obesity, dyslipidemia, hypothyroidism, status post [facility] single-chamber ICD, mild nonobstructive disease by cardiac catheterization who came for regular follow-up and interrogation of her ICD  She has been doing pretty well she's she is on coralanor, she did she denies any chest pain, any shortness of breath  Recently she gained more weight as she was on steroids for bronchial asthma  Her ICD was interrogated today  No fever, no chills, no other significant complaint         06/08/2021  Above reviewed  Patient came for follow-up  She was in the emergency room 3 times last month due to shaking and pain all over and not feeling well  It was later on found related to stress as well as her medications baclofen    She was started on gabapentin for which she has allergic reaction now she is feeling better  She is recommended rehab and want a machine to do a relaxation therapy  Patient has medical history significant for chronic systolic and diastolic heart failure, history of nonischemic cardiomyopathy status post biventricular pacemaker and ICD, paroxysmal atrial fibrillation on sotalol, history of NSVT, dyslipidemia, anxiety and recurrent heart failures  She is doing well today heart rate 87 beats per minute with atrial sensed and ventricular paced rare PVCs  Her weight is 246 lb  She does well when her weight is less than 250 lb  No nausea no vomiting no other issues at this time labs done from May 2021 reviewed  09/23/2021  Above reviewed  Patient came for follow-up  She was in the emergency room twice in the last 3 months  She had allergic reaction to some food and then later on she had a fall  She has medical history significant for nonischemic cardiomyopathy status post biventricular pacemaker and ICD of [facility], paroxysmal atrial fibrillation suppressed with sotalol, history of NSVT, dyslipidemia, anxiety and recurrent heart failure  Her weight is now around 244 lb  She had blood work done in May 2021  Creatinine was 1 3  Her hemoglobin was stable  Her vitals has been stable heart rate remains stable around 90 beats per minute  Fall happened as she was not careful in walking  She is now more careful and she understand she is on blood thinners  Otherwise she is doing well her weight has been stable no chest pain no leg edema no shortness of breath with usual activities has chronic exertion shortness of breath which is not changed  Today EKG shows heart rate 89 beats per minute  Review of Systems   Constitutional: Negative for activity change, chills, diaphoresis, fever and unexpected weight change  HENT: Negative for congestion  Eyes: Negative for discharge and redness  Respiratory: Positive for shortness of breath  Negative for cough, chest tightness and wheezing  Exertional not changed   Cardiovascular: Negative  Negative for chest pain, palpitations and leg swelling  Gastrointestinal: Negative for abdominal pain, diarrhea and nausea  Endocrine: Negative  Genitourinary: Negative for decreased urine volume and urgency  Musculoskeletal: Positive for arthralgias and back pain  Negative for gait problem  Skin: Negative for rash and wound  Allergic/Immunologic: Negative  Neurological: Negative for dizziness, seizures, syncope, weakness, light-headedness and headaches  Hematological: Negative  Psychiatric/Behavioral: Negative for agitation and confusion  The patient is nervous/anxious          Historical Information   Past Medical History:   Diagnosis Date    A-fib ([place] [address] )     Abnormal blood sugar     Acid reflux     Acute bronchitis     Allergic reaction     Anxiety     Arthritis     Asthma     Cardiomyopathy ([place] [address] )     Cellulitis of left lower leg     Chest pain     [facility]congestive [facility] failure) ([facility])     Constipation     Depression with anxiety     Diabetes mellitus ([place] [address] )     Disease of thyroid gland     Diverticulitis     Dyslipidemia     Dyspnea on exertion     Elbow pain     Fracture of olecranon, open     Gastritis     Generalized pain     Hematuria     History of colonoscopy     Hypertension     Hypokalemia     Hypomagnesemia     Leg cramping     Moderate obesity     Morbid obesity due to excess calories ([facility])     Myalgia     Myositis     Nausea in adult     Nephrolithiasis     Prepatellar bursitis, unspecified knee     Screening for lipid disorders     Shortness of breath     Spasm of muscle     Thyroid trouble      Past Surgical History:   Procedure Laterality Date    CARDIAC DEFIBRILLATOR PLACEMENT      LAPAROSCOPIC CHOLECYSTECTOMY      TOTAL ABDOMINAL HYSTERECTOMY W/ BILATERAL SALPINGOOPHORECTOMY Social History     Substance and Sexual Activity   Alcohol Use Yes     Social History     Substance and Sexual Activity   Drug Use No     Social History     Tobacco Use   Smoking Status Never Smoker   Smokeless Tobacco Never Used     Family History:   Family History   Problem Relation Age of Onset    Hypertension Sister     Cancer Sister     Coronary artery disease Family     Diabetes type II Family     Hypertension Family        Meds/Allergies     Allergies   Allergen Reactions    Omnipaque [Iohexol] Swelling    Penicillins Swelling    Iv Dye  [Iodinated Diagnostic Agents] Throat Swelling    Other Swelling     Fresh herbs "basil, cilantro"    Shellfish-Derived Products - Food Allergy Hives       Current Outpatient Medications:     budesonide-formoterol (SYMBICORT) 160-4 5 mcg/act inhaler, Inhale 2 puffs 2 (two) times a day Rinse mouth after use , Disp: 1 Inhaler, Rfl: 5    diclofenac sodium (VOLTAREN) 1 %, Apply 2 g topically 4 (four) times a day, Disp: 100 g, Rfl: 0    diphenhydrAMINE (BENADRYL) 50 MG tablet, Take 1 tablet (50 mg total) by mouth every 8 (eight) hours as needed for itching, Disp: 30 tablet, Rfl: 0    Entresto  MG TABS, take 1 tablet by mouth twice a day, Disp: 180 tablet, Rfl: 3    ipratropium (ATROVENT) 0 02 % nebulizer solution, Take 2 5 mL by nebulization 4 (four) times a day for 30 days, Disp: 300 mL, Rfl: 0    levalbuterol (XOPENEX HFA) 45 mcg/act inhaler, Inhale 2 puffs every 6 (six) hours as needed for shortness of breath, Disp: 1 Inhaler, Rfl: 5    levalbuterol (XOPENEX) 1 25 mg/3 mL nebulizer solution, inhale contents of 1 vial in nebulizer every 6 hours if needed, Disp: 720 mL, Rfl: 1    levothyroxine 175 mcg tablet, take 1 tablet by mouth once daily, Disp: 90 tablet, Rfl: 3    Magnesium 100 MG CAPS, Take by mouth, Disp: , Rfl:     ondansetron (ZOFRAN-ODT) 4 mg disintegrating tablet, Take 1 tablet (4 mg total) by mouth every 6 (six) hours as needed for nausea or vomiting, Disp: 20 tablet, Rfl: 0    pantoprazole (PROTONIX) 40 mg tablet, take 1 tablet by mouth once daily, Disp: 90 tablet, Rfl: 3    potassium chloride (K-DUR) 10 mEq tablet, take 1 tablet by mouth once daily, Disp: 30 tablet, Rfl: 5    pravastatin (PRAVACHOL) 40 mg tablet, take 1 tablet by mouth once daily, Disp: 90 tablet, Rfl: 3    sotalol (BETAPACE) 80 mg tablet, TAKE 1 TABLET BY MOUTH EVERY 12 HOURS, Disp: 180 tablet, Rfl: 3    spironolactone (ALDACTONE) 25 mg tablet, TAKE 1/2 TABLET BY MOUTH DAILY, Disp: 45 tablet, Rfl: 3    tiZANidine (ZANAFLEX) 2 mg tablet, take 1 tablet by mouth every 8 hours if needed for muscle spasm, Disp: 21 tablet, Rfl: 3    torsemide (DEMADEX) 20 mg tablet, take 2 tablets by mouth once daily, Disp: 180 tablet, Rfl: 1    Xarelto 20 MG tablet, take 1 tablet by mouth once daily, Disp: 90 tablet, Rfl: 3    baclofen 20 mg tablet, Take 1 tablet (20 mg total) by mouth 3 (three) times a day (Patient not taking: Reported on 6/8/2021), Disp: 30 tablet, Rfl: 0    escitalopram (LEXAPRO) 10 mg tablet, Take 1 tablet (10 mg total) by mouth daily (Patient not taking: Reported on 6/8/2021), Disp: 30 tablet, Rfl: 0    Vitals: Blood pressure 110/76, pulse 89, temperature 98 2 °F (36 8 °C), temperature source Temporal, height 5' 6" (1 676 m), weight 111 kg (244 lb), SpO2 99 %  Body mass index is 39 38 kg/m²  Vitals:    09/23/21 1248   Weight: 111 kg (244 lb)     BP Readings from Last 3 Encounters:   09/23/21 110/76   09/05/21 107/80   08/14/21 118/76       Physical Exam  Constitutional:       General: She is not in acute distress  Appearance: She is well-developed  She is not diaphoretic  HENT:      Head: Normocephalic and atraumatic  Eyes:      Pupils: Pupils are equal, round, and reactive to light  Neck:      Thyroid: No thyromegaly  Vascular: No JVD  Trachea: No tracheal deviation  Cardiovascular:      Rate and Rhythm: Normal rate and regular rhythm  Heart sounds: S1 normal and S2 normal  Heart sounds not distant  Murmur heard  Systolic (ejection) murmur is present with a grade of 2/6  No friction rub  No gallop  No S3 or S4 sounds  Pulmonary:      Effort: Pulmonary effort is normal  No respiratory distress  Breath sounds: Normal breath sounds  No wheezing or rales  Chest:      Chest wall: No tenderness  Abdominal:      General: Bowel sounds are normal  There is no distension  Palpations: Abdomen is soft  Tenderness: There is no abdominal tenderness  Musculoskeletal:         General: No deformity  Cervical back: Neck supple  Skin:     General: Skin is warm and dry  Coloration: Skin is not pale  Findings: No rash  Neurological:      Mental Status: She is alert and oriented to person, place, and time  Psychiatric:         Behavior: Behavior normal          Judgment: Judgment normal        Diagnostic Studies Review Cardio:    Echocardiogram/VANNESA: Echo Doppler done in February 2016 shows EF 25-30%, pacemaker in the right-sided chambers, thickened aortic valve without stenosis, moderate to severe mitral regurgitation  Repeat echo Doppler done February 20, 2018  LV is markedly dilated EF 25%, mild LVH, right atrium mildly dilated mild MR and trace TR which was insufficient to measure pulmonary artery pressure  Pacemaker Wire in right-sided chambers  Repeat echo Doppler done 10/12/2020 shows patient's EF is around 14%, grade 3 diastolic dysfunction, mild mitral regurgitation      Catheterization: Cardiac catheter patient done in 2013 was EF 30%, no evidence of significant obstructive coronary artery disease  This was done in outside hospital         ICD/ Pacer Interpretation  Device interrogation done 08/06/2021 shows it is functioning adequately it is a [address] biventricular ICD  Life is 1 2 year  She is 90% ventricular paced  ECG Report:      Comparison to prior ECGs: no interval change  01/22/2018  Twelve lead EKG shows normal sinus rhythm heart rate 81 beats per minute  Poor R-wave progression  No change from old EKG  Repeat 12 lead EKG on 03/08/2018 shows normal sinus rhythm  Rare PVCs heart rate 78 beats per minute  Prolonged QTC      07/26/2018 12 lead EKG shows normal sinus rhythm heart rate 90 beats per minute  Peak PVCs noted  IVCD otherwise no other significant ST changes  Her QRS duration is 118 milliseconds  Twelve lead EKG done today 11/08/2018 shows normal sinus rhythm  Incomplete LBBB with IVCD her QS duration is 118 milliseconds  Twelve lead EKG shows atrial sense ventricular paced heart rate 75 beats per minute  She is biventricular paced now      Twelve lead EKG done 01/16/2020 shows atrial sensed ventricular paced heart rate 82 beats per minute she is biventricular pacemaker  Her device findings reviewed with her which was interrogated at [facility]    Repeat interrogation from January 2020 reviewed      Twelve lead EKG shows paced rhythm heart rate 89 beats per minute she is biventricular pacemaker  Twelve lead EKG 11/12/2020 shows atrial sensed ventricular paced heart rate 79 beats per minute she has a biventricular pacemaker  Twelve lead EKG 02/24/2021 shows atrial sense ventricular paced heart rate 80 beats per minute  Twelve lead EKG 06/08/2021 shows atrial sensed ventricular paced heart rate around 87 beats per minute  Few PVCs noted      Twelve lead EKG done 09/23/2021 shows    Imaging:  Chest X-Ray:   Xr Chest 2 Views    Result Date: 4/9/2017  Impression Stable cardiomegaly without active pulmonary disease   Workstation performed: [id number]     Lab Review   Lab Results   Component Value Date    WBC 9 72 05/23/2021    HGB 12 4 05/23/2021    HCT 39 3 05/23/2021    MCV 85 05/23/2021     05/23/2021     Lab Results   Component Value Date     06/06/2016    K 3 6 05/23/2021     05/23/2021    CO2 28 05/23/2021    ANIONGAP 12 9 11/01/2015    BUN 19 05/23/2021    CREATININE 1 35 (H) 05/23/2021    GLUCOSE 95 06/06/2016    CALCIUM 9 6 05/23/2021    AST 20 05/23/2021    ALT 27 05/23/2021    ALKPHOS 79 05/23/2021    PROT 6 8 06/06/2016    BILITOT 0 3 06/06/2016    EGFR 47 05/23/2021     Lab Results   Component Value Date    GLUCOSE 95 06/06/2016    CALCIUM 9 6 05/23/2021     06/06/2016    K 3 6 05/23/2021    CO2 28 05/23/2021     05/23/2021    BUN 19 05/23/2021    CREATININE 1 35 (H) 05/23/2021         Lab Results   Component Value Date    HGBA1C 6 2 (H) 06/06/2016       Dr Alon Schumacher MD [facility] [place]      "This note has been constructed using a voice recognition system  Therefore there may be syntax, spelling, and/or grammatical errors   Please call if you have any questions  "

## 2021-10-07 DIAGNOSIS — I48.0 PAROXYSMAL ATRIAL FIBRILLATION (HCC): ICD-10-CM

## 2021-10-07 DIAGNOSIS — E78.5 DYSLIPIDEMIA: ICD-10-CM

## 2021-10-07 DIAGNOSIS — I50.42 CHRONIC COMBINED SYSTOLIC AND DIASTOLIC CONGESTIVE HEART FAILURE (HCC): ICD-10-CM

## 2021-10-07 DIAGNOSIS — R06.00 DYSPNEA ON EXERTION: ICD-10-CM

## 2021-10-07 DIAGNOSIS — I10 ESSENTIAL HYPERTENSION: ICD-10-CM

## 2021-10-07 DIAGNOSIS — I42.0 DILATED CARDIOMYOPATHY (HCC): ICD-10-CM

## 2021-10-07 DIAGNOSIS — E03.4 HYPOTHYROIDISM DUE TO ACQUIRED ATROPHY OF THYROID: ICD-10-CM

## 2021-10-07 RX ORDER — TORSEMIDE 20 MG/1
40 TABLET ORAL DAILY
Qty: 180 TABLET | Refills: 3 | Status: SHIPPED | OUTPATIENT
Start: 2021-10-07 | End: 2021-10-12

## 2021-10-10 DIAGNOSIS — I50.42 CHRONIC COMBINED SYSTOLIC AND DIASTOLIC CONGESTIVE HEART FAILURE (HCC): ICD-10-CM

## 2021-10-10 DIAGNOSIS — I10 ESSENTIAL HYPERTENSION: ICD-10-CM

## 2021-10-10 DIAGNOSIS — I42.0 DILATED CARDIOMYOPATHY (HCC): ICD-10-CM

## 2021-10-10 DIAGNOSIS — E03.4 HYPOTHYROIDISM DUE TO ACQUIRED ATROPHY OF THYROID: ICD-10-CM

## 2021-10-10 DIAGNOSIS — R06.00 DYSPNEA ON EXERTION: ICD-10-CM

## 2021-10-10 DIAGNOSIS — E78.5 DYSLIPIDEMIA: ICD-10-CM

## 2021-10-10 DIAGNOSIS — I48.0 PAROXYSMAL ATRIAL FIBRILLATION (HCC): ICD-10-CM

## 2021-10-12 RX ORDER — TORSEMIDE 20 MG/1
TABLET ORAL
Qty: 180 TABLET | Refills: 3 | Status: ON HOLD | OUTPATIENT
Start: 2021-10-12 | End: 2022-07-29 | Stop reason: SDUPTHER

## 2021-10-23 DIAGNOSIS — I48.0 PAF (PAROXYSMAL ATRIAL FIBRILLATION) (HCC): ICD-10-CM

## 2021-10-24 RX ORDER — SOTALOL HYDROCHLORIDE 80 MG/1
TABLET ORAL
Qty: 180 TABLET | Refills: 3 | Status: SHIPPED | OUTPATIENT
Start: 2021-10-24

## 2021-10-28 ENCOUNTER — HOSPITAL ENCOUNTER (EMERGENCY)
Facility: HOSPITAL | Age: 66
Discharge: HOME | End: 2021-10-28
Attending: EMERGENCY MEDICINE | Admitting: EMERGENCY MEDICINE
Payer: COMMERCIAL

## 2021-10-28 ENCOUNTER — APPOINTMENT (EMERGENCY)
Dept: RADIOLOGY | Facility: HOSPITAL | Age: 66
End: 2021-10-28
Payer: COMMERCIAL

## 2021-10-28 VITALS
OXYGEN SATURATION: 97 % | HEIGHT: 66 IN | TEMPERATURE: 98.4 F | RESPIRATION RATE: 19 BRPM | HEART RATE: 84 BPM | WEIGHT: 247 LBS | BODY MASS INDEX: 39.7 KG/M2 | DIASTOLIC BLOOD PRESSURE: 68 MMHG | SYSTOLIC BLOOD PRESSURE: 121 MMHG

## 2021-10-28 DIAGNOSIS — R31.9 HEMATURIA: ICD-10-CM

## 2021-10-28 DIAGNOSIS — R10.9 ABDOMINAL PAIN: Primary | ICD-10-CM

## 2021-10-28 DIAGNOSIS — N28.1 RENAL CYST: ICD-10-CM

## 2021-10-28 LAB
ALBUMIN SERPL BCP-MCNC: 3.6 G/DL (ref 3.5–5)
ALP SERPL-CCNC: 80 U/L (ref 46–116)
ALT SERPL W P-5'-P-CCNC: 18 U/L (ref 12–78)
ANION GAP SERPL CALCULATED.3IONS-SCNC: 9 MMOL/L (ref 4–13)
AST SERPL W P-5'-P-CCNC: 15 U/L (ref 5–45)
BACTERIA UR QL AUTO: NORMAL /HPF
BASOPHILS # BLD AUTO: 0.03 THOUSANDS/ΜL (ref 0–0.1)
BASOPHILS NFR BLD AUTO: 0 % (ref 0–1)
BILIRUB SERPL-MCNC: 0.52 MG/DL (ref 0.2–1)
BILIRUB UR QL STRIP: NEGATIVE
BUN SERPL-MCNC: 15 MG/DL (ref 5–25)
CALCIUM SERPL-MCNC: 9 MG/DL (ref 8.3–10.1)
CHLORIDE SERPL-SCNC: 101 MMOL/L (ref 100–108)
CLARITY UR: CLEAR
CO2 SERPL-SCNC: 28 MMOL/L (ref 21–32)
COLOR UR: YELLOW
CREAT SERPL-MCNC: 1.13 MG/DL (ref 0.6–1.3)
EOSINOPHIL # BLD AUTO: 0.23 THOUSAND/ΜL (ref 0–0.61)
EOSINOPHIL NFR BLD AUTO: 3 % (ref 0–6)
ERYTHROCYTE [DISTWIDTH] IN BLOOD BY AUTOMATED COUNT: 14.2 % (ref 11.6–15.1)
GFR SERPL CREATININE-BSD FRML MDRD: 59 ML/MIN/1.73SQ M
GLUCOSE SERPL-MCNC: 105 MG/DL (ref 65–140)
GLUCOSE UR STRIP-MCNC: NEGATIVE MG/DL
HCT VFR BLD AUTO: 38.8 % (ref 34.8–46.1)
HGB BLD-MCNC: 12.5 G/DL (ref 11.5–15.4)
HGB UR QL STRIP.AUTO: ABNORMAL
IMM GRANULOCYTES # BLD AUTO: 0.02 THOUSAND/UL (ref 0–0.2)
IMM GRANULOCYTES NFR BLD AUTO: 0 % (ref 0–2)
KETONES UR STRIP-MCNC: NEGATIVE MG/DL
LEUKOCYTE ESTERASE UR QL STRIP: NEGATIVE
LIPASE SERPL-CCNC: 50 U/L (ref 73–393)
LYMPHOCYTES # BLD AUTO: 1.75 THOUSANDS/ΜL (ref 0.6–4.47)
LYMPHOCYTES NFR BLD AUTO: 25 % (ref 14–44)
MCH RBC QN AUTO: 28.1 PG (ref 26.8–34.3)
MCHC RBC AUTO-ENTMCNC: 32.2 G/DL (ref 31.4–37.4)
MCV RBC AUTO: 87 FL (ref 82–98)
MONOCYTES # BLD AUTO: 0.52 THOUSAND/ΜL (ref 0.17–1.22)
MONOCYTES NFR BLD AUTO: 8 % (ref 4–12)
NEUTROPHILS # BLD AUTO: 4.4 THOUSANDS/ΜL (ref 1.85–7.62)
NEUTS SEG NFR BLD AUTO: 64 % (ref 43–75)
NITRITE UR QL STRIP: NEGATIVE
NON-SQ EPI CELLS URNS QL MICRO: NORMAL /HPF
NRBC BLD AUTO-RTO: 0 /100 WBCS
PH UR STRIP.AUTO: 7 [PH]
PLATELET # BLD AUTO: 298 THOUSANDS/UL (ref 149–390)
PMV BLD AUTO: 10.1 FL (ref 8.9–12.7)
POTASSIUM SERPL-SCNC: 3.8 MMOL/L (ref 3.5–5.3)
PROT SERPL-MCNC: 7 G/DL (ref 6.4–8.2)
PROT UR STRIP-MCNC: NEGATIVE MG/DL
RBC # BLD AUTO: 4.45 MILLION/UL (ref 3.81–5.12)
RBC #/AREA URNS AUTO: NORMAL /HPF
SODIUM SERPL-SCNC: 138 MMOL/L (ref 136–145)
SP GR UR STRIP.AUTO: 1.01 (ref 1–1.03)
TROPONIN I SERPL-MCNC: <0.02 NG/ML
UROBILINOGEN UR QL STRIP.AUTO: 0.2 E.U./DL
WBC # BLD AUTO: 6.95 THOUSAND/UL (ref 4.31–10.16)
WBC #/AREA URNS AUTO: NORMAL /HPF

## 2021-10-28 PROCEDURE — 87086 URINE CULTURE/COLONY COUNT: CPT | Performed by: PHYSICIAN ASSISTANT

## 2021-10-28 PROCEDURE — 84484 ASSAY OF TROPONIN QUANT: CPT | Performed by: PHYSICIAN ASSISTANT

## 2021-10-28 PROCEDURE — 83690 ASSAY OF LIPASE: CPT | Performed by: PHYSICIAN ASSISTANT

## 2021-10-28 PROCEDURE — 93005 ELECTROCARDIOGRAM TRACING: CPT

## 2021-10-28 PROCEDURE — 85025 COMPLETE CBC W/AUTO DIFF WBC: CPT | Performed by: PHYSICIAN ASSISTANT

## 2021-10-28 PROCEDURE — 80053 COMPREHEN METABOLIC PANEL: CPT | Performed by: PHYSICIAN ASSISTANT

## 2021-10-28 PROCEDURE — 74176 CT ABD & PELVIS W/O CONTRAST: CPT

## 2021-10-28 PROCEDURE — 99285 EMERGENCY DEPT VISIT HI MDM: CPT | Performed by: PHYSICIAN ASSISTANT

## 2021-10-28 PROCEDURE — 81001 URINALYSIS AUTO W/SCOPE: CPT | Performed by: PHYSICIAN ASSISTANT

## 2021-10-28 PROCEDURE — 99284 EMERGENCY DEPT VISIT MOD MDM: CPT

## 2021-10-28 PROCEDURE — 96374 THER/PROPH/DIAG INJ IV PUSH: CPT

## 2021-10-28 PROCEDURE — 36415 COLL VENOUS BLD VENIPUNCTURE: CPT | Performed by: PHYSICIAN ASSISTANT

## 2021-10-28 PROCEDURE — 96375 TX/PRO/DX INJ NEW DRUG ADDON: CPT

## 2021-10-28 RX ORDER — DICYCLOMINE HCL 20 MG
20 TABLET ORAL 2 TIMES DAILY
Qty: 8 TABLET | Refills: 0 | Status: SHIPPED | OUTPATIENT
Start: 2021-10-28 | End: 2022-04-27 | Stop reason: HOSPADM

## 2021-10-28 RX ORDER — MORPHINE SULFATE 4 MG/ML
4 INJECTION, SOLUTION INTRAMUSCULAR; INTRAVENOUS ONCE
Status: COMPLETED | OUTPATIENT
Start: 2021-10-28 | End: 2021-10-28

## 2021-10-28 RX ORDER — METOCLOPRAMIDE 10 MG/1
10 TABLET ORAL EVERY 6 HOURS
Qty: 15 TABLET | Refills: 0 | Status: SHIPPED | OUTPATIENT
Start: 2021-10-28 | End: 2022-04-27 | Stop reason: HOSPADM

## 2021-10-28 RX ORDER — ONDANSETRON 4 MG/1
4 TABLET, ORALLY DISINTEGRATING ORAL EVERY 6 HOURS PRN
Qty: 10 TABLET | Refills: 0 | Status: SHIPPED | OUTPATIENT
Start: 2021-10-28 | End: 2021-10-28

## 2021-10-28 RX ORDER — ONDANSETRON 2 MG/ML
4 INJECTION INTRAMUSCULAR; INTRAVENOUS ONCE
Status: COMPLETED | OUTPATIENT
Start: 2021-10-28 | End: 2021-10-28

## 2021-10-28 RX ADMIN — MORPHINE SULFATE 4 MG: 4 INJECTION INTRAVENOUS at 09:45

## 2021-10-28 RX ADMIN — ONDANSETRON 4 MG: 2 INJECTION INTRAMUSCULAR; INTRAVENOUS at 09:43

## 2021-10-29 LAB
ATRIAL RATE: 84 BPM
BACTERIA UR CULT: NORMAL
P AXIS: 44 DEGREES
PR INTERVAL: 170 MS
QRS AXIS: -41 DEGREES
QRSD INTERVAL: 160 MS
QT INTERVAL: 466 MS
QTC INTERVAL: 550 MS
T WAVE AXIS: 69 DEGREES
VENTRICULAR RATE: 84 BPM

## 2021-10-29 PROCEDURE — 93010 ELECTROCARDIOGRAM REPORT: CPT | Performed by: INTERNAL MEDICINE

## 2021-12-20 ENCOUNTER — TELEPHONE (OUTPATIENT)
Dept: CARDIOLOGY CLINIC | Facility: CLINIC | Age: 66
End: 2021-12-20

## 2021-12-22 LAB
ALBUMIN SERPL-MCNC: 4.4 G/DL (ref 3.8–4.8)
ALBUMIN/GLOB SERPL: 1.8 {RATIO} (ref 1.2–2.2)
ALP SERPL-CCNC: 81 IU/L (ref 44–121)
ALT SERPL-CCNC: 8 IU/L (ref 0–32)
AST SERPL-CCNC: 17 IU/L (ref 0–40)
BASOPHILS # BLD AUTO: 0 X10E3/UL (ref 0–0.2)
BASOPHILS NFR BLD AUTO: 1 %
BILIRUB SERPL-MCNC: 0.4 MG/DL (ref 0–1.2)
BUN SERPL-MCNC: 14 MG/DL (ref 8–27)
BUN/CREAT SERPL: 13 (ref 12–28)
CALCIUM SERPL-MCNC: 9.9 MG/DL (ref 8.7–10.3)
CHLORIDE SERPL-SCNC: 101 MMOL/L (ref 96–106)
CHOLEST SERPL-MCNC: 215 MG/DL (ref 100–199)
CHOLEST/HDLC SERPL: 4.8 RATIO (ref 0–4.4)
CO2 SERPL-SCNC: 26 MMOL/L (ref 20–29)
CREAT SERPL-MCNC: 1.07 MG/DL (ref 0.57–1)
EOSINOPHIL # BLD AUTO: 0.2 X10E3/UL (ref 0–0.4)
EOSINOPHIL NFR BLD AUTO: 2 %
ERYTHROCYTE [DISTWIDTH] IN BLOOD BY AUTOMATED COUNT: 13.2 % (ref 11.7–15.4)
GLOBULIN SER-MCNC: 2.5 G/DL (ref 1.5–4.5)
GLUCOSE SERPL-MCNC: 101 MG/DL (ref 65–99)
HCT VFR BLD AUTO: 40.6 % (ref 34–46.6)
HDLC SERPL-MCNC: 45 MG/DL
HGB BLD-MCNC: 13.3 G/DL (ref 11.1–15.9)
IMM GRANULOCYTES # BLD: 0 X10E3/UL (ref 0–0.1)
IMM GRANULOCYTES NFR BLD: 0 %
LDLC SERPL CALC-MCNC: 148 MG/DL (ref 0–99)
LYMPHOCYTES # BLD AUTO: 2.2 X10E3/UL (ref 0.7–3.1)
LYMPHOCYTES NFR BLD AUTO: 25 %
MCH RBC QN AUTO: 28.3 PG (ref 26.6–33)
MCHC RBC AUTO-ENTMCNC: 32.8 G/DL (ref 31.5–35.7)
MCV RBC AUTO: 86 FL (ref 79–97)
MONOCYTES # BLD AUTO: 0.6 X10E3/UL (ref 0.1–0.9)
MONOCYTES NFR BLD AUTO: 7 %
NEUTROPHILS # BLD AUTO: 5.6 X10E3/UL (ref 1.4–7)
NEUTROPHILS NFR BLD AUTO: 65 %
PLATELET # BLD AUTO: 342 X10E3/UL (ref 150–450)
POTASSIUM SERPL-SCNC: 4.7 MMOL/L (ref 3.5–5.2)
PROT SERPL-MCNC: 6.9 G/DL (ref 6–8.5)
RBC # BLD AUTO: 4.7 X10E6/UL (ref 3.77–5.28)
SL AMB EGFR AFRICAN AMERICAN: 63 ML/MIN/1.73
SL AMB EGFR NON AFRICAN AMERICAN: 54 ML/MIN/1.73
SL AMB VLDL CHOLESTEROL CALC: 22 MG/DL (ref 5–40)
SODIUM SERPL-SCNC: 139 MMOL/L (ref 134–144)
TRIGL SERPL-MCNC: 124 MG/DL (ref 0–149)
TSH SERPL DL<=0.005 MIU/L-ACNC: 1.62 UIU/ML (ref 0.45–4.5)
WBC # BLD AUTO: 8.6 X10E3/UL (ref 3.4–10.8)

## 2021-12-23 ENCOUNTER — TELEPHONE (OUTPATIENT)
Dept: CARDIOLOGY CLINIC | Facility: CLINIC | Age: 66
End: 2021-12-23

## 2022-01-01 ENCOUNTER — HOSPITAL ENCOUNTER (EMERGENCY)
Facility: HOSPITAL | Age: 67
End: 2022-09-07
Attending: EMERGENCY MEDICINE | Admitting: EMERGENCY MEDICINE
Payer: COMMERCIAL

## 2022-01-01 VITALS — RESPIRATION RATE: 3 BRPM | SYSTOLIC BLOOD PRESSURE: 92 MMHG | DIASTOLIC BLOOD PRESSURE: 49 MMHG

## 2022-01-01 DIAGNOSIS — I46.9 CARDIOPULMONARY ARREST (HCC): Primary | ICD-10-CM

## 2022-01-01 LAB
ATRIAL RATE: 0 BPM
QRS AXIS: 0 DEGREES
QRSD INTERVAL: 0 MS
QT INTERVAL: 0 MS
QTC INTERVAL: 0 MS
T WAVE AXIS: 0 DEGREES
VENTRICULAR RATE: 0 BPM

## 2022-01-01 PROCEDURE — 92950 HEART/LUNG RESUSCITATION CPR: CPT | Performed by: EMERGENCY MEDICINE

## 2022-01-01 PROCEDURE — 92950 HEART/LUNG RESUSCITATION CPR: CPT

## 2022-01-01 PROCEDURE — 99291 CRITICAL CARE FIRST HOUR: CPT | Performed by: EMERGENCY MEDICINE

## 2022-01-01 PROCEDURE — 93005 ELECTROCARDIOGRAM TRACING: CPT

## 2022-01-01 PROCEDURE — 99285 EMERGENCY DEPT VISIT HI MDM: CPT

## 2022-01-01 RX ORDER — MAGNESIUM SULFATE 500 MG/ML
VIAL (ML) INJECTION CODE/TRAUMA/SEDATION MEDICATION
Status: COMPLETED | OUTPATIENT
Start: 2022-01-01 | End: 2022-01-01

## 2022-01-01 RX ORDER — DEXTROSE 50 % IN WATER 50 %
SYRINGE (ML) INTRAVENOUS CODE/TRAUMA/SEDATION MEDICATION
Status: COMPLETED | OUTPATIENT
Start: 2022-01-01 | End: 2022-01-01

## 2022-01-01 RX ORDER — AMIODARONE HYDROCHLORIDE 50 MG/ML
INJECTION, SOLUTION INTRAVENOUS CODE/TRAUMA/SEDATION MEDICATION
Status: COMPLETED | OUTPATIENT
Start: 2022-01-01 | End: 2022-01-01

## 2022-01-01 RX ORDER — SODIUM BICARBONATE 84 MG/ML
INJECTION, SOLUTION INTRAVENOUS CODE/TRAUMA/SEDATION MEDICATION
Status: COMPLETED | OUTPATIENT
Start: 2022-01-01 | End: 2022-01-01

## 2022-01-01 RX ORDER — CALCIUM CHLORIDE 100 MG/ML
SYRINGE (ML) INTRAVENOUS CODE/TRAUMA/SEDATION MEDICATION
Status: COMPLETED | OUTPATIENT
Start: 2022-01-01 | End: 2022-01-01

## 2022-01-01 RX ORDER — EPINEPHRINE 0.1 MG/ML
SYRINGE (ML) INJECTION CODE/TRAUMA/SEDATION MEDICATION
Status: COMPLETED | OUTPATIENT
Start: 2022-01-01 | End: 2022-01-01

## 2022-01-01 RX ADMIN — DEXTROSE MONOHYDRATE 25 G: 500 INJECTION PARENTERAL at 21:40

## 2022-01-01 RX ADMIN — EPINEPHRINE 1 MG: 0.1 INJECTION, SOLUTION ENDOTRACHEAL; INTRACARDIAC; INTRAVENOUS at 21:36

## 2022-01-01 RX ADMIN — SODIUM BICARBONATE 50 MEQ: 84 INJECTION, SOLUTION INTRAVENOUS at 21:40

## 2022-01-01 RX ADMIN — EPINEPHRINE 1 MG: 0.1 INJECTION, SOLUTION ENDOTRACHEAL; INTRACARDIAC; INTRAVENOUS at 21:46

## 2022-01-01 RX ADMIN — AMIODARONE HYDROCHLORIDE 150 MG: 50 INJECTION, SOLUTION INTRAVENOUS at 21:34

## 2022-01-01 RX ADMIN — SODIUM BICARBONATE 50 MEQ: 84 INJECTION, SOLUTION INTRAVENOUS at 21:35

## 2022-01-01 RX ADMIN — CALCIUM CHLORIDE 1 G: 100 INJECTION PARENTERAL at 21:38

## 2022-01-01 RX ADMIN — EPINEPHRINE 1 MG: 0.1 INJECTION, SOLUTION ENDOTRACHEAL; INTRACARDIAC; INTRAVENOUS at 21:32

## 2022-01-01 RX ADMIN — SODIUM BICARBONATE 50 MEQ: 84 INJECTION, SOLUTION INTRAVENOUS at 21:42

## 2022-01-01 RX ADMIN — ALTEPLASE 100 MG: KIT at 21:44

## 2022-01-01 RX ADMIN — CALCIUM CHLORIDE 1 G: 100 INJECTION PARENTERAL at 21:33

## 2022-01-01 RX ADMIN — EPINEPHRINE 1 MG: 0.1 INJECTION, SOLUTION ENDOTRACHEAL; INTRACARDIAC; INTRAVENOUS at 21:39

## 2022-01-01 RX ADMIN — MAGNESIUM SULFATE HEPTAHYDRATE 2 G: 500 INJECTION, SOLUTION INTRAMUSCULAR; INTRAVENOUS at 21:33

## 2022-02-28 DIAGNOSIS — E78.5 DYSLIPIDEMIA: ICD-10-CM

## 2022-02-28 RX ORDER — PRAVASTATIN SODIUM 40 MG
TABLET ORAL
Qty: 90 TABLET | Refills: 3 | Status: SHIPPED | OUTPATIENT
Start: 2022-02-28

## 2022-03-12 DIAGNOSIS — I42.0 DILATED CARDIOMYOPATHY (HCC): ICD-10-CM

## 2022-03-12 DIAGNOSIS — I10 ESSENTIAL HYPERTENSION: ICD-10-CM

## 2022-03-14 RX ORDER — SACUBITRIL AND VALSARTAN 97; 103 MG/1; MG/1
TABLET, FILM COATED ORAL
Qty: 180 TABLET | Refills: 3 | Status: SHIPPED | OUTPATIENT
Start: 2022-03-14

## 2022-03-22 ENCOUNTER — OFFICE VISIT (OUTPATIENT)
Dept: CARDIOLOGY CLINIC | Facility: CLINIC | Age: 67
End: 2022-03-22
Payer: COMMERCIAL

## 2022-03-22 VITALS
WEIGHT: 241 LBS | BODY MASS INDEX: 38.73 KG/M2 | DIASTOLIC BLOOD PRESSURE: 78 MMHG | HEIGHT: 66 IN | OXYGEN SATURATION: 97 % | SYSTOLIC BLOOD PRESSURE: 120 MMHG | HEART RATE: 94 BPM | TEMPERATURE: 97.8 F

## 2022-03-22 DIAGNOSIS — Z95.810 AICD (AUTOMATIC CARDIOVERTER/DEFIBRILLATOR) PRESENT: ICD-10-CM

## 2022-03-22 DIAGNOSIS — J45.20 MILD INTERMITTENT ASTHMA WITHOUT COMPLICATION: ICD-10-CM

## 2022-03-22 DIAGNOSIS — I47.2 NSVT (NONSUSTAINED VENTRICULAR TACHYCARDIA) (HCC): ICD-10-CM

## 2022-03-22 DIAGNOSIS — I10 ESSENTIAL HYPERTENSION: ICD-10-CM

## 2022-03-22 DIAGNOSIS — I48.0 PAF (PAROXYSMAL ATRIAL FIBRILLATION) (HCC): ICD-10-CM

## 2022-03-22 DIAGNOSIS — I42.0 DILATED CARDIOMYOPATHY (HCC): ICD-10-CM

## 2022-03-22 DIAGNOSIS — E78.5 DYSLIPIDEMIA: ICD-10-CM

## 2022-03-22 PROCEDURE — 93000 ELECTROCARDIOGRAM COMPLETE: CPT | Performed by: INTERNAL MEDICINE

## 2022-03-22 PROCEDURE — 3008F BODY MASS INDEX DOCD: CPT | Performed by: INTERNAL MEDICINE

## 2022-03-22 PROCEDURE — 1160F RVW MEDS BY RX/DR IN RCRD: CPT | Performed by: INTERNAL MEDICINE

## 2022-03-22 PROCEDURE — 3078F DIAST BP <80 MM HG: CPT | Performed by: INTERNAL MEDICINE

## 2022-03-22 PROCEDURE — 99214 OFFICE O/P EST MOD 30 MIN: CPT | Performed by: INTERNAL MEDICINE

## 2022-03-22 PROCEDURE — 3074F SYST BP LT 130 MM HG: CPT | Performed by: INTERNAL MEDICINE

## 2022-03-22 PROCEDURE — 1036F TOBACCO NON-USER: CPT | Performed by: INTERNAL MEDICINE

## 2022-03-22 NOTE — PROGRESS NOTES
Progress Note - Cardiology Office  [facility]  Manuel Beard 79 y o  female MRN: [id number]  : 1955   Encounter: [id number]      Assessment:     1  Dilated cardiomyopathy ([facility] [address] )    2  PAF (paroxysmal atrial fibrillation) ([facility] [address] )    3  Essential hypertension    4  Dyslipidemia    5  AICD (automatic cardioverter/defibrillator) present    6  NSVT (nonsustained ventricular tachycardia) ([facility])    7  Mild intermittent asthma without complication        Discussion Summary and Plan:  1  Status post ICD shock for ventricular arrhythmias  Patient device was upgraded to biventricular device  She was also started on sotalol  QTC acceptable since then no ICD shock  Her LV lead threshold is slightly high  So far no ICD shock  Last ICD check was in August   She will be enrolled in our clinic  2  Chronic systolic heart failure New [place] Heart Association class II/3  Patient has tolerated Entresto very well  No clinical evidence of heart failure or ischemia at this time  Currently she is taking sotalol, Aldactone, entresto  She is on high dose of 97/103 mg twice a day  Torsemide is 40 mg daily along with Aldactone 12 5 mg daily  Continue same medication electrolytes are acceptable  Her electrolytes done in 2021 acceptable  She has he had dilated cardiomyopathy will check echo Doppler    3  Status post [facility] ICD  Her device was upgraded to [facility] biventricular ICD  No more ICD shocks  Her device interrogation reviewed with her  Device was interrogated in 2021  No events noted  Patient has battery life of 1 2 years  We will check it battery as soon as possible  4  Bronchial asthma  Not actively wheezing she uses p r n  Inhalers  Advised to follow up with Pulmonary    5  History of paroxysmal at fibrillation currently in sinus rhythm  Continue antithrombotic therapy  Patient tolerating well  Continue sotalol  QTC is acceptable she is paced    She is maintaining sinus    6  Hypothyroidism  She is on levothyroxine TSH was acceptable  Currently she is taking levothyroxine 175 mcg  Monitor TSH    7  Anxiety  She's now on Lexapro     8  Dyslipidemia  Continue statin  She is on pravastatin 40 mg  Her cholesterol has gone up  She is motivated to change her diet  9   Dyspnea on exertion  Patient has chronic exertional shortness of breath  There is no evidence of any volume overload  She get exertional shortness of breath when she do some activities  Advised her to take an additional 12 5 mg spironolactone if she has more shortness of breath as before  She has questions regarding Elicia stim, it will be reviewed and discussed with her at a later time  Follow-up 4 months  Counseling :  A description of the counseling  Issues related to heart failure, regular diet, weight all discussed at length  All her questions answered  Goals and Barriers  The patient has the current Goals: To stay out of heart failure  The patent has the current Barriers: Weight gain  Patient's ability to self care: Yes  Medication side effect reviewed with patient in detail and all their questions answered to their satisfaction  HPI :     Tone Bauer is a 79y o  year old female who came for follow up  Patient has with past medical history significant for nonischemic dilated cardiomyopathy, hypertension, bronchial asthma, moderate obesity, dyslipidemia, hypothyroidism, status post [facility] single-chamber ICD, mild nonobstructive disease by cardiac catheterization who came for regular follow-up and interrogation of her ICD  She has been doing pretty well she's she is on coralanor, she did she denies any chest pain, any shortness of breath  Recently she gained more weight as she was on steroids for bronchial asthma  Her ICD was interrogated today  No fever, no chills, no other significant complaint         06/08/2021  Above reviewed    Patient came for follow-up  She was in the emergency room 3 times last month due to shaking and pain all over and not feeling well  It was later on found related to stress as well as her medications baclofen  She was started on gabapentin for which she has allergic reaction now she is feeling better  She is recommended rehab and want a machine to do a relaxation therapy  Patient has medical history significant for chronic systolic and diastolic heart failure, history of nonischemic cardiomyopathy status post biventricular pacemaker and ICD, paroxysmal atrial fibrillation on sotalol, history of NSVT, dyslipidemia, anxiety and recurrent heart failures  She is doing well today heart rate 87 beats per minute with atrial sensed and ventricular paced rare PVCs  Her weight is 246 lb  She does well when her weight is less than 250 lb  No nausea no vomiting no other issues at this time labs done from May 2021 reviewed  09/23/2021  Above reviewed  Patient came for follow-up  She was in the emergency room twice in the last 3 months  She had allergic reaction to some food and then later on she had a fall  She has medical history significant for nonischemic cardiomyopathy status post biventricular pacemaker and ICD of [facility], paroxysmal atrial fibrillation suppressed with sotalol, history of NSVT, dyslipidemia, anxiety and recurrent heart failure  Her weight is now around 244 lb  She had blood work done in May 2021  Creatinine was 1 3  Her hemoglobin was stable  Her vitals has been stable heart rate remains stable around 90 beats per minute  Fall happened as she was not careful in walking  She is now more careful and she understand she is on blood thinners  Otherwise she is doing well her weight has been stable no chest pain no leg edema no shortness of breath with usual activities has chronic exertion shortness of breath which is not changed    Today EKG shows heart rate 89 beats per minute     03/22/2022  Above reviewed  Patient came for follow-up  Medical history significant for nonischemic cardiomyopathy status post biventricular pacemaker and ICD which was last interrogated in August 2021 at that time had a battery life of 1 year and 2 months, paroxysmal atrial fibrillation suppressed with sotalol, history of NSVT, dyslipidemia, anxiety, recurrent heart failure who came for follow-up  She had a blood test done in December 2021 where electrolytes were acceptable though her cholesterol has increased  Her heart rate remains elevated around 90 beats per minute  She feels she has lost some weight and her weight is around 241 lb  Denies any chest pain but continues to have exertional shortness of breath  She is compliant with her cholesterol and other medications  Today EKG shows she is atrial sensed and ventricular paced heart rate 94 beats per minute  Review of Systems   Constitutional: Negative for activity change, chills, diaphoresis, fever and unexpected weight change  HENT: Negative for congestion  Eyes: Negative for discharge and redness  Respiratory: Positive for shortness of breath  Negative for cough, chest tightness and wheezing  Chronic   Cardiovascular: Negative  Negative for chest pain, palpitations and leg swelling  Gastrointestinal: Negative for abdominal pain, diarrhea and nausea  Endocrine: Negative  Genitourinary: Negative for decreased urine volume and urgency  Musculoskeletal: Positive for arthralgias and back pain  Negative for gait problem  Skin: Negative for rash and wound  Allergic/Immunologic: Negative  Neurological: Negative for dizziness, seizures, syncope, weakness, light-headedness and headaches  Hematological: Negative  Psychiatric/Behavioral: Negative for agitation and confusion  The patient is nervous/anxious          Historical Information   Past Medical History:   Diagnosis Date    [facility]     Abnormal blood sugar     Acid reflux     Acute bronchitis     Allergic reaction     Anxiety     Arthritis     Asthma     Cardiomyopathy ([place] [address] )     Cellulitis of left lower leg     Chest pain     CHF (congestive heart failure) ([facility])     Constipation     Depression with anxiety     Diabetes mellitus ([place] [address] )     Disease of thyroid gland     Diverticulitis     Dyslipidemia     Dyspnea on exertion     Elbow pain     Fracture of olecranon, open     Gastritis     Generalized pain     Hematuria     History of colonoscopy     Hypertension     Hypokalemia     Hypomagnesemia     Leg cramping     Moderate obesity     Morbid obesity due to excess calories ([facility])     Myalgia     Myositis     Nausea in adult     Nephrolithiasis     Prepatellar bursitis, unspecified knee     Screening for lipid disorders     Shortness of breath     Spasm of muscle     Thyroid trouble      Past Surgical History:   Procedure Laterality Date    CARDIAC DEFIBRILLATOR PLACEMENT      LAPAROSCOPIC CHOLECYSTECTOMY      TOTAL ABDOMINAL HYSTERECTOMY W/ BILATERAL SALPINGOOPHORECTOMY       Social History     Substance and Sexual Activity   Alcohol Use Yes     Social History     Substance and Sexual Activity   Drug Use No     Social History     Tobacco Use   Smoking Status Never Smoker   Smokeless Tobacco Never Used     Family History:   Family History   Problem Relation Age of Onset    Hypertension Sister     Cancer Sister     Coronary artery disease Family     Diabetes type II Family     Hypertension Family        Meds/Allergies     Allergies   Allergen Reactions    Omnipaque [Iohexol] Swelling    Penicillins Swelling    Iv Dye  [Iodinated Diagnostic Agents] Throat Swelling    Other Swelling     Fresh herbs "basil, cilantro"    Shellfish-Derived Products - Food Allergy Hives       Current Outpatient Medications:     budesonide-formoterol (SYMBICORT) 160-4 5 mcg/act inhaler, Inhale 2 puffs 2 (two) times a day Rinse mouth after use , Disp: 1 Inhaler, Rfl: 5    diclofenac sodium (VOLTAREN) 1 %, Apply 2 g topically 4 (four) times a day, Disp: 100 g, Rfl: 0    Entresto  MG TABS, take 1 tablet by mouth twice a day, Disp: 180 tablet, Rfl: 3    levalbuterol (XOPENEX HFA) 45 mcg/act inhaler, Inhale 2 puffs every 6 (six) hours as needed for shortness of breath, Disp: 1 Inhaler, Rfl: 5    levalbuterol (XOPENEX) 1 25 mg/3 mL nebulizer solution, inhale contents of 1 vial in nebulizer every 6 hours if needed, Disp: 720 mL, Rfl: 1    levothyroxine 175 mcg tablet, take 1 tablet by mouth once daily, Disp: 90 tablet, Rfl: 3    Magnesium 100 MG CAPS, Take by mouth, Disp: , Rfl:     metoclopramide (Reglan) 10 mg tablet, Take 1 tablet (10 mg total) by mouth every 6 (six) hours, Disp: 15 tablet, Rfl: 0    ondansetron (ZOFRAN-ODT) 4 mg disintegrating tablet, Take 1 tablet (4 mg total) by mouth every 6 (six) hours as needed for nausea or vomiting, Disp: 20 tablet, Rfl: 0    pantoprazole (PROTONIX) 40 mg tablet, take 1 tablet by mouth once daily, Disp: 90 tablet, Rfl: 3    potassium chloride (K-DUR) 10 mEq tablet, take 1 tablet by mouth once daily, Disp: 30 tablet, Rfl: 5    pravastatin (PRAVACHOL) 40 mg tablet, take 1 tablet by mouth once daily, Disp: 90 tablet, Rfl: 3    sotalol (BETAPACE) 80 mg tablet, TAKE 1 TABLET BY MOUTH EVERY 12 HOURS, Disp: 180 tablet, Rfl: 3    spironolactone (ALDACTONE) 25 mg tablet, TAKE 1/2 TABLET BY MOUTH DAILY, Disp: 45 tablet, Rfl: 3    tiZANidine (ZANAFLEX) 2 mg tablet, take 1 tablet by mouth every 8 hours if needed for muscle spasm, Disp: 21 tablet, Rfl: 3    torsemide (DEMADEX) 20 mg tablet, take 2 tablets by mouth once daily, Disp: 180 tablet, Rfl: 3    Xarelto 20 MG tablet, take 1 tablet by mouth once daily, Disp: 90 tablet, Rfl: 3    baclofen 20 mg tablet, Take 1 tablet (20 mg total) by mouth 3 (three) times a day (Patient not taking: Reported on 6/8/2021), Disp: 30 tablet, Rfl: 0   dicyclomine (BENTYL) 20 mg tablet, Take 1 tablet (20 mg total) by mouth 2 (two) times a day for 4 days, Disp: 8 tablet, Rfl: 0    escitalopram (LEXAPRO) 10 mg tablet, Take 1 tablet (10 mg total) by mouth daily (Patient not taking: Reported on 6/8/2021), Disp: 30 tablet, Rfl: 0    ipratropium (ATROVENT) 0 02 % nebulizer solution, Take 2 5 mL by nebulization 4 (four) times a day for 30 days, Disp: 300 mL, Rfl: 0    Vitals: Blood pressure 120/78, pulse 94, temperature 97 8 °F (36 6 °C), height 5' 6" (1 676 m), weight 112 kg (248 lb), SpO2 97 %  Body mass index is 40 03 kg/m²  Vitals:    03/22/22 1100   Weight: 112 kg (248 lb)     BP Readings from Last 3 Encounters:   03/22/22 120/78   10/28/21 121/68   09/23/21 110/76       Physical Exam  Constitutional:       General: She is not in acute distress  Appearance: She is well-developed  She is not diaphoretic  Neck:      Thyroid: No thyromegaly  Vascular: No JVD  Trachea: No tracheal deviation  Cardiovascular:      Rate and Rhythm: Normal rate and regular rhythm  Heart sounds: S1 normal and S2 normal  Heart sounds not distant  Murmur heard  Systolic (ejection) murmur is present with a grade of 2/6  No friction rub  No gallop  No S3 or S4 sounds  Pulmonary:      Effort: Pulmonary effort is normal  No respiratory distress  Breath sounds: Normal breath sounds  No wheezing or rales  Chest:      Chest wall: No tenderness  Abdominal:      General: Bowel sounds are normal  There is no distension  Palpations: Abdomen is soft  Tenderness: There is no abdominal tenderness  Musculoskeletal:         General: No deformity  Cervical back: Neck supple  Skin:     General: Skin is warm and dry  Coloration: Skin is not pale  Findings: No rash  Neurological:      Mental Status: She is alert and oriented to person, place, and time     Psychiatric:         Behavior: Behavior normal          Judgment: Judgment normal  Diagnostic Studies Review Cardio:    Echocardiogram/VANNESA: Echo Doppler done in February 2016 shows EF 25-30%, pacemaker in the right-sided chambers, thickened aortic valve without stenosis, moderate to severe mitral regurgitation  Repeat echo Doppler done February 20, 2018  LV is markedly dilated EF 25%, mild LVH, right atrium mildly dilated mild MR and trace TR which was insufficient to measure pulmonary artery pressure  Pacemaker Wire in right-sided chambers  Repeat echo Doppler done 10/12/2020 shows patient's EF is around 98%, grade 3 diastolic dysfunction, mild mitral regurgitation      Catheterization: Cardiac catheter patient done in 2013 was EF 30%, no evidence of significant obstructive coronary artery disease  This was done in outside hospital         ICD/ Pacer Interpretation  Device interrogation done 08/06/2021 shows it is functioning adequately it is a [address] biventricular ICD  Life is 1 2 year  She is 90% ventricular paced  ECG Report:      Comparison to prior ECGs: no interval change  01/22/2018  Twelve lead EKG shows normal sinus rhythm heart rate 81 beats per minute  Poor R-wave progression  No change from old EKG  Repeat 12 lead EKG on 03/08/2018 shows normal sinus rhythm  Rare PVCs heart rate 78 beats per minute  Prolonged QTC      07/26/2018 12 lead EKG shows normal sinus rhythm heart rate 90 beats per minute  Peak PVCs noted  IVCD otherwise no other significant ST changes  Her QRS duration is 118 milliseconds  Twelve lead EKG done today 11/08/2018 shows normal sinus rhythm  Incomplete LBBB with IVCD her QS duration is 118 milliseconds  Twelve lead EKG shows atrial sense ventricular paced heart rate 75 beats per minute  She is biventricular paced now      Twelve lead EKG done 01/16/2020 shows atrial sensed ventricular paced heart rate 82 beats per minute she is biventricular pacemaker      Her device findings reviewed with her which was interrogated at [facility]    Repeat interrogation from January 2020 reviewed      Twelve lead EKG shows paced rhythm heart rate 89 beats per minute she is biventricular pacemaker  Twelve lead EKG 11/12/2020 shows atrial sensed ventricular paced heart rate 79 beats per minute she has a biventricular pacemaker  Twelve lead EKG 02/24/2021 shows atrial sense ventricular paced heart rate 80 beats per minute  Twelve lead EKG 06/08/2021 shows atrial sensed ventricular paced heart rate around 87 beats per minute  Few PVCs noted      Twelve lead EKG done 03/22/2022 shows atrial sensed ventricular paced heart rate 94 beats per minute  Imaging:  Chest X-Ray:   Xr Chest 2 Views    Result Date: 4/9/2017  Impression Stable cardiomegaly without active pulmonary disease  Workstation performed: [id number]     Lab Review   Lab Results   Component Value Date    WBC 8 6 12/21/2021    HGB 13 3 12/21/2021    HCT 40 6 12/21/2021    MCV 86 12/21/2021     12/21/2021     Lab Results   Component Value Date     06/06/2016    K 4 7 12/21/2021     12/21/2021    CO2 26 12/21/2021    ANIONGAP 12 9 11/01/2015    BUN 14 12/21/2021    CREATININE 1 07 (H) 12/21/2021    GLUCOSE 95 06/06/2016    CALCIUM 9 0 10/28/2021    AST 17 12/21/2021    ALT 8 12/21/2021    ALKPHOS 80 10/28/2021    PROT 6 8 06/06/2016    BILITOT 0 3 06/06/2016    EGFR 59 10/28/2021     Lab Results   Component Value Date    GLUCOSE 95 06/06/2016    CALCIUM 9 0 10/28/2021     06/06/2016    K 4 7 12/21/2021    CO2 26 12/21/2021     12/21/2021    BUN 14 12/21/2021    CREATININE 1 07 (H) 12/21/2021         Lab Results   Component Value Date    HGBA1C 6 2 (H) 06/06/2016       Dr Elodia Reynoso MD [facility] [place]      "This note has been constructed using a voice recognition system  Therefore there may be syntax, spelling, and/or grammatical errors   Please call if you have any questions  "

## 2022-03-28 ENCOUNTER — IN-CLINIC DEVICE VISIT (OUTPATIENT)
Dept: CARDIOLOGY CLINIC | Facility: CLINIC | Age: 67
End: 2022-03-28
Payer: COMMERCIAL

## 2022-03-28 ENCOUNTER — TELEPHONE (OUTPATIENT)
Dept: CARDIOLOGY CLINIC | Facility: CLINIC | Age: 67
End: 2022-03-28

## 2022-03-28 DIAGNOSIS — Z95.810 PRESENCE OF IMPLANTABLE CARDIOVERTER-DEFIBRILLATOR (ICD): Primary | ICD-10-CM

## 2022-03-28 PROCEDURE — 93284 PRGRMG EVAL IMPLANTABLE DFB: CPT | Performed by: INTERNAL MEDICINE

## 2022-03-28 NOTE — TELEPHONE ENCOUNTER
----- Message from Dolores Bryant MD sent at 3/28/2022  2:08 PM EDT -----  Device interrogation shows her LV lead is not capturing  She may need to follow-up with EP doctor at [facility] as 1 of the lead is not working  She can contact them or we can help connect with them  Please let her know  Tell her not to panic her device is functioning adequately

## 2022-03-28 NOTE — PROGRESS NOTES
[facility] BI-V ICD/ACTIVE SYSTEM IS MRI CONDITIONAL   DEVICE INTERROGATED IN THE [place] OFFICE:  BATTERY VOLTAGE NEARING KEITH (11 3 MONTHS)  WILL SCHEDULE MONTHLY BATTERY CHECKS   AP 4 8% BP 86% (<90%/DDD 60 BPM)   ALL LEAD PARAMETERS WITHIN NORMAL LIMITS   LV LEAD NOT CAPTURING DUE TO HIGH LV THRESHOLD (D1-RV COIL)    MULTIPLE LV PACING CONFIGURATIONS TESTED WITH CAPTURE ON P4-RV COIL AND P4 - M2 AT 2 5-3 V @ 1 5 MS   PROGRAMMED P4 - M2   LONGEVITY INCREASED TO 1 1 YR   233 AMS EPISODES SHOWING NOISE ON THE ATRIAL ELECTRODE, NOT REPRODUCIBLE IN CLINIC   NO AF   CORVUE IMPEDANCE MONITORING WITHIN NORMAL LIMITS   NORMAL DEVICE FUNCTION   RG

## 2022-04-14 ENCOUNTER — TELEPHONE (OUTPATIENT)
Dept: CARDIOLOGY CLINIC | Facility: CLINIC | Age: 67
End: 2022-04-14

## 2022-04-14 NOTE — TELEPHONE ENCOUNTER
----- Message from Milka Eugene MD sent at 4/13/2022  3:46 PM EDT -----  Patient's device interrogation reading shows,  device function is normal   Patient had episode of NSVT which broke off its on does not need device therapy  Please call patient

## 2022-04-22 ENCOUNTER — HOSPITAL ENCOUNTER (OUTPATIENT)
Dept: NON INVASIVE DIAGNOSTICS | Facility: HOSPITAL | Age: 67
Discharge: HOME | End: 2022-04-22
Attending: INTERNAL MEDICINE
Payer: COMMERCIAL

## 2022-04-22 VITALS
HEIGHT: 66 IN | HEART RATE: 99 BPM | BODY MASS INDEX: 38.73 KG/M2 | WEIGHT: 241 LBS | SYSTOLIC BLOOD PRESSURE: 127 MMHG | DIASTOLIC BLOOD PRESSURE: 88 MMHG

## 2022-04-22 DIAGNOSIS — I47.2 NSVT (NONSUSTAINED VENTRICULAR TACHYCARDIA) (HCC): ICD-10-CM

## 2022-04-22 DIAGNOSIS — Z95.810 AICD (AUTOMATIC CARDIOVERTER/DEFIBRILLATOR) PRESENT: ICD-10-CM

## 2022-04-22 DIAGNOSIS — I10 ESSENTIAL HYPERTENSION: ICD-10-CM

## 2022-04-22 DIAGNOSIS — I48.0 PAF (PAROXYSMAL ATRIAL FIBRILLATION) (HCC): ICD-10-CM

## 2022-04-22 DIAGNOSIS — J45.20 MILD INTERMITTENT ASTHMA WITHOUT COMPLICATION: ICD-10-CM

## 2022-04-22 DIAGNOSIS — I42.0 DILATED CARDIOMYOPATHY (HCC): ICD-10-CM

## 2022-04-22 DIAGNOSIS — E78.5 DYSLIPIDEMIA: ICD-10-CM

## 2022-04-22 PROCEDURE — 93306 TTE W/DOPPLER COMPLETE: CPT | Performed by: INTERNAL MEDICINE

## 2022-04-22 PROCEDURE — 93306 TTE W/DOPPLER COMPLETE: CPT

## 2022-04-24 LAB
AORTIC ROOT: 2.9 CM
APICAL FOUR CHAMBER EJECTION FRACTION: 13 %
AV LVOT PEAK GRADIENT: 5 MMHG
AV PEAK GRADIENT: 7 MMHG
DOP CALC LVOT AREA: 2.83 CM2
DOP CALC LVOT DIAMETER: 1.9 CM
E WAVE DECELERATION TIME: 158 MS
FRACTIONAL SHORTENING: 15 % (ref 28–44)
INTERVENTRICULAR SEPTUM IN DIASTOLE (PARASTERNAL SHORT AXIS VIEW): 0.8 CM
INTERVENTRICULAR SEPTUM: 0.8 CM (ref 0.56–1.06)
LAAS-AP2: 30.9 CM2
LAAS-AP4: 28.9 CM2
LEFT ATRIUM AREA SYSTOLE SINGLE PLANE A4C: 29.4 CM2
LEFT ATRIUM SIZE: 4.7 CM
LEFT INTERNAL DIMENSION IN SYSTOLE: 7.1 CM (ref 4.45–6.74)
LEFT VENTRICLE DIASTOLIC VOLUME (MOD BIPLANE): 393 ML (ref 113.26–255.08)
LEFT VENTRICLE SYSTOLIC VOLUME (MOD BIPLANE): 328 ML
LEFT VENTRICULAR INTERNAL DIMENSION IN DIASTOLE: 8.4 CM (ref 7.44–11.09)
LEFT VENTRICULAR POSTERIOR WALL IN END DIASTOLE: 0.8 CM (ref 0.55–1.04)
LEFT VENTRICULAR STROKE VOLUME: 121 ML
LV EF: 16 %
LVSV (TEICH): 121 ML
MITRAL REGURGITATION PEAK VELOCITY: 5.05 M/S
MITRAL VALVE MEAN INFLOW VELOCITY: 3.4 M/S
MITRAL VALVE REGURGITANT PEAK GRADIENT: 102 MMHG
MV E'TISSUE VEL-LAT: 11 CM/S
MV E'TISSUE VEL-SEP: 5 CM/S
MV PEAK A VEL: 0.76 M/S
MV PEAK E VEL: 118 CM/S
MV STENOSIS PRESSURE HALF TIME: 46 MS
MV VALVE AREA P 1/2 METHOD: 4.78 CM2
PISA MRMAX VEL: 0.35 M/S
PISA RADIUS: 0.7 CM
PV PEAK GRADIENT: 2 MMHG
RIGHT ATRIUM AREA SYSTOLE A4C: 23 CM2
RIGHT VENTRICLE ID DIMENSION: 4.3 CM
SL CV DOP CALC MV VTI RETROGRADE: 137.1 CM
SL CV LEFT ATRIUM LENGTH A2C: 6.2 CM
SL CV LV DIAS VOL ENDO Z SCORE: 5.89
SL CV LV EF: 20
SL CV MV MEAN GRADIENT RETROGRADE: 55 MMHG
SL CV PED ECHO LEFT VENTRICLE DIASTOLIC VOLUME (MOD BIPLANE) 2D: 385 ML
SL CV PED ECHO LEFT VENTRICLE SYSTOLIC VOLUME (MOD BIPLANE) 2D: 264 ML
TR MAX PG: 65 MMHG
TR PEAK VELOCITY: 3.9 M/S
TRICUSPID VALVE PEAK REGURGITATION VELOCITY: 3.91 M/S
Z-SCORE OF INTERVENTRICULAR SEPTUM IN END DIASTOLE: -0.09
Z-SCORE OF LEFT VENTRICULAR DIMENSION IN END DIASTOLE: -0.78
Z-SCORE OF LEFT VENTRICULAR DIMENSION IN END SYSTOLE: 2.05
Z-SCORE OF LEFT VENTRICULAR POSTERIOR WALL IN END DIASTOLE: 0.03

## 2022-04-25 ENCOUNTER — TELEPHONE (OUTPATIENT)
Dept: CARDIOLOGY CLINIC | Facility: CLINIC | Age: 67
End: 2022-04-25

## 2022-04-25 NOTE — TELEPHONE ENCOUNTER
----- Message from Sarah Marc MD sent at 4/25/2022  8:58 AM EDT -----  Patient's echo Doppler reviewed  As compared to previous echo EF remains the same 20% however now moderate to severe mitral regurgitation and mild moderate TR is noted  Patient should keep appointment

## 2022-04-25 NOTE — TELEPHONE ENCOUNTER
----- Message from Charles Serrato MD sent at 4/25/2022  8:58 AM EDT -----  I may have to refer her to [facility] or [facility] heart transplant team   We should see her early but no urgency okay to see her in 6-8 weeks

## 2022-04-26 ENCOUNTER — APPOINTMENT (EMERGENCY)
Dept: RADIOLOGY | Facility: HOSPITAL | Age: 67
End: 2022-04-26
Payer: COMMERCIAL

## 2022-04-26 ENCOUNTER — HOSPITAL ENCOUNTER (OUTPATIENT)
Facility: HOSPITAL | Age: 67
Setting detail: OBSERVATION
Discharge: HOME | End: 2022-04-27
Attending: EMERGENCY MEDICINE | Admitting: INTERNAL MEDICINE
Payer: COMMERCIAL

## 2022-04-26 DIAGNOSIS — R07.9 CHEST PAIN: Primary | ICD-10-CM

## 2022-04-26 DIAGNOSIS — N17.9 AKI (ACUTE KIDNEY INJURY) (HCC): ICD-10-CM

## 2022-04-26 LAB
2HR DELTA HS TROPONIN: -2 NG/L
ALBUMIN SERPL BCP-MCNC: 3.9 G/DL (ref 3.5–5)
ALP SERPL-CCNC: 89 U/L (ref 46–116)
ALT SERPL W P-5'-P-CCNC: 16 U/L (ref 12–78)
ANION GAP SERPL CALCULATED.3IONS-SCNC: 9 MMOL/L (ref 4–13)
AST SERPL W P-5'-P-CCNC: 16 U/L (ref 5–45)
ATRIAL RATE: 97 BPM
BASOPHILS # BLD AUTO: 0.06 THOUSANDS/ΜL (ref 0–0.1)
BASOPHILS NFR BLD AUTO: 1 % (ref 0–1)
BILIRUB SERPL-MCNC: 0.68 MG/DL (ref 0.2–1)
BUN SERPL-MCNC: 23 MG/DL (ref 5–25)
CALCIUM SERPL-MCNC: 9.8 MG/DL (ref 8.3–10.1)
CARDIAC TROPONIN I PNL SERPL HS: 7 NG/L
CARDIAC TROPONIN I PNL SERPL HS: 9 NG/L
CHLORIDE SERPL-SCNC: 102 MMOL/L (ref 100–108)
CK SERPL-CCNC: 31 U/L (ref 26–192)
CK SERPL-CCNC: 44 U/L (ref 26–192)
CO2 SERPL-SCNC: 30 MMOL/L (ref 21–32)
CREAT SERPL-MCNC: 1.38 MG/DL (ref 0.6–1.3)
D DIMER PPP FEU-MCNC: 0.42 UG/ML FEU
EOSINOPHIL # BLD AUTO: 0.21 THOUSAND/ΜL (ref 0–0.61)
EOSINOPHIL NFR BLD AUTO: 2 % (ref 0–6)
ERYTHROCYTE [DISTWIDTH] IN BLOOD BY AUTOMATED COUNT: 14.6 % (ref 11.6–15.1)
GFR SERPL CREATININE-BSD FRML MDRD: 39 ML/MIN/1.73SQ M
GLUCOSE SERPL-MCNC: 110 MG/DL (ref 65–140)
HCT VFR BLD AUTO: 42.6 % (ref 34.8–46.1)
HGB BLD-MCNC: 13.4 G/DL (ref 11.5–15.4)
IMM GRANULOCYTES # BLD AUTO: 0.02 THOUSAND/UL (ref 0–0.2)
IMM GRANULOCYTES NFR BLD AUTO: 0 % (ref 0–2)
LIPASE SERPL-CCNC: 71 U/L (ref 73–393)
LYMPHOCYTES # BLD AUTO: 2.69 THOUSANDS/ΜL (ref 0.6–4.47)
LYMPHOCYTES NFR BLD AUTO: 31 % (ref 14–44)
MAGNESIUM SERPL-MCNC: 2.2 MG/DL (ref 1.6–2.6)
MCH RBC QN AUTO: 26.9 PG (ref 26.8–34.3)
MCHC RBC AUTO-ENTMCNC: 31.5 G/DL (ref 31.4–37.4)
MCV RBC AUTO: 85 FL (ref 82–98)
MONOCYTES # BLD AUTO: 0.7 THOUSAND/ΜL (ref 0.17–1.22)
MONOCYTES NFR BLD AUTO: 8 % (ref 4–12)
NEUTROPHILS # BLD AUTO: 5.11 THOUSANDS/ΜL (ref 1.85–7.62)
NEUTS SEG NFR BLD AUTO: 58 % (ref 43–75)
NRBC BLD AUTO-RTO: 0 /100 WBCS
NT-PROBNP SERPL-MCNC: 1227 PG/ML
P AXIS: 61 DEGREES
PLATELET # BLD AUTO: 355 THOUSANDS/UL (ref 149–390)
PMV BLD AUTO: 10.2 FL (ref 8.9–12.7)
POTASSIUM SERPL-SCNC: 3.6 MMOL/L (ref 3.5–5.3)
PR INTERVAL: 170 MS
PROT SERPL-MCNC: 8 G/DL (ref 6.4–8.2)
QRS AXIS: -81 DEGREES
QRSD INTERVAL: 162 MS
QT INTERVAL: 440 MS
QTC INTERVAL: 558 MS
RBC # BLD AUTO: 4.99 MILLION/UL (ref 3.81–5.12)
SODIUM SERPL-SCNC: 141 MMOL/L (ref 136–145)
T WAVE AXIS: 104 DEGREES
VENTRICULAR RATE: 97 BPM
WBC # BLD AUTO: 8.79 THOUSAND/UL (ref 4.31–10.16)

## 2022-04-26 PROCEDURE — 99220 PR INITIAL OBSERVATION CARE/DAY 70 MINUTES: CPT | Performed by: INTERNAL MEDICINE

## 2022-04-26 PROCEDURE — 99215 OFFICE O/P EST HI 40 MIN: CPT | Performed by: INTERNAL MEDICINE

## 2022-04-26 PROCEDURE — 82306 VITAMIN D 25 HYDROXY: CPT

## 2022-04-26 PROCEDURE — 99285 EMERGENCY DEPT VISIT HI MDM: CPT

## 2022-04-26 PROCEDURE — 99283 EMERGENCY DEPT VISIT LOW MDM: CPT | Performed by: EMERGENCY MEDICINE

## 2022-04-26 PROCEDURE — 93005 ELECTROCARDIOGRAM TRACING: CPT

## 2022-04-26 PROCEDURE — 85025 COMPLETE CBC W/AUTO DIFF WBC: CPT | Performed by: EMERGENCY MEDICINE

## 2022-04-26 PROCEDURE — 83690 ASSAY OF LIPASE: CPT | Performed by: EMERGENCY MEDICINE

## 2022-04-26 PROCEDURE — 82550 ASSAY OF CK (CPK): CPT | Performed by: EMERGENCY MEDICINE

## 2022-04-26 PROCEDURE — 36415 COLL VENOUS BLD VENIPUNCTURE: CPT | Performed by: EMERGENCY MEDICINE

## 2022-04-26 PROCEDURE — 83880 ASSAY OF NATRIURETIC PEPTIDE: CPT | Performed by: EMERGENCY MEDICINE

## 2022-04-26 PROCEDURE — 83735 ASSAY OF MAGNESIUM: CPT | Performed by: EMERGENCY MEDICINE

## 2022-04-26 PROCEDURE — 80053 COMPREHEN METABOLIC PANEL: CPT | Performed by: EMERGENCY MEDICINE

## 2022-04-26 PROCEDURE — 82550 ASSAY OF CK (CPK): CPT | Performed by: INTERNAL MEDICINE

## 2022-04-26 PROCEDURE — 71045 X-RAY EXAM CHEST 1 VIEW: CPT

## 2022-04-26 PROCEDURE — 84484 ASSAY OF TROPONIN QUANT: CPT | Performed by: EMERGENCY MEDICINE

## 2022-04-26 PROCEDURE — 96374 THER/PROPH/DIAG INJ IV PUSH: CPT

## 2022-04-26 PROCEDURE — 85379 FIBRIN DEGRADATION QUANT: CPT | Performed by: EMERGENCY MEDICINE

## 2022-04-26 PROCEDURE — 93010 ELECTROCARDIOGRAM REPORT: CPT | Performed by: INTERNAL MEDICINE

## 2022-04-26 RX ORDER — HYDROMORPHONE HCL/PF 1 MG/ML
0.5 SYRINGE (ML) INJECTION ONCE
Status: COMPLETED | OUTPATIENT
Start: 2022-04-26 | End: 2022-04-26

## 2022-04-26 RX ORDER — TORSEMIDE 20 MG/1
40 TABLET ORAL DAILY
Status: DISCONTINUED | OUTPATIENT
Start: 2022-04-27 | End: 2022-04-27

## 2022-04-26 RX ORDER — SOTALOL HYDROCHLORIDE 80 MG/1
80 TABLET ORAL EVERY 12 HOURS
Status: DISCONTINUED | OUTPATIENT
Start: 2022-04-26 | End: 2022-04-27

## 2022-04-26 RX ORDER — OXYCODONE HYDROCHLORIDE 5 MG/1
2.5 TABLET ORAL EVERY 4 HOURS PRN
Status: DISCONTINUED | OUTPATIENT
Start: 2022-04-26 | End: 2022-04-27 | Stop reason: HOSPADM

## 2022-04-26 RX ORDER — PANTOPRAZOLE SODIUM 40 MG/1
40 TABLET, DELAYED RELEASE ORAL DAILY
Status: DISCONTINUED | OUTPATIENT
Start: 2022-04-26 | End: 2022-04-27 | Stop reason: HOSPADM

## 2022-04-26 RX ORDER — TIZANIDINE 2 MG/1
2 TABLET ORAL EVERY 8 HOURS PRN
Status: DISCONTINUED | OUTPATIENT
Start: 2022-04-26 | End: 2022-04-27 | Stop reason: HOSPADM

## 2022-04-26 RX ORDER — ONDANSETRON 2 MG/ML
4 INJECTION INTRAMUSCULAR; INTRAVENOUS EVERY 6 HOURS PRN
Status: DISCONTINUED | OUTPATIENT
Start: 2022-04-26 | End: 2022-04-27 | Stop reason: HOSPADM

## 2022-04-26 RX ORDER — BUDESONIDE AND FORMOTEROL FUMARATE DIHYDRATE 160; 4.5 UG/1; UG/1
2 AEROSOL RESPIRATORY (INHALATION) 2 TIMES DAILY
Status: DISCONTINUED | OUTPATIENT
Start: 2022-04-26 | End: 2022-04-27 | Stop reason: HOSPADM

## 2022-04-26 RX ORDER — PRAVASTATIN SODIUM 40 MG
40 TABLET ORAL
Status: DISCONTINUED | OUTPATIENT
Start: 2022-04-26 | End: 2022-04-27 | Stop reason: HOSPADM

## 2022-04-26 RX ORDER — POTASSIUM CHLORIDE 20 MEQ/1
40 TABLET, EXTENDED RELEASE ORAL ONCE
Status: COMPLETED | OUTPATIENT
Start: 2022-04-26 | End: 2022-04-26

## 2022-04-26 RX ORDER — ALBUTEROL SULFATE 90 UG/1
1 AEROSOL, METERED RESPIRATORY (INHALATION) EVERY 4 HOURS PRN
Status: DISCONTINUED | OUTPATIENT
Start: 2022-04-26 | End: 2022-04-27 | Stop reason: HOSPADM

## 2022-04-26 RX ORDER — SPIRONOLACTONE 25 MG/1
12.5 TABLET ORAL DAILY
Status: DISCONTINUED | OUTPATIENT
Start: 2022-04-27 | End: 2022-04-27

## 2022-04-26 RX ORDER — ACETAMINOPHEN 325 MG/1
650 TABLET ORAL EVERY 6 HOURS PRN
Status: DISCONTINUED | OUTPATIENT
Start: 2022-04-26 | End: 2022-04-27 | Stop reason: HOSPADM

## 2022-04-26 RX ADMIN — BUDESONIDE AND FORMOTEROL FUMARATE DIHYDRATE 2 PUFF: 160; 4.5 AEROSOL RESPIRATORY (INHALATION) at 11:44

## 2022-04-26 RX ADMIN — PANTOPRAZOLE SODIUM 40 MG: 40 TABLET, DELAYED RELEASE ORAL at 11:43

## 2022-04-26 RX ADMIN — RIVAROXABAN 20 MG: 20 TABLET, FILM COATED ORAL at 11:41

## 2022-04-26 RX ADMIN — SACUBITRIL AND VALSARTAN 1 TABLET: 97; 103 TABLET, FILM COATED ORAL at 17:44

## 2022-04-26 RX ADMIN — LEVOTHYROXINE SODIUM 175 MCG: 150 TABLET ORAL at 11:40

## 2022-04-26 RX ADMIN — POTASSIUM CHLORIDE 40 MEQ: 20 TABLET, EXTENDED RELEASE ORAL at 11:43

## 2022-04-26 RX ADMIN — ONDANSETRON 4 MG: 2 INJECTION INTRAMUSCULAR; INTRAVENOUS at 10:45

## 2022-04-26 RX ADMIN — PRAVASTATIN SODIUM 40 MG: 40 TABLET ORAL at 17:44

## 2022-04-26 RX ADMIN — HYDROMORPHONE HYDROCHLORIDE 0.5 MG: 1 INJECTION, SOLUTION INTRAMUSCULAR; INTRAVENOUS; SUBCUTANEOUS at 05:35

## 2022-04-26 RX ADMIN — TIZANIDINE 2 MG: 2 TABLET ORAL at 20:30

## 2022-04-26 RX ADMIN — HYDROMORPHONE HYDROCHLORIDE 0.5 MG: 1 INJECTION, SOLUTION INTRAMUSCULAR; INTRAVENOUS; SUBCUTANEOUS at 08:18

## 2022-04-26 RX ADMIN — SOTALOL HYDROCHLORIDE 80 MG: 80 TABLET ORAL at 22:36

## 2022-04-26 RX ADMIN — BUDESONIDE AND FORMOTEROL FUMARATE DIHYDRATE 2 PUFF: 160; 4.5 AEROSOL RESPIRATORY (INHALATION) at 17:43

## 2022-04-26 NOTE — PROGRESS NOTES
Patient states pain in her chest has returned and feels like the same muscle spasms she had in the ED  Dr Padmini Barton made aware  VS obtained  Will continue to monitor

## 2022-04-26 NOTE — H&P
Rob 45  H&P- [facility] 1955, 79 y o  female MRN: [id number]  Unit/Bed#: [address] [address] Encounter: [id number]  Primary Care Provider: Azam Jorgensen MD   Date and time admitted to hospital: 4/26/2022  4:31 AM    * Chest pain  Assessment & Plan  This is a 70-year-old female with history of hypertension, dilated cardiomyopathy, AICD in place, hypothyroidism, hyperlipidemia presenting with chest pain  Patient describes as muscle spasms starting on the right arm radiating across the right chest mid sternum and back  Denies any dyspnea, palpitations  · High sensitivity troponin negative  · cardiology consultation appreciated  · Likely musculoskeletal  · Continue pain control    Hypothyroidism  Assessment & Plan  · Continue levothyroxine    Moderate obesity  Assessment & Plan  · Counseled on diet and lifestyle modification    Paroxysmal atrial fibrillation (HCC)  Assessment & Plan  · Continue sotalol, Xarelto for anticoagulation    Hypertension  Assessment & Plan  · Continue sotalol, torsemide, Aldactone and entresto     Dyslipidemia  Assessment & Plan  · Continue statin    Cardiomyopathy ([facility] )  Assessment & Plan  · Patient has history of dilated cardiomyopathy  · AICD in place  · Appears euvolemic  · Continue sotalol, entresto, torsemide, spironolactone as per Cardiology    Mild intermittent asthma without complication  Assessment & Plan  · No acute exacerbation  · Continue inhalers        VTE Prophylaxis: Rivaroxaban (Xarelto)  / sequential compression device   Code Status: FULL  POLST: There is no POLST form on file for this patient (pre-hospital)    Anticipated Length of Stay:  Patient will be admitted on an Observation basis with an anticipated length of stay of  Less than 2 midnights  Justification for Hospital Stay: chest pain    Total Time for Visit, including Counseling / Coordination of Care: 30 minutes    Greater than 50% of this total time spent on direct patient counseling and coordination of care  Chief Complaint:   Chest pain    History of Present Illness:    Lupe Guerin is a 79 y o  female who presents with chest pain  Patient has history ofhypertension, dilated cardiomyopathy, AICD in place, hypothyroidism, hyperlipidemia presenting with chest pain  Patient describes as muscle spasms starting on the right arm radiating across the right chest mid sternum and back  Denies any dyspnea, palpitations  Patient denies any fever, chills, cough, congestion  Review of Systems:    Review of Systems   Constitutional: Negative  HENT: Negative  Eyes: Negative  Respiratory: Negative  Cardiovascular: Positive for chest pain  Gastrointestinal: Negative  Endocrine: Negative  Genitourinary: Negative  Musculoskeletal: Positive for myalgias  Skin: Negative  Allergic/Immunologic: Negative  Neurological: Negative  Hematological: Negative  Psychiatric/Behavioral: Negative          Past Medical and Surgical History:     Past Medical History:   Diagnosis Date    A-fib ([facility] )     Abnormal blood sugar     Acid reflux     Acute bronchitis     Allergic reaction     Anxiety     Arthritis     Asthma     Cardiomyopathy ([facility] )     Cellulitis of left lower leg     Chest pain     CHF [facility]congestive heart failure) ([facility])     Constipation     Depression with anxiety     Disease of thyroid gland     Diverticulitis     Dyslipidemia     Dyspnea on exertion     Elbow pain     Fracture of olecranon, open     Gastritis     Generalized pain     Hematuria     History of colonoscopy     Hypertension     Hypokalemia     Hypomagnesemia     Leg cramping     Moderate obesity     Morbid obesity due to excess calories (HCC)     Myalgia     Myositis     Nausea in adult     Nephrolithiasis     Prepatellar bursitis, unspecified knee     Screening for lipid disorders     Shortness of breath     Spasm of muscle     Thyroid trouble        Past Surgical History:   Procedure Laterality Date    CARDIAC DEFIBRILLATOR PLACEMENT      LAPAROSCOPIC CHOLECYSTECTOMY      TOTAL ABDOMINAL HYSTERECTOMY W/ BILATERAL SALPINGOOPHORECTOMY         Meds/Allergies:    Prior to Admission medications    Medication Sig Start Date End Date Taking? Authorizing Provider   budesonide-formoterol (SYMBICORT) 160-4 5 mcg/act inhaler Inhale 2 puffs 2 (two) times a day Rinse mouth after use   10/4/18  Yes Emma Calhoun MD   Entresto  MG TABS take 1 tablet by mouth twice a day 3/14/22  Yes Abelardo Anderson MD   levalbuterol Tilman Duffel HFA) 45 mcg/act inhaler Inhale 2 puffs every 6 (six) hours as needed for shortness of breath 10/4/18  Yes Emma Calhoun MD   levalbuterol (XOPENEX) 1 25 mg/3 mL nebulizer solution inhale contents of 1 vial in nebulizer every 6 hours if needed 5/29/19  Yes Quin Sinha MD   levothyroxine 175 mcg tablet take 1 tablet by mouth once daily 7/24/21  Yes Kirsten Tucker DO   Magnesium 100 MG CAPS Take by mouth   Yes Historical Provider, MD   pantoprazole (PROTONIX) 40 mg tablet take 1 tablet by mouth once daily 8/2/21  Yes Kirsten Tucker DO   potassium chloride (K-DUR) 10 mEq tablet take 1 tablet by mouth once daily 8/7/20  Yes Samy Perez DO   pravastatin (PRAVACHOL) 40 mg tablet take 1 tablet by mouth once daily 2/28/22  Yes Abelardo Anderson MD   sotalol (BETAPACE) 80 mg tablet TAKE 1 TABLET BY MOUTH EVERY 12 HOURS 10/24/21  Yes Abelardo Anderson MD   spironolactone (ALDACTONE) 25 mg tablet TAKE 1/2 TABLET BY MOUTH DAILY 7/15/21  Yes Abelardo Anderson MD   tiZANidine (ZANAFLEX) 2 mg tablet take 1 tablet by mouth every 8 hours if needed for muscle spasm 2/22/21  Yes Kirsten Tucker DO   torsemide [facility]) 20 mg tablet take 2 tablets by mouth once daily 10/12/21  Yes Abelardo Anderson MD   Xarelto 20 MG tablet take 1 tablet by mouth once daily 5/12/21  Yes Abelardo Anderson MD   baclofen 20 mg tablet Take 1 tablet (20 mg total) by mouth 3 (three) times a day  Patient not taking: Reported on 6/8/2021 5/22/21   Mike Newton MD   diclofenac sodium (VOLTAREN) 1 % Apply 2 g topically 4 (four) times a day 5/8/20   Shu Mckeon MD   dicyclomine (BENTYL) 20 mg tablet Take 1 tablet (20 mg total) by mouth 2 (two) times a day for 4 days 10/28/21 11/1/21  Corrina West PA-C   escitalopram (LEXAPRO) 10 mg tablet Take 1 tablet (10 mg total) by mouth daily  Patient not taking: Reported on 6/8/2021 5/23/21   Mike Newton MD   ipratropium (ATROVENT) 0 02 % nebulizer solution Take 2 5 mL by nebulization 4 (four) times a day for 30 days 10/23/16 9/23/21  Savita Armstrong DO   metoclopramide (Reglan) 10 mg tablet Take 1 tablet (10 mg total) by mouth every 6 (six) hours 10/28/21   Corrina West PA-C   ondansetron (ZOFRAN-ODT) 4 mg disintegrating tablet Take 1 tablet (4 mg total) by mouth every 6 (six) hours as needed for nausea or vomiting 5/22/21   Mike Newton MD     I have reviewed home medications with patient personally  Allergies:    Allergies   Allergen Reactions    Omnipaque [Iohexol] Swelling    Penicillins Swelling    Iv Dye  [Iodinated Diagnostic Agents] Throat Swelling    Other Swelling     Fresh herbs "basil, cilantro"    Shellfish-Derived Products - Food Allergy Hives       Social History:     Social History     Substance and Sexual Activity   Alcohol Use Not Currently     Social History     Tobacco Use   Smoking Status Never Smoker   Smokeless Tobacco Never Used     Social History     Substance and Sexual Activity   Drug Use No       Family History:    Family History   Problem Relation Age of Onset    Hypertension Sister     Cancer Sister     Coronary artery disease Family     Diabetes type II Family     Hypertension Family        Physical Exam:     Vitals:   Blood Pressure: 110/72 (04/26/22 1506)  Pulse: 75 (04/26/22 1506)  Temperature: (!) 97 2 °F (36 2 °C) (04/26/22 1538)  Temp Source: Oral (04/26/22 1538)  Respirations: 18 (04/26/22 1506)  Height: 5' 6" (167 6 cm) (04/26/22 0840)  Weight - Scale: 108 kg (237 lb) (04/26/22 0840)  SpO2: 97 % (04/26/22 1506)    Constitutional: Patient is oriented to person, place and time, no acute distress  HEENT:  Normocephalic, atraumatic  Cardiovascular: Normal S1S2, RRR, No murmurs/rubs/gallops appreciated  Pulmonary:  Bilateral air entry, No rhonchi/rales/wheezing appreciated  Abdominal: Soft, Bowel sounds present, Non-tender, Non-distended  Extremities:  No cyanosis, clubbing or edema  Neurological: Cranial nerves II-XII grossly intact, sensation intact, otherwise no focal neurological symptoms  Additional Data:     Lab Results: I have personally reviewed pertinent reports  Results from last 7 days   Lab Units 04/26/22  0441   WBC Thousand/uL 8 79   HEMOGLOBIN g/dL 13 4   HEMATOCRIT % 42 6   PLATELETS Thousands/uL 355   NEUTROS PCT % 58   LYMPHS PCT % 31   MONOS PCT % 8   EOS PCT % 2     Results from last 7 days   Lab Units 04/26/22  0441   POTASSIUM mmol/L 3 6   CHLORIDE mmol/L 102   CO2 mmol/L 30   BUN mg/dL 23   CREATININE mg/dL 1 38*   CALCIUM mg/dL 9 8   ALK PHOS U/L 89   ALT U/L 16   AST U/L 16           Imaging: I have personally reviewed pertinent reports  XR chest 1 view portable    Result Date: 4/26/2022  Narrative: CHEST INDICATION:   chest pain  COMPARISON:  5/23/2021 EXAM PERFORMED/VIEWS:  XR CHEST PORTABLE FINDINGS: Heart shadow is enlarged but unchanged from prior exam  ICD transvenous pacemaker present  No acute pulmonary disease, no pneumothorax or pleural effusion Osseous structures appear within normal limits for patient age  Impression: No acute pulmonary disease Workstation performed: [id number]     Cardiac EP device report    Result Date: 4/13/2022  Narrative: [facility] BI-V ICD/ACTIVE SYSTEM IS MRI CONDITIONAL MERLIN TRANSMISSION ALERT- NB: 1 NSVT EPISODE- 26 BEATS @ 213 BPM- BROKE ON OWN; NO THERAPY   8 AMS EPISODES MAX DURATION 26 SEC- ATRIAL LEAD NOISE ON EGM; NO AF  HX OF SAME & NOT REPRODUCABLE IN CLINIC  PT O XARELTO & SOTALOL  AS/BVP @ 80 PPM ON CURREENT EGM  BATTERY VOLTAGE ADEQUATE (1 2 YRS)  AP-13%, BVP-93%  ALL AVAILABLE LEAD PARAMETERS WITHIN NORMAL LIMITS  CORVUE IMPEDANCE MONITORING WITHIN NORMAL LIMITS  NEXT SCHEDULED REMOTE TRANSMISSION ON 4/28/22  NORMAL DEVICE FUNCTION  GV     Cardiac EP device report    Result Date: 3/28/2022  Narrative: [facility] BI-V ICD/ACTIVE SYSTEM IS MRI CONDITIONAL DEVICE INTERROGATED IN THE [place] OFFICE:  BATTERY VOLTAGE NEARING KEITH (11 3 MONTHS)  WILL SCHEDULE MONTHLY BATTERY CHECKS  AP 4 8% BP 86% (<90%/DDD 60 BPM)  ALL LEAD PARAMETERS WITHIN NORMAL LIMITS  LV LEAD NOT CAPTURING DUE TO HIGH LV THRESHOLD (D1-RV COIL)  MULTIPLE LV PACING CONFIGURATIONS TESTED WITH CAPTURE ON P4-RV COIL AND P4 - M2 AT 2 5-3 V @ 1 5 MS  PROGRAMMED P4 - M2   LONGEVITY INCREASED TO 1 1 YR   233 AMS EPISODES SHOWING NOISE ON THE ATRIAL ELECTRODE, NOT REPRODUCIBLE IN CLINIC  NO AF   CORVUE IMPEDANCE MONITORING WITHIN NORMAL LIMITS  NORMAL DEVICE FUNCTION  RG     Echo complete w/ contrast if indicated    Result Date: 4/24/2022  Narrative: Jocelynn Slipper  Left Ventricle: Left ventricular cavity size is severely dilated  Wall thickness is normal  The left ventricular ejection fraction is 20% by visual estimation  Systolic function is severely reduced  There is global hypokinesis with regional variation  Basal-mid anteroseptal, septal, and inferoseptal wall are akinetic Diastolic function is abnormal    Left Atrium: The atrium is severely dilated (>48 mL/m2)    Right Atrium: The atrium is moderately dilated    Atrial Septum: The septum bows into the right atrium, suggesting increased left atrial pressure    Mitral Valve: The annulus is moderately dilated  There is severe regurgitation with a wall-impinging jet to posterior atrial wall   Tricuspid Valve: There is mild to moderate regurgitation   The right ventricular systolic pressure is moderately to severely elevated    Pulmonic Valve: There is mild regurgitation  Compared to prior study, LV remains markedly dilated with severely reduced EF  There is now severe mitral regurgitation with markedly elevated left atrial size by index volume     Allscripts / Epic Records Reviewed: Yes     ** Please Note: This note has been constructed using a voice recognition system   **

## 2022-04-26 NOTE — PLAN OF CARE
Problem: CARDIOVASCULAR - ADULT  Goal: Maintains optimal cardiac output and hemodynamic stability  Description: INTERVENTIONS:  - Monitor I/O, vital signs and rhythm  - Monitor for S/S and trends of decreased cardiac output  - Administer and titrate ordered vasoactive medications to optimize hemodynamic stability  - Assess quality of pulses, skin color and temperature  - Assess for signs of decreased coronary artery perfusion  - Instruct patient to report change in severity of symptoms  Outcome: Progressing     Problem: RESPIRATORY - ADULT  Goal: Achieves optimal ventilation and oxygenation  Description: INTERVENTIONS:  - Assess for changes in respiratory status  - Assess for changes in mentation and behavior  - Position to facilitate oxygenation and minimize respiratory effort  - Oxygen administered by appropriate delivery if ordered  - Initiate smoking cessation education as indicated  - Encourage broncho-pulmonary hygiene including cough, deep breathe, Incentive Spirometry  - Assess the need for suctioning and aspirate as needed  - Assess and instruct to report SOB or any respiratory difficulty  - Respiratory Therapy support as indicated  Outcome: Progressing     Problem: PAIN - ADULT  Goal: Verbalizes/displays adequate comfort level or baseline comfort level  Description: Interventions:  - Encourage patient to monitor pain and request assistance  - Assess pain using appropriate pain scale  - Administer analgesics based on type and severity of pain and evaluate response  - Implement non-pharmacological measures as appropriate and evaluate response  - Consider cultural and social influences on pain and pain management  - Notify physician/advanced practitioner if interventions unsuccessful or patient reports new pain  Outcome: Progressing

## 2022-04-26 NOTE — ED NOTES
Patient states pain improved - states it feels like a muscle cramping or spasm and increases with movements         Edwina Mendez RN  04/26/22 7412

## 2022-04-26 NOTE — ASSESSMENT & PLAN NOTE
· Patient has history of dilated cardiomyopathy  · AICD in place  · Appears euvolemic  · Continue sotalol, entresto, torsemide, spironolactone as per Cardiology

## 2022-04-26 NOTE — ASSESSMENT & PLAN NOTE
This is a 61-year-old female with history of hypertension, dilated cardiomyopathy, AICD in place, hypothyroidism, hyperlipidemia presenting with chest pain  Patient describes as muscle spasms starting on the right arm radiating across the right chest mid sternum and back  Denies any dyspnea, palpitations      · High sensitivity troponin negative  · cardiology consultation appreciated  · Likely musculoskeletal  · Continue pain control

## 2022-04-26 NOTE — CONSULTS
Consultation - Cardiology   Sylwia De La Fuente 79 y o  female MRN: [id number]  Unit/Bed#: 74803 [facility] 406-01 Encounter: [id number]    Assessment/Plan     Assessment:  1  Musculoskeletal chest discomfort  2  Dilated cardiomyopathy  3  Hypertension  4  AICD present  5  Hypokalemia       Plan:  Patient has been placed in observation status on the hospitalist service  1  Continue her advanced heart failure medications including Entresto, spironolactone, and torsemide  2  Seek cause of musculoskeletal discomfort    3  No further cardiac testing at this time  History of Present Illness   Physician Requesting Consult: Batsheva Tran MD  Reason for Consult / Principal Problem:  Noncardiac chest pain in patient with history of dilated cardiomyopathy      HPI: Sylwia De La Fuente is a 79y o  year old female who presented to the emergency room with a one-week history of midsternal chest discomfort which she stated would radiate to her right shoulder and down her right arm with movement  She also notes with position changes and attempts to sit in upright fashion she will get discomfort  Twelve lead EKG demonstrates paced rhythm  High sensitivity troponins were negative  She presented to the emergency room, with concerns for possible rhabdomyolysis as she had had this previously and she states the pain was similar  Lab workup was unremarkable  Patient does have a history significant for dilated cardiomyopathy for which she has and I AICD, paroxysmal atrial fibrillation, hypertension, dyslipidemia, nonsustained ventricular tachycardia and mild asthma  2D echocardiogram performed as an outpatient earlier this week demonstrated EF of 82% with LV systolic function markedly reduced and global hypokinesis  There was also severe mitral valve regurgitation  Results were reviewed at length with patient      Consult to cardiology  Consult performed by: MAJOR Du  Consult ordered by: Batsheva Tran MD          Review of Systems   Constitutional: Negative  Negative for activity change, appetite change, fatigue and fever  HENT: Negative for congestion, ear discharge, mouth sores, sinus pain and tinnitus  Eyes: Negative  Negative for photophobia and visual disturbance  Respiratory: Positive for chest tightness  Negative for shortness of breath and wheezing  Cardiovascular: Positive for chest pain  Gastrointestinal: Negative for abdominal distention, diarrhea, nausea and vomiting  Endocrine: Negative  Negative for polydipsia, polyphagia and polyuria  Genitourinary: Negative  Negative for difficulty urinating  Musculoskeletal: Negative  Negative for back pain and gait problem  Skin: Negative  Neurological: Negative  Negative for dizziness, syncope, weakness and light-headedness  Hematological: Negative  Psychiatric/Behavioral: Negative  Negative for agitation         Historical Information   Past Medical History:   Diagnosis Date    A-fib ([place] [address] )     Abnormal blood sugar     Acid reflux     Acute bronchitis     Allergic reaction     Anxiety     Arthritis     Asthma     Cardiomyopathy ([facility] )     Cellulitis of left lower leg     Chest pain     [facility] failure[facility])     Constipation     Depression with anxiety     Disease of thyroid gland     Diverticulitis     Dyslipidemia     Dyspnea on exertion     Elbow pain     Fracture of olecranon, open     Gastritis     Generalized pain     Hematuria     History of colonoscopy     Hypertension     Hypokalemia     Hypomagnesemia     Leg cramping     Moderate obesity     Morbid obesity due to excess calories ([facility])     Myalgia     Myositis     Nausea in adult     Nephrolithiasis     Prepatellar bursitis, unspecified knee     Screening for lipid disorders     Shortness of breath     Spasm of muscle     Thyroid trouble      Past Surgical History:   Procedure Laterality Date    CARDIAC DEFIBRILLATOR PLACEMENT      LAPAROSCOPIC CHOLECYSTECTOMY      TOTAL ABDOMINAL HYSTERECTOMY W/ BILATERAL SALPINGOOPHORECTOMY       Social History     Substance and Sexual Activity   Alcohol Use Not Currently     Social History     Substance and Sexual Activity   Drug Use No     E-Cigarette/Vaping    E-Cigarette Use Never User      E-Cigarette/Vaping Substances     Social History     Tobacco Use   Smoking Status Never Smoker   Smokeless Tobacco Never Used     Family History:   Family History   Problem Relation Age of Onset    Hypertension Sister     Cancer Sister     Coronary artery disease Family     Diabetes type II Family     Hypertension Family        Meds/Allergies   all current active meds have been reviewed, current meds:   Current Facility-Administered Medications   Medication Dose Route Frequency    acetaminophen (TYLENOL) tablet 650 mg  650 mg Oral Q6H PRN    albuterol (PROVENTIL HFA,VENTOLIN HFA) inhaler 1 puff  1 puff Inhalation Q4H PRN    budesonide-formoterol (SYMBICORT) 160-4 5 mcg/act inhaler 2 puff  2 puff Inhalation BID    levothyroxine tablet 175 mcg  175 mcg Oral Daily    ondansetron (ZOFRAN) injection 4 mg  4 mg Intravenous Q6H PRN    oxyCODONE (ROXICODONE) IR tablet 2 5 mg  2 5 mg Oral Q4H PRN    pantoprazole (PROTONIX) EC tablet 40 mg  40 mg Oral Daily    potassium chloride (K-DUR,KLOR-CON) CR tablet 40 mEq  40 mEq Oral Once    pravastatin (PRAVACHOL) tablet 40 mg  40 mg Oral Daily With Dinner    rivaroxaban (XARELTO) tablet 20 mg  20 mg Oral Daily    sotalol (BETAPACE) tablet 80 mg  80 mg Oral Q12H    tiZANidine (ZANAFLEX) tablet 2 mg  2 mg Oral Q8H PRN    and PTA meds:   Prior to Admission Medications   Prescriptions Last Dose Informant Patient Reported? Taking?    Entresto  MG TABS 4/25/2022 at Unknown time Self No Yes   Sig: take 1 tablet by mouth twice a day   Magnesium 100 MG CAPS 4/25/2022 at Unknown time Self Yes Yes   Sig: Take by mouth   Xarelto 20 MG tablet 4/25/2022 at Unknown time Self No Yes   Sig: take 1 tablet by mouth once daily   baclofen 20 mg tablet Not Taking at Unknown time Self No No   Sig: Take 1 tablet (20 mg total) by mouth 3 (three) times a day   Patient not taking: Reported on 6/8/2021   budesonide-formoterol (SYMBICORT) 160-4 5 mcg/act inhaler 4/25/2022 at Unknown time Self No Yes   Sig: Inhale 2 puffs 2 (two) times a day Rinse mouth after use     diclofenac sodium (VOLTAREN) 1 %  Self No No   Sig: Apply 2 g topically 4 (four) times a day   dicyclomine (BENTYL) 20 mg tablet   No No   Sig: Take 1 tablet (20 mg total) by mouth 2 (two) times a day for 4 days   escitalopram (LEXAPRO) 10 mg tablet Not Taking at Unknown time Self No No   Sig: Take 1 tablet (10 mg total) by mouth daily   Patient not taking: Reported on 6/8/2021   ipratropium (ATROVENT) 0 02 % nebulizer solution  Self No No   Sig: Take 2 5 mL by nebulization 4 (four) times a day for 30 days   levalbuterol (XOPENEX HFA) 45 mcg/act inhaler 4/25/2022 at Unknown time Self No Yes   Sig: Inhale 2 puffs every 6 (six) hours as needed for shortness of breath   levalbuterol (XOPENEX) 1 25 mg/3 mL nebulizer solution 4/25/2022 at Unknown time Self No Yes   Sig: inhale contents of 1 vial in nebulizer every 6 hours if needed   levothyroxine 175 mcg tablet 4/25/2022 at Unknown time Self No Yes   Sig: take 1 tablet by mouth once daily   metoclopramide (Reglan) 10 mg tablet Unknown at Unknown time Self No No   Sig: Take 1 tablet (10 mg total) by mouth every 6 (six) hours   ondansetron (ZOFRAN-ODT) 4 mg disintegrating tablet Unknown at Unknown time Self No No   Sig: Take 1 tablet (4 mg total) by mouth every 6 (six) hours as needed for nausea or vomiting   pantoprazole (PROTONIX) 40 mg tablet 4/25/2022 at Unknown time Self No Yes   Sig: take 1 tablet by mouth once daily   potassium chloride (K-DUR) 10 mEq tablet 4/25/2022 at Unknown time Self No Yes   Sig: take 1 tablet by mouth once daily   pravastatin (PRAVACHOL) 40 mg tablet 4/25/2022 at Unknown time Self No Yes   Sig: take 1 tablet by mouth once daily   sotalol (BETAPACE) 80 mg tablet 4/25/2022 at Unknown time Self No Yes   Sig: TAKE 1 TABLET BY MOUTH EVERY 12 HOURS   spironolactone (ALDACTONE) 25 mg tablet 4/25/2022 at Unknown time Self No Yes   Sig: TAKE 1/2 TABLET BY MOUTH DAILY   tiZANidine (ZANAFLEX) 2 mg tablet 4/25/2022 at Unknown time Self No Yes   Sig: take 1 tablet by mouth every 8 hours if needed for muscle spasm   torsemide (DEMADEX) 20 mg tablet 4/25/2022 at Unknown time Self No Yes   Sig: take 2 tablets by mouth once daily      Facility-Administered Medications: None     Allergies   Allergen Reactions    Omnipaque [Iohexol] Swelling    Penicillins Swelling    Iv Dye  [Iodinated Diagnostic Agents] Throat Swelling    Other Swelling     Fresh herbs "basil, cilantro"    Shellfish-Derived Products - Food Allergy Hives       Objective   Vitals: Blood pressure 112/70, pulse 77, temperature 98 °F (36 7 °C), temperature source Oral, resp  rate 18, height 5' 6" (1 676 m), weight 108 kg (237 lb), SpO2 96 %  Orthostatic Blood Pressures      Most Recent Value   Blood Pressure 112/70 filed at 04/26/2022 1003   Patient Position - Orthostatic VS Lying filed at 04/26/2022 0840          No intake or output data in the 24 hours ending 04/26/22 1057    Invasive Devices  Report    Peripheral Intravenous Line            Peripheral IV 04/26/22 Right Antecubital <1 day                Physical Exam  Vitals and nursing note reviewed  Constitutional:       Appearance: Normal appearance  She is morbidly obese  HENT:      Right Ear: External ear normal       Left Ear: External ear normal       Nose: Nose normal    Eyes:      General: No scleral icterus  Right eye: No discharge  Left eye: No discharge  Cardiovascular:      Rate and Rhythm: Normal rate and regular rhythm  Pulses: Normal pulses  Heart sounds: Murmur heard          Comments: Discomfort reproducible with movement and also with palpation of the sternal cartilage  Pulmonary:      Effort: Pulmonary effort is normal  No respiratory distress  Breath sounds: Normal breath sounds  No wheezing, rhonchi or rales  Abdominal:      General: Bowel sounds are normal  There is no distension  Palpations: Abdomen is soft  Musculoskeletal:      Right lower leg: No edema  Left lower leg: No edema  Skin:     General: Skin is warm and dry  Capillary Refill: Capillary refill takes less than 2 seconds  Neurological:      General: No focal deficit present  Mental Status: She is alert and oriented to person, place, and time  Mental status is at baseline  Psychiatric:         Behavior: Behavior is cooperative  Lab Results:   I have personally reviewed pertinent lab results  CBC with diff:   Results from last 7 days   Lab Units 04/26/22  0441   WBC Thousand/uL 8 79   RBC Million/uL 4 99   HEMOGLOBIN g/dL 13 4   HEMATOCRIT % 42 6   MCV fL 85   MCH pg 26 9   MCHC g/dL 31 5   RDW % 14 6   MPV fL 10 2   PLATELETS Thousands/uL 355     CMP:   Results from last 7 days   Lab Units 04/26/22  0441   SODIUM mmol/L 141   CHLORIDE mmol/L 102   CO2 mmol/L 30   BUN mg/dL 23   CREATININE mg/dL 1 38*   CALCIUM mg/dL 9 8   AST U/L 16   ALT U/L 16   ALK PHOS U/L 89   EGFR ml/min/1 73sq m 39     HS Troponin:   0   Lab Value Date/Time    HSTNI0 9 04/26/2022 0441    HSTNI2 7 04/26/2022 0632     BNP:   Results from last 7 days   Lab Units 04/26/22  0441   POTASSIUM mmol/L 3 6   CHLORIDE mmol/L 102   CO2 mmol/L 30   BUN mg/dL 23   CREATININE mg/dL 1 38*   CALCIUM mg/dL 9 8   EGFR ml/min/1 73sq m 39     Magnesium:   Results from last 7 days   Lab Units 04/26/22  0441   MAGNESIUM mg/dL 2 2     Lipid Profile:     Imaging: I have personally reviewed pertinent reports  EKG:   Av pacing on  EKG  VTE Prophylaxis: Sequential compression device (Venodyne)     Code Status: Level 1 - Full Code  Advance Directive and Living Will: Power of :    POLST:      Dru Sultana, [address] [facility]

## 2022-04-26 NOTE — ED PROVIDER NOTES
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 and reports this feels similar to that past pain  Also reports recent echo showing mitral valve regurgitation   Muscle Pain     HPI    This is a 79 year female that presents today with chest pain and muscle pain  Patient states she has cardiac history with CHF, AICD in place,  Eczema atrial fibrillation on anticoagulation, mitral regurgitation  Patient states for the past week she has been having pain that starts in the right hand and feels like a shocking sensation going up her arm into her chest   Patient states around around under her breast to her back  Patient states she has had this before and was due to rhabdomyolysis  States epigastric pain as well  She thought it might be rhabdo so came to get evaluated  States little short of breath when she exerts herself  States the pain is more severe when she is sitting up  79 year female that presents with chest pain    Prior to Admission Medications   Prescriptions Last Dose Informant Patient Reported? Taking? Entresto  MG TABS 4/25/2022 at Unknown time Self No Yes   Sig: take 1 tablet by mouth twice a day   Magnesium 100 MG CAPS 4/25/2022 at Unknown time Self Yes Yes   Sig: Take by mouth   Xarelto 20 MG tablet 4/25/2022 at Unknown time Self No Yes   Sig: take 1 tablet by mouth once daily   baclofen 20 mg tablet Not Taking at Unknown time Self No No   Sig: Take 1 tablet (20 mg total) by mouth 3 (three) times a day   Patient not taking: Reported on 6/8/2021   budesonide-formoterol (SYMBICORT) 160-4 5 mcg/act inhaler 4/25/2022 at Unknown time Self No Yes   Sig: Inhale 2 puffs 2 (two) times a day Rinse mouth after use     diclofenac sodium (VOLTAREN) 1 %  Self No No   Sig: Apply 2 g topically 4 (four) times a day   dicyclomine (BENTYL) 20 mg tablet   No No   Sig: Take 1 tablet (20 mg total) by mouth 2 (two) times a day for 4 days   escitalopram (LEXAPRO) 10 mg tablet Not Taking at Unknown time Self No No   Sig: Take 1 tablet (10 mg total) by mouth daily   Patient not taking: Reported on 6/8/2021   ipratropium (ATROVENT) 0 02 % nebulizer solution  Self No No   Sig: Take 2 5 mL by nebulization 4 (four) times a day for 30 days   levalbuterol (XOPENEX HFA) 45 mcg/act inhaler 4/25/2022 at Unknown time Self No Yes   Sig: Inhale 2 puffs every 6 (six) hours as needed for shortness of breath   levalbuterol (XOPENEX) 1 25 mg/3 mL nebulizer solution 4/25/2022 at Unknown time Self No Yes   Sig: inhale contents of 1 vial in nebulizer every 6 hours if needed   levothyroxine 175 mcg tablet 4/25/2022 at Unknown time Self No Yes   Sig: take 1 tablet by mouth once daily   metoclopramide (Reglan) 10 mg tablet Unknown at Unknown time Self No No   Sig: Take 1 tablet (10 mg total) by mouth every 6 (six) hours   ondansetron (ZOFRAN-ODT) 4 mg disintegrating tablet Unknown at Unknown time Self No No   Sig: Take 1 tablet (4 mg total) by mouth every 6 (six) hours as needed for nausea or vomiting   pantoprazole (PROTONIX) 40 mg tablet 4/25/2022 at Unknown time Self No Yes   Sig: take 1 tablet by mouth once daily   potassium chloride (K-DUR) 10 mEq tablet 4/25/2022 at Unknown time Self No Yes   Sig: take 1 tablet by mouth once daily   pravastatin (PRAVACHOL) 40 mg tablet 4/25/2022 at Unknown time Self No Yes   Sig: take 1 tablet by mouth once daily   sotalol (BETAPACE) 80 mg tablet 4/25/2022 at Unknown time Self No Yes   Sig: TAKE 1 TABLET BY MOUTH EVERY 12 HOURS   spironolactone (ALDACTONE) 25 mg tablet 4/25/2022 at Unknown time Self No Yes   Sig: TAKE 1/2 TABLET BY MOUTH DAILY   tiZANidine (ZANAFLEX) 2 mg tablet 4/25/2022 at Unknown time Self No Yes   Sig: take 1 tablet by mouth every 8 hours if needed for muscle spasm   torsemide (DEMADEX) 20 mg tablet 4/25/2022 at Unknown time Self No Yes   Sig: take 2 tablets by mouth once daily      Facility-Administered Medications: None       Past Medical History:   Diagnosis Date    A-fib ([place] [address] )     Abnormal blood sugar     Acid reflux     Acute bronchitis     Allergic reaction     Anxiety     Arthritis     Asthma     Cardiomyopathy ([place] [address] )     Cellulitis of left lower leg     Chest pain     CH[facility]est[facility] failure[facility])     Constipation     Depression with anxiety     Disease of thyroid gland     Diverticulitis     Dyslipidemia     Dyspnea on exertion     Elbow pain     Fracture of olecranon, open     Gastritis     Generalized pain     Hematuria     History of colonoscopy     Hypertension     Hypokalemia     Hypomagnesemia     Leg cramping     Moderate obesity     Morbid obesity due to excess calories ([facility])     Myalgia     Myositis     Nausea in adult     Nephrolithiasis     Prepatellar bursitis, unspecified knee     Screening for lipid disorders     Shortness of breath     Spasm of muscle     Thyroid trouble        Past Surgical History:   Procedure Laterality Date    CARDIAC DEFIBRILLATOR PLACEMENT      LAPAROSCOPIC CHOLECYSTECTOMY      TOTAL ABDOMINAL HYSTERECTOMY W/ BILATERAL SALPINGOOPHORECTOMY         Family History   Problem Relation Age of Onset    Hypertension Sister     Cancer Sister     Coronary artery disease Family     Diabetes type II Family     Hypertension Family      I have reviewed and agree with the history as documented  E-Cigarette/Vaping    E-Cigarette Use Never User      E-Cigarette/Vaping Substances     Social History     Tobacco Use    Smoking status: Never Smoker    Smokeless tobacco: Never Used   Vaping Use    Vaping Use: Never used   Substance Use Topics    Alcohol use: Not Currently    Drug use: No       Review of Systems   Constitutional: Negative  Negative for diaphoresis and fever  HENT: Negative  Respiratory: Positive for chest tightness and shortness of breath   Negative for cough and wheezing  Cardiovascular: Positive for chest pain  Negative for palpitations and leg swelling  Gastrointestinal: Negative for abdominal distention, abdominal pain, nausea and vomiting  Genitourinary: Negative  Musculoskeletal: Negative  Skin: Negative  Neurological: Negative  Psychiatric/Behavioral: Negative  All other systems reviewed and are negative  Physical Exam  Physical Exam  Vitals and nursing note reviewed  Constitutional:       General: She is not in acute distress  Appearance: She is well-developed  HENT:      Head: Normocephalic and atraumatic  Eyes:      Conjunctiva/sclera: Conjunctivae normal    Cardiovascular:      Rate and Rhythm: Normal rate and regular rhythm  Heart sounds: Murmur heard  Pulmonary:      Effort: Pulmonary effort is normal  No respiratory distress  Breath sounds: Normal breath sounds  Abdominal:      Palpations: Abdomen is soft  Tenderness: There is no abdominal tenderness  Musculoskeletal:      Cervical back: Neck supple  Skin:     General: Skin is warm and dry  Neurological:      Mental Status: She is alert           Vital Signs  ED Triage Vitals   Temperature Pulse Respirations Blood Pressure SpO2   04/26/22 0440 04/26/22 0433 04/26/22 0433 04/26/22 0433 04/26/22 0433   98 1 °F (36 7 °C) 96 15 146/82 100 %      Temp Source Heart Rate Source Patient Position - Orthostatic VS BP Location FiO2 (%)   04/26/22 0440 04/26/22 0433 04/26/22 0433 04/26/22 0433 --   Oral Monitor Lying Right arm       Pain Score       04/26/22 0730       6           Vitals:    04/26/22 1858 04/26/22 2225 04/26/22 2300 04/27/22 0221   BP: 93/62 115/62  110/62   Pulse: 84 65 71 80   Patient Position - Orthostatic VS:             Visual Acuity      ED Medications  Medications   budesonide-formoterol (SYMBICORT) 160-4 5 mcg/act inhaler 2 puff (2 puffs Inhalation Given 4/26/22 1743)   albuterol (PROVENTIL HFA,VENTOLIN HFA) inhaler 1 puff (has no administration in time range)   levothyroxine tablet 175 mcg (175 mcg Oral Given 4/26/22 1140)   pantoprazole (PROTONIX) EC tablet 40 mg (40 mg Oral Given 4/26/22 1143)   pravastatin (PRAVACHOL) tablet 40 mg (40 mg Oral Given 4/26/22 1744)   sotalol (BETAPACE) tablet 80 mg (80 mg Oral Given 4/26/22 2236)   tiZANidine (ZANAFLEX) tablet 2 mg (2 mg Oral Given 4/26/22 2030)   rivaroxaban (XARELTO) tablet 20 mg (20 mg Oral Given 4/26/22 1141)   oxyCODONE (ROXICODONE) IR tablet 2 5 mg (has no administration in time range)   acetaminophen (TYLENOL) tablet 650 mg (has no administration in time range)   ondansetron (ZOFRAN) injection 4 mg (4 mg Intravenous Given 4/26/22 1045)   sacubitril-valsartan (ENTRESTO)  MG per tablet 1 tablet (1 tablet Oral Given 4/26/22 1744)   spironolactone (ALDACTONE) tablet 12 5 mg (has no administration in time range)   torsemide (DEMADEX) tablet 40 mg (has no administration in time range)   HYDROmorphone (DILAUDID) injection 0 5 mg (0 5 mg Intravenous Given 4/26/22 0535)   HYDROmorphone (DILAUDID) injection 0 5 mg (0 5 mg Intravenous Given 4/26/22 0818)   potassium chloride (K-DUR,KLOR-CON) CR tablet 40 mEq (40 mEq Oral Given 4/26/22 1143)       Diagnostic Studies  Results Reviewed     Procedure Component Value Units Date/Time    D-Dimer [[id number]]  (Normal) Collected: 04/26/22 0740    Lab Status: Final result Specimen: Blood from Arm, Right Updated: 04/26/22 0758     D-Dimer, Quant 0 42 ug/ml FEU     Narrative: In the evaluation for possible pulmonary embolism, in the appropriate (Well's Score of 4 or less) patient, the age adjusted d-dimer cutoff for this patient can be calculated as:    Age x 0 01 (in ug/mL) for Age-adjusted D-dimer exclusion threshold for a patient over 50 years      HS Troponin I 2hr [[id number]]  (Normal) Collected: 04/26/22 7300    Lab Status: Final result Specimen: Blood from Arm, Right Updated: 04/26/22 0702     hs TnI 2hr 7 ng/L      Delta 2hr hsTnI -2 ng/L     HS Troponin I 4hr [[id number]]     Lab Status: No result Specimen: Blood     NT-BNP PRO [[id number]]  (Abnormal) Collected: 04/26/22 0441    Lab Status: Final result Specimen: Blood from Line, Venous Updated: 04/26/22 0526     NT-proBNP 1,227 pg/mL     CK (with reflex to MB) [[id number]]  (Normal) Collected: 04/26/22 0441    Lab Status: Final result Specimen: Blood from Line, Venous Updated: 04/26/22 0526     Total CK 31 U/L     Lipase [[id number]]  (Abnormal) Collected: 04/26/22 0441    Lab Status: Final result Specimen: Blood from Line, Venous Updated: 04/26/22 0526     Lipase 71 u/L     Magnesium [[id number]]  (Normal) Collected: 04/26/22 0441    Lab Status: Final result Specimen: Blood from Line, Venous Updated: 04/26/22 0526     Magnesium 2 2 mg/dL     HS Troponin 0hr (reflex protocol) [[id number]]  (Normal) Collected: 04/26/22 0441    Lab Status: Final result Specimen: Blood from Line, Venous Updated: 04/26/22 0509     hs TnI 0hr 9 ng/L     Comprehensive metabolic panel [[id number]]  (Abnormal) Collected: 04/26/22 0441    Lab Status: Final result Specimen: Blood from Line, Venous Updated: 04/26/22 0503     Sodium 141 mmol/L      Potassium 3 6 mmol/L      Chloride 102 mmol/L      CO2 30 mmol/L      ANION GAP 9 mmol/L      BUN 23 mg/dL      Creatinine 1 38 mg/dL      Glucose 110 mg/dL      Calcium 9 8 mg/dL      AST 16 U/L      ALT 16 U/L      Alkaline Phosphatase 89 U/L      Total Protein 8 0 g/dL      Albumin 3 9 g/dL      Total Bilirubin 0 68 mg/dL      eGFR 39 ml/min/1 73sq m     Narrative:      [place] guidelines for Chronic Kidney Disease (CKD):     Stage 1 with normal or high GFR (GFR > 90 mL/min/1 73 square meters)    Stage 2 Mild CKD (GFR = 60-89 mL/min/1 73 square meters)    Stage 3A Moderate CKD (GFR = 45-59 mL/min/1 73 square meters)    Stage 3B Moderate CKD (GFR = 30-44 mL/min/1 73 square meters)    Stage 4 Severe CKD (GFR = 15-29 mL/min/1 73 square meters)   Stage 5 End Stage CKD (GFR <15 mL/min/1 73 square meters)  Note: GFR calculation is accurate only with a steady state creatinine    CBC and differential [[id number]] Collected: 04/26/22 0441    Lab Status: Final result Specimen: Blood from Line, Venous Updated: 04/26/22 0447     WBC 8 79 Thousand/uL      RBC 4 99 Million/uL      Hemoglobin 13 4 g/dL      Hematocrit 42 6 %      MCV 85 fL      MCH 26 9 pg      MCHC 31 5 g/dL      RDW 14 6 %      MPV 10 2 fL      Platelets 676 Thousands/uL      nRBC 0 /100 WBCs      Neutrophils Relative 58 %      Immat GRANS % 0 %      Lymphocytes Relative 31 %      Monocytes Relative 8 %      Eosinophils Relative 2 %      Basophils Relative 1 %      Neutrophils Absolute 5 11 Thousands/µL      Immature Grans Absolute 0 02 Thousand/uL      Lymphocytes Absolute 2 69 Thousands/µL      Monocytes Absolute 0 70 Thousand/µL      Eosinophils Absolute 0 21 Thousand/µL      Basophils Absolute 0 06 Thousands/µL                  XR chest 1 view portable   Final Result by Awais Maher MD (04/26 0940)      No acute pulmonary disease                  Workstation performed: [id number]                    Procedures  Procedures         ED Course  ED Course as of 04/27/22 0232   Tue Apr 26, 2022 0440 Procedure Note: EKG  Date/Time: 04/26/22 4:40 AM   Performed by: Jaxson Arzate   Authorized by: Jaxson Arzate   Indications / Diagnosis: CP  ECG reviewed by me, the ED Provider: yes   The EKG demonstrates:  Paced rhtyhm      0627 Will do a delta troponin             HEART Risk Score      Most Recent Value   Heart Score Risk Calculator    History 0 Filed at: 04/26/2022 0604   ECG 0 Filed at: 04/26/2022 0604   Age 2 Filed at: 04/26/2022 0604   Risk Factors 2 Filed at: 04/26/2022 0604   Troponin 0 Filed at: 04/26/2022 0604   HEART Score 4 Filed at: 04/26/2022 6366                        SBIRT 22yo+      Most Recent Value   SBIRT (25 yo +)    In order to provide better care to our patients, we are screening all of our patients for alcohol and drug use  Would it be okay to ask you these screening questions? No Filed at: 04/26/2022 1370                    MDM    Disposition  Final diagnoses:   Chest pain   KOLBY (acute kidney injury) [facility])     Time reflects when diagnosis was documented in both MDM as applicable and the Disposition within this note     Time User Action Codes Description Comment    4/26/2022  7:21 AM Edmonia Cocks Add [R07 9] Chest pain     4/26/2022  7:23 AM Сергей Dallin Tripathi Add [N17 9] KOLBY (acute kidney injury) [facility])       ED Disposition     ED Disposition Condition Date/Time Comment    Admit Stable Tue Apr 26, 2022  7:21 AM Case was discussed with medicine and the patient's admission status was agreed to be Admission Status: observation status to the service of Dr Lee Carrington   Follow-up Information    None         Current Discharge Medication List      CONTINUE these medications which have NOT CHANGED    Details   budesonide-formoterol (SYMBICORT) 160-4 5 mcg/act inhaler Inhale 2 puffs 2 (two) times a day Rinse mouth after use  Qty: 1 Inhaler, Refills: 5    Associated Diagnoses: Mild intermittent asthma with exacerbation      Entresto  MG TABS take 1 tablet by mouth twice a day  Qty: 180 tablet, Refills: 3    Associated Diagnoses: Dilated cardiomyopathy ([address] );  Essential hypertension      levalbuterol (XOPENEX HFA) 45 mcg/act inhaler Inhale 2 puffs every 6 (six) hours as needed for shortness of breath  Qty: 1 Inhaler, Refills: 5    Associated Diagnoses: Mild intermittent asthma with exacerbation      levalbuterol (XOPENEX) 1 25 mg/3 mL nebulizer solution inhale contents of 1 vial in nebulizer every 6 hours if needed  Qty: 720 mL, Refills: 1    Associated Diagnoses: Medication refill      levothyroxine 175 mcg tablet take 1 tablet by mouth once daily  Qty: 90 tablet, Refills: 3    Associated Diagnoses: Hypothyroidism due to acquired atrophy of thyroid      Magnesium 100 MG CAPS Take by mouth pantoprazole (PROTONIX) 40 mg tablet take 1 tablet by mouth once daily  Qty: 90 tablet, Refills: 3    Associated Diagnoses: Gastroesophageal reflux disease without esophagitis      potassium chloride (K-DUR) 10 mEq tablet take 1 tablet by mouth once daily  Qty: 30 tablet, Refills: 5    Associated Diagnoses: Hypokalemia      pravastatin (PRAVACHOL) 40 mg tablet take 1 tablet by mouth once daily  Qty: 90 tablet, Refills: 3    Associated Diagnoses: Dyslipidemia      sotalol (BETAPACE) 80 mg tablet TAKE 1 TABLET BY MOUTH EVERY 12 HOURS  Qty: 180 tablet, Refills: 3    Associated Diagnoses: PAF (paroxysmal atrial fibrillation) ([facility])      spironolactone (ALDACTONE) 25 mg tablet TAKE 1/2 TABLET BY MOUTH DAILY  Qty: 45 tablet, Refills: 3    Associated Diagnoses: Chronic combined systolic and diastolic heart failure, NYHA class 2 ([facility])      tiZANidine (ZANAFLEX) 2 mg tablet take 1 tablet by mouth every 8 hours if needed for muscle spasm  Qty: 21 tablet, Refills: 3    Associated Diagnoses: Back muscle spasm      torsemide (DEMADEX) 20 mg tablet take 2 tablets by mouth once daily  Qty: 180 tablet, Refills: 3    Associated Diagnoses: Chronic combined systolic and diastolic congestive heart failure ([address] ); Paroxysmal atrial fibrillation ([address] ); Essential hypertension; Dilated cardiomyopathy ([address] ); Dyspnea on exertion; Dyslipidemia;  Hypothyroidism due to acquired atrophy of thyroid      Xarelto 20 MG tablet take 1 tablet by mouth once daily  Qty: 90 tablet, Refills: 3    Associated Diagnoses: Paroxysmal atrial fibrillation ([facility])      baclofen 20 mg tablet Take 1 tablet (20 mg total) by mouth 3 (three) times a day  Qty: 30 tablet, Refills: 0    Associated Diagnoses: Myalgia      diclofenac sodium (VOLTAREN) 1 % Apply 2 g topically 4 (four) times a day  Qty: 100 g, Refills: 0    Associated Diagnoses: Osteoarthritis, unspecified osteoarthritis type, unspecified site      dicyclomine (BENTYL) 20 mg tablet Take 1 tablet (20 mg total) by mouth 2 (two) times a day for 4 days  Qty: 8 tablet, Refills: 0    Associated Diagnoses: Abdominal pain      escitalopram (LEXAPRO) 10 mg tablet Take 1 tablet (10 mg total) by mouth daily  Qty: 30 tablet, Refills: 0    Associated Diagnoses: Anxiety      ipratropium (ATROVENT) 0 02 % nebulizer solution Take 2 5 mL by nebulization 4 (four) times a day for 30 days  Qty: 300 mL, Refills: 0      metoclopramide (Reglan) 10 mg tablet Take 1 tablet (10 mg total) by mouth every 6 (six) hours  Qty: 15 tablet, Refills: 0    Associated Diagnoses: Abdominal pain      ondansetron (ZOFRAN-ODT) 4 mg disintegrating tablet Take 1 tablet (4 mg total) by mouth every 6 (six) hours as needed for nausea or vomiting  Qty: 20 tablet, Refills: 0    Associated Diagnoses: Vomiting             No discharge procedures on file      PDMP Review     None          ED Provider  Electronically Signed by           Corwin Martinez MD  04/27/22 8433

## 2022-04-26 NOTE — ED NOTES
Patient requesting additional pain medication prior to being transferred to the floor    States "I know the pain is going to return once I start moving around and I want to stay ahead of the pain "  Dr Nichole Saenz made aware of patient's request      José Dye RN  04/26/22 9052

## 2022-04-26 NOTE — NURSING NOTE
Pt requesting fluids for muscle spasms;refused any medication to help with the spasms  Spoke to Nathanael [address] Said she will come to see the pt

## 2022-04-27 VITALS
OXYGEN SATURATION: 96 % | SYSTOLIC BLOOD PRESSURE: 108 MMHG | TEMPERATURE: 97.8 F | WEIGHT: 237 LBS | HEIGHT: 66 IN | BODY MASS INDEX: 38.09 KG/M2 | HEART RATE: 81 BPM | RESPIRATION RATE: 18 BRPM | DIASTOLIC BLOOD PRESSURE: 63 MMHG

## 2022-04-27 LAB
ALBUMIN SERPL BCP-MCNC: 3.2 G/DL (ref 3.5–5)
ALP SERPL-CCNC: 70 U/L (ref 46–116)
ALT SERPL W P-5'-P-CCNC: 16 U/L (ref 12–78)
ANION GAP SERPL CALCULATED.3IONS-SCNC: 10 MMOL/L (ref 4–13)
AST SERPL W P-5'-P-CCNC: 13 U/L (ref 5–45)
BASOPHILS # BLD AUTO: 0.03 THOUSANDS/ΜL (ref 0–0.1)
BASOPHILS NFR BLD AUTO: 0 % (ref 0–1)
BILIRUB SERPL-MCNC: 0.49 MG/DL (ref 0.2–1)
BUN SERPL-MCNC: 24 MG/DL (ref 5–25)
CALCIUM ALBUM COR SERPL-MCNC: 9.6 MG/DL (ref 8.3–10.1)
CALCIUM SERPL-MCNC: 9 MG/DL (ref 8.3–10.1)
CHLORIDE SERPL-SCNC: 103 MMOL/L (ref 100–108)
CO2 SERPL-SCNC: 26 MMOL/L (ref 21–32)
CREAT SERPL-MCNC: 1.22 MG/DL (ref 0.6–1.3)
EOSINOPHIL # BLD AUTO: 0.15 THOUSAND/ΜL (ref 0–0.61)
EOSINOPHIL NFR BLD AUTO: 2 % (ref 0–6)
ERYTHROCYTE [DISTWIDTH] IN BLOOD BY AUTOMATED COUNT: 14.5 % (ref 11.6–15.1)
GFR SERPL CREATININE-BSD FRML MDRD: 45 ML/MIN/1.73SQ M
GLUCOSE P FAST SERPL-MCNC: 111 MG/DL (ref 65–99)
GLUCOSE SERPL-MCNC: 111 MG/DL (ref 65–140)
HCT VFR BLD AUTO: 37.3 % (ref 34.8–46.1)
HGB BLD-MCNC: 11.9 G/DL (ref 11.5–15.4)
IMM GRANULOCYTES # BLD AUTO: 0.02 THOUSAND/UL (ref 0–0.2)
IMM GRANULOCYTES NFR BLD AUTO: 0 % (ref 0–2)
LYMPHOCYTES # BLD AUTO: 2.51 THOUSANDS/ΜL (ref 0.6–4.47)
LYMPHOCYTES NFR BLD AUTO: 28 % (ref 14–44)
MCH RBC QN AUTO: 27 PG (ref 26.8–34.3)
MCHC RBC AUTO-ENTMCNC: 31.9 G/DL (ref 31.4–37.4)
MCV RBC AUTO: 85 FL (ref 82–98)
MONOCYTES # BLD AUTO: 0.9 THOUSAND/ΜL (ref 0.17–1.22)
MONOCYTES NFR BLD AUTO: 10 % (ref 4–12)
NEUTROPHILS # BLD AUTO: 5.38 THOUSANDS/ΜL (ref 1.85–7.62)
NEUTS SEG NFR BLD AUTO: 60 % (ref 43–75)
NRBC BLD AUTO-RTO: 0 /100 WBCS
PLATELET # BLD AUTO: 298 THOUSANDS/UL (ref 149–390)
PMV BLD AUTO: 10 FL (ref 8.9–12.7)
POTASSIUM SERPL-SCNC: 4.1 MMOL/L (ref 3.5–5.3)
PROT SERPL-MCNC: 6.7 G/DL (ref 6.4–8.2)
RBC # BLD AUTO: 4.4 MILLION/UL (ref 3.81–5.12)
SODIUM SERPL-SCNC: 139 MMOL/L (ref 136–145)
WBC # BLD AUTO: 8.99 THOUSAND/UL (ref 4.31–10.16)

## 2022-04-27 PROCEDURE — 85025 COMPLETE CBC W/AUTO DIFF WBC: CPT | Performed by: INTERNAL MEDICINE

## 2022-04-27 PROCEDURE — 80053 COMPREHEN METABOLIC PANEL: CPT | Performed by: INTERNAL MEDICINE

## 2022-04-27 PROCEDURE — 99217 PR OBSERVATION CARE DISCHARGE MANAGEMENT: CPT | Performed by: HOSPITALIST

## 2022-04-27 RX ORDER — SOTALOL HYDROCHLORIDE 80 MG/1
80 TABLET ORAL EVERY 12 HOURS
Status: DISCONTINUED | OUTPATIENT
Start: 2022-04-27 | End: 2022-04-27 | Stop reason: HOSPADM

## 2022-04-27 RX ORDER — SPIRONOLACTONE 25 MG/1
12.5 TABLET ORAL DAILY
Status: DISCONTINUED | OUTPATIENT
Start: 2022-04-28 | End: 2022-04-27 | Stop reason: HOSPADM

## 2022-04-27 RX ORDER — TRAMADOL HYDROCHLORIDE 50 MG/1
50 TABLET ORAL EVERY 6 HOURS PRN
Qty: 15 TABLET | Refills: 0 | Status: SHIPPED | OUTPATIENT
Start: 2022-04-27 | End: 2022-05-07

## 2022-04-27 RX ORDER — PROCHLORPERAZINE 25 MG
25 SUPPOSITORY, RECTAL RECTAL EVERY 12 HOURS PRN
Qty: 12 SUPPOSITORY | Refills: 0 | Status: SHIPPED | OUTPATIENT
Start: 2022-04-27 | End: 2022-05-15

## 2022-04-27 RX ORDER — ONDANSETRON 4 MG/1
4 TABLET, ORALLY DISINTEGRATING ORAL EVERY 6 HOURS PRN
Qty: 10 TABLET | Refills: 0 | Status: SHIPPED | OUTPATIENT
Start: 2022-04-27 | End: 2022-05-15

## 2022-04-27 RX ORDER — TORSEMIDE 20 MG/1
40 TABLET ORAL DAILY
Status: DISCONTINUED | OUTPATIENT
Start: 2022-04-28 | End: 2022-04-27 | Stop reason: HOSPADM

## 2022-04-27 RX ADMIN — PANTOPRAZOLE SODIUM 40 MG: 40 TABLET, DELAYED RELEASE ORAL at 06:21

## 2022-04-27 RX ADMIN — ONDANSETRON 4 MG: 2 INJECTION INTRAMUSCULAR; INTRAVENOUS at 08:16

## 2022-04-27 RX ADMIN — SOTALOL HYDROCHLORIDE 80 MG: 80 TABLET ORAL at 09:23

## 2022-04-27 RX ADMIN — RIVAROXABAN 20 MG: 20 TABLET, FILM COATED ORAL at 09:15

## 2022-04-27 RX ADMIN — LEVOTHYROXINE SODIUM 175 MCG: 150 TABLET ORAL at 06:21

## 2022-04-27 RX ADMIN — BUDESONIDE AND FORMOTEROL FUMARATE DIHYDRATE 2 PUFF: 160; 4.5 AEROSOL RESPIRATORY (INHALATION) at 09:23

## 2022-04-27 NOTE — PLAN OF CARE
Problem: Potential for Falls  Goal: Patient will remain free of falls  Description: INTERVENTIONS:  - Educate patient/family on patient safety including physical limitations  - Instruct patient to call for assistance with activity   - Consult OT/PT to assist with strengthening/mobility   - Keep Call bell within reach  - Keep bed low and locked with side rails adjusted as appropriate  - Keep care items and personal belongings within reach  - Initiate and maintain comfort rounds  - Make Fall Risk Sign visible to staff  - Offer Toileting every 2 Hours, in advance of need    - Apply yellow socks and bracelet for high fall risk patients  - Consider moving patient to room near nurses station  Outcome: Progressing     Problem: Nutrition/Hydration-ADULT  Goal: Nutrient/Hydration intake appropriate for improving, restoring or maintaining nutritional needs  Description: Monitor and assess patient's nutrition/hydration status for malnutrition  Collaborate with interdisciplinary team and initiate plan and interventions as ordered  Monitor patient's weight and dietary intake as ordered or per policy  Utilize nutrition screening tool and intervene as necessary  Determine patient's food preferences and provide high-protein, high-caloric foods as appropriate       INTERVENTIONS:  - Monitor oral intake, urinary output, labs, and treatment plans  - Assess nutrition and hydration status and recommend course of action  - Evaluate amount of meals eaten  - Assist patient with eating if necessary   - Allow adequate time for meals  - Recommend/ encourage appropriate diets, oral nutritional supplements, and vitamin/mineral supplements  - Order, calculate, and assess calorie counts as needed  - Recommend, monitor, and adjust tube feedings and TPN/PPN based on assessed needs  - Assess need for intravenous fluids  - Provide specific nutrition/hydration education as appropriate  - Include patient/family/caregiver in decisions related to nutrition  Outcome: Progressing     Problem: CARDIOVASCULAR - ADULT  Goal: Maintains optimal cardiac output and hemodynamic stability  Description: INTERVENTIONS:  - Monitor I/O, vital signs and rhythm  - Monitor for S/S and trends of decreased cardiac output  - Administer and titrate ordered vasoactive medications to optimize hemodynamic stability  - Assess quality of pulses, skin color and temperature  - Assess for signs of decreased coronary artery perfusion  - Instruct patient to report change in severity of symptoms  Outcome: Progressing     Problem: RESPIRATORY - ADULT  Goal: Achieves optimal ventilation and oxygenation  Description: INTERVENTIONS:  - Assess for changes in respiratory status  - Assess for changes in mentation and behavior  - Position to facilitate oxygenation and minimize respiratory effort  - Oxygen administered by appropriate delivery if ordered  - Initiate smoking cessation education as indicated  - Encourage broncho-pulmonary hygiene including cough, deep breathe, Incentive Spirometry  - Assess the need for suctioning and aspirate as needed  - Assess and instruct to report SOB or any respiratory difficulty  - Respiratory Therapy support as indicated  Outcome: Progressing     Problem: GASTROINTESTINAL - ADULT  Goal: Minimal or absence of nausea and/or vomiting  Description: INTERVENTIONS:  - Administer IV fluids if ordered to ensure adequate hydration  - Maintain NPO status until nausea and vomiting are resolved  - Nasogastric tube if ordered  - Administer ordered antiemetic medications as needed  - Provide nonpharmacologic comfort measures as appropriate  - Advance diet as tolerated, if ordered  - Consider nutrition services referral to assist patient with adequate nutrition and appropriate food choices  Outcome: Progressing  Goal: Maintains adequate nutritional intake  Description: INTERVENTIONS:  - Monitor percentage of each meal consumed  - Identify factors contributing to decreased intake, treat as appropriate  - Assist with meals as needed  - Monitor I&O, weight, and lab values if indicated  - Obtain nutrition services referral as needed  Outcome: Progressing     Problem: METABOLIC, FLUID AND ELECTROLYTES - ADULT  Goal: Electrolytes maintained within normal limits  Description: INTERVENTIONS:  - Monitor labs and assess patient for signs and symptoms of electrolyte imbalances  - Administer electrolyte replacement as ordered  - Monitor response to electrolyte replacements, including repeat lab results as appropriate  - Instruct patient on fluid and nutrition as appropriate  Outcome: Progressing  Goal: Fluid balance maintained  Description: INTERVENTIONS:  - Monitor labs   - Monitor I/O and WT  - Instruct patient on fluid and nutrition as appropriate  - Assess for signs & symptoms of volume excess or deficit  Outcome: Progressing     Problem: SKIN/TISSUE INTEGRITY - ADULT  Goal: Skin Integrity remains intact(Skin Breakdown Prevention)  Description: Assess:  -Perform Jose Martin assessment every shift    -Assess extremities for adequate circulation and sensation     Bed Management:  -Have minimal linens on bed & keep smooth, unwrinkled  -Change linens as needed when moist       Skin Care:  -Avoid use of baby powder, tape, friction and shearing, hot water or constrictive   -Do not massage red bony areas    Next Steps:  Outcome: Progressing     Problem: HEMATOLOGIC - ADULT  Goal: Maintains hematologic stability  Description: INTERVENTIONS  - Assess for signs and symptoms of bleeding or hemorrhage  - Monitor labs  - Administer supportive blood products/factors as ordered and appropriate  Outcome: Progressing     Problem: PAIN - ADULT  Goal: Verbalizes/displays adequate comfort level or baseline comfort level  Description: Interventions:  - Encourage patient to monitor pain and request assistance  - Assess pain using appropriate pain scale  - Administer analgesics based on type and severity of pain and evaluate response  - Implement non-pharmacological measures as appropriate and evaluate response  - Consider cultural and social influences on pain and pain management  - Notify physician/advanced practitioner if interventions unsuccessful or patient reports new pain  Outcome: Progressing     Problem: DISCHARGE PLANNING  Goal: Discharge to home or other facility with appropriate resources  Description: INTERVENTIONS:  - Identify barriers to discharge w/patient and caregiver  - Arrange for needed discharge resources and transportation as appropriate  - Identify discharge learning needs (meds, wound care, etc )  - Arrange for interpretive services to assist at discharge as needed  - Refer to Case Management Department for coordinating discharge planning if the patient needs post-hospital services based on physician/advanced practitioner order or complex needs related to functional status, cognitive ability, or social support system  Outcome: Progressing

## 2022-04-27 NOTE — QUICK NOTE
Called to patient room as she is requesting continuous IVFs  She states that she was given fluids in the past to help with muscle spasms  Educated patient that IVFs would not be beneficial as she has EF of 20%, severely reduced systolic function, abnormal diastolic dysfunction and this could cause her to become fluid overloaded  Discussed continuing oral hydration, tizanidine, pain control which patient does not want to try at this time  Will reevaluate 
Stretcher

## 2022-04-27 NOTE — PLAN OF CARE
Problem: Nutrition/Hydration-ADULT  Goal: Nutrient/Hydration intake appropriate for improving, restoring or maintaining nutritional needs  Description: Monitor and assess patient's nutrition/hydration status for malnutrition  Collaborate with interdisciplinary team and initiate plan and interventions as ordered  Monitor patient's weight and dietary intake as ordered or per policy  Utilize nutrition screening tool and intervene as necessary  Determine patient's food preferences and provide high-protein, high-caloric foods as appropriate       INTERVENTIONS:  - Monitor oral intake, urinary output, labs, and treatment plans  - Assess nutrition and hydration status and recommend course of action  - Evaluate amount of meals eaten  - Assist patient with eating if necessary   - Allow adequate time for meals  - Recommend/ encourage appropriate diets, oral nutritional supplements, and vitamin/mineral supplements  - Order, calculate, and assess calorie counts as needed  - Recommend, monitor, and adjust tube feedings and TPN/PPN based on assessed needs  - Assess need for intravenous fluids  - Provide specific nutrition/hydration education as appropriate  - Include patient/family/caregiver in decisions related to nutrition  Outcome: Progressing     Problem: CARDIOVASCULAR - ADULT  Goal: Maintains optimal cardiac output and hemodynamic stability  Description: INTERVENTIONS:  - Monitor I/O, vital signs and rhythm  - Monitor for S/S and trends of decreased cardiac output  - Administer and titrate ordered vasoactive medications to optimize hemodynamic stability  - Assess quality of pulses, skin color and temperature  - Assess for signs of decreased coronary artery perfusion  - Instruct patient to report change in severity of symptoms  Outcome: Progressing

## 2022-04-27 NOTE — UTILIZATION REVIEW
Initial Clinical Review    Admission: Date/Time/Statement:   Admission Orders (From admission, onward)     Ordered        04/26/22 0722  Place in Observation  Once                      Orders Placed This Encounter   Procedures    Place in Observation     Standing Status:   Standing     Number of Occurrences:   1     Order Specific Question:   Level of Care     Answer:   Med Surg [16]     ED Arrival Information     Expected Arrival Acuity    - 4/26/2022 04:26 Urgent         Means of arrival Escorted by Service Admission type    Walk-In Self Hospitalist Urgent         Arrival complaint    CHEST PAIN        Chief Complaint   Patient presents with    Chest Pain     Pt reports chest/muscle pain for the past week, worsening throughout the week, 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 and reports this feels similar to that past pain  Also reports recent echo showing mitral valve regurgitation   Muscle Pain       Initial Presentation:   79 year female that presents today with chest pain and muscle pain  Patient states she has cardiac history with CHF, AICD in place,  Eczema atrial fibrillation on anticoagulation, mitral regurgitation  Patient states for the past week she has been having pain that starts in the right hand and feels like a shocking sensation going up her arm into her chest   Patient states around around under her breast to her back  Patient states she has had this before and was due to rhabdomyolysis  States epigastric pain as well  She thought it might be rhabdo so came to get evaluated  States little short of breath when she exerts herself  States the pain is more severe when she is sitting up  Chest pain  Assessment & Plan  This is a 70-year-old female with history of hypertension, dilated cardiomyopathy, AICD in place, hypothyroidism, hyperlipidemia presenting with chest pain  Patient describes as muscle spasms starting on the right arm radiating across the right chest mid sternum and back    Denies any dyspnea, palpitations      · High sensitivity troponin negative  · cardiology consultation appreciated  · Likely musculoskeletal  · Continue pain control    Per cardio:  1  Musculoskeletal chest discomfort  2  Dilated cardiomyopathy  3  Hypertension  4  AICD present  5  Hypokalemia   Plan:  Patient has been placed in observation status on the hospitalist service  1  Continue her advanced heart failure medications including Entresto, spironolactone, and torsemide      2  Seek cause of musculoskeletal discomfort     3  No further cardiac testing at this time        ED Triage Vitals   Temperature Pulse Respirations Blood Pressure SpO2   04/26/22 0440 04/26/22 0433 04/26/22 0433 04/26/22 0433 04/26/22 0433   98 1 °F (36 7 °C) 96 15 146/82 100 %      Temp Source Heart Rate Source Patient Position - Orthostatic VS BP Location FiO2 (%)   04/26/22 0440 04/26/22 0433 04/26/22 0433 04/26/22 0433 --   Oral Monitor Lying Right arm       Pain Score       04/26/22 0730       6          Wt Readings from Last 1 Encounters:   04/26/22 108 kg (237 lb)     Additional Vital Signs:   04/27/22 02:21:11 97 7 °F (36 5 °C) 80 -- 110/62 78 95 % -- --   04/26/22 2300 -- 71 -- -- -- 93 % None (Room air) --   04/26/22 22:25:01 97 6 °F (36 4 °C) 65 18 115/62 80 98 % -- --   04/26/22 18:58:44 98 7 °F (37 1 °C) 84 18 93/62 72 94 % -- --   04/26/22 17:21:50 -- 92 -- 115/74 88 96 % -- --   04/26/22 1538 97 2 °F (36 2 °C) Abnormal  -- -- -- -- -- -- --     Pertinent Labs/Diagnostic Test Results:   XR chest 1 view portable   Final Result by Connie Chan MD (04/26 0940)      No acute pulmonary disease                  Workstation performed: [id number]               Results from last 7 days   Lab Units 04/27/22  0619 04/26/22  0441   WBC Thousand/uL 8 99 8 79   HEMOGLOBIN g/dL 11 9 13 4   HEMATOCRIT % 37 3 42 6   PLATELETS Thousands/uL 298 355   NEUTROS ABS Thousands/µL 5 38 5 11         Results from last 7 days   Lab Units 04/27/22  5660 04/26/22  0441   SODIUM mmol/L 139 141   POTASSIUM mmol/L 4 1 3 6   CHLORIDE mmol/L 103 102   CO2 mmol/L 26 30   ANION GAP mmol/L 10 9   BUN mg/dL 24 23   CREATININE mg/dL 1 22 1 38*   EGFR ml/min/1 73sq m 45 39   CALCIUM mg/dL 9 0 9 8   MAGNESIUM mg/dL  --  2 2     Results from last 7 days   Lab Units 04/27/22  0619 04/26/22  0441   AST U/L 13 16   ALT U/L 16 16   ALK PHOS U/L 70 89   TOTAL PROTEIN g/dL 6 7 8 0   ALBUMIN g/dL 3 2* 3 9   TOTAL BILIRUBIN mg/dL 0 49 0 68         Results from last 7 days   Lab Units 04/27/22  0619 04/26/22  0441   GLUCOSE RANDOM mg/dL 111 110             No results found for: BETA-HYDROXYBUTYRATE               Results from last 7 days   Lab Units 04/26/22  1734 04/26/22  0441   CK TOTAL U/L 44 31     Results from last 7 days   Lab Units 04/26/22  0632 04/26/22  0441   HS TNI 0HR ng/L  --  9   HS TNI 2HR ng/L 7  --    HSTNI D2 ng/L -2  --      Results from last 7 days   Lab Units 04/26/22  0740   D-DIMER QUANTITATIVE ug/ml FEU 0 42                             Results from last 7 days   Lab Units 04/26/22  0441   NT-PRO BNP pg/mL 1,227*             Results from last 7 days   Lab Units 04/26/22  0441   LIPASE u/L 71*                                                               ED Treatment:   Medication Administration from 04/26/2022 0426 to 04/26/2022 2393       Date/Time Order Dose Route Action     04/26/2022 0535 HYDROmorphone (DILAUDID) injection 0 5 mg 0 5 mg Intravenous Given     04/26/2022 0818 HYDROmorphone (DILAUDID) injection 0 5 mg 0 5 mg Intravenous Given        Past Medical History:   Diagnosis Date    A-fib ([facility] )     Abnormal blood sugar     Acid reflux     Acute bronchitis     Allergic reaction     Anxiety     Arthritis     Asthma     Cardiomyopathy ([facility] )     Cellulitis of left lower leg     Chest pain     CHF (congestive heart failure) (HCC)     Constipation     Depression with anxiety     Disease of thyroid gland     Diverticulitis     Dyslipidemia     Dyspnea on exertion     Elbow pain     Fracture of olecranon, open     Gastritis     Generalized pain     Hematuria     History of colonoscopy     Hypertension     Hypokalemia     Hypomagnesemia     Leg cramping     Moderate obesity     Morbid obesity due to excess calories (HCC)     Myalgia     Myositis     Nausea in adult     Nephrolithiasis     Prepatellar bursitis, unspecified knee     Screening for lipid disorders     Shortness of breath     Spasm of muscle     Thyroid trouble      Present on Admission:   Mild intermittent asthma without complication   [facility]omyopathy ([email or web address] )   Dyslipidemia   Hypertension   Paroxysmal atrial fibrillation (HCC)   Hypothyroidism   Moderate obesity      Admitting Diagnosis: Muscle pain [M79 10]  Chest pain [R07 9]  KOLBY (acute kidney injury) ([email or web address] 75 ) [N17 9]  Age/Sex: 79 y o  female  Admission Orders:  Consult cardio  Telemetry  Scd/foot pumps    Scheduled Medications:  budesonide-formoterol, 2 puff, Inhalation, BID  levothyroxine, 175 mcg, Oral, Daily  pantoprazole, 40 mg, Oral, Daily  pravastatin, 40 mg, Oral, Daily With Dinner  rivaroxaban, 20 mg, Oral, Daily  sacubitril-valsartan, 1 tablet, Oral, BID  sotalol, 80 mg, Oral, Q12H  spironolactone, 12 5 mg, Oral, Daily  torsemide, 40 mg, Oral, Daily      Continuous IV Infusions:     PRN Meds:  acetaminophen, 650 mg, Oral, Q6H PRN  albuterol, 1 puff, Inhalation, Q4H PRN  ondansetron, 4 mg, Intravenous, Q6H PRN  oxyCODONE, 2 5 mg, Oral, Q4H PRN  tiZANidine, 2 mg, Oral, Q8H PRN        IP CONSULT TO CARDIOLOGY    Network Utilization Review Department  ATTENTION: Please call with any questions or concerns to [phone] and carefully listen to the prompts so that you are directed to the right person   All voicemails are confidential   Joselito Foreman all requests for admission clinical reviews, approved or denied determinations and any other requests to dedicated fax number below belonging to the campus where the patient is receiving treatment   List of dedicated fax numbers for the Facilities:  [address] DENIALS (Administrative/Medical Necessity) [phone]   [address] (Maternity/NICU/Pediatrics) [phone] [address] 125 [facility]  [address] [place] [address] [address] [place] [address] [address] [place] [address] [facility] [address] [phone]

## 2022-04-27 NOTE — CASE MANAGEMENT
Case Management Assessment & Discharge Planning Note    Patient name Jose Martin Hernandez  Location [address]4 CXTGD 243-* MRN [id number]  : 1955 Date 2022       Current Admission Date: 2022  Current Admission Diagnosis:Chest pain   Patient Active Problem List    Diagnosis Date Noted    Chest pain 2022    NSVT (nonsustained ventricular tachycardia) ([facility] ) 2021    Acute on chronic congestive heart failure ([place] [address] ) 10/10/2020    Mild intermittent asthma without complication 49/15/0922    Dyspnea on exertion 2018    Paroxysmal atrial fibrillation ([place] [address] ) 2018    Moderate obesity 2018    Chronic systolic congestive heart failure, NYHA class 2 ([place] [address] ) 2017    Dyslipidemia 2017    Hypertension 2013    Cardiomyopathy ([place] [address] ) 10/31/2013    Hypothyroidism 10/26/2013      LOS (days): 0  Geometric Mean LOS (GMLOS) (days):   Days to GMLOS:     OBJECTIVE:   Current admission status: Observation  Preferred Pharmacy:   [id number], [address] [facility], [address] [place] [id number]  Phone: [phone] Fax: [phone]    [address] [place] [address], [facility] [address] [place] [id number]  Phone: [phone] Fax: [phone]    Primary Care Provider: Cornel Stahl MD  Primary Insurance: [facility]  Secondary Insurance:     ASSESSMENT:    Chart reviewed and case discussed in MDR's  No needs identified

## 2022-04-27 NOTE — DISCHARGE SUMMARY
Rob 45  Discharge- Derek Brower 1955, 79 y o  female MRN: [id number]  Unit/Bed#: [address] [address] Encounter: [id number]  Primary Care Provider: Nawaf Nielson MD   Date and time admitted to hospital: 4/26/2022  4:31 AM    Hypothyroidism  Assessment & Plan  · Continued on Synthroid as inpatient    Paroxysmal atrial fibrillation (HCC)  Assessment & Plan  · Rate controlled during hospitalization, continue sotalol and Xarelto    Cardiomyopathy ([address] )  Assessment & Plan  · History of dilated cardiomyopathy, repeat echo with ejection fraction 20%, severe regurgitation of mitral valve  She was continued on sotalol, entresto, torsemide and Aldactone as per her home regimen  * Chest pain  Assessment & Plan  · Originally presented with right-sided chest pain, that patient described as muscle spasms that got worse with movement  Description was not concerning for cardiac pain, however troponins were negative and Cardiology was consulted and repeat echo was done (results as below) and did not feel that she required any further inpatient workup  Possible referral for transplant as outpatient  Given noncardiac chest pain likely of musculoskeletal origin, patient requested prescription for tramadol which was provided, as this has improved her pain, she did receive Dilaudid in the hospital which caused her to throw up  Medical Problems             Resolved Problems  Date Reviewed: 4/26/2022    None              Discharging Physician / Practitioner: Mario Jara MD  PCP: Nawaf Nielson MD  Admission Date:   Admission Orders (From admission, onward)     Ordered        04/26/22 0722  Place in Observation  Once                      Discharge Date: 04/27/22    Consultations During Hospital Stay:  · Cardiology    Procedures Performed:   · Echocardiogram   Left Ventricle: Left ventricular cavity size is severely dilated   Wall thickness is normal  The left ventricular ejection fraction is 20% by visual estimation  Systolic function is severely reduced  There is global hypokinesis with regional variation  Basal-mid anteroseptal, septal, and inferoseptal wall are akinetic Diastolic function is abnormal     Left Atrium: The atrium is severely dilated (>48 mL/m2)    Right Atrium: The atrium is moderately dilated    Atrial Septum: The septum bows into the right atrium, suggesting increased left atrial pressure    Mitral Valve: The annulus is moderately dilated  There is severe regurgitation with a wall-impinging jet to posterior atrial wall    Tricuspid Valve: There is mild to moderate regurgitation  The right ventricular systolic pressure is moderately to severely elevated    Pulmonic Valve: There is mild regurgitation  Test Results Pending at Discharge (will require follow up): · Vitamin-D level         Reason for Admission:  Chest pain    Hospital Course:   Jaylen Armando is a 79 y o  female patient who originally presented to the hospital on 4/26/2022 due to chest pain  Please see above list of diagnoses and related plan for additional information  Condition at Discharge: stable    Discharge Day Visit / Exam:   Subjective:  Feeling much better    Vitals: Blood Pressure: 108/63 (04/27/22 0913)  Pulse: 81 (04/27/22 0913)  Temperature: 97 8 °F (36 6 °C) (04/27/22 0735)  Temp Source: Oral (04/26/22 1538)  Respirations: 18 (04/27/22 0735)  Height: 5' 6" (167 6 cm) (04/26/22 0840)  Weight - Scale: 108 kg (237 lb) (04/26/22 0840)  SpO2: 96 % (04/27/22 0913)  Exam:   Physical Exam   General:  No acute distress, sitting up in bed  Cardiovascular:  S1, S2, RRR  Pulmonary:  Clear to auscultation bilaterally  Abdomen:  Soft, nontender, nondistended  Neuro/psych:  Alert, oriented, pleasant, no focal deficits    Discussion with Family:  Jennifer (patient's sister) was informed over the phone with Ms Dilip Cannon present    Discharge instructions/Information to patient and family:   See after visit summary for information provided to patient and family  Provisions for Follow-Up Care:  See after visit summary for information related to follow-up care and any pertinent home health orders  Disposition:   Home    Planned Readmission: no     Discharge Statement:  I spent 35 minutes discharging the patient  This time was spent on the day of discharge  I had direct contact with the patient on the day of discharge  Greater than 50% of the total time was spent examining patient, answering all patient questions, arranging and discussing plan of care with patient as well as directly providing post-discharge instructions  Additional time then spent on discharge activities  Discharge Medications:  See after visit summary for reconciled discharge medications provided to patient and/or family        **Please Note: This note may have been constructed using a voice recognition system**

## 2022-04-27 NOTE — ASSESSMENT & PLAN NOTE
· Originally presented with right-sided chest pain, that patient described as muscle spasms that got worse with movement  Description was not concerning for cardiac pain, however troponins were negative and Cardiology was consulted and repeat echo was done (results as below) and did not feel that she required any further inpatient workup  Possible referral for transplant as outpatient  Given noncardiac chest pain likely of musculoskeletal origin, patient requested prescription for tramadol which was provided, as this has improved her pain, she did receive Dilaudid in the hospital which caused her to throw up

## 2022-04-27 NOTE — ASSESSMENT & PLAN NOTE
· History of dilated cardiomyopathy, repeat echo with ejection fraction 20%, severe regurgitation of mitral valve  She was continued on sotalol, entresto, torsemide and Aldactone as per her home regimen

## 2022-05-01 LAB
25(OH)D2 SERPL-MCNC: 1.9 NG/ML
25(OH)D3 SERPL-MCNC: 22 NG/ML
25(OH)D3+25(OH)D2 SERPL-MCNC: 24 NG/ML

## 2022-05-02 ENCOUNTER — OFFICE VISIT (OUTPATIENT)
Dept: CARDIOLOGY CLINIC | Facility: CLINIC | Age: 67
End: 2022-05-02
Payer: COMMERCIAL

## 2022-05-02 VITALS
DIASTOLIC BLOOD PRESSURE: 76 MMHG | SYSTOLIC BLOOD PRESSURE: 128 MMHG | BODY MASS INDEX: 37.93 KG/M2 | TEMPERATURE: 98.7 F | WEIGHT: 236 LBS | HEART RATE: 98 BPM | HEIGHT: 66 IN

## 2022-05-02 DIAGNOSIS — I42.0 DILATED CARDIOMYOPATHY (HCC): ICD-10-CM

## 2022-05-02 DIAGNOSIS — Z95.810 PRESENCE OF IMPLANTABLE CARDIOVERTER-DEFIBRILLATOR (ICD): ICD-10-CM

## 2022-05-02 DIAGNOSIS — I48.0 PAF (PAROXYSMAL ATRIAL FIBRILLATION) (HCC): ICD-10-CM

## 2022-05-02 DIAGNOSIS — E03.4 HYPOTHYROIDISM DUE TO ACQUIRED ATROPHY OF THYROID: ICD-10-CM

## 2022-05-02 DIAGNOSIS — I10 ESSENTIAL HYPERTENSION: ICD-10-CM

## 2022-05-02 DIAGNOSIS — E78.5 DYSLIPIDEMIA: ICD-10-CM

## 2022-05-02 DIAGNOSIS — I48.0 PAROXYSMAL ATRIAL FIBRILLATION (HCC): ICD-10-CM

## 2022-05-02 DIAGNOSIS — I50.42 CHRONIC COMBINED SYSTOLIC AND DIASTOLIC CONGESTIVE HEART FAILURE (HCC): ICD-10-CM

## 2022-05-02 PROCEDURE — 3074F SYST BP LT 130 MM HG: CPT | Performed by: INTERNAL MEDICINE

## 2022-05-02 PROCEDURE — 99215 OFFICE O/P EST HI 40 MIN: CPT | Performed by: INTERNAL MEDICINE

## 2022-05-02 PROCEDURE — 3078F DIAST BP <80 MM HG: CPT | Performed by: INTERNAL MEDICINE

## 2022-05-02 RX ORDER — ALPRAZOLAM 0.25 MG/1
0.25 TABLET ORAL DAILY PRN
COMMUNITY
Start: 2022-03-28

## 2022-05-02 RX ORDER — CARVEDILOL 3.12 MG/1
3.12 TABLET ORAL 2 TIMES DAILY WITH MEALS
Qty: 60 TABLET | Refills: 1 | Status: SHIPPED | OUTPATIENT
Start: 2022-05-02 | End: 2022-05-13 | Stop reason: SINTOL

## 2022-05-02 NOTE — PROGRESS NOTES
Progress Note - Cardiology Office  [facility]  Melvenia Nyhan 79 y o  female MRN: [id number]  : 1955   Encounter: [id number]      Assessment:     1  Dilated cardiomyopathy ([facility] )    2  PAF (paroxysmal atrial fibrillation) ([facility] )    3  Essential hypertension    4  Presence of implantable cardioverter-defibrillator (ICD)    5  Chronic combined systolic and diastolic congestive heart failure (HCC)    6  Paroxysmal atrial fibrillation ([facility] )    7  Dyslipidemia    8  Hypothyroidism due to acquired atrophy of thyroid        Discussion Summary and Plan:  1  Status post ICD shock for ventricular arrhythmias  Patient device was upgraded to biventricular device  She was also started on sotalol  QTC acceptable since then no ICD shock  Her LV lead threshold is slightly high  So far no ICD shock  Last ICD check was in August   No new symptoms  2  Chronic systolic heart failure New [place] Heart Association class II/3  Patient has tolerated Entresto very well  No clinical evidence of heart failure or ischemia at this time  Currently she is taking sotalol, Aldactone, entresto  She is on high dose of 97/103 mg twice a day  Torsemide is 40 mg daily along with Aldactone 12 5 mg daily  Continue same medication electrolytes are acceptable  We added Jardiance 10 mg daily  Instructions were given  Will also start Coreg 3 125 twice a day to better control her heart rate  They will call us if there is any symptoms of dizziness lightheadedness  3  Status post [facility] ICD  Her device was upgraded to [facility] biventricular ICD  No more ICD shocks  Her device interrogation reviewed with her  Device was interrogated in 2021  No events noted  Patient has battery life of 1 2 years  Discussed with patient    4  Bronchial asthma  Currently not wheezing    5  History of paroxysmal at fibrillation currently in sinus rhythm  Continue antithrombotic therapy   Patient tolerating well   Continue sotalol  QTC is acceptable she is paced  She is maintaining sinus rhythm  6  Hypothyroidism  She is on levothyroxine TSH was acceptable  Currently she is taking levothyroxine 175 mcg  Monitor TSH  Discussed with patient    7  Severe MR  Moderate to severe TR and elevated PA pressure with EF around 20%  Patient is not diagnosed to have dilated LV with moderate TR and at least moderate to severe MR  We will refer her to advanced heart failure  Various options discussed with her including MitraClip, follow-up with advanced heart failure team   She would prefer to go to [facility] ([place], [place]) where she had previously device placed and she also had insurance issues  Referral will be made to Dr Rusty Daniels of [facility] ([place], [place]) who is a director of advanced heart failure team   Message left for him  8  Dyslipidemia  Continue statin  She is on pravastatin 40 mg  Her cholesterol has gone up  She is motivated to change her diet  9   Dyspnea on exertion  Patient has chronic exertional shortness of breath  She has added Jardiance  Will check electrolytes encouraged her to drink more water  Patient came with his son and her sister who is a nurse was on the phone  All their questions answered to their satisfaction spent about 30 minutes talking to them  She has questions regarding Elicia stim, it will be reviewed and discussed with her at a later time  Follow-up 4 months  Counseling :  A description of the counseling  Issues related to heart failure, regular diet, weight all discussed at length  All her questions answered  Goals and Barriers  The patient has the current Goals: To stay out of heart failure  The patent has the current Barriers: Weight gain  Patient's ability to self care: Yes  Medication side effect reviewed with patient in detail and all their questions answered to their satisfaction  HPI :     Heather Lugo is a 79y o  year old female who came for follow up   Patient has with past medical history significant for nonischemic dilated cardiomyopathy, hypertension, bronchial asthma, moderate obesity, dyslipidemia, hypothyroidism, status post [facility] single-chamber ICD, mild nonobstructive disease by cardiac catheterization who came for regular follow-up and interrogation of her ICD  She has been doing pretty well she's she is on coralanor, she did she denies any chest pain, any shortness of breath  Recently she gained more weight as she was on steroids for bronchial asthma  Her ICD was interrogated today  No fever, no chills, no other significant complaint         06/08/2021  Above reviewed  Patient came for follow-up  She was in the emergency room 3 times last month due to shaking and pain all over and not feeling well  It was later on found related to stress as well as her medications baclofen  She was started on gabapentin for which she has allergic reaction now she is feeling better  She is recommended rehab and want a machine to do a relaxation therapy  Patient has medical history significant for chronic systolic and diastolic heart failure, history of nonischemic cardiomyopathy status post biventricular pacemaker and ICD, paroxysmal atrial fibrillation on sotalol, history of NSVT, dyslipidemia, anxiety and recurrent heart failures  She is doing well today heart rate 87 beats per minute with atrial sensed and ventricular paced rare PVCs  Her weight is 246 lb  She does well when her weight is less than 250 lb  No nausea no vomiting no other issues at this time labs done from May 2021 reviewed  09/23/2021  Above reviewed  Patient came for follow-up  She was in the emergency room twice in the last 3 months  She had allergic reaction to some food and then later on she had a fall    She has medical history significant for nonischemic cardiomyopathy status post biventricular pacemaker and ICD of [facility], paroxysmal atrial fibrillation suppressed with sotalol, history of NSVT, dyslipidemia, anxiety and recurrent heart failure  Her weight is now around 244 lb  She had blood work done in May 2021  Creatinine was 1 3  Her hemoglobin was stable  Her vitals has been stable heart rate remains stable around 90 beats per minute  Fall happened as she was not careful in walking  She is now more careful and she understand she is on blood thinners  Otherwise she is doing well her weight has been stable no chest pain no leg edema no shortness of breath with usual activities has chronic exertion shortness of breath which is not changed  Today EKG shows heart rate 89 beats per minute     03/22/2022  Above reviewed  Patient came for follow-up  Medical history significant for nonischemic cardiomyopathy status post biventricular pacemaker and ICD which was last interrogated in August 2021 at that time had a battery life of 1 year and 2 months, paroxysmal atrial fibrillation suppressed with sotalol, history of NSVT, dyslipidemia, anxiety, recurrent heart failure who came for follow-up  She had a blood test done in December 2021 where electrolytes were acceptable though her cholesterol has increased  Her heart rate remains elevated around 90 beats per minute  She feels she has lost some weight and her weight is around 241 lb  Denies any chest pain but continues to have exertional shortness of breath  She is compliant with her cholesterol and other medications  Today EKG shows she is atrial sensed and ventricular paced heart rate 94 beats per minute  05/02/2022  Above reviewed  Patient came for follow-up  She had history of nonischemic cardiomyopathy status post biventricular pacemaker and ICD which is functioning adequately  Her battery life is around 1 year of the pacemaker  Paroxysmal atrial fibrillation suppressed with sotalol, history of NSVT, dyslipidemia, anxiety, dilated LV now has severe MR with the moderate TR and elevated PA pressure  She was recently in the hospital with musculoskeletal chest pain and was found to have low vitamin-D  Her weight is now around 236 lb  Weight during last visit was 241 lb  Her echo was reviewed with her and her sister who is a nurse  We had discussion about her worsening valvular disease and need to see advanced heart failure team   She has chronic exertional shortness of breath but she is not requiring any oxygen and has no lower extremity edema she does get some short of breath when she does her activities  She is able to cook clean and do her activities without any problem  Her labs done in April recently were acceptable  Her NT BNP was acceptable and much lower than when she was admitted to the hospital       Review of Systems   Constitutional: Negative for activity change, chills, diaphoresis, fever and unexpected weight change  HENT: Negative for congestion  Eyes: Negative for discharge and redness  Respiratory: Positive for shortness of breath  Negative for cough, chest tightness and wheezing  Chronic   Cardiovascular: Negative  Negative for chest pain, palpitations and leg swelling  Gastrointestinal: Negative for abdominal pain, diarrhea and nausea  Endocrine: Negative  Genitourinary: Negative for decreased urine volume and urgency  Musculoskeletal: Positive for arthralgias and back pain  Negative for gait problem  Skin: Negative for rash and wound  Allergic/Immunologic: Negative  Neurological: Negative for dizziness, seizures, syncope, weakness, light-headedness and headaches  Hematological: Negative  Psychiatric/Behavioral: Negative for agitation and confusion  The patient is nervous/anxious          Historical Information   Past Medical History:   Diagnosis Date    A-fib ([facility] )     Abnormal blood sugar     Acid reflux     Acute bronchitis     Allergic reaction     Anxiety     Arthritis     Asthma     Cardiomyopathy ([facility] )     Cellulitis of left lower leg  Chest pain     CHF (congestive heart failure) (HCC)     Constipation     Depression with anxiety     Disease of thyroid gland     Diverticulitis     Dyslipidemia     Dyspnea on exertion     Elbow pain     Fracture of olecranon, open     Gastritis     Generalized pain     Hematuria     History of colonoscopy     Hypertension     Hypokalemia     Hypomagnesemia     Leg cramping     Moderate obesity     Morbid obesity due to excess calories (HCC)     Myalgia     Myositis     Nausea in adult     Nephrolithiasis     Prepatellar bursitis, unspecified knee     Screening for lipid disorders     Shortness of breath     Spasm of muscle     Thyroid trouble      Past Surgical History:   Procedure Laterality Date    CARDIAC DEFIBRILLATOR PLACEMENT      LAPAROSCOPIC CHOLECYSTECTOMY      TOTAL ABDOMINAL HYSTERECTOMY W/ BILATERAL SALPINGOOPHORECTOMY       Social History     Substance and Sexual Activity   Alcohol Use Not Currently     Social History     Substance and Sexual Activity   Drug Use No     Social History     Tobacco Use   Smoking Status Never Smoker   Smokeless Tobacco Never Used     Family History:   Family History   Problem Relation Age of Onset    Hypertension Sister     Cancer Sister     Coronary artery disease Family     Diabetes type II Family     Hypertension Family        Meds/Allergies     Allergies   Allergen Reactions    Omnipaque [Iohexol] Swelling    Penicillins Swelling    Iv Dye  [Iodinated Diagnostic Agents] Throat Swelling    Other Swelling     Fresh herbs "basil, cilantro"    Shellfish-Derived Products - Food Allergy Hives       Current Outpatient Medications:     ALPRAZolam (XANAX) 0 25 mg tablet, Take 0 25 mg by mouth daily as needed, Disp: , Rfl:     budesonide-formoterol (SYMBICORT) 160-4 5 mcg/act inhaler, Inhale 2 puffs 2 (two) times a day Rinse mouth after use , Disp: 1 Inhaler, Rfl: 5    diclofenac sodium (VOLTAREN) 1 %, Apply 2 g topically 4 (four) times a day, Disp: 100 g, Rfl: 0    Entresto  MG TABS, take 1 tablet by mouth twice a day, Disp: 180 tablet, Rfl: 3    levalbuterol (XOPENEX HFA) 45 mcg/act inhaler, Inhale 2 puffs every 6 (six) hours as needed for shortness of breath, Disp: 1 Inhaler, Rfl: 5    levothyroxine 175 mcg tablet, take 1 tablet by mouth once daily, Disp: 90 tablet, Rfl: 3    Magnesium 100 MG CAPS, Take by mouth, Disp: , Rfl:     ondansetron (Zofran ODT) 4 mg disintegrating tablet, Take 1 tablet (4 mg total) by mouth every 6 (six) hours as needed for nausea or vomiting, Disp: 10 tablet, Rfl: 0    pantoprazole (PROTONIX) 40 mg tablet, take 1 tablet by mouth once daily, Disp: 90 tablet, Rfl: 3    potassium chloride (K-DUR) 10 mEq tablet, take 1 tablet by mouth once daily, Disp: 30 tablet, Rfl: 5    pravastatin (PRAVACHOL) 40 mg tablet, take 1 tablet by mouth once daily, Disp: 90 tablet, Rfl: 3    sotalol (BETAPACE) 80 mg tablet, TAKE 1 TABLET BY MOUTH EVERY 12 HOURS, Disp: 180 tablet, Rfl: 3    spironolactone (ALDACTONE) 25 mg tablet, TAKE 1/2 TABLET BY MOUTH DAILY, Disp: 45 tablet, Rfl: 3    tiZANidine (ZANAFLEX) 2 mg tablet, take 1 tablet by mouth every 8 hours if needed for muscle spasm, Disp: 21 tablet, Rfl: 3    torsemide (DEMADEX) 20 mg tablet, take 2 tablets by mouth once daily, Disp: 180 tablet, Rfl: 3    traMADol (Ultram) 50 mg tablet, Take 1 tablet (50 mg total) by mouth every 6 (six) hours as needed for moderate pain or severe pain for up to 10 days, Disp: 15 tablet, Rfl: 0    Xarelto 20 MG tablet, take 1 tablet by mouth once daily, Disp: 90 tablet, Rfl: 3    carvedilol (COREG) 3 125 mg tablet, Take 1 tablet (3 125 mg total) by mouth 2 (two) times a day with meals, Disp: 60 tablet, Rfl: 1    Empagliflozin (Jardiance) 10 MG TABS, Take 1 tablet (10 mg total) by mouth every morning, Disp: 30 tablet, Rfl: 1    ipratropium (ATROVENT) 0 02 % nebulizer solution, Take 2 5 mL by nebulization 4 (four) times a day for 30 days, Disp: 300 mL, Rfl: 0    prochlorperazine (COMPAZINE) 25 mg suppository, Insert 1 suppository (25 mg total) into the rectum every 12 (twelve) hours as needed for nausea or vomiting for up to 12 doses (Patient not taking: Reported on 5/2/2022 ), Disp: 12 suppository, Rfl: 0    Vitals: Blood pressure 128/76, pulse 98, temperature 98 7 °F (37 1 °C), height 5' 6" (1 676 m), weight 107 kg (236 lb)  Body mass index is 38 09 kg/m²  Vitals:    05/02/22 1443   Weight: 107 kg (236 lb)     BP Readings from Last 3 Encounters:   05/02/22 128/76   04/27/22 108/63   04/22/22 127/88       Physical Exam  Constitutional:       General: She is not in acute distress  Appearance: She is well-developed  She is not diaphoretic  Neck:      Thyroid: No thyromegaly  Vascular: No JVD  Pulmonary:      Effort: No respiratory distress  Breath sounds: No wheezing or rales  Abdominal:      General: There is no distension  Palpations: Abdomen is soft  Tenderness: There is no abdominal tenderness  There is no rebound  Musculoskeletal:         General: No tenderness  Normal range of motion  Cervical back: Normal range of motion and neck supple  Skin:     General: Skin is warm and dry  Neurological:      Mental Status: She is alert and oriented to person, place, and time  Psychiatric:         Behavior: Behavior normal          Judgment: Judgment normal        Diagnostic Studies Review Cardio:    Echocardiogram/VANNESA: Echo Doppler done in February 2016 shows EF 25-30%, pacemaker in the right-sided chambers, thickened aortic valve without stenosis, moderate to severe mitral regurgitation  Repeat echo Doppler done February 20, 2018  LV is markedly dilated EF 25%, mild LVH, right atrium mildly dilated mild MR and trace TR which was insufficient to measure pulmonary artery pressure  Pacemaker Wire in right-sided chambers        Repeat echo Doppler done 10/12/2020 shows patient's EF is around 20%, grade 3 diastolic dysfunction, mild mitral regurgitation    Repeat echo Doppler done in April 2022 shows patient's EF is around 20 25%, left atrium moderate to severely dilated, right atrium moderately dilated, severe mitral regurgitation and mild-to-moderate TR with PA pressure around 70 mm of mercury  Catheterization: Cardiac catheter patient done in 2013 was EF 30%, no evidence of significant obstructive coronary artery disease  This was done in outside hospital         ICD/ Pacer Interpretation  Device interrogation done 08/06/2021 shows it is functioning adequately it is a [address] biventricular ICD  Life is 1 2 year  She is 90% ventricular paced  ECG Report:      Comparison to prior ECGs: no interval change  01/22/2018  Twelve lead EKG shows normal sinus rhythm heart rate 81 beats per minute  Poor R-wave progression  No change from old EKG  Repeat 12 lead EKG on 03/08/2018 shows normal sinus rhythm  Rare PVCs heart rate 78 beats per minute  Prolonged QTC      07/26/2018 12 lead EKG shows normal sinus rhythm heart rate 90 beats per minute  Peak PVCs noted  IVCD otherwise no other significant ST changes  Her QRS duration is 118 milliseconds  Twelve lead EKG done today 11/08/2018 shows normal sinus rhythm  Incomplete LBBB with IVCD her QS duration is 118 milliseconds  Twelve lead EKG shows atrial sense ventricular paced heart rate 75 beats per minute  She is biventricular paced now      Twelve lead EKG done 01/16/2020 shows atrial sensed ventricular paced heart rate 82 beats per minute she is biventricular pacemaker  Her device findings reviewed with her which was interrogated at [facility]    Repeat interrogation from January 2020 reviewed      Twelve lead EKG shows paced rhythm heart rate 89 beats per minute she is biventricular pacemaker        Twelve lead EKG 11/12/2020 shows atrial sensed ventricular paced heart rate 79 beats per minute she has a biventricular pacemaker  Twelve lead EKG 02/24/2021 shows atrial sense ventricular paced heart rate 80 beats per minute  Twelve lead EKG 06/08/2021 shows atrial sensed ventricular paced heart rate around 87 beats per minute  Few PVCs noted      Twelve lead EKG done 03/22/2022 shows atrial sensed ventricular paced heart rate 94 beats per minute  Imaging:  Chest X-Ray:   Xr Chest 2 Views    Result Date: 4/9/2017  Impression Stable cardiomegaly without active pulmonary disease  Workstation performed: [id number]     Lab Review   Lab Results   Component Value Date    WBC 8 99 04/27/2022    HGB 11 9 04/27/2022    HCT 37 3 04/27/2022    MCV 85 04/27/2022     04/27/2022     Lab Results   Component Value Date     06/06/2016    K 4 1 04/27/2022     04/27/2022    CO2 26 04/27/2022    ANIONGAP 12 9 11/01/2015    BUN 24 04/27/2022    CREATININE 1 22 04/27/2022    GLUCOSE 95 06/06/2016    GLUF 111 (H) 04/27/2022    CALCIUM 9 0 04/27/2022    CORRECTEDCA 9 6 04/27/2022    AST 13 04/27/2022    ALT 16 04/27/2022    ALKPHOS 70 04/27/2022    PROT 6 8 06/06/2016    BILITOT 0 3 06/06/2016    EGFR 45 04/27/2022     Lab Results   Component Value Date    GLUCOSE 95 06/06/2016    CALCIUM 9 0 04/27/2022     06/06/2016    K 4 1 04/27/2022    CO2 26 04/27/2022     04/27/2022    BUN 24 04/27/2022    CREATININE 1 22 04/27/2022         Lab Results   Component Value Date    HGBA1C 6 2 (H) 06/06/2016       Dr Guadalupe Flores MD [facility]      "This note has been constructed using a voice recognition system  Therefore there may be syntax, spelling, and/or grammatical errors   Please call if you have any questions  "

## 2022-05-02 NOTE — PATIENT INSTRUCTIONS
Start on Coreg 3 125 twice a day    If you tolerate Coreg valve for 3-4 days you can start Jardiance initially 10 mg every other day than 10 mg daily drink more water with it  If he have any problem with any of the above medication you can give us a call    Want to start tolerating Jardiance for about 7-10 days do a blood test

## 2022-05-03 ENCOUNTER — TELEPHONE (OUTPATIENT)
Dept: CARDIOLOGY CLINIC | Facility: CLINIC | Age: 67
End: 2022-05-03

## 2022-05-03 NOTE — TELEPHONE ENCOUNTER
Regarding coreg ->   Metoprolol may not affect asthma  Pt is still waiting on call back from other doctor Wing Miguel due to stressing out  Can you please ease her mind

## 2022-05-05 NOTE — TELEPHONE ENCOUNTER
I did call her and I will text the [facility] team again  She did not  the phone  Message was left

## 2022-05-06 NOTE — TELEPHONE ENCOUNTER
Patient called asking questions regarding if her follow up with referred doctor is ok for July  I did stress that you hadn't indicated any urgency  She is aware that you are unavailable at this time  I will reach out to her if anything changes once you are reachable

## 2022-05-11 ENCOUNTER — TELEPHONE (OUTPATIENT)
Dept: CARDIOLOGY CLINIC | Facility: CLINIC | Age: 67
End: 2022-05-11

## 2022-05-11 NOTE — TELEPHONE ENCOUNTER
Pt called and lvm on our machine at 11:30am today  States she is aware dr Monserrat Choudhary is on vacation and would like direction from another provider  Pt states she gets following symptoms after coreg: Tingling in hands and feet  Ankles are swelling   Very lightheaded Feels like heart is palpitating a little fast then slowing down

## 2022-05-13 ENCOUNTER — TELEPHONE (OUTPATIENT)
Dept: CARDIOLOGY CLINIC | Facility: CLINIC | Age: 67
End: 2022-05-13

## 2022-05-13 NOTE — PROGRESS NOTES
Spoke to patient by telephone on 05/13/2022  Several days after beginning on carvedilol, she noticed numbness and tingling in her hands and feet, swelling or ankles, dizziness, brief periods of rapid heart action with symptoms lasting for about 1-1/2 days  She then stop the carvedilol with resolution of all of the above symptoms  She has not yet started on Jardiance because of fear of side effects and asked my opinion about that  I recommended she start on Jardiance and explained potential side effects  She agreed to do so  I recommended that we would try to get her in to see Dr Morenita Lopez in the next several weeks, so he could review her tolerance to [place] and her reaction to carvedilol      Monique Rivas MD

## 2022-05-15 ENCOUNTER — APPOINTMENT (EMERGENCY)
Dept: RADIOLOGY | Facility: HOSPITAL | Age: 67
DRG: 312 | End: 2022-05-15
Payer: COMMERCIAL

## 2022-05-15 ENCOUNTER — HOSPITAL ENCOUNTER (INPATIENT)
Facility: HOSPITAL | Age: 67
LOS: 1 days | Discharge: HOME | DRG: 312 | End: 2022-05-17
Attending: EMERGENCY MEDICINE | Admitting: INTERNAL MEDICINE
Payer: COMMERCIAL

## 2022-05-15 DIAGNOSIS — I47.2 NSVT (NONSUSTAINED VENTRICULAR TACHYCARDIA) (HCC): ICD-10-CM

## 2022-05-15 DIAGNOSIS — I49.3 FREQUENT PVCS: ICD-10-CM

## 2022-05-15 DIAGNOSIS — E03.4 HYPOTHYROIDISM DUE TO ACQUIRED ATROPHY OF THYROID: ICD-10-CM

## 2022-05-15 DIAGNOSIS — R00.2 PALPITATIONS: ICD-10-CM

## 2022-05-15 DIAGNOSIS — R07.89 ATYPICAL CHEST PAIN: Primary | ICD-10-CM

## 2022-05-15 DIAGNOSIS — R00.0 TACHYCARDIA: ICD-10-CM

## 2022-05-15 PROBLEM — R73.09 ELEVATED GLUCOSE: Status: ACTIVE | Noted: 2022-05-15

## 2022-05-15 PROBLEM — N18.9 CKD (CHRONIC KIDNEY DISEASE): Status: ACTIVE | Noted: 2022-05-15

## 2022-05-15 LAB
2HR DELTA HS TROPONIN: 18 NG/L
4HR DELTA HS TROPONIN: 41 NG/L
ALBUMIN SERPL BCP-MCNC: 3.7 G/DL (ref 3.5–5)
ALP SERPL-CCNC: 88 U/L (ref 46–116)
ALT SERPL W P-5'-P-CCNC: 18 U/L (ref 12–78)
ANION GAP SERPL CALCULATED.3IONS-SCNC: 11 MMOL/L (ref 4–13)
AST SERPL W P-5'-P-CCNC: 14 U/L (ref 5–45)
ATRIAL RATE: 119 BPM
BASOPHILS # BLD AUTO: 0.05 THOUSANDS/ΜL (ref 0–0.1)
BASOPHILS NFR BLD AUTO: 1 % (ref 0–1)
BILIRUB SERPL-MCNC: 0.31 MG/DL (ref 0.2–1)
BUN SERPL-MCNC: 22 MG/DL (ref 5–25)
CALCIUM SERPL-MCNC: 9.7 MG/DL (ref 8.3–10.1)
CARDIAC TROPONIN I PNL SERPL HS: 11 NG/L
CARDIAC TROPONIN I PNL SERPL HS: 29 NG/L
CARDIAC TROPONIN I PNL SERPL HS: 52 NG/L
CHLORIDE SERPL-SCNC: 101 MMOL/L (ref 100–108)
CO2 SERPL-SCNC: 25 MMOL/L (ref 21–32)
CREAT SERPL-MCNC: 1.4 MG/DL (ref 0.6–1.3)
EOSINOPHIL # BLD AUTO: 0.21 THOUSAND/ΜL (ref 0–0.61)
EOSINOPHIL NFR BLD AUTO: 2 % (ref 0–6)
ERYTHROCYTE [DISTWIDTH] IN BLOOD BY AUTOMATED COUNT: 14.5 % (ref 11.6–15.1)
EST. AVERAGE GLUCOSE BLD GHB EST-MCNC: 134 MG/DL
GFR SERPL CREATININE-BSD FRML MDRD: 38 ML/MIN/1.73SQ M
GLUCOSE SERPL-MCNC: 185 MG/DL (ref 65–140)
HBA1C MFR BLD: 6.3 %
HCT VFR BLD AUTO: 41.4 % (ref 34.8–46.1)
HGB BLD-MCNC: 13.4 G/DL (ref 11.5–15.4)
IMM GRANULOCYTES # BLD AUTO: 0.02 THOUSAND/UL (ref 0–0.2)
IMM GRANULOCYTES NFR BLD AUTO: 0 % (ref 0–2)
LYMPHOCYTES # BLD AUTO: 3.31 THOUSANDS/ΜL (ref 0.6–4.47)
LYMPHOCYTES NFR BLD AUTO: 34 % (ref 14–44)
MAGNESIUM SERPL-MCNC: 2.2 MG/DL (ref 1.6–2.6)
MCH RBC QN AUTO: 27 PG (ref 26.8–34.3)
MCHC RBC AUTO-ENTMCNC: 32.4 G/DL (ref 31.4–37.4)
MCV RBC AUTO: 83 FL (ref 82–98)
MONOCYTES # BLD AUTO: 0.73 THOUSAND/ΜL (ref 0.17–1.22)
MONOCYTES NFR BLD AUTO: 8 % (ref 4–12)
NEUTROPHILS # BLD AUTO: 5.41 THOUSANDS/ΜL (ref 1.85–7.62)
NEUTS SEG NFR BLD AUTO: 55 % (ref 43–75)
NRBC BLD AUTO-RTO: 0 /100 WBCS
PHOSPHATE SERPL-MCNC: 6 MG/DL (ref 2.3–4.1)
PLATELET # BLD AUTO: 369 THOUSANDS/UL (ref 149–390)
PMV BLD AUTO: 10.7 FL (ref 8.9–12.7)
POTASSIUM SERPL-SCNC: 3.7 MMOL/L (ref 3.5–5.3)
PROT SERPL-MCNC: 7.6 G/DL (ref 6.4–8.2)
QRS AXIS: -29 DEGREES
QRSD INTERVAL: 142 MS
QT INTERVAL: 488 MS
QTC INTERVAL: 672 MS
RBC # BLD AUTO: 4.97 MILLION/UL (ref 3.81–5.12)
SODIUM SERPL-SCNC: 137 MMOL/L (ref 136–145)
T WAVE AXIS: 92 DEGREES
T4 FREE SERPL-MCNC: 1.51 NG/DL (ref 0.76–1.46)
TSH SERPL DL<=0.05 MIU/L-ACNC: 0.83 UIU/ML (ref 0.45–4.5)
VENTRICULAR RATE: 114 BPM
WBC # BLD AUTO: 9.73 THOUSAND/UL (ref 4.31–10.16)

## 2022-05-15 PROCEDURE — 96365 THER/PROPH/DIAG IV INF INIT: CPT

## 2022-05-15 PROCEDURE — 99215 OFFICE O/P EST HI 40 MIN: CPT | Performed by: INTERNAL MEDICINE

## 2022-05-15 PROCEDURE — 99219 PR INITIAL OBSERVATION CARE/DAY 50 MINUTES: CPT | Performed by: NURSE PRACTITIONER

## 2022-05-15 PROCEDURE — 84439 ASSAY OF FREE THYROXINE: CPT | Performed by: NURSE PRACTITIONER

## 2022-05-15 PROCEDURE — 99285 EMERGENCY DEPT VISIT HI MDM: CPT

## 2022-05-15 PROCEDURE — 85025 COMPLETE CBC W/AUTO DIFF WBC: CPT | Performed by: EMERGENCY MEDICINE

## 2022-05-15 PROCEDURE — 93005 ELECTROCARDIOGRAM TRACING: CPT

## 2022-05-15 PROCEDURE — 84443 ASSAY THYROID STIM HORMONE: CPT | Performed by: NURSE PRACTITIONER

## 2022-05-15 PROCEDURE — 80053 COMPREHEN METABOLIC PANEL: CPT | Performed by: EMERGENCY MEDICINE

## 2022-05-15 PROCEDURE — 84100 ASSAY OF PHOSPHORUS: CPT | Performed by: EMERGENCY MEDICINE

## 2022-05-15 PROCEDURE — 84484 ASSAY OF TROPONIN QUANT: CPT | Performed by: NURSE PRACTITIONER

## 2022-05-15 PROCEDURE — 83036 HEMOGLOBIN GLYCOSYLATED A1C: CPT | Performed by: NURSE PRACTITIONER

## 2022-05-15 PROCEDURE — 99219 PR INITIAL OBSERVATION CARE/DAY 50 MINUTES: CPT | Performed by: FAMILY MEDICINE

## 2022-05-15 PROCEDURE — 83735 ASSAY OF MAGNESIUM: CPT | Performed by: EMERGENCY MEDICINE

## 2022-05-15 PROCEDURE — 36415 COLL VENOUS BLD VENIPUNCTURE: CPT | Performed by: EMERGENCY MEDICINE

## 2022-05-15 PROCEDURE — 71045 X-RAY EXAM CHEST 1 VIEW: CPT

## 2022-05-15 PROCEDURE — 93010 ELECTROCARDIOGRAM REPORT: CPT | Performed by: INTERNAL MEDICINE

## 2022-05-15 PROCEDURE — 99285 EMERGENCY DEPT VISIT HI MDM: CPT | Performed by: EMERGENCY MEDICINE

## 2022-05-15 PROCEDURE — 84484 ASSAY OF TROPONIN QUANT: CPT | Performed by: EMERGENCY MEDICINE

## 2022-05-15 RX ORDER — MAGNESIUM SULFATE HEPTAHYDRATE 40 MG/ML
2 INJECTION, SOLUTION INTRAVENOUS ONCE
Status: COMPLETED | OUTPATIENT
Start: 2022-05-15 | End: 2022-05-15

## 2022-05-15 RX ORDER — POTASSIUM CHLORIDE 20 MEQ/1
20 TABLET, EXTENDED RELEASE ORAL ONCE
Status: DISCONTINUED | OUTPATIENT
Start: 2022-05-15 | End: 2022-05-15

## 2022-05-15 RX ORDER — ACETAMINOPHEN 325 MG/1
650 TABLET ORAL EVERY 6 HOURS PRN
Status: DISCONTINUED | OUTPATIENT
Start: 2022-05-15 | End: 2022-05-17 | Stop reason: HOSPADM

## 2022-05-15 RX ORDER — PANTOPRAZOLE SODIUM 40 MG/1
40 TABLET, DELAYED RELEASE ORAL
Status: DISCONTINUED | OUTPATIENT
Start: 2022-05-15 | End: 2022-05-17 | Stop reason: HOSPADM

## 2022-05-15 RX ORDER — LEVALBUTEROL INHALATION SOLUTION 0.63 MG/3ML
0.63 SOLUTION RESPIRATORY (INHALATION) EVERY 4 HOURS PRN
Status: DISCONTINUED | OUTPATIENT
Start: 2022-05-15 | End: 2022-05-17 | Stop reason: HOSPADM

## 2022-05-15 RX ORDER — TIZANIDINE 2 MG/1
2 TABLET ORAL EVERY 8 HOURS PRN
Status: DISCONTINUED | OUTPATIENT
Start: 2022-05-15 | End: 2022-05-17 | Stop reason: HOSPADM

## 2022-05-15 RX ORDER — PRAVASTATIN SODIUM 40 MG
40 TABLET ORAL
Status: DISCONTINUED | OUTPATIENT
Start: 2022-05-15 | End: 2022-05-17 | Stop reason: HOSPADM

## 2022-05-15 RX ORDER — ALBUMIN (HUMAN) 12.5 G/50ML
12.5 SOLUTION INTRAVENOUS ONCE
Status: COMPLETED | OUTPATIENT
Start: 2022-05-15 | End: 2022-05-15

## 2022-05-15 RX ORDER — POTASSIUM CHLORIDE 20 MEQ/1
20 TABLET, EXTENDED RELEASE ORAL DAILY
Status: DISCONTINUED | OUTPATIENT
Start: 2022-05-16 | End: 2022-05-17 | Stop reason: HOSPADM

## 2022-05-15 RX ORDER — BUDESONIDE AND FORMOTEROL FUMARATE DIHYDRATE 160; 4.5 UG/1; UG/1
2 AEROSOL RESPIRATORY (INHALATION) 2 TIMES DAILY
Status: DISCONTINUED | OUTPATIENT
Start: 2022-05-15 | End: 2022-05-17 | Stop reason: HOSPADM

## 2022-05-15 RX ORDER — DOCUSATE SODIUM 100 MG/1
100 CAPSULE, LIQUID FILLED ORAL 2 TIMES DAILY
Status: DISCONTINUED | OUTPATIENT
Start: 2022-05-15 | End: 2022-05-17 | Stop reason: HOSPADM

## 2022-05-15 RX ORDER — ONDANSETRON 2 MG/ML
4 INJECTION INTRAMUSCULAR; INTRAVENOUS EVERY 6 HOURS PRN
Status: DISCONTINUED | OUTPATIENT
Start: 2022-05-15 | End: 2022-05-17 | Stop reason: HOSPADM

## 2022-05-15 RX ORDER — ALPRAZOLAM 0.25 MG/1
0.25 TABLET ORAL DAILY PRN
Status: DISCONTINUED | OUTPATIENT
Start: 2022-05-15 | End: 2022-05-17 | Stop reason: HOSPADM

## 2022-05-15 RX ORDER — SOTALOL HYDROCHLORIDE 80 MG/1
80 TABLET ORAL EVERY 12 HOURS
Status: DISCONTINUED | OUTPATIENT
Start: 2022-05-15 | End: 2022-05-17

## 2022-05-15 RX ORDER — POTASSIUM CHLORIDE 750 MG/1
10 TABLET, EXTENDED RELEASE ORAL DAILY
Status: DISCONTINUED | OUTPATIENT
Start: 2022-05-15 | End: 2022-05-15

## 2022-05-15 RX ORDER — BISACODYL 10 MG
10 SUPPOSITORY, RECTAL RECTAL ONCE
Status: COMPLETED | OUTPATIENT
Start: 2022-05-15 | End: 2022-05-15

## 2022-05-15 RX ORDER — SPIRONOLACTONE 25 MG/1
12.5 TABLET ORAL DAILY
Status: DISCONTINUED | OUTPATIENT
Start: 2022-05-15 | End: 2022-05-17

## 2022-05-15 RX ORDER — TORSEMIDE 20 MG/1
40 TABLET ORAL DAILY
Status: DISCONTINUED | OUTPATIENT
Start: 2022-05-15 | End: 2022-05-17

## 2022-05-15 RX ADMIN — TIZANIDINE 2 MG: 2 TABLET ORAL at 16:58

## 2022-05-15 RX ADMIN — SOTALOL HYDROCHLORIDE 80 MG: 80 TABLET ORAL at 06:07

## 2022-05-15 RX ADMIN — POTASSIUM CHLORIDE 30 MEQ: 1500 TABLET, EXTENDED RELEASE ORAL at 06:34

## 2022-05-15 RX ADMIN — DOCUSATE SODIUM 100 MG: 100 CAPSULE, LIQUID FILLED ORAL at 21:49

## 2022-05-15 RX ADMIN — SACUBITRIL AND VALSARTAN 1 TABLET: 97; 103 TABLET, FILM COATED ORAL at 17:13

## 2022-05-15 RX ADMIN — SACUBITRIL AND VALSARTAN 1 TABLET: 97; 103 TABLET, FILM COATED ORAL at 11:06

## 2022-05-15 RX ADMIN — BUDESONIDE AND FORMOTEROL FUMARATE DIHYDRATE 2 PUFF: 160; 4.5 AEROSOL RESPIRATORY (INHALATION) at 17:13

## 2022-05-15 RX ADMIN — PRAVASTATIN SODIUM 40 MG: 40 TABLET ORAL at 16:58

## 2022-05-15 RX ADMIN — LEVOTHYROXINE SODIUM 175 MCG: 150 TABLET ORAL at 06:05

## 2022-05-15 RX ADMIN — RIVAROXABAN 20 MG: 20 TABLET, FILM COATED ORAL at 09:09

## 2022-05-15 RX ADMIN — BISACODYL 10 MG: 10 SUPPOSITORY RECTAL at 21:49

## 2022-05-15 RX ADMIN — ALPRAZOLAM 0.25 MG: 0.25 TABLET ORAL at 06:34

## 2022-05-15 RX ADMIN — EMPAGLIFLOZIN 10 MG: 10 TABLET, FILM COATED ORAL at 08:55

## 2022-05-15 RX ADMIN — BUDESONIDE AND FORMOTEROL FUMARATE DIHYDRATE 2 PUFF: 160; 4.5 AEROSOL RESPIRATORY (INHALATION) at 08:47

## 2022-05-15 RX ADMIN — SPIRONOLACTONE 12.5 MG: 25 TABLET ORAL at 11:05

## 2022-05-15 RX ADMIN — SOTALOL HYDROCHLORIDE 80 MG: 80 TABLET ORAL at 17:13

## 2022-05-15 RX ADMIN — TORSEMIDE 40 MG: 20 TABLET ORAL at 11:05

## 2022-05-15 RX ADMIN — PANTOPRAZOLE SODIUM 40 MG: 40 TABLET, DELAYED RELEASE ORAL at 06:06

## 2022-05-15 RX ADMIN — MAGNESIUM SULFATE HEPTAHYDRATE 2 G: 40 INJECTION, SOLUTION INTRAVENOUS at 04:11

## 2022-05-15 RX ADMIN — ALBUMIN (HUMAN) 12.5 G: 0.25 INJECTION, SOLUTION INTRAVENOUS at 23:02

## 2022-05-15 RX ADMIN — Medication 12.5 MG: at 06:05

## 2022-05-15 NOTE — ASSESSMENT & PLAN NOTE
Status post biventricular ICD implant    Continue sotalol, Xarelto  Start low-dose metoprolol  Telemetry  Optimize electrolytes

## 2022-05-15 NOTE — ASSESSMENT & PLAN NOTE
2D echo 4/22/2022 showed severely dilated LV with EF 20%, global hypokinesis with regional variation, diastolic function is abnormal, severely dilated LA, moderately dilated RA, severe MR, mild-to-moderate TR,moderately to severely elevated PA pressure  Continue Entresto, Demadex, Aldactone, Jardiance  Patient is being referred to Advanced Heart failure team in [place]

## 2022-05-15 NOTE — ASSESSMENT & PLAN NOTE
Patient presents with palpitations with associated left forearm numbness, SOB, nausea, dizziness after walking to bathroom around 430AM  Reports felt her heart was going to "pop out of her chest"  EKG in ED showed wide QRS rhythm with occasional PVC,   Patient is status post biventricular ICD implant  Was upgraded from ICD due to ICD shocks  History of paroxysmal AFib and dilated cardiomyopathy  Telemetry shows paced rhythm with occasional PVCs on ED monitor, currently  ICD interrogation on 4/13 noted max duration 26 seconds of NSVT  No AFib  Patient was started on Coreg 3 125mg last week, took for 3 days then discontinued due to intolerance(leg edema,dizziness, numbness tingling in hands and feet)  Started Isis [place] yesterday  ED provider discussed EKG findings with on-call cardiologist,no concern over prolonged QTC  Patient given Mag 2 g in ED    Will place patient on telemetry  Interrogate ICD  Start low-dose metoprolol  Consult Cardiology

## 2022-05-15 NOTE — ASSESSMENT & PLAN NOTE
Wt Readings from Last 3 Encounters:   05/15/22 107 kg (235 lb 7 2 oz)   05/02/22 107 kg (236 lb)   04/26/22 108 kg (237 lb)     2D echo as above  Patient appears euvolemic on exam   Chest x-ray unremarkable to me, pending final read  Continue Entresto, Demadex, Aldactone, Jardiance    Daily weight and I&Os

## 2022-05-15 NOTE — H&P
[address] 1955, 79 y o  female MRN: [id number]  Unit/Bed#: [address] [address] Encounter: [id number]  Primary Care Provider: Nam Ellison MD   Date and time admitted to hospital: 5/15/2022  3:42 AM    * Palpitations  Assessment & Plan  Patient presents with palpitations with associated left forearm numbness, SOB, nausea, dizziness after walking to bathroom around 430AM  Reports felt her heart was going to "pop out her chest"  EKG in ED showed wide QRS rhythm with occasional PVC,   Patient is status post biventricular ICD implant  Was upgraded from ICD due to ICD shocks  History of paroxysmal AFib and dilated cardiomyopathy  Telemetry shows paced rhythm with occasional PVCs on ED monitor, currently  ICD interrogation on 4/13 noted max duration 26 seconds of NSVT  No AFib  Patient was started on Coreg 3 125mg last week, took for 3 days then discontinued due to intolerance(leg edema,dizziness, numbness tingling in hands and feet)  Started Milagros Jews yesterday  ED provider discussed EKG findings with on-call cardiologist,no concern over prolonged QTC  Patient given Mag 2 g in ED  Will place patient on telemetry  Interrogate ICD  Start low-dose metoprolol  Consult Cardiology            Dilated cardiomyopathy [facility])  Assessment & Plan  2D echo 4/22/2022 showed severely dilated LV with EF 20%, global hypokinesis with regional variation, diastolic function is abnormal, severely dilated LA, moderately dilated RA, severe MR, mild-to-moderate TR,moderately to severely elevated PA pressure  Continue Entresto, Demadex, Aldactone, Jardiance  Patient is being referred to Advanced Heart failure team in [place]      Chronic combined systolic and diastolic congestive heart failure, NYHA class 2 (HCC)  Assessment & Plan  Wt Readings from Last 3 Encounters:   05/15/22 107 kg (235 lb 7 2 oz)   05/02/22 107 kg (236 lb)   04/26/22 108 kg (237 lb)     2D echo as above  Patient appears euvolemic on exam   Chest x-ray unremarkable to me, pending final read  Continue Entresto, Demadex, Aldactone, Jardiance  Daily weight and I&Os         Elevated glucose  Assessment & Plan  Glucose 185  Check A1c    CKD (chronic kidney disease)  Assessment & Plan  Lab Results   Component Value Date    EGFR 38 05/15/2022    EGFR 45 04/27/2022    EGFR 39 04/26/2022    CREATININE 1 40 (H) 05/15/2022    CREATININE 1 22 04/27/2022    CREATININE 1 38 (H) 04/26/2022   Baseline creatinine appears to be around 1 0-1 3s  Creatinine slight elevated  Repeat lab in a m  Hypothyroidism  Assessment & Plan  Continue Synthroid  No recent TSH in epic  Will check TSH, free T4  Moderate obesity  Assessment & Plan  Body mass index is 38 kg/m²  Diet and lifestyle modification    Paroxysmal atrial fibrillation [facility])  Assessment & Plan  Status post biventricular ICD implant  Continue sotalol, Xarelto  Start low-dose metoprolol  Telemetry  Optimize electrolytes    Hypertension  Assessment & Plan  Patient reports her SBP is normally in 110s  BP was 140/85 when she was experiencing palpitation which she thinks was too high for her  Continue Demadex Aldactone  Monitor BP closely    Dyslipidemia  Assessment & Plan  Continue statin    Mild intermittent asthma without complication  Assessment & Plan  No evidence of acute exacerbation  Continue Cymbalta  Will order Xopenex p r n  VTE Prophylaxis: Rivaroxaban (Xarelto)  / reason for no mechanical VTE prophylaxis Xarelto   Code Status:  Full code  POLST: POLST form is not discussed and not completed at this time  Anticipated Length of Stay:  Patient will be admitted on an Observation basis with an anticipated length of stay of  < 2 midnights  Justification for Hospital Stay:  Palpitations    Total Time for Visit, including Counseling / Coordination of Care: 1 hour    Greater than 50% of this total time spent on direct patient counseling and coordination of care  Chief Complaint:   Palpitation with left forearm numbness tingling after walking to the bathroom    History of Present Illness:    Yeimi Salinas is a 79 y o  female with PMH of dilated cardiomyopathy, combined CHF, paroxysmal AFib, hypothyroid, hypertension, morbid obesity who presents with palpitation with left forearm numbness tingling after walking to the bathroom around 4:30 a m  Patient states she felt her heart was going to pop out of her chest She checked her BP,it was 140/85 which was very high for her  Patient denies chest pain, reports associated SOB, nausea, dizziness  She reports she was started on Chelsea Briana yesterday and not sure if it was related to that  She was also started on Coreg last week, she took 3 days then stopped taking it due to intolerance  It made her leg swell up,she felt dizzy and had numbness tingling to feet and hands  Patient reports feeling slight dizzy and SOB currently  Patient offered no other complaints  Patient drove herself to the ED  Of note, patient was admitted between 4/26-4/27/2022 for chest pain  Most likely noncardiac chest pain per chart review  Review of Systems:    Review of Systems   Respiratory: Positive for shortness of breath  Cardiovascular: Positive for palpitations  Gastrointestinal: Positive for nausea  Neurological: Positive for dizziness  All other systems reviewed and are negative        Past Medical and Surgical History:     Past Medical History:   Diagnosis Date    A-fib ([facility] )     Abnormal blood sugar     Acid reflux     Acute bronchitis     Allergic reaction     Anxiety     Arthritis     Asthma     [facility]omy[facility]     Cellulitis of left lower leg     Chest pain     CHF (congestive heart failure) (HCC)     Constipation     Depression with anxiety     Disease of thyroid gland     Diverticulitis     Dyslipidemia     Dyspnea on exertion     Elbow pain     Fracture of olecranon, open     Gastritis     Generalized pain     Hematuria     History of colonoscopy     Hypertension     Hypokalemia     Hypomagnesemia     Leg cramping     Moderate obesity     Morbid obesity due to excess calories (HCC)     Myalgia     Myositis     Nausea in adult     Nephrolithiasis     Prepatellar bursitis, unspecified knee     Screening for lipid disorders     Shortness of breath     Spasm of muscle     Thyroid trouble        Past Surgical History:   Procedure Laterality Date    CARDIAC DEFIBRILLATOR PLACEMENT      LAPAROSCOPIC CHOLECYSTECTOMY      TOTAL ABDOMINAL HYSTERECTOMY W/ BILATERAL SALPINGOOPHORECTOMY         Meds/Allergies:    Prior to Admission medications    Medication Sig Start Date End Date Taking?  Authorizing Provider   ALPRAZolam [facility]) 0 25 mg tablet Take 0 25 mg by mouth daily as needed 3/28/22  Yes Historical Provider, MD   diclofenac sodium (VOLTAREN) 1 % Apply 2 g topically 4 (four) times a day 5/8/20  Yes Jeffery Iqbal MD   Empagliflozin (Jardiance) 10 MG TABS Take 1 tablet (10 mg total) by mouth every morning 5/2/22  Yes Ulises Gilmore MD   [facility]  MG TABS take 1 tablet by mouth twice a day 3/14/22  Yes Ulises Gilmore MD   levalbuterol [facility]) 45 mcg/act inhaler Inhale 2 puffs every 6 (six) hours as needed for shortness of breath 10/4/18  Yes Cornelio Platt MD   levothyroxine 175 mcg tablet take 1 tablet by mouth once daily 7/24/21  Yes Wolf Vazquez DO   Magnesium 100 MG CAPS Take by mouth   Yes Historical Provider, MD   pantoprazole (PROTONIX) 40 mg tablet take 1 tablet by mouth once daily 8/2/21  Yes Wolf Vazquez DO   potassium chloride (K-DUR) 10 mEq tablet take 1 tablet by mouth once daily 8/7/20  Yes Vicki Hughes DO   pravastatin (PRAVACHOL) 40 mg tablet take 1 tablet by mouth once daily 2/28/22  Yes Ulises Gilmore MD   sotalol (BETAPACE) 80 mg tablet TAKE 1 TABLET BY MOUTH EVERY 12 HOURS 10/24/21  Yes Ulises Gilmore MD   spironolactone (ALDACTONE) 25 mg tablet TAKE 1/2 TABLET BY MOUTH DAILY 7/15/21  Yes Guadalupe Flores MD   tiZANidine (ZANAFLEX) 2 mg tablet take 1 tablet by mouth every 8 hours if needed for muscle spasm 2/22/21  Yes Andrea Wyatt DO   torsemide [facility]) 20 mg tablet take 2 tablets by mouth once daily 10/12/21  Yes Guadalupe Flores MD   Xarelto 20 MG tablet take 1 tablet by mouth once daily 5/12/21  Yes Guadalupe Flores MD   budesonide-formoterol (SYMBICORT) 160-4 5 mcg/act inhaler Inhale 2 puffs 2 (two) times a day Rinse mouth after use  10/4/18   Josse Murphy MD   ipratropium (ATROVENT) 0 02 % nebulizer solution Take 2 5 mL by nebulization 4 (four) times a day for 30 days 10/23/16 5/15/22  Savita Armstrong DO   ondansetron (Zofran ODT) 4 mg disintegrating tablet Take 1 tablet (4 mg total) by mouth every 6 (six) hours as needed for nausea or vomiting 4/27/22 5/15/22  Venancio Blizzard, MD   prochlorperazine (COMPAZINE) 25 mg suppository Insert 1 suppository (25 mg total) into the rectum every 12 (twelve) hours as needed for nausea or vomiting for up to 12 doses  Patient not taking: Reported on 5/2/2022  4/27/22 5/15/22  Venancio Blizzard, MD     I have reviewed home medications with patient personally  Allergies: Allergies   Allergen Reactions    Carvedilol Palpitations, Other (See Comments), Dizziness and Edema     Started several days after initiation of carvedilol 3 125 milligrams bid with duration of complaints approximately 36 hours  Patient also had numbness and tingling of hands and feet      Omnipaque [Iohexol] Swelling    Penicillins Swelling    Iv Dye  [Iodinated Diagnostic Agents] Throat Swelling    Other Swelling     Fresh herbs "basil, cilantro"    Shellfish-Derived Products - Food Allergy Hives       Social History:     Marital Status: Single   Occupation:  Disabled  Patient Pre-hospital Living Situation:  Lives alone  Patient Pre-hospital Level of Mobility:  Independent  Patient Pre-hospital Diet Restrictions:  Cardiac diet  Substance Use History:   Social History     Substance and Sexual Activity   Alcohol Use Not Currently     Social History     Tobacco Use   Smoking Status Never Smoker   Smokeless Tobacco Never Used     Social History     Substance and Sexual Activity   Drug Use No       Family History:    non-contributory    Physical Exam:     Vitals:   Blood Pressure: 110/79 (05/15/22 0554)  Pulse: 101 (05/15/22 0554)  Temperature: 98 4 °F (36 9 °C) (05/15/22 0554)  Temp Source: Oral (05/15/22 0554)  Respirations: 18 (05/15/22 0554)  Height: 5' 6" (167 6 cm) (05/15/22 0548)  Weight - Scale: 107 kg (235 lb 7 2 oz) (05/15/22 0548)  SpO2: 95 % (05/15/22 0554)    Physical Exam  Vitals and nursing note reviewed  Constitutional:       Appearance: She is well-developed  She is obese  HENT:      Head: Normocephalic and atraumatic  Neck:      Thyroid: No thyromegaly  Vascular: No JVD  Trachea: No tracheal deviation  Cardiovascular:      Rate and Rhythm: Tachycardia present  Heart sounds: Murmur heard  Comments: Heart rate 101 on ED monitor  Paced rhythm  Pulmonary:      Effort: Pulmonary effort is normal  No respiratory distress  Breath sounds: Normal breath sounds  No wheezing or rales  Abdominal:      General: Bowel sounds are normal  There is no distension  Palpations: Abdomen is soft  Tenderness: There is no abdominal tenderness  There is no guarding  Musculoskeletal:         General: No swelling or deformity  Cervical back: Neck supple  Right lower leg: No edema  Left lower leg: No edema  Skin:     General: Skin is warm and dry  Neurological:      General: No focal deficit present  Mental Status: She is alert and oriented to person, place, and time  Psychiatric:         Mood and Affect: Mood normal          Judgment: Judgment normal          Additional Data:     Lab Results: I have personally reviewed pertinent reports        Results from last 7 days   Lab Units 05/15/22  0408   WBC Thousand/uL 9 73   HEMOGLOBIN g/dL 13 4   HEMATOCRIT % 41 4   PLATELETS Thousands/uL 369   NEUTROS PCT % 55   LYMPHS PCT % 34   MONOS PCT % 8   EOS PCT % 2     Results from last 7 days   Lab Units 05/15/22  0408   POTASSIUM mmol/L 3 7   CHLORIDE mmol/L 101   CO2 mmol/L 25   BUN mg/dL 22   CREATININE mg/dL 1 40*   CALCIUM mg/dL 9 7   ALK PHOS U/L 88   ALT U/L 18   AST U/L 14           Imaging: I have personally reviewed pertinent reports  XR chest 1 view portable    Result Date: 4/26/2022  Narrative: CHEST INDICATION:   chest pain  COMPARISON:  5/23/2021 EXAM PERFORMED/VIEWS:  XR CHEST PORTABLE FINDINGS: Heart shadow is enlarged but unchanged from prior exam  ICD transvenous pacemaker present  No acute pulmonary disease, no pneumothorax or pleural effusion Osseous structures appear within normal limits for patient age  Impression: No acute pulmonary disease Workstation performed: [id number]     Echo complete w/ contrast if indicated    Result Date: 4/24/2022  Narrative: [facility]  Left Ventricle: Left ventricular cavity size is severely dilated  Wall thickness is normal  The left ventricular ejection fraction is 20% by visual estimation  Systolic function is severely reduced  There is global hypokinesis with regional variation  Basal-mid anteroseptal, septal, and inferoseptal wall are akinetic Diastolic function is abnormal    Left Atrium: The atrium is severely dilated (>48 mL/m2)    Right Atrium: The atrium is moderately dilated    Atrial Septum: The septum bows into the right atrium, suggesting increased left atrial pressure    Mitral Valve: The annulus is moderately dilated  There is severe regurgitation with a wall-impinging jet to posterior atrial wall   Tricuspid Valve: There is mild to moderate regurgitation  The right ventricular systolic pressure is moderately to severely elevated    Pulmonic Valve: There is mild regurgitation   Compared to prior study, LV remains markedly dilated with severely reduced EF  There is now severe mitral regurgitation with markedly elevated left atrial size by index volume       EKG, Pathology, and Other Studies Reviewed on Admission:   · EKG: Wide QRS rhythm with occasional PVCs    [employer] Records Reviewed: Yes     ** Please Note: Dragon 360 Dictation voice to text software may have been used in the creation of this document   **

## 2022-05-15 NOTE — CONSULTS
Consultation - Cardiology   Marlena Denis 79 y o  female MRN: [id number]  Unit/Bed#: [address] [place] [address] Encounter: [id number]  05/15/22  10:42 AM          Physician Requesting Consult: Barbara Bain MD  Reason for Consult / Principal Problem: Palpitations      Assessment:  1  Palpitations - reviewed ECG  Patient does not have prolonged QT as documented in ER physician note  Pacemaker was interrogated  She had no arrhythmias seen  Possible diaphragm pacing     - Symptoms may have been caused by orthostatic hypotension  Recommend holding Jardiance for now  May restart as outpatient and discontinue 2nd dose of torsemide  - continue sotalol  QT interval acceptable - no events on pacemaker interrogation    2  Paroxysmal atrial fibrillation - on treatment with sotalol and Xarelto  No events noted on pacemaker interrogation    3  Hypertension - blood pressure was low today  Continue current medication regimen  But hold Jardiance  She was taking carvedilol which was discontinued 2 days ago  May try metoprolol as outpatient once symptoms resolve  4  Chronic combined systolic and diastolic congestive heart failure with ejection fraction of 20%  Nonischemic cardiomyopathy  - patient has a biventricular pacemaker/ICD  - she was referred to [facility] as outpatient  5  Severe mitral regurgitation - follow-up as outpatient  Plan:  As above      History of Present Illness   HPI: Marlena Denis is a 79y o  year old female who presents with palpitations  The patient was walking to the bathroom earlier this morning and felt palpitations which she described as her heart was racing and pounding  She did not have any chest pain  She felt associated shortness of breath nausea and dizziness  In the emergency room, troponin was negative  EKG showed sinus tachycardia with ventricular pacing and frequent PVCs  Chest x-ray did not show any evidence of pulmonary vascular congestion      She has a history of dilated, nonischemic cardiomyopathy with an ejection fraction of less than 20%  She has a biventricular ICD pacemaker  Last pacemaker interrogation showed the presence of nonsustained ventricular tachycardia lasting 26 beats with no recent ICD shocks  She was started on carvedilol but could not tolerate due to dizziness and numbness tingling  She was also started on Jardiance which she took her 1st dose yesterday morning  She had an echocardiogram in April 2022 which showed ejection fraction of 20% with global hypokinesis  There was severe LA dilation, increased LA filling pressure  Severe mitral regurgitation and severe pulmonary hypertension  Review of Systems:    Review of Systems   Constitutional: Positive for fatigue  Respiratory: Positive for shortness of breath  Cardiovascular: Positive for palpitations  Negative for chest pain and leg swelling  Musculoskeletal: Positive for arthralgias  Neurological: Positive for dizziness, weakness and light-headedness  All other systems reviewed and are negative        Historical Information   Past Medical History:   Diagnosis Date    A-fib ([place] [id number] )     Abnormal blood sugar     Acid reflux     Acute bronchitis     Allergic reaction     Anxiety     Arthritis     Asthma     [facility] )     Cellulitis of left lower leg     Chest pain     [facility] failure) ([facility])     Constipation     Depression with anxiety     Disease of thyroid gland     Diverticulitis     Dyslipidemia     Dyspnea on exertion     Elbow pain     Fracture of olecranon, open     Gastritis     Generalized pain     Hematuria     History of colonoscopy     Hypertension     Hypokalemia     Hypomagnesemia     Leg cramping     Moderate obesity     Morbid obesity due to excess calories ([facility])     Myalgia     Myositis     Nausea in adult     Nephrolithiasis     Prepatellar bursitis, unspecified knee     Screening for lipid disorders  Shortness of breath     Spasm of muscle     Thyroid trouble      Past Surgical History:   Procedure Laterality Date    CARDIAC DEFIBRILLATOR PLACEMENT      LAPAROSCOPIC CHOLECYSTECTOMY      TOTAL ABDOMINAL HYSTERECTOMY W/ BILATERAL SALPINGOOPHORECTOMY       Social History     Substance and Sexual Activity   Alcohol Use Not Currently     Social History     Substance and Sexual Activity   Drug Use No     Social History     Tobacco Use   Smoking Status Never Smoker   Smokeless Tobacco Never Used       Family History:   Family History   Problem Relation Age of Onset    Hypertension Sister     Cancer Sister     Coronary artery disease Family     Diabetes type II Family     Hypertension Family        Meds/Allergies   current meds:   Current Facility-Administered Medications   Medication Dose Route Frequency    acetaminophen (TYLENOL) tablet 650 mg  650 mg Oral Q6H PRN    ALPRAZolam (XANAX) tablet 0 25 mg  0 25 mg Oral Daily PRN    budesonide-formoterol (SYMBICORT) 160-4 5 mcg/act inhaler 2 puff  2 puff Inhalation BID    Empagliflozin TABS 10 mg  10 mg Oral QAM    levalbuterol (XOPENEX) inhalation solution 0 63 mg  0 63 mg Nebulization Q4H PRN    levothyroxine tablet 175 mcg  175 mcg Oral Early Morning    metoprolol tartrate (LOPRESSOR) partial tablet 12 5 mg  12 5 mg Oral Q12H [address]    ondansetron (ZOFRAN) injection 4 mg  4 mg Intravenous Q6H PRN    pantoprazole (PROTONIX) EC tablet 40 mg  40 mg Oral Early Morning    [START ON 5/16/2022] potassium chloride (K-DUR,KLOR-CON) CR tablet 20 mEq  20 mEq Oral Daily    pravastatin (PRAVACHOL) tablet 40 mg  40 mg Oral Daily With Dinner    rivaroxaban (XARELTO) tablet 20 mg  20 mg Oral Daily    sacubitril-valsartan (ENTRESTO)  MG per tablet 1 tablet  1 tablet Oral BID    sotalol (BETAPACE) tablet 80 mg  80 mg Oral Q12H    spironolactone (ALDACTONE) tablet 12 5 mg  12 5 mg Oral Daily    tiZANidine (ZANAFLEX) tablet 2 mg  2 mg Oral Q8H PRN    torsemide (DEMADEX) tablet 40 mg  40 mg Oral Daily    and PTA meds:   Prior to Admission Medications   Prescriptions Last Dose Informant Patient Reported? Taking? ALPRAZolam (XANAX) 0 25 mg tablet 5/14/2022 at Unknown time  Yes Yes   Sig: Take 0 25 mg by mouth daily as needed   Empagliflozin (Jardiance) 10 MG TABS 5/14/2022 at Unknown time  No Yes   Sig: Take 1 tablet (10 mg total) by mouth every morning   Entresto  MG TABS 5/14/2022 at Unknown time Self No Yes   Sig: take 1 tablet by mouth twice a day   Magnesium 100 MG CAPS 5/14/2022 at Unknown time Self Yes Yes   Sig: Take by mouth   Xarelto 20 MG tablet 5/14/2022 at Unknown time Self No Yes   Sig: take 1 tablet by mouth once daily   budesonide-formoterol (SYMBICORT) 160-4 5 mcg/act inhaler Unknown at Unknown time Self No No   Sig: Inhale 2 puffs 2 (two) times a day Rinse mouth after use     diclofenac sodium (VOLTAREN) 1 % Past Week at Unknown time Self No Yes   Sig: Apply 2 g topically 4 (four) times a day   levalbuterol (XOPENEX HFA) 45 mcg/act inhaler 5/14/2022 at Unknown time Self No Yes   Sig: Inhale 2 puffs every 6 (six) hours as needed for shortness of breath   levothyroxine 175 mcg tablet 5/14/2022 at Unknown time Self No Yes   Sig: take 1 tablet by mouth once daily   pantoprazole (PROTONIX) 40 mg tablet 5/14/2022 at Unknown time Self No Yes   Sig: take 1 tablet by mouth once daily   potassium chloride (K-DUR) 10 mEq tablet 5/14/2022 at Unknown time Self No Yes   Sig: take 1 tablet by mouth once daily   pravastatin (PRAVACHOL) 40 mg tablet 5/14/2022 at Unknown time Self No Yes   Sig: take 1 tablet by mouth once daily   sotalol (BETAPACE) 80 mg tablet 5/14/2022 at Unknown time Self No Yes   Sig: TAKE 1 TABLET BY MOUTH EVERY 12 HOURS   spironolactone (ALDACTONE) 25 mg tablet 5/14/2022 at Unknown time Self No Yes   Sig: TAKE 1/2 TABLET BY MOUTH DAILY   tiZANidine (ZANAFLEX) 2 mg tablet 5/14/2022 at Unknown time Self No Yes   Sig: take 1 tablet by mouth every 8 hours if needed for muscle spasm   torsemide (DEMADEX) 20 mg tablet 5/14/2022 at Unknown time Self No Yes   Sig: take 2 tablets by mouth once daily      Facility-Administered Medications: None     Allergies   Allergen Reactions    Carvedilol Palpitations, Other (See Comments), Dizziness and Edema     Started several days after initiation of carvedilol 3 125 milligrams bid with duration of complaints approximately 36 hours  Patient also had numbness and tingling of hands and feet   Omnipaque [Iohexol] Swelling    Penicillins Swelling    Iv Dye  [Iodinated Diagnostic Agents] Throat Swelling    Other Swelling     Fresh herbs "basil, cilantro"    Shellfish-Derived Products - Food Allergy Hives       Objective   Vitals: Blood pressure 102/70, pulse 82, temperature 97 9 °F (36 6 °C), resp  rate 18, height 5' 6" (1 676 m), weight 107 kg (235 lb 7 2 oz), SpO2 97 %  , Body mass index is 38 kg/m²  Physical Exam   Constitutional: She appears healthy  No distress  Eyes: Pupils are equal, round, and reactive to light  Conjunctivae are normal    Neck: No JVD present  Cardiovascular: Normal rate, regular rhythm and normal heart sounds  Exam reveals no gallop and no friction rub  No murmur heard  Pulmonary/Chest: Effort normal and breath sounds normal  She has no wheezes  She has no rales  Musculoskeletal:         General: No tenderness, deformity or edema  Cervical back: Normal range of motion and neck supple  Neurological: She is alert and oriented to person, place, and time  Skin: Skin is warm and dry         Lab Results:     Troponins:       CBC with diff:   Results from last 7 days   Lab Units 05/15/22  0408   WBC Thousand/uL 9 73   HEMOGLOBIN g/dL 13 4   HEMATOCRIT % 41 4   MCV fL 83   PLATELETS Thousands/uL 369   MCH pg 27 0   MCHC g/dL 32 4   RDW % 14 5   MPV fL 10 7   NRBC AUTO /100 WBCs 0       CMP:   Results from last 7 days   Lab Units 05/15/22  0408   POTASSIUM mmol/L 3 7 CHLORIDE mmol/L 101   CO2 mmol/L 25   BUN mg/dL 22   CREATININE mg/dL 1 40*   CALCIUM mg/dL 9 7   AST U/L 14   ALT U/L 18   ALK PHOS U/L 88   EGFR ml/min/1 73sq m 38       Magnesium:   Results from last 7 days   Lab Units 05/15/22  0408   MAGNESIUM mg/dL 2 2       Coags:       Lipid Profile:         Cardiac testing: All previous testing has been reviewed  See HPI for summary  EKG: Personally reviewed:  Sinus tachycardia with ventricular pacing and PVCs    Imaging: I have personally reviewed pertinent films in PACS - chest x-ray with pacemaker  No pulmonary vascular congestion

## 2022-05-15 NOTE — ED PROVIDER NOTES
Final Diagnosis:  1  Atypical chest pain    2  Frequent PVCs    3  Tachycardia    4  NSVT (nonsustained ventricular tachycardia) ([facility] [address] )    5  Palpitations        Chief Complaint   Patient presents with    Chest Pain     Patient c/o rapid heart rate with numbness in left arm, also shortness of breath all starting 1 hour ago, did start new medication Jardiance today     HPI    61-year-old patient presents with heart rate rapid palpitations tearful anxious numbness in left arm shortness of breath  Started an hour ago  She is concerned that she started Jardiance it might be a side effect  She has a history of nonsustained V-tach she has a dual-chamber [employer] pacer defibrillator  History of afib on noac, history of nonischemic cardiomyopathy around 25% EF  On sotalol, couldn't tolerate Ca ch blocker  We were not able to interrogate the pacer defibrillator in the emergency department secondary to device malfunction  She presents tachy the 120 is though this improved to about 105 prior to admission  She is severely symptomatic now on admission she is still moderately symptomatic  Monitor reading V-tach though likely sinus with block  Frequent PVCs  On the monitor I see a run of about 5 to ventricular contractions  Difficult EKG  Multiple morphologies possible AFib with conduction delay sinus arrhythmia with conduction delay but different morphologies to the QRS  EKG reading QTC greater than 600 give him act  Discussed with Cardiology they note normal QTC  They think likely sinus with block  Due to patient symptoms admit for pacer interrogation/cardiology review - obs    - No language barrier    - History obtained from patient  - There are no limitations to the history obtained  - Previous charting underwent limited review with attention to last ED visits, labs, ekgs, and prior imaging      PMH:   has a past medical history of A-fib ([facility] [address] ), Abnormal blood sugar, Acid reflux, Acute bronchitis, Allergic reaction, Anxiety, Arthritis, Asthma, Cardiomyopathy ([facility] [address] ), Cellulitis of left lower leg, Chest pain, [facility]congest[facility] failure) ([facility] Constipation, Depression with anxiety, Disease of thyroid gland, Diverticulitis, Dyslipidemia, Dyspnea on exertion, Elbow pain, Fracture of olecranon, open, Gastritis, Generalized pain, Hematuria, History of colonoscopy, Hypertension, Hypokalemia, Hypomagnesemia, Leg cramping, Moderate obesity, Morbid obesity due to excess calories ([facility] ), Myalgia, Myositis, Nausea in adult, Nephrolithiasis, Prepatellar bursitis, unspecified knee, Screening for lipid disorders, Shortness of breath, Spasm of muscle, and Thyroid trouble  PSH:   has a past surgical history that includes Laparoscopic cholecystectomy; Total abdominal hysterectomy w/ bilateral salpingoophorectomy; and Cardiac defibrillator placement  Social History:    Tobacco Use: Low Risk     Smoking Tobacco Use: Never Smoker    Smokeless Tobacco Use: Never Used     Alcohol Use: Unknown    Frequency of Alcohol Consumption: Monthly or less    Average Number of Drinks: 1 or 2    Frequency of Binge Drinking: Not on file     Patient with no illicit use    ROS:    Pertinent positives/negatives:   Review of Systems   Respiratory: Positive for chest tightness and shortness of breath  Cardiovascular: Positive for palpitations  CONSTITUTIONAL:  No dizziness  No weakness  No unexpected weight loss  EYES:  No pain, erythema, or discharge  No loss of vision  ENT:  No tinnitus, decreased hearing  No epistaxis/purulent drainage  No voice change, airway closing, trismus  CARDIOVASCULAR:  No chest pain  No palpitations  No new lower extremity edema  RESPIRATORY:  No purulent cough  No hemoptysis  No dyspnea  No paroxysmal nocturnal dyspnea  No stridor, audible wheezing bedside  GASTROINTESTINAL:  Normal appetite  No vomiting, diarrhea  No pain  No bloating  No melena  GENITOURINARY:  No frequency, urgency, nocturia   No hematuria or dysuria  No discharge  No sores/adenopathy  MUSCULOSKELETAL:  No arthralgias or myalgias that are new  INTEGUMENTARY:  No swelling  No unexpected contusions  No abrasions  No lymphangitis  NEUROLOGIC:  No meningismus  No numbness of the extremities  No new focal weakness  No postural instability  PSYCHIATRIC:  No SI HI AVH  HEMATOLOGICAL:  No bleeding  No petechiae  No bruising  ALLERGIES:  No urticaria  No sudden abd cramping  No stridor  PE:     Physical exam highlights:   Physical Exam       Vitals:    05/17/22 0736 05/17/22 0800 05/17/22 0828 05/17/22 0856   BP: 109/69   106/70   BP Location: Left arm      Pulse: 78  83 83   Resp: 18      Temp: 98 1 °F (36 7 °C)      TempSrc: Oral      SpO2: 94%  98% 98%   Weight:  107 kg (236 lb 5 3 oz)     Height:         Vitals reviewed by me  Nursing note reviewed  Chaperone present for all sensitive exam   Const: No acute distress  Alert  Nontoxic  Not diaphoretic  HEENT: External ears normal  No protrusion drainage swelling  Nose normal  No drainage/traumatic deformity  MMM  Mouth with baseline/symmetric movement  No trismus  Eyes: No squinting  No icterus  Tracks through the room with normal EOM  No tearing/swelling/drainage  Neck: ROM normal  No rigidity  No meningismus  Cards: Rate as per vitals  Compared to monitor sinus unless documented above  Regular  Well perfused  Pulm: able to verbalize without additional effort  Effort and excursion normal  No disress  No audible wheezing/ stridor  Normal resp rate  Abd: No distension beyond baseline  No fluctuant wave  Patient without peritoneal pain with shifting/bumping the bed  MSK: ROM normal and baseline  No deformity  Skin: No new rashes visible  Well perfused  Neuro: Nonfocal  Baseline  CN grossly intact  Moving all four with coordination  Psych: Normal behavior and affect  A:  - Nursing note reviewed      Ddx and MDM                       ED Course as of 05/18/22 2302   [place] May 15, 2022   0345 Compared to prior study, LV remains markedly dilated with severely reduced EF  There is now severe mitral regurgitation with markedly elevated left atrial size by index volume   0346  1 NSVT EPISODE- 26 BEATS @ 213 BPM- BROKE ON OWN; NO THERAPY     XR chest 1 view portable   Final Result      No acute cardiopulmonary disease  Workstation performed: [id number]           Orders Placed This Encounter   Procedures    XR chest 1 view portable    HS Troponin 0hr (reflex protocol)    CBC and differential    Comprehensive metabolic panel    Magnesium    Phosphorus    HS Troponin I 2hr    HS Troponin I 4hr    TSH, 3rd generation    T4, free    Hemoglobin A1C    Basic metabolic panel    CBC and differential    Magnesium    CBC    Basic metabolic panel    Magnesium    TSH, 3rd generation with Free T4 reflex    T4, free    Discharge Diet    Notify admitting physician    Notify admitting physician on arrival    Activity as tolerated    Call provider for:  difficulty breathing, headache or visual disturbances    Call provider for:  persistent dizziness or light-headedness    Inpatient consult to Cardiology    CARDIOLOGY RESULTS    ECG 12 lead    ECG 12 lead    ECG 12 lead    Place in Observation    Inpatient Admission    Discharge patient     Labs Reviewed   COMPREHENSIVE METABOLIC PANEL - Abnormal       Result Value Ref Range Status    Sodium 137  136 - 145 mmol/L Final    Potassium 3 7  3 5 - 5 3 mmol/L Final    Chloride 101  100 - 108 mmol/L Final    CO2 25  21 - 32 mmol/L Final    ANION GAP 11  4 - 13 mmol/L Final    BUN 22  5 - 25 mg/dL Final    Creatinine 1 40 (*) 0 60 - 1 30 mg/dL Final    Comment: Standardized to IDMS reference method    Glucose 185 (*) 65 - 140 mg/dL Final    Comment: If the patient is fasting, the ADA then defines impaired fasting glucose as > 100 mg/dL and diabetes as > or equal to 123 mg/dL    Specimen collection should occur prior to Sulfasalazine administration due to the potential for falsely depressed results  Specimen collection should occur prior to Sulfapyridine administration due to the potential for falsely elevated results  Calcium 9 7  8 3 - 10 1 mg/dL Final    AST 14  5 - 45 U/L Final    Comment: Specimen collection should occur prior to Sulfasalazine administration due to the potential for falsely depressed results  ALT 18  12 - 78 U/L Final    Comment: Specimen collection should occur prior to Sulfasalazine administration due to the potential for falsely depressed results  Alkaline Phosphatase 88  46 - 116 U/L Final    Total Protein 7 6  6 4 - 8 2 g/dL Final    Albumin 3 7  3 5 - 5 0 g/dL Final    Total Bilirubin 0 31  0 20 - 1 00 mg/dL Final    Comment: Use of this assay is not recommended for patients undergoing treatment with eltrombopag due to the potential for falsely elevated results  eGFR 38  ml/min/1 73sq m Final    Narrative:     [place] guidelines for Chronic Kidney Disease (CKD):     Stage 1 with normal or high GFR (GFR > 90 mL/min/1 73 square meters)    Stage 2 Mild CKD (GFR = 60-89 mL/min/1 73 square meters)    Stage 3A Moderate CKD (GFR = 45-59 mL/min/1 73 square meters)    Stage 3B Moderate CKD (GFR = 30-44 mL/min/1 73 square meters)    Stage 4 Severe CKD (GFR = 15-29 mL/min/1 73 square meters)    Stage 5 End Stage CKD (GFR <15 mL/min/1 73 square meters)  Note: GFR calculation is accurate only with a steady state creatinine   PHOSPHORUS - Abnormal    Phosphorus 6 0 (*) 2 3 - 4 1 mg/dL Final   HS TROPONIN I 0HR - Normal    hs TnI 0hr 11  "Refer to ACS Flowchart"- see link ng/L Final    Comment:                                              Initial (time 0) result  If >=50 ng/L, Myocardial injury suggested ;  Type of myocardial injury and treatment strategy  to be determined  If 5-49 ng/L, a delta result at 2 hours and or 4 hours will be needed to further evaluate    If <4 ng/L, and chest pain has been >3 hours since onset, patient may qualify for discharge based on the HEART score in the ED  If <5 ng/L and <3hours since onset of chest pain, a delta result at 2 hours will be needed to further evaluate  HS Troponin 99th Percentile URL of a Health Population=12 ng/L with a 95% Confidence Interval of 8-18 ng/L  Second Troponin (time 2 hours)  If calculated delta >= 20 ng/L,  Myocardial injury suggested ; Type of myocardial injury and treatment strategy to be determined  If 5-49 ng/L and the calculated delta is 5-19 ng/L, consult medical service for evaluation  Continue evaluation for ischemia on ecg and other possible etiology and repeat hs troponin at 4 hours  If delta is <5 ng/L at 2 hours, consider discharge based on risk stratification via the HEART score (if in ED), or FIDELINA risk score in IP/Observation  HS Troponin 99th Percentile URL of a Health Population=12 ng/L with a 95% Confidence Interval of 8-18 ng/L     MAGNESIUM - Normal    Magnesium 2 2  1 6 - 2 6 mg/dL Final   CBC AND DIFFERENTIAL    WBC 9 73  4 31 - 10 16 Thousand/uL Final    RBC 4 97  3 81 - 5 12 Million/uL Final    Hemoglobin 13 4  11 5 - 15 4 g/dL Final    Hematocrit 41 4  34 8 - 46 1 % Final    MCV 83  82 - 98 fL Final    MCH 27 0  26 8 - 34 3 pg Final    MCHC 32 4  31 4 - 37 4 g/dL Final    RDW 14 5  11 6 - 15 1 % Final    MPV 10 7  8 9 - 12 7 fL Final    Platelets 662  435 - 390 Thousands/uL Final    nRBC 0  /100 WBCs Final    Neutrophils Relative 55  43 - 75 % Final    Immat GRANS % 0  0 - 2 % Final    Lymphocytes Relative 34  14 - 44 % Final    Monocytes Relative 8  4 - 12 % Final    Eosinophils Relative 2  0 - 6 % Final    Basophils Relative 1  0 - 1 % Final    Neutrophils Absolute 5 41  1 85 - 7 62 Thousands/µL Final    Immature Grans Absolute 0 02  0 00 - 0 20 Thousand/uL Final    Lymphocytes Absolute 3 31  0 60 - 4 47 Thousands/µL Final    Monocytes Absolute 0 73  0 17 - 1 22 Thousand/µL Final Eosinophils Absolute 0 21  0 00 - 0 61 Thousand/µL Final    Basophils Absolute 0 05  0 00 - 0 10 Thousands/µL Final       Final Diagnosis:  1  Atypical chest pain    2  Frequent PVCs    3  Tachycardia    4  NSVT (nonsustained ventricular tachycardia) ([facility])    5  Palpitations        P:  - hospital tx includes   Medications   magnesium sulfate 2 g/50 mL IVPB (premix) 2 g (2 g Intravenous New Bag 5/15/22 3863)   potassium chloride (K-DUR,KLOR-CON) CR tablet 30 mEq (30 mEq Oral Given 5/15/22 3227)   bisacodyl (DULCOLAX) rectal suppository 10 mg (10 mg Rectal Given 5/15/22 7046)   albumin human (FLEXBUMIN) 25 % injection 12 5 g (0 g Intravenous Stopped 5/15/22 4324)         - disposition  Time reflects when diagnosis was documented in both MDM as applicable and the Disposition within this note     Time User Action Codes Description Comment    5/15/2022  5:02 AM Ary Bright Add [R07 89] Atypical chest pain     5/15/2022  5:02 AM Ary Bright Add [I49 3] Frequent PVCs     5/15/2022  5:02 AM Ary Bright Add [R00 0] Tachycardia     5/15/2022  5:30 AM Clarence Ascencio Add [R00 2] Palpitations     5/15/2022  5:30 AM Clarence Ascencio Add [I47 2] NSVT (nonsustained ventricular tachycardia) ([facility] [address] )     5/15/2022  5:30 AM Clarence Ascencio Remove [R00 2] Palpitations     5/15/2022  5:30 AM Clarence Ascencio Add [R00 2] Palpitations       ED Disposition     ED Disposition   Discharge    Condition   Stable    Date/Time   Sun May 15, 2022  5:02 AM    Comment   Denisha Yo discharge to home/self care                 Follow-up Information     Follow up With Specialties Details Why Contact Info    Monica Malone MD Family Medicine Follow up in 1 week(s)  [address] [place]  [address] [place] [address] Crow Leiva MD Cardiology Follow up in 2 week(s)  [address] [facility]  [phone]            - patient will call their PCP to let them know they were in the emergency department   We discuss return precautions       - additional tx intended, if consistent with primary provider:  - patient to follow with :      Discharge Medication List as of 5/17/2022 11:47 AM      START taking these medications    Details   metoprolol tartrate (LOPRESSOR) 25 mg tablet Take 0 5 tablets (12 5 mg total) by mouth every 12 (twelve) hours, Starting Tue 5/17/2022, Until Thu 6/16/2022, Normal         CONTINUE these medications which have NOT CHANGED    Details   ALPRAZolam (XANAX) 0 25 mg tablet Take 0 25 mg by mouth daily as needed, Starting Mon 3/28/2022, Historical Med      diclofenac sodium (VOLTAREN) 1 % Apply 2 g topically 4 (four) times a day, Starting Fri 5/8/2020, Normal      Entresto  MG TABS take 1 tablet by mouth twice a day, Normal      levalbuterol (XOPENEX HFA) 45 mcg/act inhaler Inhale 2 puffs every 6 (six) hours as needed for shortness of breath, Starting Thu 10/4/2018, Normal      levothyroxine 175 mcg tablet take 1 tablet by mouth once daily, Normal      Magnesium 100 MG CAPS Take by mouth, Historical Med      pantoprazole (PROTONIX) 40 mg tablet take 1 tablet by mouth once daily, Normal      potassium chloride (K-DUR) 10 mEq tablet take 1 tablet by mouth once daily, Normal      pravastatin (PRAVACHOL) 40 mg tablet take 1 tablet by mouth once daily, Normal      sotalol (BETAPACE) 80 mg tablet TAKE 1 TABLET BY MOUTH EVERY 12 HOURS, Normal      spironolactone (ALDACTONE) 25 mg tablet TAKE 1/2 TABLET BY MOUTH DAILY, Normal      tiZANidine (ZANAFLEX) 2 mg tablet take 1 tablet by mouth every 8 hours if needed for muscle spasm, Normal      torsemide (DEMADEX) 20 mg tablet take 2 tablets by mouth once daily, Normal      Xarelto 20 MG tablet take 1 tablet by mouth once daily, Normal      Empagliflozin (Jardiance) 10 MG TABS Take 1 tablet (10 mg total) by mouth every morning, Starting Mon 5/2/2022, Normal      budesonide-formoterol (SYMBICORT) 160-4 5 mcg/act inhaler Inhale 2 puffs 2 (two) times a day Rinse mouth after use , Starting u 10/4/2018, Normal           Outpatient Discharge Orders   Discharge Diet     Activity as tolerated     Call provider for:  difficulty breathing, headache or visual disturbances     Call provider for:  persistent dizziness or light-headedness     Prior to Admission Medications   Prescriptions Last Dose Informant Patient Reported? Taking? ALPRAZolam (XANAX) 0 25 mg tablet 5/14/2022 at Unknown time  Yes Yes   Sig: Take 0 25 mg by mouth daily as needed   Empagliflozin (Jardiance) 10 MG TABS 5/14/2022 at Unknown time  No Yes   Sig: Take 1 tablet (10 mg total) by mouth every morning   Entresto  MG TABS 5/14/2022 at Unknown time Self No Yes   Sig: take 1 tablet by mouth twice a day   Magnesium 100 MG CAPS 5/14/2022 at Unknown time Self Yes Yes   Sig: Take by mouth   Xarelto 20 MG tablet 5/14/2022 at Unknown time Self No Yes   Sig: take 1 tablet by mouth once daily   budesonide-formoterol (SYMBICORT) 160-4 5 mcg/act inhaler Unknown at Unknown time Self No No   Sig: Inhale 2 puffs 2 (two) times a day Rinse mouth after use     diclofenac sodium (VOLTAREN) 1 % Past Week at Unknown time Self No Yes   Sig: Apply 2 g topically 4 (four) times a day   levalbuterol (XOPENEX HFA) 45 mcg/act inhaler 5/14/2022 at Unknown time Self No Yes   Sig: Inhale 2 puffs every 6 (six) hours as needed for shortness of breath   levothyroxine 175 mcg tablet 5/14/2022 at Unknown time Self No Yes   Sig: take 1 tablet by mouth once daily   pantoprazole (PROTONIX) 40 mg tablet 5/14/2022 at Unknown time Self No Yes   Sig: take 1 tablet by mouth once daily   potassium chloride (K-DUR) 10 mEq tablet 5/14/2022 at Unknown time Self No Yes   Sig: take 1 tablet by mouth once daily   pravastatin (PRAVACHOL) 40 mg tablet 5/14/2022 at Unknown time Self No Yes   Sig: take 1 tablet by mouth once daily   sotalol (BETAPACE) 80 mg tablet 5/14/2022 at Unknown time Self No Yes   Sig: TAKE 1 TABLET BY MOUTH EVERY 12 HOURS   spironolactone (ALDACTONE) 25 mg tablet 5/14/2022 at Unknown time Self No Yes   Sig: TAKE 1/2 TABLET BY MOUTH DAILY   tiZANidine (ZANAFLEX) 2 mg tablet 5/14/2022 at Unknown time Self No Yes   Sig: take 1 tablet by mouth every 8 hours if needed for muscle spasm   torsemide (DEMADEX) 20 mg tablet 5/14/2022 at Unknown time Self No Yes   Sig: take 2 tablets by mouth once daily      Facility-Administered Medications: None       Portions of the record may have been created with voice recognition software  Occasional wrong word or "sound a like" substitutions may have occurred due to the inherent limitations of voice recognition software  Read the chart carefully and recognize, using context, where substitutions have occurred      Electronically signed by:  MD Tesfaye Ortiz MD  05/18/22 1481

## 2022-05-15 NOTE — ASSESSMENT & PLAN NOTE
Lab Results   Component Value Date    EGFR 38 05/15/2022    EGFR 45 04/27/2022    EGFR 39 04/26/2022    CREATININE 1 40 (H) 05/15/2022    CREATININE 1 22 04/27/2022    CREATININE 1 38 (H) 04/26/2022   Baseline creatinine appears to be around 1 0-1 3s  Creatinine slight elevated  Repeat lab in a m

## 2022-05-15 NOTE — PLAN OF CARE
Problem: Potential for Falls  Goal: Patient will remain free of falls  Description: INTERVENTIONS:  - Educate patient/family on patient safety including physical limitations  - Instruct patient to call for assistance with activity   - Consult OT/PT to assist with strengthening/mobility   - Keep Call bell within reach  - Keep bed low and locked with side rails adjusted as appropriate  - Keep care items and personal belongings within reach  - Initiate and maintain comfort rounds  - Make Fall Risk Sign visible to staff  - Offer Toileting every 4 Hours, in advance of need  - Initiate/Maintain bed alarm  - Obtain necessary fall risk management equipment: side rail  - Apply yellow socks and bracelet for high fall risk patients  - Consider moving patient to room near nurses station  Outcome: Progressing     Problem: CARDIOVASCULAR - ADULT  Goal: Maintains optimal cardiac output and hemodynamic stability  Description: INTERVENTIONS:  - Monitor I/O, vital signs and rhythm  - Monitor for S/S and trends of decreased cardiac output  - Administer and titrate ordered vasoactive medications to optimize hemodynamic stability  - Assess quality of pulses, skin color and temperature  - Assess for signs of decreased coronary artery perfusion  - Instruct patient to report change in severity of symptoms  Outcome: Progressing  Goal: Absence of cardiac dysrhythmias or at baseline rhythm  Description: INTERVENTIONS:  - Continuous cardiac monitoring, vital signs, obtain 12 lead EKG if ordered  - Administer antiarrhythmic and heart rate control medications as ordered  - Monitor electrolytes and administer replacement therapy as ordered  Outcome: Progressing     Problem: METABOLIC, FLUID AND ELECTROLYTES - ADULT  Goal: Electrolytes maintained within normal limits  Description: INTERVENTIONS:  - Monitor labs and assess patient for signs and symptoms of electrolyte imbalances  - Administer electrolyte replacement as ordered  - Monitor response to electrolyte replacements, including repeat lab results as appropriate  - Instruct patient on fluid and nutrition as appropriate  Outcome: Progressing

## 2022-05-15 NOTE — ASSESSMENT & PLAN NOTE
Patient reports her SBP is normally in 110s  BP was 140/85 when she was experiencing palpitation which she thinks was too high for her  Continue Demadex Aldactone    Monitor BP closely

## 2022-05-16 LAB
ANION GAP SERPL CALCULATED.3IONS-SCNC: 8 MMOL/L (ref 4–13)
ATRIAL RATE: 117 BPM
BASOPHILS # BLD AUTO: 0.04 THOUSANDS/ΜL (ref 0–0.1)
BASOPHILS NFR BLD AUTO: 1 % (ref 0–1)
BUN SERPL-MCNC: 24 MG/DL (ref 5–25)
CALCIUM SERPL-MCNC: 8.9 MG/DL (ref 8.3–10.1)
CHLORIDE SERPL-SCNC: 101 MMOL/L (ref 100–108)
CO2 SERPL-SCNC: 27 MMOL/L (ref 21–32)
CREAT SERPL-MCNC: 1.27 MG/DL (ref 0.6–1.3)
EOSINOPHIL # BLD AUTO: 0.15 THOUSAND/ΜL (ref 0–0.61)
EOSINOPHIL NFR BLD AUTO: 2 % (ref 0–6)
ERYTHROCYTE [DISTWIDTH] IN BLOOD BY AUTOMATED COUNT: 14.4 % (ref 11.6–15.1)
GFR SERPL CREATININE-BSD FRML MDRD: 43 ML/MIN/1.73SQ M
GLUCOSE P FAST SERPL-MCNC: 108 MG/DL (ref 65–99)
GLUCOSE SERPL-MCNC: 108 MG/DL (ref 65–140)
HCT VFR BLD AUTO: 36.8 % (ref 34.8–46.1)
HGB BLD-MCNC: 12.1 G/DL (ref 11.5–15.4)
IMM GRANULOCYTES # BLD AUTO: 0.02 THOUSAND/UL (ref 0–0.2)
IMM GRANULOCYTES NFR BLD AUTO: 0 % (ref 0–2)
LYMPHOCYTES # BLD AUTO: 2.53 THOUSANDS/ΜL (ref 0.6–4.47)
LYMPHOCYTES NFR BLD AUTO: 33 % (ref 14–44)
MAGNESIUM SERPL-MCNC: 2.5 MG/DL (ref 1.6–2.6)
MCH RBC QN AUTO: 27.3 PG (ref 26.8–34.3)
MCHC RBC AUTO-ENTMCNC: 32.9 G/DL (ref 31.4–37.4)
MCV RBC AUTO: 83 FL (ref 82–98)
MONOCYTES # BLD AUTO: 0.71 THOUSAND/ΜL (ref 0.17–1.22)
MONOCYTES NFR BLD AUTO: 9 % (ref 4–12)
NEUTROPHILS # BLD AUTO: 4.12 THOUSANDS/ΜL (ref 1.85–7.62)
NEUTS SEG NFR BLD AUTO: 55 % (ref 43–75)
NRBC BLD AUTO-RTO: 0 /100 WBCS
P AXIS: 33 DEGREES
PLATELET # BLD AUTO: 281 THOUSANDS/UL (ref 149–390)
PMV BLD AUTO: 10.1 FL (ref 8.9–12.7)
POTASSIUM SERPL-SCNC: 3.5 MMOL/L (ref 3.5–5.3)
PR INTERVAL: 172 MS
QRS AXIS: -30 DEGREES
QRSD INTERVAL: 142 MS
QT INTERVAL: 328 MS
QTC INTERVAL: 457 MS
RBC # BLD AUTO: 4.44 MILLION/UL (ref 3.81–5.12)
SODIUM SERPL-SCNC: 136 MMOL/L (ref 136–145)
T WAVE AXIS: 117 DEGREES
VENTRICULAR RATE: 117 BPM
WBC # BLD AUTO: 7.57 THOUSAND/UL (ref 4.31–10.16)

## 2022-05-16 PROCEDURE — 99232 SBSQ HOSP IP/OBS MODERATE 35: CPT | Performed by: INTERNAL MEDICINE

## 2022-05-16 PROCEDURE — 80048 BASIC METABOLIC PNL TOTAL CA: CPT | Performed by: FAMILY MEDICINE

## 2022-05-16 PROCEDURE — 93010 ELECTROCARDIOGRAM REPORT: CPT | Performed by: INTERNAL MEDICINE

## 2022-05-16 PROCEDURE — 83735 ASSAY OF MAGNESIUM: CPT | Performed by: FAMILY MEDICINE

## 2022-05-16 PROCEDURE — 85025 COMPLETE CBC W/AUTO DIFF WBC: CPT | Performed by: FAMILY MEDICINE

## 2022-05-16 RX ADMIN — BUDESONIDE AND FORMOTEROL FUMARATE DIHYDRATE 2 PUFF: 160; 4.5 AEROSOL RESPIRATORY (INHALATION) at 08:37

## 2022-05-16 RX ADMIN — DOCUSATE SODIUM 100 MG: 100 CAPSULE, LIQUID FILLED ORAL at 17:05

## 2022-05-16 RX ADMIN — SOTALOL HYDROCHLORIDE 80 MG: 80 TABLET ORAL at 06:05

## 2022-05-16 RX ADMIN — ALPRAZOLAM 0.25 MG: 0.25 TABLET ORAL at 09:38

## 2022-05-16 RX ADMIN — PANTOPRAZOLE SODIUM 40 MG: 40 TABLET, DELAYED RELEASE ORAL at 06:05

## 2022-05-16 RX ADMIN — LEVOTHYROXINE SODIUM 175 MCG: 150 TABLET ORAL at 06:05

## 2022-05-16 RX ADMIN — RIVAROXABAN 20 MG: 20 TABLET, FILM COATED ORAL at 08:37

## 2022-05-16 RX ADMIN — POTASSIUM CHLORIDE 20 MEQ: 20 TABLET, EXTENDED RELEASE ORAL at 08:37

## 2022-05-16 RX ADMIN — DOCUSATE SODIUM 100 MG: 100 CAPSULE, LIQUID FILLED ORAL at 08:37

## 2022-05-16 RX ADMIN — ACETAMINOPHEN 650 MG: 325 TABLET, FILM COATED ORAL at 14:44

## 2022-05-16 RX ADMIN — PRAVASTATIN SODIUM 40 MG: 40 TABLET ORAL at 17:05

## 2022-05-16 RX ADMIN — BUDESONIDE AND FORMOTEROL FUMARATE DIHYDRATE 2 PUFF: 160; 4.5 AEROSOL RESPIRATORY (INHALATION) at 17:05

## 2022-05-16 RX ADMIN — ACETAMINOPHEN 650 MG: 325 TABLET, FILM COATED ORAL at 03:54

## 2022-05-16 NOTE — PROGRESS NOTES
Progress Note - Cardiology   [facility]     Mei Hines 79 y o  female MRN: [id number]  : 1955  Unit/Bed#: [address] [address] Encounter: [id number]    Assessment and Plan:   1  Chronic combined systolic and diastolic heart failure:  Nonischemic cardiomyopathy with EF of 20%    -  has biventricular pacemaker ICD    -  continue Aldactone 12 5 mg once a day    -  continue Entresto 97/103 mg b i d     -  continue Demadex 40 mg daily    -  low-sodium diet    2  Paroxysmal atrial fibrillation:  Maintaining sinus rhythm and no arrhythmias stored on device    -  continue sotalol 80 mg q 12 hours    -  continue her Xarelto 20 mg once a day    3  Hypertension:  Blood pressure stable on current medications as above    4  Palpitations:  Question LV lead diaphragmatic pacing    5  Severe mitral valve regurgitation:  Outpatient evaluation    Subjective / Objective:   Patient seen examined  No further complaints offered since she was here  Device was interrogated yesterday and found to have no arrhythmias noted in history  Question LV lead diaphragmatic pacing  Notes that symptoms seem to have started right after starting Jardiance  Vitals: Blood pressure 97/61, pulse 63, temperature 97 8 °F (36 6 °C), temperature source Oral, resp  rate 18, height 5' 6" (1 676 m), weight 108 kg (237 lb 3 4 oz), SpO2 98 %  Vitals:    05/15/22 0600 22 0548   Weight: 107 kg (235 lb 7 2 oz) 108 kg (237 lb 3 4 oz)     Body mass index is 38 29 kg/m²  BP Readings from Last 3 Encounters:   22 97/61   22 128/76   22 108/63     Orthostatic Blood Pressures    Flowsheet Row Most Recent Value   Blood Pressure 97/61 filed at 2022 0724   Patient Position - Orthostatic VS Lying filed at 2022 0724        I/O        0701  05/15 0700 05/15 0701   07 07 0700    P  O   1200 240    IV Piggyback  50     Total Intake(mL/kg)  1250 (11 6) 240 (2 2)    Urine (mL/kg/hr) 1000 (0 4)     Total Output  1000     Net  +250 +240               Invasive Devices  Report    Peripheral Intravenous Line  Duration           Peripheral IV 05/15/22 Right Antecubital 1 day                  Intake/Output Summary (Last 24 hours) at 5/16/2022 1058  Last data filed at 5/16/2022 0838  Gross per 24 hour   Intake 1010 ml   Output 700 ml   Net 310 ml         Physical Exam:   Physical Exam  Vitals and nursing note reviewed  Constitutional:       General: She is not in acute distress  Appearance: Normal appearance  She is obese  HENT:      Right Ear: External ear normal       Left Ear: External ear normal    Eyes:      General: No scleral icterus  Right eye: No discharge  Left eye: No discharge  Cardiovascular:      Rate and Rhythm: Normal rate and regular rhythm  Pulses: Normal pulses  Heart sounds: No murmur heard  Pulmonary:      Effort: Pulmonary effort is normal       Breath sounds: Normal breath sounds  Abdominal:      General: Bowel sounds are normal  There is no distension  Palpations: Abdomen is soft  Musculoskeletal:      Right lower leg: No edema  Left lower leg: No edema  Skin:     General: Skin is warm and dry  Capillary Refill: Capillary refill takes less than 2 seconds  Neurological:      General: No focal deficit present  Mental Status: She is alert and oriented to person, place, and time  Mental status is at baseline     Psychiatric:         Mood and Affect: Mood normal                    Medications/ Allergies:     Current Facility-Administered Medications   Medication Dose Route Frequency Provider Last Rate    acetaminophen  650 mg Oral Q6H PRN MAJOR Terry      ALPRAZolam  0 25 mg Oral Daily PRN MAJOR Terry      budesonide-formoterol  2 puff Inhalation BID MAJOR Terry      docusate sodium  100 mg Oral BID MAJOR Terry      levalbuterol  0 63 mg Nebulization Q4H PRN MAJOR Mc      levothyroxine  175 mcg Oral Early Morning Minerva Huerta, CRNP      metoprolol tartrate  12 5 mg Oral Q12H [facility] Minerva Huerta, MAJOR      ondansetron  4 mg Intravenous Q6H PRN Minerva Huerta, CRNP      pantoprazole  40 mg Oral Early Morning Cujade Huerta, CRNP      potassium chloride  20 mEq Oral Daily Mukesh Campos MD      pravastatin  40 mg Oral Daily With Bartolome Sánchez, CRNP      rivaroxaban  20 mg Oral Daily Minerva Joseph, CRLESLY      sacubitril-valsartan  1 tablet Oral BID Uzairyusuf Huerta, CRLESLY      sotalol  80 mg Oral Q12H Minerva Huerta, CRNP      spironolactone  12 5 mg Oral Daily Minerva Huerta, CRNP      tiZANidine  2 mg Oral Q8H PRN Minerva Huerta, CRNP      torsemide  40 mg Oral Daily [facility], CR[facility]       acetaminophen, 650 mg, Q6H PRN  ALPRAZolam, 0 25 mg, Daily PRN  levalbuterol, 0 63 mg, Q4H PRN  ondansetron, 4 mg, Q6H PRN  tiZANidine, 2 mg, Q8H PRN      Allergies   Allergen Reactions    Carvedilol Palpitations, Other (See Comments), Dizziness and Edema     Started several days after initiation of carvedilol 3 125 milligrams bid with duration of complaints approximately 36 hours  Patient also had numbness and tingling of hands and feet      Omnipaque [Iohexol] Swelling    Penicillins Swelling    Iv Dye  [Iodinated Diagnostic Agents] Throat Swelling    Other Swelling     Fresh herbs "basil, cilantro"    Shellfish-Derived Products - Food Allergy Hives       VTE Pharmacologic Prophylaxis:   Sequential compression device (Venodyne)     Labs:   Troponins:  Results from last 7 days   Lab Units 05/15/22  0905 05/15/22  0624   HSTNI D2 ng/L  --  18   HSTNI D4 ng/L 41*  --      CBC with diff:  Results from last 7 days   Lab Units 05/16/22  0603 05/15/22  0408   WBC Thousand/uL 7 57 9 73   HEMOGLOBIN g/dL 12 1 13 4   HEMATOCRIT % 36 8 41 4   MCV fL 83 83   PLATELETS Thousands/uL 281 369   MCH pg 27 3 27 0   MCHC g/dL 32 9 32 4   RDW % 14 4 14 5   MPV fL 10 1 10 7   NRBC AUTO /100 WBCs 0 0 CMP:  Results from last 7 days   Lab Units 05/16/22  0603 05/15/22  0408   SODIUM mmol/L 136 137   POTASSIUM mmol/L 3 5 3 7   CHLORIDE mmol/L 101 101   CO2 mmol/L 27 25   ANION GAP mmol/L 8 11   BUN mg/dL 24 22   CREATININE mg/dL 1 27 1 40*   GLUCOSE FASTING mg/dL 108*  --    CALCIUM mg/dL 8 9 9 7   AST U/L  --  14   ALT U/L  --  18   ALK PHOS U/L  --  88   TOTAL PROTEIN g/dL  --  7 6   ALBUMIN g/dL  --  3 7   TOTAL BILIRUBIN mg/dL  --  0 31   EGFR ml/min/1 73sq m 43 38     Magnesium:  Results from last 7 days   Lab Units 05/16/22  0603 05/15/22  0408   MAGNESIUM mg/dL 2 5 2 2     TSH:  Results from last 7 days   Lab Units 05/15/22  0630   TSH 3RD GENERATON uIU/mL 0 834     Hgb A1c:  Results from last 7 days   Lab Units 05/15/22  0408   HEMOGLOBIN A1C % 6 3*       Imaging & Testing   I have personally reviewed pertinent reports  XR chest 1 view portable    Result Date: 5/15/2022  Narrative: CHEST INDICATION:   pacer  COMPARISON:  4/26/2022 EXAM PERFORMED/VIEWS:  XR CHEST PORTABLE FINDINGS:  Dual-lead left-sided cardiac device with intact appearing leads Unchanged severe cardiomegaly  The lungs are clear  No pneumothorax or pleural effusion  Osseous structures appear within normal limits for patient age  Impression: No acute cardiopulmonary disease  Workstation performed: [id number]           Aparna Castro, [facility]  Cardiology      "This note has been constructed using a voice recognition system  Therefore there may be syntax, spelling, and/or grammatical errors   Please call if you have any questions  "

## 2022-05-16 NOTE — PROGRESS NOTES
[facility] Progress Note  Patient: Lupe Guerin 79 y o  female   MRN: [id number]  PCP: Tsering Eugene MD  Unit/Bed#: [address] [place] [address] Encounter: [id number]  Date Of Visit: 05/16/22    Problem List:    Principal Problem:    Palpitations  Active Problems:    Chronic combined systolic and diastolic congestive heart failure, NYHA class 2 ([address] )    Hypertension    Dilated cardiomyopathy (HCC)    Paroxysmal atrial fibrillation (HCC)    CKD (chronic kidney disease)    Frequent PVCs    Mild intermittent asthma without complication    Dyslipidemia    Hypothyroidism    Elevated glucose    Moderate obesity      Assessment & Plan:    Hypertension  Assessment & Plan  Patient reports her SBP is normally in 110s  BP was 140/85 when she was experiencing palpitation which she thinks was too high for her  Noted to have low blood pressure today, relatively asymptomatic but patient was unable to get CHF meds due to holding parameters  · Check orthostasis  · Resume cardiac meds as tolerated  · Follow up Cardiology recommendation    Chronic combined systolic and diastolic congestive heart failure, NYHA class 2 (HCC)  Assessment & Plan  Wt Readings from Last 3 Encounters:   05/16/22 108 kg (237 lb 3 4 oz)   05/02/22 107 kg (236 lb)   04/26/22 108 kg (237 lb)     2D echo as above  Patient appears euvolemic on exam   Possibly mildly volume depleted on exam?  Jardiance effect in combination with diuretics  Chest x-ray unremarkable   · Resume Entresto, Demadex, Aldactone as tolerated  · Daily weight and I&Os   · Follow up Cardiology recommendations    * Palpitations  Assessment & Plan  Patient presents with palpitations with associated left forearm numbness, SOB, nausea, dizziness after walking to bathroom around 430AM  Reports felt her heart was going to "pop out of her chest"  EKG in ED showed wide QRS rhythm with occasional PVC,   Patient is status post biventricular ICD implant    Was upgraded from ICD due to ICD shocks  History of paroxysmal AFib and dilated cardiomyopathy  Telemetry shows paced rhythm with occasional PVCs on ED monitor, currently  ICD interrogation on 4/13 noted max duration 26 seconds of NSVT  No AFib  Patient was recently started on Coreg 3 125mg last week, took for 3 days then discontinued due to intolerance(leg edema,dizziness, numbness tingling in hands and feet)  Started [place] yesterday  -which was subsequently discontinued  ED provider discussed EKG findings with on-call cardiologist,no concern over prolonged QTC  Patient given Mag 2 g in ED  Status post Bi V AICD interrogation-no events noted   Questionable diaphragmatic pacing as per Cardiology  Cardiology following, input appreciated  Patient reports symptomatic improvement  · Telemetry  · Continue metoprolol  · Discontinue Jardiance  · Follow-up thyroid profile  · Monitor volume status    Frequent PVCs  Assessment & Plan  On sotalol, metoprolol    CKD (chronic kidney disease)  Assessment & Plan  Lab Results   Component Value Date    EGFR 43 05/16/2022    EGFR 38 05/15/2022    EGFR 45 04/27/2022    CREATININE 1 27 05/16/2022    CREATININE 1 40 (H) 05/15/2022    CREATININE 1 22 04/27/2022   Baseline creatinine appears to be around 1 0-1 3s  Creatinine slight elevated on admission  Improved after discontinuing Jardiance and holding diuretics  Repeat lab in a m  Paroxysmal atrial fibrillation [facility])  Assessment & Plan  Status post biventricular ICD implant  · Continue sotalol, Xarelto  · Started low-dose metoprolol, continue  · Telemetry  · Optimize electrolytes  · Follow up Cardiology recommendations    Dilated cardiomyopathy [facility])  Assessment & Plan  2D echo 4/22/2022 showed severely dilated LV with EF 20%, global hypokinesis with regional variation, diastolic function is abnormal, severely dilated LA, moderately dilated RA, severe MR, mild-to-moderate TR,moderately to severely elevated PA pressure    Cardiology following, input appreciated  · Continue Entresto, Demadex, Aldactone as tolerated  · Patient was recently placed on carvedilol and Jardiance but was subsequently discontinued  · Currently appears euvolemic,? Mildly volume depleted on admission  · Patient is being referred to Advanced Heart failure team in [place]  Elevated glucose  Assessment & Plan  Glucose 185  Hemoglobin A1c 6 3  Will recommend lifestyle modification    Hypothyroidism  Assessment & Plan  Continue Synthroid  TSH normal normal with mildly elevated free T4  Repeat thyroid profile in a m  Will consider decreasing Synthroid if remains abnormal    Dyslipidemia  Assessment & Plan  Continue statin    Mild intermittent asthma without complication  Assessment & Plan  No evidence of acute exacerbation  Continue Symbicort  Will order Xopenex p r n  Moderate obesity  Assessment & Plan  Body mass index is 38 29 kg/m²  Diet and lifestyle modification          VTE Pharmacologic Prophylaxis: VTE Score: 3 Moderate Risk (Score 3-4) - Pharmacological DVT Prophylaxis Ordered: rivaroxaban (Xarelto)  Patient Centered Rounds: I performed bedside rounds with nursing staff today  Discussions with Specialists or Other Care Team Provider:  Cardiology    Education and Discussions with Family / Patient: Updated  (sister) via phone  Time Spent for Care: 45 minutes  More than 50% of total time spent on counseling and coordination of care as described above  Current Length of Stay: 0 day(s)  Current Patient Status: Inpatient   Certification Statement: The patient will continue to require additional inpatient hospital stay due to Low blood pressure  Discharge Plan: Anticipate discharge in 24-48 hrs to home      Code Status: Level 1 - Full Code    Subjective:   Overall feels better  Denies any further palpitation  Denies any chest pain or shortness of breath  Noted to have low blood pressure relatively asymptomatic but unable to get any of her cardiac meds today    Recently  placed on carvedilol and Jardiance but was subsequently discontinued    Denies any dizziness  Reported having nausea previously but now improving  Tolerating diet  Denies any abdominal pain or diarrhea  Reports good urine output    Objective:     Vitals:   Temp (24hrs), Av 9 °F (36 6 °C), Min:97 8 °F (36 6 °C), Max:98 °F (36 7 °C)    Temp:  [97 8 °F (36 6 °C)-98 °F (36 7 °C)] 97 9 °F (36 6 °C)  HR:  [63-91] 70  Resp:  [18] 18  BP: ()/(53-68) 96/60  SpO2:  [96 %-98 %] 98 % on room air  Body mass index is 38 29 kg/m²  Input and Output Summary (last 24 hours): Intake/Output Summary (Last 24 hours) at 2022 1237  Last data filed at 2022 4602  Gross per 24 hour   Intake 1010 ml   Output 700 ml   Net 310 ml       Physical Exam:   Physical Exam  Constitutional:       General: She is not in acute distress  Comments: Comfortably lying flat in bed   HENT:      Head: Normocephalic and atraumatic  Cardiovascular:      Rate and Rhythm: Normal rate  Pulmonary:      Effort: Pulmonary effort is normal  No respiratory distress  Breath sounds: Normal breath sounds  No wheezing or rales  Abdominal:      General: Bowel sounds are normal  There is no distension  Palpations: Abdomen is soft  Tenderness: There is no abdominal tenderness  There is no guarding or rebound  Musculoskeletal:      Right lower leg: No edema  Left lower leg: No edema  Skin:     General: Skin is warm and dry  Findings: No rash  Neurological:      Mental Status: She is alert and oriented to person, place, and time  Mental status is at baseline           Additional Data:     Labs:  Results from last 7 days   Lab Units 22  0603   WBC Thousand/uL 7 57   HEMOGLOBIN g/dL 12 1   HEMATOCRIT % 36 8   PLATELETS Thousands/uL 281   NEUTROS PCT % 55   LYMPHS PCT % 33   MONOS PCT % 9   EOS PCT % 2     Results from last 7 days   Lab Units 22  0603 05/15/22  0408 SODIUM mmol/L 136 137   POTASSIUM mmol/L 3 5 3 7   CHLORIDE mmol/L 101 101   CO2 mmol/L 27 25   BUN mg/dL 24 22   CREATININE mg/dL 1 27 1 40*   ANION GAP mmol/L 8 11   CALCIUM mg/dL 8 9 9 7   ALBUMIN g/dL  --  3 7   TOTAL BILIRUBIN mg/dL  --  0 31   ALK PHOS U/L  --  88   ALT U/L  --  18   AST U/L  --  14   GLUCOSE RANDOM mg/dL 108 185*             Results from last 7 days   Lab Units 05/15/22  0408   HEMOGLOBIN A1C % 6 3*           Lines/Drains:  Invasive Devices  Report    Peripheral Intravenous Line  Duration           Peripheral IV 05/15/22 Right Antecubital 1 day                  Telemetry:  Telemetry Orders (From admission, onward)             48 Hour Telemetry Monitoring  (ED Bridging Orders Panel)  Continuous x 48 hours        References:    Telemetry Guidelines   Question:  Reason for 48 Hour Telemetry  Answer:  Arrhythmias Requiring Medical Therapy (eg  SVT, Vtach/fib, Bradycardia, Uncontrolled A-fib)                        Imaging: Reviewed radiology reports from this admission including: chest xray    Recent Cultures (last 7 days):         Last 24 Hours Medication List:   Current Facility-Administered Medications   Medication Dose Route Frequency Provider Last Rate    acetaminophen  650 mg Oral Q6H PRN UzairiMAJOR Arenas      ALPRAZolam  0 25 mg Oral Daily PRN MAJOR Terry      budesonide-formoterol  2 puff Inhalation BID MAJOR Terry      docusate sodium  100 mg Oral BID Cuiyusuf Huerta, MAJOR      levalbuterol  0 63 mg Nebulization Q4H PRN MAJOR Terry      levothyroxine  175 mcg Oral Early Morning UzairiMAJOR Arenas      metoprolol tartrate  12 5 mg Oral Q12H [address] CuiyiMAJOR Stevenson      ondansetron  4 mg Intravenous Q6H PRN UzairiMAJOR Arenas      pantoprazole  40 mg Oral Early Morning CuiMAJOR Arenas      potassium chloride  20 mEq Oral Daily Velia Gonzalez MD      pravastatin  40 mg Oral Daily With MAJOR Holm      rivaroxaban  20 mg Oral Daily MAJOR Terry      sacubitril-valsartan  1 tablet Oral BID MAJOR Terry      sotalol  80 mg Oral Q12H MAJOR Terry      spironolactone  12 5 mg Oral Daily MAJOR Terry      tiZANidine  2 mg Oral Q8H PRN MAJOR Terry      torsemide  40 mg Oral Daily MAJOR Terry          Today, Patient Was Seen By: Bambi Babinski, MD    ** Please Note: "This note has been constructed using a voice recognition system  Therefore there may be syntax, spelling, and/or grammatical errors   Please call if you have any questions  "**

## 2022-05-16 NOTE — UTILIZATION REVIEW
Initial Clinical Review    Observation 5/15/22 @ 0503, converted to inpatient admission 5/16/22 @ 1339 for continued care & tx for palpitations  Admission: Date/Time/Statement:   Admission Orders (From admission, onward)     Ordered        05/16/22 1339  Inpatient Admission  Once            05/15/22 0503  Place in Observation  Once                      Orders Placed This Encounter   Procedures    Inpatient Admission     Standing Status:   Standing     Number of Occurrences:   1     Order Specific Question:   Level of Care     Answer:   Med Surg [16]     Order Specific Question:   Estimated length of stay     Answer:   More than 2 Midnights     Order Specific Question:   Certification     Answer:   I certify that inpatient services are medically necessary for this patient for a duration of greater than two midnights  See H&P and MD Progress Notes for additional information about the patient's course of treatment  ED Arrival Information     Expected   -    Arrival   5/15/2022 03:35    Acuity   Emergent            Means of arrival   Walk-In    Escorted by   Self    Service   Hospitalist    Admission type   Emergency            Arrival complaint   CHEST PAIN           Chief Complaint   Patient presents with    Chest Pain     Patient c/o rapid heart rate with numbness in left arm, also shortness of breath all starting 1 hour ago, did start new medication Jardiance today     Initial Presentation:   79 yof to ER from home c/o palpitations, LFA numbness & tingling with associated SOB, nausea & dizziness; felt like heart was going to pop out of chest, /85 (high for her)  She reports she was started on Ann-Marie Bis yesterday and not sure if it was related to that  She was also started on Coreg last week, she took 3 days then stopped taking it due to intolerance  It made her leg swell up,she felt dizzy and had numbness tingling to feet and hands  Patient reports feeling slight dizzy and SOB currently   Hx  dilated cardiomyopathy, combined CHF, paroxysmal AFib, hypothyroid, hypertension, morbid obesity  Placed in observation status for palpitations, cardio consulted, placed on telemetry  Per cardio:  1  Palpitations - reviewed ECG  Patient does not have prolonged QT as documented in ER physician note  Pacemaker was interrogated  She had no arrhythmias seen  Possible diaphragm pacing     - Symptoms may have been caused by orthostatic hypotension  Recommend holding Jardiance for now  May restart as outpatient and discontinue 2nd dose of torsemide  - continue sotalol  QT interval acceptable - no events on pacemaker interrogation  2  Paroxysmal atrial fibrillation - on treatment with sotalol and Xarelto  No events noted on pacemaker interrogation  3  Hypertension - blood pressure was low today  Continue current medication regimen  But hold Jardiance  She was taking carvedilol which was discontinued 2 days ago  May try metoprolol as outpatient once symptoms resolve  4  Chronic combined systolic and diastolic congestive heart failure with ejection fraction of 20%  Nonischemic cardiomyopathy  - patient has a biventricular pacemaker/ICD  - she was referred to Heart failure Clinic as outpatient  5  Severe mitral regurgitation - follow-up as outpatient  Observation to IP admission 5/16/22:  BP low, cardiac meds held 2nd holding parameters  Orthostatic ordered, negative  Resume Entresto, Demadex, Aldactone as tolerated  Status post Bi V AICD interrogation-no events noted   Questionable diaphragmatic pacing as per Cardiology      Per cardio:   Chronic combined systolic and diastolic heart failure:  Nonischemic cardiomyopathy with EF of 20%    -  has biventricular pacemaker ICD    -  continue Aldactone 12 5 mg once a day    -  continue Entresto 97/103 mg b i d     -  continue Demadex 40 mg daily    -  low-sodium diet  Paroxysmal atrial fibrillation:  Maintaining sinus rhythm and no arrhythmias stored on device    - continue sotalol 80 mg q 12 hours    -  continue her Xarelto 20 mg once a day  RECOMMENDATIONS:   Continue cardiac medications including sotalol, metoprolol, Xarelto, pravastatin, Entresto, spironolactone and Demadex     ED Triage Vitals   Temperature Pulse Respirations Blood Pressure SpO2   05/15/22 0344 05/15/22 0344 05/15/22 0344 05/15/22 0344 05/15/22 0344   98 8 °F (37 1 °C) (!) 121 (!) 24 154/85 99 %      Temp Source Heart Rate Source Patient Position - Orthostatic VS BP Location FiO2 (%)   05/15/22 0344 05/15/22 0344 05/15/22 1702 05/15/22 0344 --   Tympanic Monitor Lying - Orthostatic VS Right arm       Pain Score       05/15/22 0553       No Pain          Wt Readings from Last 1 Encounters:   05/17/22 107 kg (236 lb 5 3 oz)     Additional Vital Signs:   Date/Time Temp Pulse Resp BP MAP (mmHg) SpO2 O2 Device Patient Position - Orthostatic VS   05/16/22 05:55:44 -- 71 -- 97/61 73 97 % -- --   05/15/22 2200 -- 73 -- 89/56 Abnormal   67 97 % -- --   BP: manual taken 90/64 at 05/15/22 2200   05/15/22 20:51:48 98 °F (36 7 °C) 68 -- 87/53 Abnormal  64 96 % -- --   05/15/22 17:07:55 -- 91 -- 121/67 85 97 % -- Standing for 3 minutes - Orthostatic VS   05/15/22 17:04:25 -- 84 -- 101/68 79 96 % -- Sitting - Orthostatic VS   05/15/22 17:02:58 -- 85 -- 103/67 79 97 % -- Lying -      Pertinent Labs/Diagnostic Test Results:   XR chest 1 view portable   Final Result (05/15 0945)      No acute cardiopulmonary disease       Results from last 7 days   Lab Units 05/17/22  0603 05/16/22  0603 05/15/22  0408   WBC Thousand/uL 7 00 7 57 9 73   HEMOGLOBIN g/dL 11 8 12 1 13 4   HEMATOCRIT % 36 3 36 8 41 4   PLATELETS Thousands/uL 277 281 369   NEUTROS ABS Thousands/µL  --  4 12 5 41     Results from last 7 days   Lab Units 05/17/22  0603 05/16/22  0603 05/15/22  0408   SODIUM mmol/L 140 136 137   POTASSIUM mmol/L 3 8 3 5 3 7   CHLORIDE mmol/L 103 101 101   CO2 mmol/L 27 27 25   ANION GAP mmol/L 10 8 11   BUN mg/dL 19 24 22 CREATININE mg/dL 0 97 1 27 1 40*   EGFR ml/min/1 73sq m 60 43 38   CALCIUM mg/dL 9 1 8 9 9 7   MAGNESIUM mg/dL 2 5 2 5 2 2   PHOSPHORUS mg/dL  --   --  6 0*     Results from last 7 days   Lab Units 05/15/22  0408   AST U/L 14   ALT U/L 18   ALK PHOS U/L 88   TOTAL PROTEIN g/dL 7 6   ALBUMIN g/dL 3 7   TOTAL BILIRUBIN mg/dL 0 31     Results from last 7 days   Lab Units 05/17/22  0603 05/16/22  0603 05/15/22  0408   GLUCOSE RANDOM mg/dL 102 108 185*     Results from last 7 days   Lab Units 05/15/22  0408   HEMOGLOBIN A1C % 6 3*   EAG mg/dl 134     Results from last 7 days   Lab Units 05/15/22  0905 05/15/22  0624 05/15/22  0408   HS TNI 0HR ng/L  --   --  11   HS TNI 2HR ng/L  --  29  --    HSTNI D2 ng/L  --  18  --    HS TNI 4HR ng/L 52*  --   --    HSTNI D4 ng/L 41*  --   --      Results from last 7 days   Lab Units 05/17/22  0603 05/15/22  0630   TSH 3RD GENERATON uIU/mL 1 003 0 834     ED Treatment:   Medication Administration from 05/15/2022 0335 to 05/15/2022 0535       Date/Time Order Dose Route Action     05/15/2022 0411 magnesium sulfate 2 g/50 mL IVPB (premix) 2 g 2 g Intravenous New Bag        Past Medical History:   Diagnosis Date    A-fib ([facility] )     Abnormal blood sugar     Acid reflux     Acute bronchitis     Allergic reaction     Anxiety     Arthritis     Asthma     Cardiomyopathy ([facility] )     Cellulitis of left lower leg     Chest pain     CH[facility]est[facility] failure) [facility]     Constipation     Depression with anxiety     Disease of thyroid gland     Diverticulitis     Dyslipidemia     Dyspnea on exertion     Elbow pain     Fracture of olecranon, open     Gastritis     Generalized pain     Hematuria     History of colonoscopy     Hypertension     Hypokalemia     Hypomagnesemia     Leg cramping     Moderate obesity     Morbid obesity due to excess calories ([facility])     Myalgia     Myositis     Nausea in adult     Nephrolithiasis     Prepatellar bursitis, unspecified knee  Screening for lipid disorders     Shortness of breath     Spasm of muscle     Thyroid trouble      Present on Admission:   Chronic combined systolic and diastolic congestive heart failure, NYHA class 2 (HCC)   Dyslipidemia   Hypertension   Hypothyroidism   Mild intermittent asthma without complication   Paroxysmal atrial fibrillation (HCC)   Dilated cardiomyopathy (HCC)   Palpitations   CKD (chronic kidney disease)   Elevated glucose   Moderate obesity   Frequent PVCs    Admitting Diagnosis: Palpitations [R00 2]  Chest pain [R07 9]  Tachycardia [R00 0]  Atypical chest pain [R07 89]  NSVT (nonsustained ventricular tachycardia) (HCC) [I47 2]  Frequent PVCs [I49 3]  Age/Sex: 79 y o  female  Admission Orders:  Consult cardio  Telemetry     Scheduled Medications:  budesonide-formoterol, 2 puff, Inhalation, BID  docusate sodium, 100 mg, Oral, BID  levothyroxine, 175 mcg, Oral, Early Morning  metoprolol tartrate, 12 5 mg, Oral, Q12H ALEKSANDRA  pantoprazole, 40 mg, Oral, Early Morning  potassium chloride, 20 mEq, Oral, Daily  pravastatin, 40 mg, Oral, Daily With Dinner  rivaroxaban, 20 mg, Oral, Daily  sacubitril-valsartan, 1 tablet, Oral, BID  sotalol, 80 mg, Oral, Q12H  spironolactone, 12 5 mg, Oral, Daily  torsemide, 40 mg, Oral, Daily    PRN Meds:  acetaminophen, 650 mg, Oral, Q6H PRN  ALPRAZolam, 0 25 mg, Oral, Daily PRN  levalbuterol, 0 63 mg, Nebulization, Q4H PRN  ondansetron, 4 mg, Intravenous, Q6H PRN  tiZANidine, 2 mg, Oral, Q8H PRN    Network Utilization Review Department  ATTENTION: Please call with any questions or concerns to [phone] and carefully listen to the prompts so that you are directed to the right person  All voicemails are confidential   Andrew Beyer all requests for admission clinical reviews, approved or denied determinations and any other requests to dedicated fax number below belonging to the campus where the patient is receiving treatment   List of dedicated fax numbers for the Facilities:  FACILITY NAME UR FAX NUMBER   ADMISSION DENIALS (Administrative/Medical Necessity) [phone]   [address] (Maternity/NICU/Pediatrics) [address] [facility] [facility] 40 125 [facility]  [phone]   [address] [facility] [place] [address] [address] [place] [address] Darinel [address] [facility] [place] [address] [phone]

## 2022-05-16 NOTE — PLAN OF CARE
Problem: Potential for Falls  Goal: Patient will remain free of falls  Description: INTERVENTIONS:  - Educate patient/family on patient safety including physical limitations  - Instruct patient to call for assistance with activity   - Consult OT/PT to assist with strengthening/mobility   - Keep Call bell within reach  - Keep bed low and locked with side rails adjusted as appropriate  - Keep care items and personal belongings within reach  - Initiate and maintain comfort rounds  - Make Fall Risk Sign visible to staff  - Offer Toileting every 2 Hours, in advance of need  - Initiate/Maintain bedalarm  - Obtain necessary fall risk management equipment:   Problem: CARDIOVASCULAR - ADULT  Goal: Absence of cardiac dysrhythmias or at baseline rhythm  Description: INTERVENTIONS:  - Continuous cardiac monitoring, vital signs, obtain 12 lead EKG if ordered  - Administer antiarrhythmic and heart rate control medications as ordered  - Monitor electrolytes and administer replacement therapy as ordered  Outcome: Progressing     Problem: METABOLIC, FLUID AND ELECTROLYTES - ADULT  Goal: Electrolytes maintained within normal limits  Description: INTERVENTIONS:  - Monitor labs and assess patient for signs and symptoms of electrolyte imbalances  - Administer electrolyte replacement as ordered  - Monitor response to electrolyte replacements, including repeat lab results as appropriate  - Instruct patient on fluid and nutrition as appropriate  Outcome: Progressing     - Apply yellow socks and bracelet for high fall risk patients  - Consider moving patient to room near nurses station  Outcome: Progressing

## 2022-05-16 NOTE — PLAN OF CARE
Problem: CARDIOVASCULAR - ADULT  Goal: Maintains optimal cardiac output and hemodynamic stability  Description: INTERVENTIONS:  - Monitor I/O, vital signs and rhythm  - Monitor for S/S and trends of decreased cardiac output  - Administer and titrate ordered vasoactive medications to optimize hemodynamic stability  - Assess quality of pulses, skin color and temperature  - Assess for signs of decreased coronary artery perfusion  - Instruct patient to report change in severity of symptoms  Outcome: Progressing       Problem: METABOLIC, FLUID AND ELECTROLYTES - ADULT  Goal: Electrolytes maintained within normal limits  Description: INTERVENTIONS:  - Monitor labs and assess patient for signs and symptoms of electrolyte imbalances  - Administer electrolyte replacement as ordered  - Monitor response to electrolyte replacements, including repeat lab results as appropriate  - Instruct patient on fluid and nutrition as appropriate  Outcome: Progressing

## 2022-05-17 VITALS
HEIGHT: 66 IN | TEMPERATURE: 98.1 F | WEIGHT: 236.33 LBS | RESPIRATION RATE: 18 BRPM | SYSTOLIC BLOOD PRESSURE: 106 MMHG | DIASTOLIC BLOOD PRESSURE: 70 MMHG | HEART RATE: 83 BPM | OXYGEN SATURATION: 98 % | BODY MASS INDEX: 37.98 KG/M2

## 2022-05-17 LAB
ANION GAP SERPL CALCULATED.3IONS-SCNC: 10 MMOL/L (ref 4–13)
BUN SERPL-MCNC: 19 MG/DL (ref 5–25)
CALCIUM SERPL-MCNC: 9.1 MG/DL (ref 8.3–10.1)
CHLORIDE SERPL-SCNC: 103 MMOL/L (ref 100–108)
CO2 SERPL-SCNC: 27 MMOL/L (ref 21–32)
CREAT SERPL-MCNC: 0.97 MG/DL (ref 0.6–1.3)
ERYTHROCYTE [DISTWIDTH] IN BLOOD BY AUTOMATED COUNT: 14.3 % (ref 11.6–15.1)
GFR SERPL CREATININE-BSD FRML MDRD: 60 ML/MIN/1.73SQ M
GLUCOSE SERPL-MCNC: 102 MG/DL (ref 65–140)
HCT VFR BLD AUTO: 36.3 % (ref 34.8–46.1)
HGB BLD-MCNC: 11.8 G/DL (ref 11.5–15.4)
MAGNESIUM SERPL-MCNC: 2.5 MG/DL (ref 1.6–2.6)
MCH RBC QN AUTO: 27.3 PG (ref 26.8–34.3)
MCHC RBC AUTO-ENTMCNC: 32.5 G/DL (ref 31.4–37.4)
MCV RBC AUTO: 84 FL (ref 82–98)
PLATELET # BLD AUTO: 277 THOUSANDS/UL (ref 149–390)
PMV BLD AUTO: 10.2 FL (ref 8.9–12.7)
POTASSIUM SERPL-SCNC: 3.8 MMOL/L (ref 3.5–5.3)
RBC # BLD AUTO: 4.32 MILLION/UL (ref 3.81–5.12)
SODIUM SERPL-SCNC: 140 MMOL/L (ref 136–145)
T4 FREE SERPL-MCNC: 1.54 NG/DL (ref 0.76–1.46)
TSH SERPL DL<=0.05 MIU/L-ACNC: 1 UIU/ML (ref 0.45–4.5)
WBC # BLD AUTO: 7 THOUSAND/UL (ref 4.31–10.16)

## 2022-05-17 PROCEDURE — 99239 HOSP IP/OBS DSCHRG MGMT >30: CPT | Performed by: PHYSICIAN ASSISTANT

## 2022-05-17 PROCEDURE — 83735 ASSAY OF MAGNESIUM: CPT | Performed by: INTERNAL MEDICINE

## 2022-05-17 PROCEDURE — 94760 N-INVAS EAR/PLS OXIMETRY 1: CPT

## 2022-05-17 PROCEDURE — 99232 SBSQ HOSP IP/OBS MODERATE 35: CPT | Performed by: INTERNAL MEDICINE

## 2022-05-17 PROCEDURE — 85027 COMPLETE CBC AUTOMATED: CPT | Performed by: INTERNAL MEDICINE

## 2022-05-17 PROCEDURE — 80048 BASIC METABOLIC PNL TOTAL CA: CPT | Performed by: INTERNAL MEDICINE

## 2022-05-17 PROCEDURE — 84443 ASSAY THYROID STIM HORMONE: CPT | Performed by: INTERNAL MEDICINE

## 2022-05-17 PROCEDURE — 84439 ASSAY OF FREE THYROXINE: CPT | Performed by: PHYSICIAN ASSISTANT

## 2022-05-17 RX ORDER — BISACODYL 10 MG
10 SUPPOSITORY, RECTAL RECTAL DAILY PRN
Status: DISCONTINUED | OUTPATIENT
Start: 2022-05-17 | End: 2022-05-17 | Stop reason: HOSPADM

## 2022-05-17 RX ORDER — TORSEMIDE 20 MG/1
40 TABLET ORAL DAILY
Status: DISCONTINUED | OUTPATIENT
Start: 2022-05-18 | End: 2022-05-17 | Stop reason: HOSPADM

## 2022-05-17 RX ORDER — SOTALOL HYDROCHLORIDE 80 MG/1
80 TABLET ORAL EVERY 12 HOURS
Status: DISCONTINUED | OUTPATIENT
Start: 2022-05-17 | End: 2022-05-17 | Stop reason: HOSPADM

## 2022-05-17 RX ORDER — SPIRONOLACTONE 25 MG/1
12.5 TABLET ORAL DAILY
Status: DISCONTINUED | OUTPATIENT
Start: 2022-05-18 | End: 2022-05-17 | Stop reason: HOSPADM

## 2022-05-17 RX ADMIN — PANTOPRAZOLE SODIUM 40 MG: 40 TABLET, DELAYED RELEASE ORAL at 05:42

## 2022-05-17 RX ADMIN — ALPRAZOLAM 0.25 MG: 0.25 TABLET ORAL at 06:43

## 2022-05-17 RX ADMIN — DOCUSATE SODIUM 100 MG: 100 CAPSULE, LIQUID FILLED ORAL at 09:09

## 2022-05-17 RX ADMIN — POTASSIUM CHLORIDE 20 MEQ: 20 TABLET, EXTENDED RELEASE ORAL at 09:09

## 2022-05-17 RX ADMIN — LEVOTHYROXINE SODIUM 175 MCG: 150 TABLET ORAL at 05:42

## 2022-05-17 RX ADMIN — BISACODYL 10 MG: 10 SUPPOSITORY RECTAL at 09:03

## 2022-05-17 RX ADMIN — BUDESONIDE AND FORMOTEROL FUMARATE DIHYDRATE 2 PUFF: 160; 4.5 AEROSOL RESPIRATORY (INHALATION) at 09:03

## 2022-05-17 RX ADMIN — RIVAROXABAN 20 MG: 20 TABLET, FILM COATED ORAL at 09:10

## 2022-05-17 NOTE — DISCHARGE SUMMARY
Christa U  66   Discharge- Lupe Rosenegal 1955, 79 y o  female MRN: [id number]  Unit/Bed#: [address] [place] [address] Encounter: [id number]  Primary Care Provider: Tsering Eugene MD   Date and time admitted to hospital: 5/15/2022  3:42 AM    * Palpitations  Assessment & Plan  Patient presents with palpitations with associated left forearm numbness, SOB, nausea, dizziness after walking to bathroom around 430AM  Reports felt her heart was going to "pop out of her chest"  EKG in ED showed wide QRS rhythm with occasional PVC,   Patient is status post biventricular ICD implant  Was upgraded from ICD due to ICD shocks  History of paroxysmal AFib and dilated cardiomyopathy  Telemetry shows paced rhythm with occasional PVCs on ED monitor, currently  ICD interrogation on 4/13 noted max duration 26 seconds of NSVT  No AFib  Patient was recently started on Coreg 3 125mg last week, took for 3 days then discontinued due to intolerance(leg edema,dizziness, numbness tingling in hands and feet)  Started Heather Hawking yesterday  -which was subsequently discontinued  ED provider discussed EKG findings with on-call cardiologist,no concern over prolonged QTC  Patient given Mag 2 g in ED  Status post Bi V AICD interrogation-no events noted   Questionable diaphragmatic pacing as per Cardiology  Cardiology following, input appreciated  Patient reports symptomatic improvement  No cp, palpitations, SOB, lightheadedness/dizziness prior to discharge     · Continue metoprolol  · Discontinue Jardiance  · Repeat TSH WNL, ordered free T4, follow up with PCP for results   · Monitor volume status    Chronic combined systolic and diastolic congestive heart failure, NYHA class 2 (HCC)  Assessment & Plan  Wt Readings from Last 3 Encounters:   05/16/22 108 kg (237 lb 3 4 oz)   05/02/22 107 kg (236 lb)   04/26/22 108 kg (237 lb)     2D echo as above  Patient appears euvolemic on exam   Possibly mildly volume depleted on exam?  Jardiance effect in combination with diuretics  Chest x-ray unremarkable   · Continue Entresto, Demadex, Aldactone as tolerated  · Daily weight and I&Os   · Discussed with cardiology who states to continue cardiac meds and hold for systolic BP <[id number]  Patient expressed understanding in monitoring blood pressure at home prior to taking these medications  Hypertension  Assessment & Plan  Patient reports her SBP is normally in 110s  BP was 140/85 when she was experiencing palpitation which she thinks was too high for her  Noted to have low blood pressure  Asymptomatic  · Discussed with cards who recommends to continue all cardiac medications and hold for systolic BP < [id number]  · Orthostatic VS negative   · Education given on monitoring BP prior to taking cardiac meds and to monitor for lightheadedness/dizziness   · Patient currently denies any of the above symptoms   · Follow up Cardiology as outpatient     Paroxysmal atrial fibrillation [facility])  Assessment & Plan  Status post biventricular ICD implant  · Continue sotalol, Xarelto  · Started low-dose metoprolol, continue  · Telemetry  · Optimize electrolytes  · Follow up Cardiology recommendations    Frequent PVCs  Assessment & Plan  On sotalol, metoprolol    Elevated glucose  Assessment & Plan  Glucose 185  Hemoglobin A1c 6 3  Will recommend lifestyle modification    CKD (chronic kidney disease)  Assessment & Plan  Lab Results   Component Value Date    EGFR 43 05/16/2022    EGFR 38 05/15/2022    EGFR 45 04/27/2022    CREATININE 1 27 05/16/2022    CREATININE 1 40 (H) 05/15/2022    CREATININE 1 22 04/27/2022   Baseline creatinine appears to be around 1 0-1 3s  Creatinine slight elevated on admission  Improved after discontinuing Jardiance and holding diuretics  Repeat lab in a m    Creatinine today resolved 0 97 at baseline       Hypothyroidism  Assessment & Plan  · Continue Synthroid  · TSH normal normal with mildly elevated free T4  · Repeat thyroid profile in a m- Repeat TSH WNL  Added on free T4    · Follow up with PCP for results of free T4     Moderate obesity  Assessment & Plan  Body mass index is 38 29 kg/m²  Diet and lifestyle modification    Dyslipidemia  Assessment & Plan  Continue statin    Dilated cardiomyopathy [facility])  Assessment & Plan  2D echo 4/22/2022 showed severely dilated LV with EF 20%, global hypokinesis with regional variation, diastolic function is abnormal, severely dilated LA, moderately dilated RA, severe MR, mild-to-moderate TR,moderately to severely elevated PA pressure  Cardiology following, input appreciated  · Continue Entresto, Demadex, Aldactone as tolerated  · Patient was recently placed on carvedilol and Jardiance but was subsequently discontinued  · Currently appears euvolemic,? Mildly volume depleted on admission  · Patient is being referred to Advanced Heart failure team in [place]  Mild intermittent asthma without complication  Assessment & Plan  No evidence of acute exacerbation  Continue Symbicort  Will order Xopenex p r n  Medical Problems             Resolved Problems  Date Reviewed: 5/17/2022   None               Discharging Physician / Practitioner: Arnold Lyon PA-C  PCP: Divya Kowalski MD  Admission Date:   Admission Orders (From admission, onward)     Ordered        05/16/22 1339  Inpatient Admission  Once            05/15/22 0503  Place in Observation  Once                      Discharge Date: 05/17/22    Consultations During Hospital Stay:  · Cardiology     Procedures Performed:   · None     Significant Findings / Test Results:   XR chest 1 view portable   Final Result by Edgard Cortes MD (05/15 0921)      No acute cardiopulmonary disease  Workstation performed: [id number]             Incidental Findings:   · None     Test Results Pending at Discharge (will require follow up):    · None      Outpatient Tests Requested:  · None     Complications:  None Reason for Admission: palpitations     Hospital Course:   Urbano Mack is a 79 y o  female patient who originally presented to the hospital on 5/15/2022 due to palpitations  Cardiology was consulted  ICD/pacemaker was interrogated which showed no events  Questionable LV lead diaphragmatic pacing  Symptoms started after initiation of Jardiance which was discontinued  Patient is now asymptomatic  Started on Lopressor  Patient did have low normal blood pressure  Discussed with cardiology who recommends to continue all cardiac meds with hold parameters of <532OMPT systolic BP  Patient was educated on BP monitoring prior to taking her medications and to watch out for dizziness/lightheadedness  She will need close outpatient follow up with cardiology  Please see above list of diagnoses and related plan for additional information  Condition at Discharge: good    Discharge Day Visit / Exam:   Subjective:  Patient was resting in bed comfortably with no complaints at this time  She denies any cp or sob  No palpitations  No dizziness/lightheadedness  Is agreeable with discharge plan  Vitals: Blood Pressure: 106/70 (05/17/22 0856)  Pulse: 83 (05/17/22 0856)  Temperature: 98 1 °F (36 7 °C) (05/17/22 0736)  Temp Source: Oral (05/17/22 0736)  Respirations: 18 (05/17/22 0736)  Height: 5' 6" (167 6 cm) (05/15/22 0548)  Weight - Scale: 107 kg (236 lb 5 3 oz) (05/17/22 0800)  SpO2: 98 % (05/17/22 0856)  Exam:   Physical Exam  Constitutional:       General: She is not in acute distress  Appearance: She is obese  HENT:      Mouth/Throat:      Mouth: Mucous membranes are moist    Eyes:      General: No scleral icterus  Cardiovascular:      Heart sounds: Murmur heard  Pulmonary:      Effort: Pulmonary effort is normal       Breath sounds: Normal breath sounds  Abdominal:      General: Abdomen is flat  Bowel sounds are normal       Palpations: Abdomen is soft  Musculoskeletal:      Right lower leg: No edema  Left lower leg: No edema  Skin:     General: Skin is warm  Neurological:      General: No focal deficit present  Mental Status: She is alert  Psychiatric:         Mood and Affect: Mood normal           Discussion with Family: Updated  (son) via phone  Discharge instructions/Information to patient and family:   See after visit summary for information provided to patient and family  Provisions for Follow-Up Care:  See after visit summary for information related to follow-up care and any pertinent home health orders  Disposition:   Home    Planned Readmission: none      Discharge Statement:  I spent 45 minutes discharging the patient  This time was spent on the day of discharge  I had direct contact with the patient on the day of discharge  Greater than 50% of the total time was spent examining patient, answering all patient questions, arranging and discussing plan of care with patient as well as directly providing post-discharge instructions  Additional time then spent on discharge activities  Discharge Medications:  See after visit summary for reconciled discharge medications provided to patient and/or family        **Please Note: This note may have been constructed using a voice recognition system**

## 2022-05-17 NOTE — PROGRESS NOTES
Progress Note - Cardiology   [facility]     Filiberto Lyon 79 y o  female MRN: [id number]  : 1955  Unit/Bed#: [address] [address] Encounter: [id number]    Assessment and Plan:   1  Chronic combined systolic and diastolic heart failure:  Nonischemic cardiomyopathy with EF of 20%    -  has biventricular pacemaker ICD    -  continue Aldactone 12 5 mg once a day    -  continue Entresto 97/103 mg b i d     -  continue Demadex 40 mg daily    -  low-sodium diet     2  Paroxysmal atrial fibrillation:  Maintaining sinus rhythm and no arrhythmias stored on device    -  continue sotalol 80 mg q 12 hours    -  continue her Xarelto 20 mg once a day     3  Hypertension:  Blood pressure stable on current medications as above     4  Palpitations:  Question LV lead diaphragmatic pacing     5  Severe mitral valve regurgitation:  Outpatient evaluation    Subjective / Objective:   Patient seen examined  She is doing very well  No complaints offered at this time  Patient is cleared for discharge  Vitals: Blood pressure 106/70, pulse 83, temperature 98 1 °F (36 7 °C), temperature source Oral, resp  rate 18, height 5' 6" (1 676 m), weight 107 kg (236 lb 5 3 oz), SpO2 98 %  Vitals:    22 0548 22 0800   Weight: 108 kg (237 lb 3 4 oz) 107 kg (236 lb 5 3 oz)     Body mass index is 38 15 kg/m²  BP Readings from Last 3 Encounters:   22 106/70   22 128/76   22 108/63     Orthostatic Blood Pressures    Flowsheet Row Most Recent Value   Blood Pressure 106/70 filed at 2022 0856   Patient Position - Orthostatic VS Lying filed at 2022 0736        I/O       05/15 07 0700  0701   0700  0701   0700    P  O  1200 1380 360    I V  (mL/kg)  10 (0 1)     IV Piggyback 50      Total Intake(mL/kg) 1250 (11 6) 1390 (12 9) 360 (3 4)    Urine (mL/kg/hr) 1000 (0 4) 1800 (0 7)     Total Output 1000 1800     Net +250 -410 +360               Invasive Devices Report    Peripheral Intravenous Line  Duration           Peripheral IV 05/16/22 Right;Ventral (anterior) Forearm <1 day                  Intake/Output Summary (Last 24 hours) at 5/17/2022 1217  Last data filed at 5/17/2022 5174  Gross per 24 hour   Intake 1210 ml   Output 1800 ml   Net -590 ml         Physical Exam:   Physical Exam  Vitals and nursing note reviewed  Constitutional:       General: She is not in acute distress  Appearance: Normal appearance  She is obese  HENT:      Right Ear: External ear normal       Left Ear: External ear normal    Eyes:      General: No scleral icterus  Right eye: No discharge  Left eye: No discharge  Cardiovascular:      Rate and Rhythm: Normal rate and regular rhythm  Pulses: Normal pulses  Heart sounds: Murmur heard  Pulmonary:      Effort: Pulmonary effort is normal  No respiratory distress  Breath sounds: No wheezing, rhonchi or rales  Abdominal:      General: Bowel sounds are normal  There is no distension  Palpations: Abdomen is soft  Musculoskeletal:      Right lower leg: No edema  Left lower leg: No edema  Skin:     General: Skin is warm and dry  Capillary Refill: Capillary refill takes less than 2 seconds  Neurological:      General: No focal deficit present  Mental Status: She is alert and oriented to person, place, and time  Mental status is at baseline     Psychiatric:         Mood and Affect: Mood normal                    Medications/ Allergies:     Current Facility-Administered Medications   Medication Dose Route Frequency Provider Last Rate    acetaminophen  650 mg Oral Q6H PRN MAJOR Terry      ALPRAZolam  0 25 mg Oral Daily PRN MAJOR Terry      bisacodyl  10 mg Rectal Daily PRN Nicholas Moya PA-C      budesonide-formoterol  2 puff Inhalation BID MAJOR Terry      docusate sodium  100 mg Oral BID MAJOR Terry      levalbuterol  0 63 mg Nebulization Q4H PRN MAJOR Terry      levothyroxine  175 mcg Oral Early Morning MAJOR Terry      metoprolol tartrate  12 5 mg Oral Q12H [address] MAJOR Webber      ondansetron  4 mg Intravenous Q6H PRN MAJOR Terry      pantoprazole  40 mg Oral Early Morning MAJOR Terry      potassium chloride  20 mEq Oral Daily Velia Gonzalez MD      pravastatin  40 mg Oral Daily With MAJOR Holm      rivaroxaban  20 mg Oral Daily MAJOR Duncan      sacubitril-valsartan  1 tablet Oral BID MAJOR Webber      sotalol  80 mg Oral Q12H MAJOR Webber      [START ON 5/18/2022] spironolactone  12 5 mg Oral Daily MAJOR Webber      tiZANidine  2 mg Oral Q8H PRN MAJOR Terry      [START ON 5/18/2022] torsemide  40 mg Oral Daily MAJOR Webber       acetaminophen, 650 mg, Q6H PRN  ALPRAZolam, 0 25 mg, Daily PRN  bisacodyl, 10 mg, Daily PRN  levalbuterol, 0 63 mg, Q4H PRN  ondansetron, 4 mg, Q6H PRN  tiZANidine, 2 mg, Q8H PRN      Allergies   Allergen Reactions    Carvedilol Palpitations, Other (See Comments), Dizziness and Edema     Started several days after initiation of carvedilol 3 125 milligrams bid with duration of complaints approximately 36 hours  Patient also had numbness and tingling of hands and feet      Omnipaque [Iohexol] Swelling    Penicillins Swelling    Iv Dye  [Iodinated Diagnostic Agents] Throat Swelling    Other Swelling     Fresh herbs "basil, cilantro"    Shellfish-Derived Products - Food Allergy Hives       VTE Pharmacologic Prophylaxis:   Sequential compression device (Venodyne)     Labs:   Troponins:  Results from last 7 days   Lab Units 05/15/22  0905 05/15/22  0624   HSTNI D2 ng/L  --  18   HSTNI D4 ng/L 41*  --      CBC with diff:  Results from last 7 days   Lab Units 05/17/22  0603 05/16/22  0603 05/15/22  0408   WBC Thousand/uL 7 00 7 57 9 73   HEMOGLOBIN g/dL 11 8 12 1 13 4   HEMATOCRIT % 36 3 36 8 41 4   MCV fL 84 83 83 PLATELETS Thousands/uL 277 281 369   MCH pg 27 3 27 3 27 0   MCHC g/dL 32 5 32 9 32 4   RDW % 14 3 14 4 14 5   MPV fL 10 2 10 1 10 7   NRBC AUTO /100 WBCs  --  0 0     CMP:  Results from last 7 days   Lab Units 05/17/22  0603 05/16/22  0603 05/15/22  0408   SODIUM mmol/L 140 136 137   POTASSIUM mmol/L 3 8 3 5 3 7   CHLORIDE mmol/L 103 101 101   CO2 mmol/L 27 27 25   ANION GAP mmol/L 10 8 11   BUN mg/dL 19 24 22   CREATININE mg/dL 0 97 1 27 1 40*   GLUCOSE FASTING mg/dL  --  108*  --    CALCIUM mg/dL 9 1 8 9 9 7   AST U/L  --   --  14   ALT U/L  --   --  18   ALK PHOS U/L  --   --  88   TOTAL PROTEIN g/dL  --   --  7 6   ALBUMIN g/dL  --   --  3 7   TOTAL BILIRUBIN mg/dL  --   --  0 31   EGFR ml/min/1 73sq m 60 43 38     Magnesium:  Results from last 7 days   Lab Units 05/17/22  0603 05/16/22  0603 05/15/22  0408   MAGNESIUM mg/dL 2 5 2 5 2 2     TSH:  Results from last 7 days   Lab Units 05/17/22  0603 05/15/22  0630   TSH 3RD GENERATON uIU/mL 1 003 0 834     Hgb A1c:  Results from last 7 days   Lab Units 05/15/22  0408   HEMOGLOBIN A1C % 6 3*       Imaging & Testing   I have personally reviewed pertinent reports  XR chest 1 view portable    Result Date: 5/15/2022  Narrative: CHEST INDICATION:   pacer  COMPARISON:  4/26/2022 EXAM PERFORMED/VIEWS:  XR CHEST PORTABLE FINDINGS:  Dual-lead left-sided cardiac device with intact appearing leads Unchanged severe cardiomegaly  The lungs are clear  No pneumothorax or pleural effusion  Osseous structures appear within normal limits for patient age  Impression: No acute cardiopulmonary disease  Workstation performed: [id number]     XR chest 1 view portable    Result Date: 4/26/2022  Narrative: CHEST INDICATION:   chest pain  COMPARISON:  5/23/2021 EXAM PERFORMED/VIEWS:  XR CHEST PORTABLE FINDINGS: Heart shadow is enlarged but unchanged from prior exam  ICD transvenous pacemaker present    No acute pulmonary disease, no pneumothorax or pleural effusion Osseous structures appear within normal limits for patient age  Impression: No acute pulmonary disease Workstation performed: [id number]     Echo complete w/ contrast if indicated    Result Date: 4/24/2022  Narrative: Nance Jaime  Left Ventricle: Left ventricular cavity size is severely dilated  Wall thickness is normal  The left ventricular ejection fraction is 20% by visual estimation  Systolic function is severely reduced  There is global hypokinesis with regional variation  Basal-mid anteroseptal, septal, and inferoseptal wall are akinetic Diastolic function is abnormal    Left Atrium: The atrium is severely dilated (>48 mL/m2)    Right Atrium: The atrium is moderately dilated    Atrial Septum: The septum bows into the right atrium, suggesting increased left atrial pressure    Mitral Valve: The annulus is moderately dilated  There is severe regurgitation with a wall-impinging jet to posterior atrial wall   Tricuspid Valve: There is mild to moderate regurgitation  The right ventricular systolic pressure is moderately to severely elevated    Pulmonic Valve: There is mild regurgitation  Compared to prior study, LV remains markedly dilated with severely reduced EF  There is now severe mitral regurgitation with markedly elevated left atrial size by index volume            Yasmine Cruz, [facility]  Cardiology      "This note has been constructed using a voice recognition system  Therefore there may be syntax, spelling, and/or grammatical errors   Please call if you have any questions  "

## 2022-05-17 NOTE — DISCHARGE INSTR - AVS FIRST PAGE
Continue sotalol, Entresto, spironolactone, Demadex, pravastatin, Xarelto   Start taking lopressor 12 5mg every 12 hours   Stop taking Jardiance and Coreg   Follow up with cardiology in 2 weeks   Please monitor your blood pressure closely and hold off on taking your heart medications (sotalol, entresto, spironolactone, Demadex, lopressor) if your systolic blood pressure (top number) is less than 100

## 2022-05-17 NOTE — ASSESSMENT & PLAN NOTE
2D echo 4/22/2022 showed severely dilated LV with EF 20%, global hypokinesis with regional variation, diastolic function is abnormal, severely dilated LA, moderately dilated RA, severe MR, mild-to-moderate TR,moderately to severely elevated PA pressure  Cardiology following, input appreciated  · Continue Entresto, Demadex, Aldactone as tolerated  · Patient was recently placed on carvedilol and Jardiance but was subsequently discontinued  · Currently appears euvolemic,? Mildly volume depleted on admission  · Patient is being referred to Advanced Heart failure team in [place]

## 2022-05-17 NOTE — NURSING NOTE
YVONNES reviewed with the patient  Patient discharged via wheelchair to the [facility]

## 2022-05-17 NOTE — ASSESSMENT & PLAN NOTE
Patient presents with palpitations with associated left forearm numbness, SOB, nausea, dizziness after walking to bathroom around 430AM  Reports felt her heart was going to "pop out of her chest"  EKG in ED showed wide QRS rhythm with occasional PVC,   Patient is status post biventricular ICD implant  Was upgraded from ICD due to ICD shocks  History of paroxysmal AFib and dilated cardiomyopathy  Telemetry shows paced rhythm with occasional PVCs on ED monitor, currently  ICD interrogation on 4/13 noted max duration 26 seconds of NSVT  No AFib  Patient was recently started on Coreg 3 125mg last week, took for 3 days then discontinued due to intolerance(leg edema,dizziness, numbness tingling in hands and feet)  Started Raegan Shore yesterday  -which was subsequently discontinued  ED provider discussed EKG findings with on-call cardiologist,no concern over prolonged QTC  Patient given Mag 2 g in ED  Status post Bi V AICD interrogation-no events noted   Questionable diaphragmatic pacing as per Cardiology  Cardiology following, input appreciated  Patient reports symptomatic improvement  No cp, palpitations, SOB, lightheadedness/dizziness prior to discharge     · Continue metoprolol  · Discontinue Jardiance  · Repeat TSH WNL, ordered free T4, follow up with PCP for results   · Monitor volume status

## 2022-05-17 NOTE — ASSESSMENT & PLAN NOTE
Lab Results   Component Value Date    EGFR 43 05/16/2022    EGFR 38 05/15/2022    EGFR 45 04/27/2022    CREATININE 1 27 05/16/2022    CREATININE 1 40 (H) 05/15/2022    CREATININE 1 22 04/27/2022   Baseline creatinine appears to be around 1 0-1 3s  Creatinine slight elevated on admission  Improved after discontinuing Jardiance and holding diuretics  Repeat lab in a m    Creatinine today resolved 0 97 at baseline

## 2022-05-17 NOTE — ASSESSMENT & PLAN NOTE
Status post biventricular ICD implant    · Continue sotalol, Xarelto  · Started low-dose metoprolol, continue  · Telemetry  · Optimize electrolytes  · Follow up Cardiology recommendations

## 2022-05-17 NOTE — ASSESSMENT & PLAN NOTE
Wt Readings from Last 3 Encounters:   05/16/22 108 kg (237 lb 3 4 oz)   05/02/22 107 kg (236 lb)   04/26/22 108 kg (237 lb)     2D echo as above  Patient appears euvolemic on exam   Possibly mildly volume depleted on exam?  Jardiance effect in combination with diuretics  Chest x-ray unremarkable   · Continue Entresto, Demadex, Aldactone as tolerated  · Daily weight and I&Os   · Discussed with cardiology who states to continue cardiac meds and hold for systolic BP <[id number]  Patient expressed understanding in monitoring blood pressure at home prior to taking these medications

## 2022-05-17 NOTE — ASSESSMENT & PLAN NOTE
Patient reports her SBP is normally in 110s  BP was 140/85 when she was experiencing palpitation which she thinks was too high for her  Noted to have low blood pressure  Asymptomatic    · Discussed with cards who recommends to continue all cardiac medications and hold for systolic BP < [id number]  · Orthostatic VS negative   · Education given on monitoring BP prior to taking cardiac meds and to monitor for lightheadedness/dizziness   · Patient currently denies any of the above symptoms   · Follow up Cardiology as outpatient

## 2022-05-17 NOTE — ASSESSMENT & PLAN NOTE
· Continue Synthroid  · TSH normal normal with mildly elevated free T4  · Repeat thyroid profile in a m- Repeat TSH WNL   Added on free T4    · Follow up with PCP for results of free T4

## 2022-05-17 NOTE — PLAN OF CARE
Problem: Potential for Falls  Goal: Patient will remain free of falls  Description: INTERVENTIONS:  - Educate patient/family on patient safety including physical limitations  - Instruct patient to call for assistance with activity   - Consult OT/PT to assist with strengthening/mobility   - Keep Call bell within reach  - Keep bed low and locked with side rails adjusted as appropriate  - Keep care items and personal belongings within reach  - Initiate and maintain comfort rounds  - Make Fall Risk Sign visible to staff  - Offer Toileting every 1 Hours, in advance of need  - Initiate/Maintain bedalarm  - Obtain necessary fall risk management equipment: n/a  - Apply yellow socks and bracelet for high fall risk patients  - Consider moving patient to room near nurses station  5/17/2022 1243 by Britt Mobley RN  Outcome: Adequate for Discharge  5/17/2022 0801 by Britt Mobley RN  Outcome: Progressing     Problem: CARDIOVASCULAR - ADULT  Goal: Maintains optimal cardiac output and hemodynamic stability  Description: INTERVENTIONS:  - Monitor I/O, vital signs and rhythm  - Monitor for S/S and trends of decreased cardiac output  - Administer and titrate ordered vasoactive medications to optimize hemodynamic stability  - Assess quality of pulses, skin color and temperature  - Assess for signs of decreased coronary artery perfusion  - Instruct patient to report change in severity of symptoms  5/17/2022 1243 by Britt Mobley RN  Outcome: Adequate for Discharge  5/17/2022 0801 by Britt Mobley RN  Outcome: Progressing  Goal: Absence of cardiac dysrhythmias or at baseline rhythm  Description: INTERVENTIONS:  - Continuous cardiac monitoring, vital signs, obtain 12 lead EKG if ordered  - Administer antiarrhythmic and heart rate control medications as ordered  - Monitor electrolytes and administer replacement therapy as ordered  5/17/2022 1243 by Britt Mobley RN  Outcome: Adequate for Discharge  5/17/2022 0801 by Trenton Wagner Marily Agarwal RN  Outcome: Progressing     Problem: METABOLIC, FLUID AND ELECTROLYTES - ADULT  Goal: Electrolytes maintained within normal limits  Description: INTERVENTIONS:  - Monitor labs and assess patient for signs and symptoms of electrolyte imbalances  - Administer electrolyte replacement as ordered  - Monitor response to electrolyte replacements, including repeat lab results as appropriate  - Instruct patient on fluid and nutrition as appropriate  5/17/2022 1243 by Constantine Schwarz RN  Outcome: Adequate for Discharge  5/17/2022 0801 by Constantine Schwarz RN  Outcome: Progressing     Problem: RESPIRATORY - ADULT  Goal: Achieves optimal ventilation and oxygenation  Description: INTERVENTIONS:  - Assess for changes in respiratory status  - Assess for changes in mentation and behavior  - Position to facilitate oxygenation and minimize respiratory effort  - Oxygen administered by appropriate delivery if ordered  - Initiate smoking cessation education as indicated  - Encourage broncho-pulmonary hygiene including cough, deep breathe, Incentive Spirometry  - Assess the need for suctioning and aspirate as needed  - Assess and instruct to report SOB or any respiratory difficulty  - Respiratory Therapy support as indicated  5/17/2022 1243 by Constantine Schwarz RN  Outcome: Adequate for Discharge  5/17/2022 0801 by Constantine Schwarz RN  Outcome: Progressing

## 2022-05-18 NOTE — UTILIZATION REVIEW
Notification of Discharge   This is a Notification of Discharge from our facility [address]  Please be advised that this patient has been discharge from our facility  Below you will find the admission and discharge date and time including the patients disposition  UTILIZATION REVIEW CONTACT:  Sophy Iniguez  Utilization   Network Utilization Review Department  Phone: [phone] x carefully listen to the prompts  All voicemails are confidential   Email: [email or web address]     PHYSICIAN ADVISORY SERVICES:  FOR [id number] REVIEW - MEDICAL NECESSITY DENIAL  Phone: [phone]  Fax: [phone]  Email: [email or web address]     PRESENTATION DATE: 5/15/2022  3:42 AM  OBERVATION ADMISSION DATE:   INPATIENT ADMISSION DATE: 5/16/22  1:39 PM   DISCHARGE DATE: 5/17/2022 12:46 PM  DISPOSITION: Home/Self Care Home/Self Care      IMPORTANT INFORMATION:  Send all requests for admission clinical reviews, approved or denied determinations and any other requests to dedicated fax number below belonging to the campus where the patient is receiving treatment   List of dedicated fax numbers:  [address] DENIALS (Administrative/Medical Necessity) [phone]   [address] (Maternity/NICU/Pediatrics) [phone]   [facility] [phone]   [address] [facility] [phone]   [address] [place] [address] [phone]   [address] [place] [address],[address] [phone]   [facility]  [phone]   [address] [place] [address], [address]  [address] [facility] [address] [facility] [phone]

## 2022-05-20 ENCOUNTER — TELEPHONE (OUTPATIENT)
Dept: CARDIOLOGY CLINIC | Facility: CLINIC | Age: 67
End: 2022-05-20

## 2022-05-20 RX ORDER — LEVOTHYROXINE SODIUM 0.15 MG/1
150 TABLET ORAL DAILY
Qty: 30 TABLET | Refills: 0 | Status: SHIPPED | OUTPATIENT
Start: 2022-05-20 | End: 2022-08-05

## 2022-05-20 NOTE — QUICK NOTE
Patient has been discharged  Received results of patient's free T4 levels after discharge and they are elevated 1 54  Patient is on 175mcg levothyroxine  Did call patient and discussed with her that I will decrease her levothyroxine to 150 mcg and sent this to her pharmacy  Patient will need to follow-up with her primary care provider in regards to this medication adjustment with repeat TSH and free T4 in 4-6 weeks

## 2022-05-28 DIAGNOSIS — I48.0 PAROXYSMAL ATRIAL FIBRILLATION (HCC): ICD-10-CM

## 2022-05-28 RX ORDER — RIVAROXABAN 20 MG/1
TABLET, FILM COATED ORAL
Qty: 90 TABLET | Refills: 3 | Status: SHIPPED | OUTPATIENT
Start: 2022-05-28

## 2022-06-02 ENCOUNTER — REMOTE DEVICE CLINIC VISIT (OUTPATIENT)
Dept: CARDIOLOGY CLINIC | Facility: CLINIC | Age: 67
End: 2022-06-02

## 2022-06-02 DIAGNOSIS — Z95.810 PRESENCE OF AUTOMATIC CARDIOVERTER/DEFIBRILLATOR (AICD): Primary | ICD-10-CM

## 2022-06-02 DIAGNOSIS — I50.42 CHRONIC COMBINED SYSTOLIC AND DIASTOLIC HEART FAILURE, NYHA CLASS 2 (HCC): ICD-10-CM

## 2022-06-02 PROCEDURE — RECHECK: Performed by: INTERNAL MEDICINE

## 2022-06-02 RX ORDER — SPIRONOLACTONE 25 MG/1
TABLET ORAL
Qty: 45 TABLET | Refills: 3 | Status: SHIPPED | OUTPATIENT
Start: 2022-06-02

## 2022-06-02 NOTE — PROGRESS NOTES
Results for orders placed or performed in visit on 06/02/22   Cardiac EP device report    Narrative    SJM BI-V ICD/ACTIVE SYSTEM IS MRI CONDITIONAL  MERLIN TRANSMISSION: BATTERY CHECK  BATTERY VOLTAGE NEARING KEITH  WILL SCHEDULE MONTHLY BATTERY CHECKS  (1 YR)  AP 2% BVP 90%  ALL AVAILABLE LEAD PARAMETERS WITHIN NORMAL LIMITS  [facility], [address] <20 SEC  NO SIGNIFICANT HIGH RATE EPISODES  CORVUE IMPEDANCE MONITORING WITHIN NORMAL LIMITS  NORMAL DEVICE FUNCTION  ---WALLACE

## 2022-06-03 ENCOUNTER — TELEPHONE (OUTPATIENT)
Dept: CARDIOLOGY CLINIC | Facility: CLINIC | Age: 67
End: 2022-06-03

## 2022-06-03 NOTE — TELEPHONE ENCOUNTER
----- Message from Matias De La Torre MD sent at 6/3/2022 11:52 AM EDT -----  Patient's device interrogation reading shows,  device function is normal      Please call patient

## 2022-06-08 ENCOUNTER — TELEPHONE (OUTPATIENT)
Dept: CARDIOLOGY CLINIC | Facility: CLINIC | Age: 67
End: 2022-06-08

## 2022-06-10 ENCOUNTER — OFFICE VISIT (OUTPATIENT)
Dept: CARDIOLOGY CLINIC | Facility: CLINIC | Age: 67
End: 2022-06-10
Payer: COMMERCIAL

## 2022-06-10 VITALS
BODY MASS INDEX: 38.09 KG/M2 | HEART RATE: 91 BPM | WEIGHT: 237 LBS | DIASTOLIC BLOOD PRESSURE: 60 MMHG | SYSTOLIC BLOOD PRESSURE: 110 MMHG | TEMPERATURE: 97 F | HEIGHT: 66 IN

## 2022-06-10 DIAGNOSIS — I47.2 NSVT (NONSUSTAINED VENTRICULAR TACHYCARDIA) (HCC): ICD-10-CM

## 2022-06-10 DIAGNOSIS — I10 ESSENTIAL HYPERTENSION: ICD-10-CM

## 2022-06-10 DIAGNOSIS — I50.42 CHRONIC COMBINED SYSTOLIC AND DIASTOLIC HEART FAILURE, NYHA CLASS 2 (HCC): ICD-10-CM

## 2022-06-10 DIAGNOSIS — E78.5 DYSLIPIDEMIA: ICD-10-CM

## 2022-06-10 DIAGNOSIS — I48.0 PAROXYSMAL ATRIAL FIBRILLATION (HCC): ICD-10-CM

## 2022-06-10 DIAGNOSIS — R01.1 CARDIAC MURMUR: ICD-10-CM

## 2022-06-10 DIAGNOSIS — Z95.810 PRESENCE OF AUTOMATIC CARDIOVERTER/DEFIBRILLATOR (AICD): ICD-10-CM

## 2022-06-10 DIAGNOSIS — I42.0 DILATED CARDIOMYOPATHY (HCC): ICD-10-CM

## 2022-06-10 PROCEDURE — 1160F RVW MEDS BY RX/DR IN RCRD: CPT | Performed by: INTERNAL MEDICINE

## 2022-06-10 PROCEDURE — 93000 ELECTROCARDIOGRAM COMPLETE: CPT | Performed by: INTERNAL MEDICINE

## 2022-06-10 PROCEDURE — 3008F BODY MASS INDEX DOCD: CPT | Performed by: INTERNAL MEDICINE

## 2022-06-10 PROCEDURE — 3078F DIAST BP <80 MM HG: CPT | Performed by: INTERNAL MEDICINE

## 2022-06-10 PROCEDURE — 99214 OFFICE O/P EST MOD 30 MIN: CPT | Performed by: INTERNAL MEDICINE

## 2022-06-10 PROCEDURE — 1036F TOBACCO NON-USER: CPT | Performed by: INTERNAL MEDICINE

## 2022-06-10 PROCEDURE — 3074F SYST BP LT 130 MM HG: CPT | Performed by: INTERNAL MEDICINE

## 2022-06-10 RX ORDER — MIDODRINE HYDROCHLORIDE 2.5 MG/1
2.5 TABLET ORAL AS NEEDED
Qty: 30 TABLET | Refills: 1 | Status: SHIPPED | OUTPATIENT
Start: 2022-06-10 | End: 2022-07-11

## 2022-06-10 NOTE — PROGRESS NOTES
Progress Note - Cardiology Office  [facility]  Liz Moraes 79 y o  female MRN: [id number]  : 1955   Encounter: [id number]      Assessment:     1  Chronic combined systolic and diastolic heart failure, NYHA class 2 (HCC)    2  Paroxysmal atrial fibrillation ([facility] )    3  Presence of automatic cardioverter/defibrillator (AICD)    4  Dilated cardiomyopathy ([facility] )    5  Essential hypertension    6  Dyslipidemia    7  Cardiac murmur    8  NSVT (nonsustained ventricular tachycardia) ([facility] )        Discussion Summary and Plan:  1  Status post ICD shock for ventricular arrhythmias  Patient device was upgraded to biventricular device  She was also started on sotalol  QTC acceptable since then no ICD shock  Her LV lead threshold is slightly high  So far no ICD shock  We will continue to monitor     2  Chronic systolic heart failure [place] Heart Association class II/3  Patient has tolerated Entresto very well  No clinical evidence of heart failure or ischemia at this time  Currently she is taking sotalol, Aldactone, entresto  She is on high dose of 97/103 mg twice a day  Torsemide is 40 mg daily along with Aldactone 12 5 mg daily  Continue same medication electrolytes are acceptable  We try to add Jardiance and while cutting back her torsemide  However she became dehydrated could not tolerate it  She is reluctant to try it again  3  Status post [facility] ICD  Her device was upgraded to [facility] biventricular ICD  No more ICD shocks  Her device interrogation reviewed with her  Device was interrogated in 2021  No events noted  Patient has battery life of 1 2 years  Discussed with patient  Management as per EP    4  Bronchial asthma  Currently not wheezing  Advised to lose weight    5  History of paroxysmal at fibrillation currently in sinus rhythm  Continue antithrombotic therapy  Patient tolerating well  Continue sotalol  QTC is acceptable she is paced  She is maintaining sinus rhythm  6  Hypothyroidism  She is on levothyroxine TSH was acceptable  Currently she is taking levothyroxine 175 mcg  Monitor TSH  Discussed with patient    7  Severe MR  Moderate to severe TR and elevated PA pressure with EF around 20%  Patient is not diagnosed to have dilated LV with moderate TR and at least moderate to severe MR  We will refer her to advanced heart failure  Various options discussed with her including MitraClip, follow-up with advanced heart failure team   She would prefer to go to [place] where she had previously device placed and she also had insurance issues  Referral will be made to Dr Miguel Angel Christensen of [place] who is a director of advanced heart failure team   She has appointment to see them in July will discussed with them if she can be seen earlier  She is low blood pressure added p r n  Midodrine  She is reluctant to take    8  Dyslipidemia  Continue statin  She is on pravastatin 40 mg  Her cholesterol has gone up  She is motivated to change her diet  Discussed with patient      9  Dyspnea on exertion  She still have a chronic exertion shortness of breath most likely due to underlying cardiomyopathy and severe MR  Her weight has been stable around 236 lb  Advised to lose weight  Advised to take her heart failure medications okay to take midodrine  Spoke to patient patient's sister who was on the phone  All their questions answered to their satisfaction  She has questions regarding Elicia stim, it will be reviewed and discussed with her at a later time  Follow-up 4 months  Counseling :  A description of the counseling  Issues related to heart failure, regular diet, weight all discussed at length  All her questions answered  Goals and Barriers  The patient has the current Goals: To stay out of heart failure  The patent has the current Barriers: Weight gain     Patient's ability to self care: Yes  Medication side effect reviewed with patient in detail and all their questions answered to their satisfaction  HPI :     Yeimi Salinas is a 79y o  year old female who came for follow up  Patient has with past medical history significant for nonischemic dilated cardiomyopathy, hypertension, bronchial asthma, moderate obesity, dyslipidemia, hypothyroidism, status post [facility] single-chamber ICD, mild nonobstructive disease by cardiac catheterization who came for regular follow-up and interrogation of her ICD  She has been doing pretty well she's she is on coralanor, she did she denies any chest pain, any shortness of breath  Recently she gained more weight as she was on steroids for bronchial asthma  Her ICD was interrogated today  No fever, no chills, no other significant complaint     03/22/2022  Above reviewed  Patient came for follow-up  Medical history significant for nonischemic cardiomyopathy status post biventricular pacemaker and ICD which was last interrogated in August 2021 at that time had a battery life of 1 year and 2 months, paroxysmal atrial fibrillation suppressed with sotalol, history of NSVT, dyslipidemia, anxiety, recurrent heart failure who came for follow-up  She had a blood test done in December 2021 where electrolytes were acceptable though her cholesterol has increased  Her heart rate remains elevated around 90 beats per minute  She feels she has lost some weight and her weight is around 241 lb  Denies any chest pain but continues to have exertional shortness of breath  She is compliant with her cholesterol and other medications  Today EKG shows she is atrial sensed and ventricular paced heart rate 94 beats per minute  05/02/2022  Above reviewed  Patient came for follow-up  She had history of nonischemic cardiomyopathy status post biventricular pacemaker and ICD which is functioning adequately  Her battery life is around 1 year of the pacemaker    Paroxysmal atrial fibrillation suppressed with sotalol, history of NSVT, dyslipidemia, anxiety, dilated LV now has severe MR with the moderate TR and elevated PA pressure  She was recently in the hospital with musculoskeletal chest pain and was found to have low vitamin-D  Her weight is now around 236 lb  Weight during last visit was 241 lb  Her echo was reviewed with her and her sister who is a nurse  We had discussion about her worsening valvular disease and need to see advanced heart failure team   She has chronic exertional shortness of breath but she is not requiring any oxygen and has no lower extremity edema she does get some short of breath when she does her activities  She is able to cook clean and do her activities without any problem  Her labs done in April recently were acceptable  Her NT BNP was acceptable and much lower than when she was admitted to the hospital     06/10/2022  Above reviewed  Patient came for follow-up  History of nonischemic cardiomyopathy status post biventricular pacemaker ICD which is functioning adequately, paroxysmal atrial fibrillation suppressed with sotalol, history of NSVT, dyslipidemia, anxiety, dilated LV now has severe M MR and moderate TR and elevated PA pressure  She was again in the hospital recently with dehydration when we tried to put her on Jardiance  We also try to slow her heart rate by giving her Coreg or metoprolol she could not tolerate it she is on sotalol  She checks her blood pressure and her blood pressure is generally around  and she takes her medications  I did suggest her to take  Midodrine at a lower dose so that she can take her heart failure medication she is reluctant to try medication she is already scheduled to have advanced heart failure team evaluation at [facility]   She is able to cook and clean and do her activities   And it makes her fatigue and tired    She had blood work done on 05/07/2022 which shows her electrolytes were acceptable cholesterol is slightly elevated kidney function has been stable and potassium was stable  Today her blood pressure is 110/60 heart rate is 90 beats per minute and she is biventricular paced  Review of Systems   Constitutional: Positive for fatigue  Negative for activity change, chills, diaphoresis, fever and unexpected weight change  HENT: Negative for congestion  Eyes: Negative for discharge and redness  Respiratory: Positive for shortness of breath  Negative for cough, chest tightness and wheezing  Exertional   Cardiovascular: Negative  Negative for chest pain, palpitations and leg swelling  Gastrointestinal: Negative for abdominal pain, diarrhea and nausea  Endocrine: Negative  Genitourinary: Negative for decreased urine volume and urgency  Musculoskeletal: Negative  Negative for arthralgias, back pain and gait problem  Skin: Negative for rash and wound  Allergic/Immunologic: Negative  Neurological: Negative for dizziness, seizures, syncope, weakness, light-headedness and headaches  Hematological: Negative  Psychiatric/Behavioral: Negative for agitation and confusion  The patient is nervous/anxious          Historical Information   Past Medical History:   Diagnosis Date    A-fib ([facility] )     Abnormal blood sugar     Acid reflux     Acute bronchitis     Allergic reaction     Anxiety     Arthritis     Asthma     Cardiomyopathy ([facility] )     Cellulitis of left lower leg     Chest pain     CH[facility]congestive heart failure) [facility])     Constipation     Depression with anxiety     Disease of thyroid gland     Diverticulitis     Dyslipidemia     Dyspnea on exertion     Elbow pain     Fracture of olecranon, open     Gastritis     Generalized pain     Hematuria     History of colonoscopy     Hypertension     Hypokalemia     Hypomagnesemia     Leg cramping     Moderate obesity     Morbid obesity due to excess calories ([facility])     Myalgia     Myositis     Nausea in adult     Nephrolithiasis     Prepatellar bursitis, unspecified knee     Screening for lipid disorders     Shortness of breath     Spasm of muscle     Thyroid trouble      Past Surgical History:   Procedure Laterality Date    CARDIAC DEFIBRILLATOR PLACEMENT      LAPAROSCOPIC CHOLECYSTECTOMY      TOTAL ABDOMINAL HYSTERECTOMY W/ BILATERAL SALPINGOOPHORECTOMY       Social History     Substance and Sexual Activity   Alcohol Use Not Currently     Social History     Substance and Sexual Activity   Drug Use No     Social History     Tobacco Use   Smoking Status Never Smoker   Smokeless Tobacco Never Used     Family History:   Family History   Problem Relation Age of Onset    Hypertension Sister     Cancer Sister     Coronary artery disease Family     Diabetes type II Family     Hypertension Family        Meds/Allergies     Allergies   Allergen Reactions    Carvedilol Palpitations, Other (See Comments), Dizziness and Edema     Started several days after initiation of carvedilol 3 125 milligrams bid with duration of complaints approximately 36 hours  Patient also had numbness and tingling of hands and feet      Omnipaque [Iohexol] Swelling    Penicillins Swelling    Iv Dye  [Iodinated Diagnostic Agents] Throat Swelling    Other Swelling     Fresh herbs "basil, cilantro"    Shellfish-Derived Products - Food Allergy Hives       Current Outpatient Medications:     ALPRAZolam (XANAX) 0 25 mg tablet, Take 0 25 mg by mouth daily as needed, Disp: , Rfl:     budesonide-formoterol (SYMBICORT) 160-4 5 mcg/act inhaler, Inhale 2 puffs 2 (two) times a day Rinse mouth after use , Disp: 1 Inhaler, Rfl: 5    diclofenac sodium (VOLTAREN) 1 %, Apply 2 g topically 4 (four) times a day, Disp: 100 g, Rfl: 0    Entresto  MG TABS, take 1 tablet by mouth twice a day, Disp: 180 tablet, Rfl: 3    levalbuterol (XOPENEX HFA) 45 mcg/act inhaler, Inhale 2 puffs every 6 (six) hours as needed for shortness of breath, Disp: 1 Inhaler, Rfl: 5    levothyroxine (Synthroid) 150 mcg tablet, Take 1 tablet (150 mcg total) by mouth in the morning , Disp: 30 tablet, Rfl: 0    Magnesium 100 MG CAPS, Take by mouth, Disp: , Rfl:     metoprolol tartrate (LOPRESSOR) 25 mg tablet, Take 0 5 tablets (12 5 mg total) by mouth every 12 (twelve) hours, Disp: 30 tablet, Rfl: 0    midodrine (PROAMATINE) 2 5 mg tablet, Take 1 tablet (2 5 mg total) by mouth if needed (Take twice a day as needed), Disp: 30 tablet, Rfl: 1    pantoprazole (PROTONIX) 40 mg tablet, take 1 tablet by mouth once daily, Disp: 90 tablet, Rfl: 3    potassium chloride (K-DUR) 10 mEq tablet, take 1 tablet by mouth once daily, Disp: 30 tablet, Rfl: 5    pravastatin (PRAVACHOL) 40 mg tablet, take 1 tablet by mouth once daily, Disp: 90 tablet, Rfl: 3    sotalol (BETAPACE) 80 mg tablet, TAKE 1 TABLET BY MOUTH EVERY 12 HOURS, Disp: 180 tablet, Rfl: 3    spironolactone (ALDACTONE) 25 mg tablet, TAKE 1/2 TABLET BY MOUTH DAILY, Disp: 45 tablet, Rfl: 3    tiZANidine (ZANAFLEX) 2 mg tablet, take 1 tablet by mouth every 8 hours if needed for muscle spasm, Disp: 21 tablet, Rfl: 3    torsemide (DEMADEX) 20 mg tablet, take 2 tablets by mouth once daily, Disp: 180 tablet, Rfl: 3    Xarelto 20 MG tablet, take 1 tablet by mouth once daily, Disp: 90 tablet, Rfl: 3    Vitals: Blood pressure 110/60, pulse 91, temperature (!) 97 °F (36 1 °C), height 5' 6" (1 676 m), weight 108 kg (237 lb)  ?  Body mass index is 38 25 kg/m²  Vitals:    06/10/22 1638   Weight: 108 kg (237 lb)     BP Readings from Last 3 Encounters:   06/10/22 110/60   05/17/22 106/70   05/02/22 128/76       Physical Exam  Constitutional:       General: She is not in acute distress  Appearance: She is well-developed  She is not diaphoretic  Neck:      Thyroid: No thyromegaly  Vascular: No JVD  Trachea: No tracheal deviation  Cardiovascular:      Rate and Rhythm: Normal rate and regular rhythm        Heart sounds: S1 normal and S2 normal  Heart sounds not distant  Murmur heard  Systolic (ejection) murmur is present with a grade of 2/6  No friction rub  No gallop  No S3 or S4 sounds  Pulmonary:      Effort: Pulmonary effort is normal  No respiratory distress  Breath sounds: Normal breath sounds  No wheezing or rales  Chest:      Chest wall: No tenderness  Abdominal:      General: Bowel sounds are normal  There is no distension  Palpations: Abdomen is soft  Tenderness: There is no abdominal tenderness  Musculoskeletal:         General: No deformity  Cervical back: Neck supple  Skin:     General: Skin is warm and dry  Coloration: Skin is not pale  Findings: No rash  Neurological:      Mental Status: She is alert and oriented to person, place, and time  Psychiatric:         Behavior: Behavior normal          Judgment: Judgment normal        Diagnostic Studies Review Cardio:    Echocardiogram/VANNESA: Echo Doppler done in February 2016 shows EF 25-30%, pacemaker in the right-sided chambers, thickened aortic valve without stenosis, moderate to severe mitral regurgitation  Repeat echo Doppler done February 20, 2018  LV is markedly dilated EF 25%, mild LVH, right atrium mildly dilated mild MR and trace TR which was insufficient to measure pulmonary artery pressure  Pacemaker Wire in right-sided chambers  Repeat echo Doppler done 10/12/2020 shows patient's EF is around 23%, grade 3 diastolic dysfunction, mild mitral regurgitation    Repeat echo Doppler done in April 2022 shows patient's EF is around 20 25%, left atrium moderate to severely dilated, right atrium moderately dilated, severe mitral regurgitation and mild-to-moderate TR with PA pressure around 70 mm of mercury  Catheterization: Cardiac catheter patient done in 2013 was EF 30%, no evidence of significant obstructive coronary artery disease   This was done in outside hospital         ICD/ Pacer Interpretation  Device interrogation done 08/06/2021 shows it is functioning adequately it is a [address] biventricular ICD  Life is 1 2 year  She is 90% ventricular paced  ECG Report:      Comparison to prior ECGs: no interval change  01/22/2018  Twelve lead EKG shows normal sinus rhythm heart rate 81 beats per minute  Poor R-wave progression  No change from old EKG  Repeat 12 lead EKG on 03/08/2018 shows normal sinus rhythm  Rare PVCs heart rate 78 beats per minute  Prolonged QTC      07/26/2018 12 lead EKG shows normal sinus rhythm heart rate 90 beats per minute  Peak PVCs noted  IVCD otherwise no other significant ST changes  Her QRS duration is 118 milliseconds  Twelve lead EKG done today 11/08/2018 shows normal sinus rhythm  Incomplete LBBB with IVCD her QS duration is 118 milliseconds  Twelve lead EKG shows atrial sense ventricular paced heart rate 75 beats per minute  She is biventricular paced now      Twelve lead EKG done 01/16/2020 shows atrial sensed ventricular paced heart rate 82 beats per minute she is biventricular pacemaker  Her device findings reviewed with her which was interrogated at [facility]    Repeat interrogation from January 2020 reviewed      Twelve lead EKG shows paced rhythm heart rate 89 beats per minute she is biventricular pacemaker  Twelve lead EKG 11/12/2020 shows atrial sensed ventricular paced heart rate 79 beats per minute she has a biventricular pacemaker  Twelve lead EKG 02/24/2021 shows atrial sense ventricular paced heart rate 80 beats per minute  Twelve lead EKG 06/08/2021 shows atrial sensed ventricular paced heart rate around 87 beats per minute  Few PVCs noted      Twelve lead EKG done 03/22/2022 shows atrial sensed ventricular paced heart rate 94 beats per minute  Twelve lead EKG 06/10/2022 shows electronically by paced rhythm heart rate 91 beats per minute      Imaging:  Chest X-Ray:   Xr Chest 2 Views    Result Date: 4/9/2017  Impression Stable cardiomegaly without active pulmonary disease  Workstation performed: [id number]     Lab Review   Lab Results   Component Value Date    WBC 7 00 05/17/2022    HGB 11 8 05/17/2022    HCT 36 3 05/17/2022    MCV 84 05/17/2022     05/17/2022     Lab Results   Component Value Date     06/06/2016    K 3 8 05/17/2022     05/17/2022    CO2 27 05/17/2022    ANIONGAP 12 9 11/01/2015    BUN 19 05/17/2022    CREATININE 0 97 05/17/2022    GLUCOSE 95 06/06/2016    GLUF 108 (H) 05/16/2022    CALCIUM 9 1 05/17/2022    CORRECTEDCA 9 6 04/27/2022    AST 14 05/15/2022    ALT 18 05/15/2022    ALKPHOS 88 05/15/2022    PROT 6 8 06/06/2016    BILITOT 0 3 06/06/2016    EGFR 60 05/17/2022     Lab Results   Component Value Date    GLUCOSE 95 06/06/2016    CALCIUM 9 1 05/17/2022     06/06/2016    K 3 8 05/17/2022    CO2 27 05/17/2022     05/17/2022    BUN 19 05/17/2022    CREATININE 0 97 05/17/2022         Lab Results   Component Value Date    HGBA1C 6 3 (H) 05/15/2022       Dr Amy Mijares MD [facility]      "This note has been constructed using a voice recognition system  Therefore there may be syntax, spelling, and/or grammatical errors   Please call if you have any questions  "

## 2022-06-24 ENCOUNTER — APPOINTMENT (EMERGENCY)
Dept: RADIOLOGY | Facility: HOSPITAL | Age: 67
End: 2022-06-24
Payer: COMMERCIAL

## 2022-06-24 ENCOUNTER — HOSPITAL ENCOUNTER (EMERGENCY)
Facility: HOSPITAL | Age: 67
Discharge: HOME | End: 2022-06-24
Attending: EMERGENCY MEDICINE | Admitting: EMERGENCY MEDICINE
Payer: COMMERCIAL

## 2022-06-24 VITALS
HEART RATE: 66 BPM | TEMPERATURE: 98.2 F | RESPIRATION RATE: 18 BRPM | DIASTOLIC BLOOD PRESSURE: 59 MMHG | SYSTOLIC BLOOD PRESSURE: 108 MMHG | WEIGHT: 236 LBS | BODY MASS INDEX: 37.93 KG/M2 | HEIGHT: 66 IN | OXYGEN SATURATION: 98 %

## 2022-06-24 DIAGNOSIS — F41.9 ANXIETY: ICD-10-CM

## 2022-06-24 DIAGNOSIS — R42 LIGHTHEADEDNESS: Primary | ICD-10-CM

## 2022-06-24 DIAGNOSIS — M54.9 BACK PAIN: ICD-10-CM

## 2022-06-24 LAB
2HR DELTA HS TROPONIN: 1 NG/L
ALBUMIN SERPL BCP-MCNC: 3.6 G/DL (ref 3.5–5)
ALP SERPL-CCNC: 79 U/L (ref 46–116)
ALT SERPL W P-5'-P-CCNC: 15 U/L (ref 12–78)
ANION GAP SERPL CALCULATED.3IONS-SCNC: 8 MMOL/L (ref 4–13)
APTT PPP: 40 SECONDS (ref 23–37)
AST SERPL W P-5'-P-CCNC: 15 U/L (ref 5–45)
BACTERIA UR QL AUTO: ABNORMAL /HPF
BASOPHILS # BLD AUTO: 0.04 THOUSANDS/ΜL (ref 0–0.1)
BASOPHILS NFR BLD AUTO: 1 % (ref 0–1)
BILIRUB SERPL-MCNC: 0.23 MG/DL (ref 0.2–1)
BILIRUB UR QL STRIP: NEGATIVE
BUN SERPL-MCNC: 22 MG/DL (ref 5–25)
CALCIUM SERPL-MCNC: 9.4 MG/DL (ref 8.3–10.1)
CARDIAC TROPONIN I PNL SERPL HS: 5 NG/L
CARDIAC TROPONIN I PNL SERPL HS: 6 NG/L
CHLORIDE SERPL-SCNC: 104 MMOL/L (ref 100–108)
CLARITY UR: CLEAR
CO2 SERPL-SCNC: 28 MMOL/L (ref 21–32)
COLOR UR: YELLOW
CREAT SERPL-MCNC: 1.12 MG/DL (ref 0.6–1.3)
EOSINOPHIL # BLD AUTO: 0.22 THOUSAND/ΜL (ref 0–0.61)
EOSINOPHIL NFR BLD AUTO: 3 % (ref 0–6)
ERYTHROCYTE [DISTWIDTH] IN BLOOD BY AUTOMATED COUNT: 15.1 % (ref 11.6–15.1)
FINE GRAN CASTS URNS QL MICRO: ABNORMAL /LPF
GFR SERPL CREATININE-BSD FRML MDRD: 50 ML/MIN/1.73SQ M
GLUCOSE SERPL-MCNC: 92 MG/DL (ref 65–140)
GLUCOSE UR STRIP-MCNC: NEGATIVE MG/DL
HCT VFR BLD AUTO: 40.9 % (ref 34.8–46.1)
HGB BLD-MCNC: 12.9 G/DL (ref 11.5–15.4)
HGB UR QL STRIP.AUTO: ABNORMAL
HYALINE CASTS #/AREA URNS LPF: ABNORMAL /LPF
IMM GRANULOCYTES # BLD AUTO: 0.02 THOUSAND/UL (ref 0–0.2)
IMM GRANULOCYTES NFR BLD AUTO: 0 % (ref 0–2)
INR PPP: 1.33 (ref 0.84–1.19)
KETONES UR STRIP-MCNC: NEGATIVE MG/DL
LEUKOCYTE ESTERASE UR QL STRIP: NEGATIVE
LYMPHOCYTES # BLD AUTO: 1.97 THOUSANDS/ΜL (ref 0.6–4.47)
LYMPHOCYTES NFR BLD AUTO: 25 % (ref 14–44)
MCH RBC QN AUTO: 27.1 PG (ref 26.8–34.3)
MCHC RBC AUTO-ENTMCNC: 31.5 G/DL (ref 31.4–37.4)
MCV RBC AUTO: 86 FL (ref 82–98)
MONOCYTES # BLD AUTO: 0.69 THOUSAND/ΜL (ref 0.17–1.22)
MONOCYTES NFR BLD AUTO: 9 % (ref 4–12)
NEUTROPHILS # BLD AUTO: 4.84 THOUSANDS/ΜL (ref 1.85–7.62)
NEUTS SEG NFR BLD AUTO: 62 % (ref 43–75)
NITRITE UR QL STRIP: NEGATIVE
NON-SQ EPI CELLS URNS QL MICRO: ABNORMAL /HPF
NRBC BLD AUTO-RTO: 0 /100 WBCS
PH UR STRIP.AUTO: 6 [PH]
PLATELET # BLD AUTO: 313 THOUSANDS/UL (ref 149–390)
PMV BLD AUTO: 10 FL (ref 8.9–12.7)
POTASSIUM SERPL-SCNC: 4.5 MMOL/L (ref 3.5–5.3)
PROT SERPL-MCNC: 7.4 G/DL (ref 6.4–8.2)
PROT UR STRIP-MCNC: NEGATIVE MG/DL
PROTHROMBIN TIME: 16.1 SECONDS (ref 11.6–14.5)
RBC # BLD AUTO: 4.76 MILLION/UL (ref 3.81–5.12)
RBC #/AREA URNS AUTO: ABNORMAL /HPF
SODIUM SERPL-SCNC: 140 MMOL/L (ref 136–145)
SP GR UR STRIP.AUTO: 1.02 (ref 1–1.03)
UROBILINOGEN UR QL STRIP.AUTO: 0.2 E.U./DL
WBC # BLD AUTO: 7.78 THOUSAND/UL (ref 4.31–10.16)
WBC #/AREA URNS AUTO: ABNORMAL /HPF

## 2022-06-24 PROCEDURE — 99284 EMERGENCY DEPT VISIT MOD MDM: CPT

## 2022-06-24 PROCEDURE — 71045 X-RAY EXAM CHEST 1 VIEW: CPT

## 2022-06-24 PROCEDURE — 85730 THROMBOPLASTIN TIME PARTIAL: CPT | Performed by: EMERGENCY MEDICINE

## 2022-06-24 PROCEDURE — 80053 COMPREHEN METABOLIC PANEL: CPT | Performed by: EMERGENCY MEDICINE

## 2022-06-24 PROCEDURE — 99284 EMERGENCY DEPT VISIT MOD MDM: CPT | Performed by: EMERGENCY MEDICINE

## 2022-06-24 PROCEDURE — 81001 URINALYSIS AUTO W/SCOPE: CPT | Performed by: EMERGENCY MEDICINE

## 2022-06-24 PROCEDURE — 84484 ASSAY OF TROPONIN QUANT: CPT | Performed by: EMERGENCY MEDICINE

## 2022-06-24 PROCEDURE — 85610 PROTHROMBIN TIME: CPT | Performed by: EMERGENCY MEDICINE

## 2022-06-24 PROCEDURE — 36415 COLL VENOUS BLD VENIPUNCTURE: CPT | Performed by: EMERGENCY MEDICINE

## 2022-06-24 PROCEDURE — 85025 COMPLETE CBC W/AUTO DIFF WBC: CPT | Performed by: EMERGENCY MEDICINE

## 2022-06-24 NOTE — ED PROVIDER NOTES
History  Chief Complaint   Patient presents with    Dizziness     Patient reports on and off lightheadedness for about a week with lower back pain and tingling in all extremities  Patient has multiple complaints that started about a week ago  She states she has had lower back pain that radiates down both lower extremities without neurological weakness  She also had lightheadedness which comes and goes  She has increased carpal tunnel in the right hand associated with tingling in both feet  She also has numbness in the 1st 3 fingers of the left hand and the 4th and 5th fingers of the right hand patient has an upcoming appointment with her doctor in 3 days but states she cannot wait because it might be her heart  Denies chest pain          Prior to Admission Medications   Prescriptions Last Dose Informant Patient Reported? Taking? ALPRAZolam (XANAX) 0 25 mg tablet 6/24/2022 at Unknown time Self Yes Yes   Sig: Take 0 25 mg by mouth daily as needed   Entresto  MG TABS 6/23/2022 at Unknown time Self No Yes   Sig: take 1 tablet by mouth twice a day   Magnesium 100 MG CAPS  Self Yes No   Sig: Take by mouth   Xarelto 20 MG tablet 6/23/2022 at Unknown time Self No Yes   Sig: take 1 tablet by mouth once daily   budesonide-formoterol (SYMBICORT) 160-4 5 mcg/act inhaler 6/24/2022 at Unknown time Self No Yes   Sig: Inhale 2 puffs 2 (two) times a day Rinse mouth after use  diclofenac sodium (VOLTAREN) 1 %  Self No No   Sig: Apply 2 g topically 4 (four) times a day   levalbuterol (XOPENEX HFA) 45 mcg/act inhaler  Self No No   Sig: Inhale 2 puffs every 6 (six) hours as needed for shortness of breath   levothyroxine (Synthroid) 150 mcg tablet 6/23/2022 at Unknown time Self No Yes   Sig: Take 1 tablet (150 mcg total) by mouth in the morning     metoprolol tartrate (LOPRESSOR) 25 mg tablet  Self No No   Sig: Take 0 5 tablets (12 5 mg total) by mouth every 12 (twelve) hours   midodrine (PROAMATINE) 2 5 mg tablet More than a month at Unknown time  No No   Sig: Take 1 tablet (2 5 mg total) by mouth if needed (Take twice a day as needed)   pantoprazole (PROTONIX) 40 mg tablet 6/23/2022 at Unknown time Self No Yes   Sig: take 1 tablet by mouth once daily   potassium chloride (K-DUR) 10 mEq tablet 6/23/2022 at Unknown time Self No Yes   Sig: take 1 tablet by mouth once daily   pravastatin (PRAVACHOL) 40 mg tablet 6/23/2022 at Unknown time Self No Yes   Sig: take 1 tablet by mouth once daily   sotalol (BETAPACE) 80 mg tablet 6/23/2022 at Unknown time Self No Yes   Sig: TAKE 1 TABLET BY MOUTH EVERY 12 HOURS   spironolactone (ALDACTONE) 25 mg tablet 6/23/2022 at Unknown time Self No Yes   Sig: TAKE 1/2 TABLET BY MOUTH DAILY   tiZANidine (ZANAFLEX) 2 mg tablet 6/23/2022 at Unknown time Self No Yes   Sig: take 1 tablet by mouth every 8 hours if needed for muscle spasm   torsemide (DEMADEX) 20 mg tablet 6/23/2022 at Unknown time Self No Yes   Sig: take 2 tablets by mouth once daily      Facility-Administered Medications: None       Past Medical History:   Diagnosis Date    A-fib ([place] [address] )     Abnormal blood sugar     Acid reflux     Acute bronchitis     Allergic reaction     Anxiety     Arthritis     Asthma     [facility] )     Cellulitis of left lower leg     Chest pain     CH[facility]congestive heart failure) [facility])     Constipation     Depression with anxiety     Disease of thyroid gland     Diverticulitis     Dyslipidemia     Dyspnea on exertion     Elbow pain     Fracture of olecranon, open     Gastritis     Generalized pain     Hematuria     History of colonoscopy     Hypertension     Hypokalemia     Hypomagnesemia     Leg cramping     Moderate obesity     Morbid obesity due to excess calories (HCC)     Myalgia     Myositis     Nausea in adult     Nephrolithiasis     Prepatellar bursitis, unspecified knee     Screening for lipid disorders     Shortness of breath     Spasm of muscle     Thyroid trouble        Past Surgical History:   Procedure Laterality Date    CARDIAC DEFIBRILLATOR PLACEMENT      LAPAROSCOPIC CHOLECYSTECTOMY      TOTAL ABDOMINAL HYSTERECTOMY W/ BILATERAL SALPINGOOPHORECTOMY         Family History   Problem Relation Age of Onset    Hypertension Sister     Cancer Sister     Coronary artery disease Family     Diabetes type II Family     Hypertension Family      I have reviewed and agree with the history as documented  E-Cigarette/Vaping    E-Cigarette Use Never User      E-Cigarette/Vaping Substances     Social History     Tobacco Use    Smoking status: Never Smoker    Smokeless tobacco: Never Used   Vaping Use    Vaping Use: Never used   Substance Use Topics    Alcohol use: Not Currently    Drug use: No       Review of Systems   Constitutional: Negative for chills and fever  HENT: Negative for congestion and sore throat  Eyes: Negative for visual disturbance  Respiratory: Negative for cough and shortness of breath  Cardiovascular: Negative for chest pain  Gastrointestinal: Negative for abdominal pain and vomiting  Genitourinary: Negative for dysuria  Musculoskeletal: Positive for arthralgias and back pain  Skin: Negative for rash  Neurological: Positive for weakness, light-headedness and numbness  Negative for syncope  Hematological: Does not bruise/bleed easily  Psychiatric/Behavioral: The patient is nervous/anxious  All other systems reviewed and are negative  Physical Exam  Physical Exam  Constitutional:       Appearance: She is obese  HENT:      Head: Normocephalic  Right Ear: External ear normal       Left Ear: External ear normal       Nose: Nose normal       Mouth/Throat:      Mouth: Mucous membranes are moist    Eyes:      Extraocular Movements: Extraocular movements intact  Conjunctiva/sclera: Conjunctivae normal    Cardiovascular:      Rate and Rhythm: Normal rate and regular rhythm     Pulmonary:      Effort: Pulmonary effort is normal    Abdominal:      Palpations: Abdomen is soft  Tenderness: There is no abdominal tenderness  Musculoskeletal:         General: Normal range of motion  Cervical back: Normal range of motion and neck supple  Skin:     General: Skin is warm and dry  Capillary Refill: Capillary refill takes less than 2 seconds  Neurological:      General: No focal deficit present  Mental Status: She is alert and oriented to person, place, and time     Psychiatric:         Mood and Affect: Mood normal          Behavior: Behavior normal          Vital Signs  ED Triage Vitals   Temperature Pulse Respirations Blood Pressure SpO2   06/24/22 0704 06/24/22 0704 06/24/22 0704 06/24/22 0707 06/24/22 0704   98 2 °F (36 8 °C) 63 18 133/70 98 %      Temp Source Heart Rate Source Patient Position - Orthostatic VS BP Location FiO2 (%)   06/24/22 0704 06/24/22 0704 06/24/22 0704 06/24/22 0704 --   Tympanic Monitor Lying Left arm       Pain Score       06/24/22 0704       No Pain           Vitals:    06/24/22 0900 06/24/22 0930 06/24/22 1000 06/24/22 1100   BP: 101/69 102/67 91/55 108/59   Pulse: 62 64 68 66   Patient Position - Orthostatic VS: Lying Lying Lying          Visual Acuity  Visual Acuity    Flowsheet Row Most Recent Value   L Pupil Size (mm) 3   R Pupil Size (mm) 3          ED Medications  Medications - No data to display    Diagnostic Studies  Results Reviewed     Procedure Component Value Units Date/Time    HS Troponin I 2hr [[id number]]  (Normal) Collected: 06/24/22 1108    Lab Status: Final result Specimen: Blood from Arm, Right Updated: 06/24/22 1137     hs TnI 2hr 6 ng/L      Delta 2hr hsTnI 1 ng/L     HS Troponin I 4hr [[id number]]     Lab Status: No result Specimen: Blood     HS Troponin 0hr (reflex protocol) [[id number]]  (Normal) Collected: 06/24/22 0835    Lab Status: Final result Specimen: Blood from Arm, Right Updated: 06/24/22 0928     hs TnI 0hr 5 ng/L     Comprehensive metabolic panel [[id number]] Collected: 06/24/22 0835    Lab Status: Final result Specimen: Blood from Arm, Right Updated: 06/24/22 0901     Sodium 140 mmol/L      Potassium 4 5 mmol/L      Chloride 104 mmol/L      CO2 28 mmol/L      ANION GAP 8 mmol/L      BUN 22 mg/dL      Creatinine 1 12 mg/dL      Glucose 92 mg/dL      Calcium 9 4 mg/dL      AST 15 U/L      ALT 15 U/L      Alkaline Phosphatase 79 U/L      Total Protein 7 4 g/dL      Albumin 3 6 g/dL      Total Bilirubin 0 23 mg/dL      eGFR 50 ml/min/1 73sq m     Narrative:      National Kidney Disease Foundation guidelines for Chronic Kidney Disease (CKD):     Stage 1 with normal or high GFR (GFR > 90 mL/min/1 73 square meters)    Stage 2 Mild CKD (GFR = 60-89 mL/min/1 73 square meters)    Stage 3A Moderate CKD (GFR = 45-59 mL/min/1 73 square meters)    Stage 3B Moderate CKD (GFR = 30-44 mL/min/1 73 square meters)    Stage 4 Severe CKD (GFR = 15-29 mL/min/1 73 square meters)    Stage 5 End Stage CKD (GFR <15 mL/min/1 73 square meters)  Note: GFR calculation is accurate only with a steady state creatinine    Protime-INR [[id number]]  (Abnormal) Collected: 06/24/22 0835    Lab Status: Final result Specimen: Blood from Arm, Right Updated: 06/24/22 0855     Protime 16 1 seconds      INR 1 33    APTT [[id number]]  (Abnormal) Collected: 06/24/22 0835    Lab Status: Final result Specimen: Blood from Arm, Right Updated: 06/24/22 0855     PTT 40 seconds     Urine Microscopic [[id number]]  (Abnormal) Collected: 06/24/22 0835    Lab Status: Final result Specimen: Urine, Clean Catch Updated: 06/24/22 0853     RBC, UA 0-1 /hpf      WBC, UA 2-4 /hpf      Epithelial Cells Occasional /hpf      Bacteria, UA Occasional /hpf      Hyaline Casts, UA 2-4 /lpf      Fine granular casts 0-1 /lpf     UA (URINE) with reflex to Scope [[id number]]  (Abnormal) Collected: 06/24/22 0835    Lab Status: Final result Specimen: Urine, Clean Catch Updated: 06/24/22 0844     Color, UA Yellow     Clarity, UA Clear Specific [place], UA 1 020     pH, UA 6 0     Leukocytes, UA Negative     Nitrite, UA Negative     Protein, UA Negative mg/dl      Glucose, UA Negative mg/dl      Ketones, UA Negative mg/dl      Urobilinogen, UA 0 2 E U /dl      Bilirubin, UA Negative     Occult Blood, UA Trace-Intact    CBC and differential [[id number]] Collected: 06/24/22 0835    Lab Status: Final result Specimen: Blood from Arm, Right Updated: 06/24/22 0841     WBC 7 78 Thousand/uL      RBC 4 76 Million/uL      Hemoglobin 12 9 g/dL      Hematocrit 40 9 %      MCV 86 fL      MCH 27 1 pg      MCHC 31 5 g/dL      RDW 15 1 %      MPV 10 0 fL      Platelets 543 Thousands/uL      nRBC 0 /100 WBCs      Neutrophils Relative 62 %      Immat GRANS % 0 %      Lymphocytes Relative 25 %      Monocytes Relative 9 %      Eosinophils Relative 3 %      Basophils Relative 1 %      Neutrophils Absolute 4 84 Thousands/µL      Immature Grans Absolute 0 02 Thousand/uL      Lymphocytes Absolute 1 97 Thousands/µL      Monocytes Absolute 0 69 Thousand/µL      Eosinophils Absolute 0 22 Thousand/µL      Basophils Absolute 0 04 Thousands/µL                  XR chest 1 view portable   Final Result by Carol Vaca MD (06/24 3419)      No acute cardiopulmonary disease                    Workstation performed: [id number]                    Procedures  ECG 12 Lead Documentation Only    Date/Time: 6/24/2022 7:41 AM  Performed by: Zev Pompa MD  Authorized by: Zev Pompa MD     Indications / Diagnosis:  Lightheadedness  ECG reviewed by me, the ED Provider: yes    Patient location:  ED  Interpretation:     Interpretation: abnormal    Rate:     ECG rate:  65    ECG rate assessment: normal    Rhythm:     Rhythm: paced    Pacing:     Capture:  Complete    Type of pacing:  AV and aberrant pacer spikes             ED Course                               SBIRT 22yo+    Flowsheet Row Most Recent Value   SBIRT (25 yo +)    In order to provide better care to our patients, we are screening all of our patients for alcohol and drug use  Would it be okay to ask you these screening questions? No Filed at: 06/24/2022 0802                    MDM  Number of Diagnoses or Management Options  Diagnosis management comments: Multiple symptoms, patient's concern for possible cardiac cause  Will check profile      Disposition  Final diagnoses:   Lightheadedness   Back pain   Anxiety     Time reflects when diagnosis was documented in both MDM as applicable and the Disposition within this note     Time User Action Codes Description Comment    6/24/2022 12:16 PM [place]rett File Add [R42] [address]     6/24/2022 12:16 PM Kyle Sails A Add [M54 9] Back pain     6/24/2022 12:16 PM [place] File Add [F41 9] Anxiety       ED Disposition     ED Disposition   Discharge    Condition   Stable    Date/Time   Fri Jun 24, 2022 12:16 PM    Comment   Leila Connor discharge to home/self care  Follow-up Information     Follow up With Specialties Details Why Contact Info    Sada Cerna MD [facility] Medicine Schedule an appointment as soon as possible for a visit   [place] [address]  [address] Stacy Acosta MD Cardiology Schedule an appointment as soon as possible for a visit   [address] [facility]  [phone]            Patient's Medications   Discharge Prescriptions    No medications on file       No discharge procedures on file      PDMP Review     None          ED Provider  Electronically Signed by           Channing Goltz, MD  06/24/22 22 [id number]

## 2022-07-08 ENCOUNTER — TELEPHONE (OUTPATIENT)
Dept: CARDIOLOGY CLINIC | Facility: CLINIC | Age: 67
End: 2022-07-08

## 2022-07-08 ENCOUNTER — REMOTE DEVICE CLINIC VISIT (OUTPATIENT)
Dept: CARDIOLOGY CLINIC | Facility: CLINIC | Age: 67
End: 2022-07-08

## 2022-07-08 DIAGNOSIS — Z95.810 PRESENCE OF AUTOMATIC CARDIOVERTER/DEFIBRILLATOR (AICD): Primary | ICD-10-CM

## 2022-07-08 PROCEDURE — RECHECK: Performed by: INTERNAL MEDICINE

## 2022-07-08 NOTE — TELEPHONE ENCOUNTER
----- Message from John Dang MD sent at 7/8/2022  2:43 PM EDT -----  Patient's battery is reaching close to elective replacement index  It is about 9 months to 1 year  She will be watched more closely

## 2022-07-08 NOTE — PROGRESS NOTES
Results for orders placed or performed in visit on 07/08/22   Cardiac EP device report    Narrative    SJM BI-V ICD/ACTIVE SYSTEM IS MRI CONDITIONAL  MERLIN TRANSMISSION: N/B BATTERY VOLTAGE NEARING KEITH  WILL SCHEDULE MONTHLY BATTERY CHECKS  (1 YR) AP 12%  90%  ALL AVAILABLE LEAD PARAMETERS WITHIN NORMAL LIMITS  [address] <30 SEC (ATRIAL NOISE, HX OF THE SAME)  NO SIGNIFICANT HIGH RATE EPISODES  CORVUE IMPEDANCE MONITORING WITHIN NORMAL LIMITS  NORMAL DEVICE FUNCTION

## 2022-07-10 DIAGNOSIS — I50.42 CHRONIC COMBINED SYSTOLIC AND DIASTOLIC HEART FAILURE, NYHA CLASS 2 (HCC): ICD-10-CM

## 2022-07-11 RX ORDER — MIDODRINE HYDROCHLORIDE 2.5 MG/1
TABLET ORAL
Qty: 30 TABLET | Refills: 5 | Status: SHIPPED | OUTPATIENT
Start: 2022-07-11

## 2022-07-27 ENCOUNTER — TELEPHONE (OUTPATIENT)
Dept: CARDIOLOGY CLINIC | Facility: CLINIC | Age: 67
End: 2022-07-27

## 2022-07-27 ENCOUNTER — APPOINTMENT (EMERGENCY)
Dept: RADIOLOGY | Facility: HOSPITAL | Age: 67
DRG: 291 | End: 2022-07-27
Payer: COMMERCIAL

## 2022-07-27 ENCOUNTER — HOSPITAL ENCOUNTER (INPATIENT)
Facility: HOSPITAL | Age: 67
LOS: 2 days | Discharge: HOME HEALTH | DRG: 291 | End: 2022-07-29
Attending: EMERGENCY MEDICINE | Admitting: FAMILY MEDICINE
Payer: COMMERCIAL

## 2022-07-27 DIAGNOSIS — R06.09 DYSPNEA ON EXERTION: ICD-10-CM

## 2022-07-27 DIAGNOSIS — E03.4 HYPOTHYROIDISM DUE TO ACQUIRED ATROPHY OF THYROID: ICD-10-CM

## 2022-07-27 DIAGNOSIS — J45.20 MILD INTERMITTENT ASTHMA WITHOUT COMPLICATION: Primary | ICD-10-CM

## 2022-07-27 DIAGNOSIS — I50.42 CHRONIC COMBINED SYSTOLIC AND DIASTOLIC CONGESTIVE HEART FAILURE (HCC): ICD-10-CM

## 2022-07-27 DIAGNOSIS — R42 POSTURAL DIZZINESS WITH PRESYNCOPE: ICD-10-CM

## 2022-07-27 DIAGNOSIS — I47.20 V-TACH: ICD-10-CM

## 2022-07-27 DIAGNOSIS — N18.9 CKD (CHRONIC KIDNEY DISEASE): ICD-10-CM

## 2022-07-27 DIAGNOSIS — I48.0 PAROXYSMAL ATRIAL FIBRILLATION (HCC): ICD-10-CM

## 2022-07-27 DIAGNOSIS — I50.9 ACUTE ON CHRONIC CONGESTIVE HEART FAILURE (HCC): ICD-10-CM

## 2022-07-27 DIAGNOSIS — Z45.02 AICD DISCHARGE: ICD-10-CM

## 2022-07-27 DIAGNOSIS — R55 POSTURAL DIZZINESS WITH PRESYNCOPE: ICD-10-CM

## 2022-07-27 DIAGNOSIS — I42.0 DILATED CARDIOMYOPATHY (HCC): ICD-10-CM

## 2022-07-27 DIAGNOSIS — E78.5 DYSLIPIDEMIA: ICD-10-CM

## 2022-07-27 DIAGNOSIS — I10 ESSENTIAL HYPERTENSION: ICD-10-CM

## 2022-07-27 LAB
2HR DELTA HS TROPONIN: 8 NG/L
4HR DELTA HS TROPONIN: 10 NG/L
ANION GAP SERPL CALCULATED.3IONS-SCNC: 8 MMOL/L (ref 4–13)
ATRIAL RATE: 83 BPM
BASOPHILS # BLD AUTO: 0.05 THOUSANDS/ΜL (ref 0–0.1)
BASOPHILS NFR BLD AUTO: 0 % (ref 0–1)
BUN SERPL-MCNC: 26 MG/DL (ref 5–25)
CALCIUM SERPL-MCNC: 9.2 MG/DL (ref 8.3–10.1)
CARDIAC TROPONIN I PNL SERPL HS: 10 NG/L
CARDIAC TROPONIN I PNL SERPL HS: 18 NG/L
CARDIAC TROPONIN I PNL SERPL HS: 20 NG/L
CHLORIDE SERPL-SCNC: 106 MMOL/L (ref 96–108)
CO2 SERPL-SCNC: 29 MMOL/L (ref 21–32)
CREAT SERPL-MCNC: 1.4 MG/DL (ref 0.6–1.3)
EOSINOPHIL # BLD AUTO: 0.24 THOUSAND/ΜL (ref 0–0.61)
EOSINOPHIL NFR BLD AUTO: 2 % (ref 0–6)
ERYTHROCYTE [DISTWIDTH] IN BLOOD BY AUTOMATED COUNT: 15.6 % (ref 11.6–15.1)
GFR SERPL CREATININE-BSD FRML MDRD: 38 ML/MIN/1.73SQ M
GLUCOSE SERPL-MCNC: 113 MG/DL (ref 65–140)
HCT VFR BLD AUTO: 39.8 % (ref 34.8–46.1)
HGB BLD-MCNC: 12.3 G/DL (ref 11.5–15.4)
IMM GRANULOCYTES # BLD AUTO: 0.06 THOUSAND/UL (ref 0–0.2)
IMM GRANULOCYTES NFR BLD AUTO: 1 % (ref 0–2)
LYMPHOCYTES # BLD AUTO: 2.06 THOUSANDS/ΜL (ref 0.6–4.47)
LYMPHOCYTES NFR BLD AUTO: 17 % (ref 14–44)
MAGNESIUM SERPL-MCNC: 2.3 MG/DL (ref 1.6–2.6)
MCH RBC QN AUTO: 26.6 PG (ref 26.8–34.3)
MCHC RBC AUTO-ENTMCNC: 30.9 G/DL (ref 31.4–37.4)
MCV RBC AUTO: 86 FL (ref 82–98)
MONOCYTES # BLD AUTO: 0.8 THOUSAND/ΜL (ref 0.17–1.22)
MONOCYTES NFR BLD AUTO: 7 % (ref 4–12)
NEUTROPHILS # BLD AUTO: 8.7 THOUSANDS/ΜL (ref 1.85–7.62)
NEUTS SEG NFR BLD AUTO: 73 % (ref 43–75)
NRBC BLD AUTO-RTO: 0 /100 WBCS
P AXIS: 42 DEGREES
PLATELET # BLD AUTO: 311 THOUSANDS/UL (ref 149–390)
PMV BLD AUTO: 9.8 FL (ref 8.9–12.7)
POTASSIUM SERPL-SCNC: 3.6 MMOL/L (ref 3.5–5.3)
PR INTERVAL: 172 MS
QRS AXIS: -29 DEGREES
QRSD INTERVAL: 172 MS
QT INTERVAL: 494 MS
QTC INTERVAL: 580 MS
RBC # BLD AUTO: 4.63 MILLION/UL (ref 3.81–5.12)
SARS-COV-2 RNA RESP QL NAA+PROBE: NEGATIVE
SODIUM SERPL-SCNC: 143 MMOL/L (ref 135–147)
T WAVE AXIS: 97 DEGREES
VENTRICULAR RATE: 83 BPM
WBC # BLD AUTO: 11.91 THOUSAND/UL (ref 4.31–10.16)

## 2022-07-27 PROCEDURE — 80048 BASIC METABOLIC PNL TOTAL CA: CPT | Performed by: EMERGENCY MEDICINE

## 2022-07-27 PROCEDURE — 84484 ASSAY OF TROPONIN QUANT: CPT

## 2022-07-27 PROCEDURE — 96374 THER/PROPH/DIAG INJ IV PUSH: CPT

## 2022-07-27 PROCEDURE — U0003 INFECTIOUS AGENT DETECTION BY NUCLEIC ACID (DNA OR RNA); SEVERE ACUTE RESPIRATORY SYNDROME CORONAVIRUS 2 (SARS-COV-2) (CORONAVIRUS DISEASE [COVID-19]), AMPLIFIED PROBE TECHNIQUE, MAKING USE OF HIGH THROUGHPUT TECHNOLOGIES AS DESCRIBED BY CMS-2020-01-R: HCPCS

## 2022-07-27 PROCEDURE — 99223 1ST HOSP IP/OBS HIGH 75: CPT | Performed by: INTERNAL MEDICINE

## 2022-07-27 PROCEDURE — 83735 ASSAY OF MAGNESIUM: CPT | Performed by: EMERGENCY MEDICINE

## 2022-07-27 PROCEDURE — 99223 1ST HOSP IP/OBS HIGH 75: CPT | Performed by: FAMILY MEDICINE

## 2022-07-27 PROCEDURE — 71045 X-RAY EXAM CHEST 1 VIEW: CPT

## 2022-07-27 PROCEDURE — 85025 COMPLETE CBC W/AUTO DIFF WBC: CPT | Performed by: EMERGENCY MEDICINE

## 2022-07-27 PROCEDURE — 36415 COLL VENOUS BLD VENIPUNCTURE: CPT | Performed by: EMERGENCY MEDICINE

## 2022-07-27 PROCEDURE — 99285 EMERGENCY DEPT VISIT HI MDM: CPT

## 2022-07-27 PROCEDURE — 93005 ELECTROCARDIOGRAM TRACING: CPT

## 2022-07-27 PROCEDURE — U0005 INFEC AGEN DETEC AMPLI PROBE: HCPCS

## 2022-07-27 PROCEDURE — 99285 EMERGENCY DEPT VISIT HI MDM: CPT | Performed by: EMERGENCY MEDICINE

## 2022-07-27 PROCEDURE — 93010 ELECTROCARDIOGRAM REPORT: CPT | Performed by: INTERNAL MEDICINE

## 2022-07-27 PROCEDURE — 84484 ASSAY OF TROPONIN QUANT: CPT | Performed by: EMERGENCY MEDICINE

## 2022-07-27 PROCEDURE — 94640 AIRWAY INHALATION TREATMENT: CPT

## 2022-07-27 RX ORDER — PANTOPRAZOLE SODIUM 40 MG/1
40 TABLET, DELAYED RELEASE ORAL
Status: DISCONTINUED | OUTPATIENT
Start: 2022-07-27 | End: 2022-07-29 | Stop reason: HOSPADM

## 2022-07-27 RX ORDER — BUDESONIDE AND FORMOTEROL FUMARATE DIHYDRATE 160; 4.5 UG/1; UG/1
2 AEROSOL RESPIRATORY (INHALATION) 2 TIMES DAILY
Status: DISCONTINUED | OUTPATIENT
Start: 2022-07-27 | End: 2022-07-29 | Stop reason: HOSPADM

## 2022-07-27 RX ORDER — FUROSEMIDE 10 MG/ML
40 INJECTION INTRAMUSCULAR; INTRAVENOUS 2 TIMES DAILY
Status: DISCONTINUED | OUTPATIENT
Start: 2022-07-27 | End: 2022-07-27

## 2022-07-27 RX ORDER — ALPRAZOLAM 0.5 MG/1
0.5 TABLET ORAL 2 TIMES DAILY PRN
Status: DISCONTINUED | OUTPATIENT
Start: 2022-07-27 | End: 2022-07-29 | Stop reason: HOSPADM

## 2022-07-27 RX ORDER — LEVALBUTEROL INHALATION SOLUTION 1.25 MG/3ML
1.25 SOLUTION RESPIRATORY (INHALATION) ONCE
Qty: 3 ML | Refills: 0 | Status: SHIPPED | OUTPATIENT
Start: 2022-07-27 | End: 2022-07-29

## 2022-07-27 RX ORDER — ALPRAZOLAM 0.25 MG/1
0.25 TABLET ORAL ONCE
Status: COMPLETED | OUTPATIENT
Start: 2022-07-27 | End: 2022-07-27

## 2022-07-27 RX ORDER — ONDANSETRON 2 MG/ML
4 INJECTION INTRAMUSCULAR; INTRAVENOUS EVERY 6 HOURS PRN
Status: DISCONTINUED | OUTPATIENT
Start: 2022-07-27 | End: 2022-07-29 | Stop reason: HOSPADM

## 2022-07-27 RX ORDER — ACETAMINOPHEN 325 MG/1
650 TABLET ORAL EVERY 6 HOURS PRN
Status: DISCONTINUED | OUTPATIENT
Start: 2022-07-27 | End: 2022-07-29 | Stop reason: HOSPADM

## 2022-07-27 RX ORDER — LEVALBUTEROL 1.25 MG/.5ML
1.25 SOLUTION, CONCENTRATE RESPIRATORY (INHALATION) ONCE
Status: COMPLETED | OUTPATIENT
Start: 2022-07-27 | End: 2022-07-27

## 2022-07-27 RX ORDER — POTASSIUM CHLORIDE 750 MG/1
10 TABLET, FILM COATED, EXTENDED RELEASE ORAL DAILY
Status: DISCONTINUED | OUTPATIENT
Start: 2022-07-28 | End: 2022-07-27 | Stop reason: CLARIF

## 2022-07-27 RX ORDER — LEVALBUTEROL INHALATION SOLUTION 0.63 MG/3ML
0.63 SOLUTION RESPIRATORY (INHALATION) EVERY 6 HOURS PRN
Status: DISCONTINUED | OUTPATIENT
Start: 2022-07-27 | End: 2022-07-29 | Stop reason: HOSPADM

## 2022-07-27 RX ORDER — POTASSIUM CHLORIDE 750 MG/1
10 TABLET, EXTENDED RELEASE ORAL DAILY
Status: DISCONTINUED | OUTPATIENT
Start: 2022-07-28 | End: 2022-07-29 | Stop reason: HOSPADM

## 2022-07-27 RX ORDER — ALPRAZOLAM 0.25 MG/1
0.25 TABLET ORAL 2 TIMES DAILY PRN
Status: DISCONTINUED | OUTPATIENT
Start: 2022-07-27 | End: 2022-07-27

## 2022-07-27 RX ORDER — PRAVASTATIN SODIUM 40 MG
40 TABLET ORAL
Status: DISCONTINUED | OUTPATIENT
Start: 2022-07-27 | End: 2022-07-29 | Stop reason: HOSPADM

## 2022-07-27 RX ORDER — POTASSIUM CHLORIDE 20 MEQ/1
40 TABLET, EXTENDED RELEASE ORAL ONCE
Status: COMPLETED | OUTPATIENT
Start: 2022-07-27 | End: 2022-07-27

## 2022-07-27 RX ORDER — TIZANIDINE 2 MG/1
2 TABLET ORAL EVERY 8 HOURS PRN
Status: DISCONTINUED | OUTPATIENT
Start: 2022-07-27 | End: 2022-07-29 | Stop reason: HOSPADM

## 2022-07-27 RX ORDER — SOTALOL HYDROCHLORIDE 80 MG/1
80 TABLET ORAL EVERY 12 HOURS
Status: DISCONTINUED | OUTPATIENT
Start: 2022-07-27 | End: 2022-07-29 | Stop reason: HOSPADM

## 2022-07-27 RX ORDER — LEVOTHYROXINE SODIUM 0.15 MG/1
150 TABLET ORAL DAILY
Status: DISCONTINUED | OUTPATIENT
Start: 2022-07-28 | End: 2022-07-29 | Stop reason: HOSPADM

## 2022-07-27 RX ORDER — IPRATROPIUM BROMIDE AND ALBUTEROL SULFATE 2.5; .5 MG/3ML; MG/3ML
3 SOLUTION RESPIRATORY (INHALATION)
Status: DISCONTINUED | OUTPATIENT
Start: 2022-07-27 | End: 2022-07-27

## 2022-07-27 RX ORDER — MIDAZOLAM HYDROCHLORIDE 2 MG/2ML
1 INJECTION, SOLUTION INTRAMUSCULAR; INTRAVENOUS ONCE
Status: COMPLETED | OUTPATIENT
Start: 2022-07-27 | End: 2022-07-27

## 2022-07-27 RX ORDER — FUROSEMIDE 10 MG/ML
60 INJECTION INTRAMUSCULAR; INTRAVENOUS 2 TIMES DAILY
Status: DISCONTINUED | OUTPATIENT
Start: 2022-07-27 | End: 2022-07-28

## 2022-07-27 RX ADMIN — MIDAZOLAM 1 MG: 1 INJECTION INTRAMUSCULAR; INTRAVENOUS at 02:56

## 2022-07-27 RX ADMIN — ALPRAZOLAM 0.25 MG: 0.25 TABLET ORAL at 16:44

## 2022-07-27 RX ADMIN — BUDESONIDE AND FORMOTEROL FUMARATE DIHYDRATE 2 PUFF: 160; 4.5 AEROSOL RESPIRATORY (INHALATION) at 10:05

## 2022-07-27 RX ADMIN — PRAVASTATIN SODIUM 40 MG: 40 TABLET ORAL at 15:57

## 2022-07-27 RX ADMIN — ALPRAZOLAM 0.25 MG: 0.25 TABLET ORAL at 23:37

## 2022-07-27 RX ADMIN — RIVAROXABAN 20 MG: 20 TABLET, FILM COATED ORAL at 10:04

## 2022-07-27 RX ADMIN — POTASSIUM CHLORIDE 40 MEQ: 1500 TABLET, EXTENDED RELEASE ORAL at 06:35

## 2022-07-27 RX ADMIN — PANTOPRAZOLE SODIUM 40 MG: 40 TABLET, DELAYED RELEASE ORAL at 10:04

## 2022-07-27 RX ADMIN — IPRATROPIUM BROMIDE 0.5 MG: 0.5 SOLUTION RESPIRATORY (INHALATION) at 04:38

## 2022-07-27 RX ADMIN — ACETAMINOPHEN 650 MG: 325 TABLET, FILM COATED ORAL at 15:57

## 2022-07-27 RX ADMIN — LEVALBUTEROL HYDROCHLORIDE 1.25 MG: 1.25 SOLUTION, CONCENTRATE RESPIRATORY (INHALATION) at 05:11

## 2022-07-27 RX ADMIN — FUROSEMIDE 40 MG: 10 INJECTION, SOLUTION INTRAMUSCULAR; INTRAVENOUS at 10:04

## 2022-07-27 RX ADMIN — BUDESONIDE AND FORMOTEROL FUMARATE DIHYDRATE 2 PUFF: 160; 4.5 AEROSOL RESPIRATORY (INHALATION) at 17:24

## 2022-07-27 NOTE — PLAN OF CARE
Problem: RESPIRATORY - ADULT  Goal: Achieves optimal ventilation and oxygenation  Description: INTERVENTIONS:  - Assess for changes in respiratory status  - Assess for changes in mentation and behavior  - Position to facilitate oxygenation and minimize respiratory effort  - Oxygen administered by appropriate delivery if ordered  - Initiate smoking cessation education as indicated  - Encourage broncho-pulmonary hygiene including cough, deep breathe, Incentive Spirometry  - Assess the need for suctioning and aspirate as needed  - Assess and instruct to report SOB or any respiratory difficulty  - Respiratory Therapy support as indicated  Outcome: Progressing     Problem: CARDIOVASCULAR - ADULT  Goal: Maintains optimal cardiac output and hemodynamic stability  Description: INTERVENTIONS:  - Monitor I/O, vital signs and rhythm  - Monitor for S/S and trends of decreased cardiac output  - Administer and titrate ordered vasoactive medications to optimize hemodynamic stability  - Assess quality of pulses, skin color and temperature  - Assess for signs of decreased coronary artery perfusion  - Instruct patient to report change in severity of symptoms  Outcome: Progressing  Goal: Absence of cardiac dysrhythmias or at baseline rhythm  Description: INTERVENTIONS:  - Continuous cardiac monitoring, vital signs, obtain 12 lead EKG if ordered  - Administer antiarrhythmic and heart rate control medications as ordered  - Monitor electrolytes and administer replacement therapy as ordered  Outcome: Progressing     Problem: MOBILITY - ADULT  Goal: Maintain or return to baseline ADL function  Description: INTERVENTIONS:  -  Assess patient's ability to carry out ADLs; assess patient's baseline for ADL function and identify physical deficits which impact ability to perform ADLs (bathing, care of mouth/teeth, toileting, grooming, dressing, etc )  - Assess/evaluate cause of self-care deficits   - Assess range of motion  - Assess patient's mobility; develop plan if impaired  - Assess patient's need for assistive devices and provide as appropriate  - Encourage maximum independence but intervene and supervise when necessary  - Involve family in performance of ADLs  - Assess for home care needs following discharge   - Consider OT consult to assist with ADL evaluation and planning for discharge  - Provide patient education as appropriate  Outcome: Progressing  Goal: Maintains/Returns to pre admission functional level  Description: INTERVENTIONS:  - Perform BMAT or MOVE assessment daily    - Set and communicate daily mobility goal to care team and patient/family/caregiver  - Collaborate with rehabilitation services on mobility goals if consulted  - Perform Range of Motion 2 times a day  - Reposition patient every 2 hours    - Dangle patient 2 times a day  - Stand patient 2 times a day  - Ambulate patient 2 times a day  - Out of bed to chair 2 times a day   - Out of bed for meals 2 times a day  - Out of bed for toileting  - Record patient progress and toleration of activity level   Outcome: Progressing     Problem: Potential for Falls  Goal: Patient will remain free of falls  Description: INTERVENTIONS:  - Educate patient/family on patient safety including physical limitations  - Instruct patient to call for assistance with activity   - Consult OT/PT to assist with strengthening/mobility   - Keep Call bell within reach  - Keep bed low and locked with side rails adjusted as appropriate  - Keep care items and personal belongings within reach  - Initiate and maintain comfort rounds  - Make Fall Risk Sign visible to staff  - Offer Toileting every 2 Hours, in advance of need  - Initiate/Maintain bed alarm  - Obtain necessary fall risk management equipment: fall risk   - Apply yellow socks and bracelet for high fall risk patients  - Consider moving patient to room near nurses station  Outcome: Progressing

## 2022-07-27 NOTE — H&P
[address]  H&P- Sherice Dears 1955, 79 y o  female MRN: [id number]  Unit/Bed#: ED CT2 Encounter: [id number]  Primary Care Provider: Giuseppe Parks MD   Date and time admitted to hospital: 7/27/2022  2:24 AM    * NSVT (nonsustained ventricular tachycardia) [facility])  Assessment & Plan  Patient presents after episode of lightheadedness and AICD discharge  · Biventricular ICD device interrogated, showed 7 second run of v tach  · EKG shows v paced rhythm, HR 83  · History of paroxysmal afib and dilated cardiomyopathy  · Trend troponin  · Continue current medications  · Monitor on telemetry  · Optimize electrolytes  · Consult cardiology    Acute on chronic congestive heart failure ([facility] )  Assessment & Plan  Wt Readings from Last 3 Encounters:   06/24/22 107 kg (236 lb)   06/10/22 108 kg (237 lb)   05/17/22 107 kg (236 lb 5 3 oz)     Patient with increasing SOB, leg swelling over the last week  · Start lasix 40mg BID  · Continue entresto 97/103mg bid  Daily weights, I/Os  Fluid, sodium restriction  · Cardiology consult          CKD (chronic kidney disease)  Assessment & Plan  Lab Results   Component Value Date    EGFR 38 07/27/2022    EGFR 50 06/24/2022    EGFR 60 05/17/2022    CREATININE 1 40 (H) 07/27/2022    CREATININE 1 12 06/24/2022    CREATININE 0 97 05/17/2022   Baseline creatinine 1 0-1 3, creatinine slightly above baseline  · Monitor with BMP    Hypothyroidism  Assessment & Plan  Continue levothyroxine 150mcg    Paroxysmal atrial fibrillation (HCC)  Assessment & Plan  Continue sotalol, metoprolol, xarelto  · Telemetry  · Optimize electrolytes    Hypertension  Assessment & Plan  Continue metoprolol 25mg q12, sotalol 80mg q12 with hold parameters    Dyslipidemia  Assessment & Plan  Continue statin    Dilated cardiomyopathy ([facility] )  Assessment & Plan  2D echo 4/22/2022 showed severely dilated LV with EF 20%, global hypokinesis with regional variation, diastolic function is abnormal, severely dilated LA, moderately dilated RA, severe MR, mild-to-moderate TR,moderately to severely elevated PA pressure  · Continue entresto      Mild intermittent asthma without complication  Assessment & Plan  Continue symbicort, xoponex prn    VTE Pharmacologic Prophylaxis: VTE Score: 5 High Risk (Score >/= 5) - Pharmacological DVT Prophylaxis Ordered: rivaroxaban (Xarelto)  Sequential Compression Devices Ordered  Code Status: Full code  Discussion with family: Updated  (son) at bedside  Anticipated Length of Stay: Patient will be admitted on an inpatient basis with an anticipated length of stay of greater than 2 midnights secondary to v tach, AICD discharge  Total Time for Visit, including Counseling / Coordination of Care: 45 minutes Greater than 50% of this total time spent on direct patient counseling and coordination of care  Chief Complaint: AICD discharge    History of Present Illness:  Eulalia Sarah is a 79 y o  female with a PMH of HTN, a fib, CHF, cardiomyopathy with AICD who presents with AICD discharge  Patient states she was ambulating back from the bathroom and felt lightheaded and then felt her AICD shock her  She denies any chest pain, SOB  She does note dyspnea on exertion and swelling of her legs over the past week  Denies any fevers, chills, cough, abdominal pain, constipation/diarrhea, dysuria, hematuria  Review of Systems:  Review of Systems   Constitutional: Negative for chills and fever  HENT: Negative for ear pain and sore throat  Eyes: Negative for pain and visual disturbance  Respiratory: Positive for shortness of breath  Negative for cough  Cardiovascular: Positive for leg swelling  Negative for chest pain and palpitations  Gastrointestinal: Negative for abdominal pain and vomiting  Genitourinary: Negative for dysuria and hematuria  Musculoskeletal: Negative for arthralgias and back pain  Skin: Negative for color change and rash  Neurological: Positive for light-headedness  Negative for dizziness and headaches  All other systems reviewed and are negative  Past Medical and Surgical History:   Past Medical History:   Diagnosis Date    A-fib ([place] [address] )     Abnormal blood sugar     Acid reflux     Acute bronchitis     Allergic reaction     Anxiety     Arthritis     Asthma     Cardiomyopathy ([facility] )     Cellulitis of left lower leg     Chest pain     CH[facility]congest[facility] failure[facility] ([facility])     Constipation     Depression with anxiety     Disease of thyroid gland     Diverticulitis     Dyslipidemia     Dyspnea on exertion     Elbow pain     Fracture of olecranon, open     Gastritis     Generalized pain     Hematuria     History of colonoscopy     Hypertension     Hypokalemia     Hypomagnesemia     Leg cramping     Moderate obesity     Morbid obesity due to excess calories ([facility])     Myalgia     Myositis     Nausea in adult     Nephrolithiasis     Prepatellar bursitis, unspecified knee     Screening for lipid disorders     Shortness of breath     Spasm of muscle     Thyroid trouble        Past Surgical History:   Procedure Laterality Date    CARDIAC DEFIBRILLATOR PLACEMENT      LAPAROSCOPIC CHOLECYSTECTOMY      TOTAL ABDOMINAL HYSTERECTOMY W/ BILATERAL SALPINGOOPHORECTOMY         Meds/Allergies:  Prior to Admission medications    Medication Sig Start Date End Date Taking? Authorizing Provider   levalbuterol (Xopenex) 1 25 mg/3 mL nebulizer solution Take 3 mL (1 25 mg total) by nebulization 1 (one) time for 1 dose 7/27/22 7/27/22 Yes Corwin Garcia MD   ALPRAZolam [place]) 0 25 mg tablet Take 0 25 mg by mouth daily as needed 3/28/22   Historical Provider, MD   budesonide-formoterol (SYMBICORT) 160-4 5 mcg/act inhaler Inhale 2 puffs 2 (two) times a day Rinse mouth after use   10/4/18   Terry Phan MD   diclofenac sodium (VOLTAREN) 1 % Apply 2 g topically 4 (four) times a day 5/8/20   Milly Luna MD Entresto  MG TABS take 1 tablet by mouth twice a day 3/14/22   Chase Tipton MD   [facility]erol [facility]) 45 mcg/act inhaler Inhale 2 puffs every 6 (six) hours as needed for shortness of breath 10/4/18   Emmanuel Mata MD   levothyroxine (Synthroid) 150 mcg tablet Take 1 tablet (150 mcg total) by mouth in the morning  5/20/22 6/24/22  Sophy Flores PA-C   Magnesium 100 MG CAPS Take by mouth    Historical Provider, MD   metoprolol tartrate (LOPRESSOR) 25 mg tablet Take 0 5 tablets (12 5 mg total) by mouth every 12 (twelve) hours 5/17/22 6/16/22  Sophy Flores PA-C   midodrine (PROAMATINE) 2 5 mg tablet take 1 tablet by mouth twice a day if needed 7/11/22   Chase Tipton MD   pantoprazole (PROTONIX) 40 mg tablet take 1 tablet by mouth once daily 8/2/21   Jennifer Azevedo DO   potassium chloride (K-DUR) 10 mEq tablet take 1 tablet by mouth once daily 8/7/20   Veronica Damico DO   pravastatin (PRAVACHOL) 40 mg tablet take 1 tablet by mouth once daily 2/28/22   Chase Tipton MD   sotalol (BETAPACE) 80 mg tablet TAKE 1 TABLET BY MOUTH EVERY 12 HOURS 10/24/21   Chase Tipton MD   spironolactone (ALDACTONE) 25 mg tablet TAKE 1/2 TABLET BY MOUTH DAILY 6/2/22   Chase Tipton MD   tiZANidine (ZANAFLEX) 2 mg tablet take 1 tablet by mouth every 8 hours if needed for muscle spasm 2/22/21   Jennifer Azevedo DO   torsemide [facility]) 20 mg tablet take 2 tablets by mouth once daily 10/12/21   Chase Tipton MD   Xarelto 20 MG tablet take 1 tablet by mouth once daily 5/28/22   Chase Tipton MD   Empagliflozin (Jardiance) 10 MG TABS Take 1 tablet (10 mg total) by mouth every morning 5/2/22 5/17/22  Chase Tipton MD     I have reviewed home medications with patient personally  Allergies:    Allergies   Allergen Reactions    Carvedilol Palpitations, Other (See Comments), Dizziness and Edema     Started several days after initiation of carvedilol 3 125 milligrams bid with duration of complaints approximately 36 hours  Patient also had numbness and tingling of hands and feet   Omnipaque [Iohexol] Swelling    Penicillins Swelling    Iv Dye  [Iodinated Diagnostic Agents] Throat Swelling    Jardiance [Empagliflozin] Throat Swelling    Metoprolol Throat Swelling    Other Swelling     Fresh herbs "basil, cilantro"    Shellfish-Derived Products - Food Allergy Hives       Social History:  Marital Status: Single   Occupation:   Patient Pre-hospital Living Situation: Home  Patient Pre-hospital Level of Mobility: walks  Patient Pre-hospital Diet Restrictions: none  Substance Use History:   Social History     Substance and Sexual Activity   Alcohol Use Not Currently     Social History     Tobacco Use   Smoking Status Never Smoker   Smokeless Tobacco Never Used     Social History     Substance and Sexual Activity   Drug Use No       Family History:  Family History   Problem Relation Age of Onset    Hypertension Sister     Cancer Sister     Coronary artery disease Family     Diabetes type II Family     Hypertension Family        Physical Exam:     Vitals:   Blood Pressure: 105/62 (07/27/22 0438)  Pulse: 64 (07/27/22 0438)  Temperature: 98 6 °F (37 °C) (07/27/22 0231)  Temp Source: Oral (07/27/22 0231)  Respirations: 18 (07/27/22 0438)  SpO2: 95 % (07/27/22 0438)    Physical Exam  Vitals reviewed  Constitutional:       General: She is not in acute distress  Appearance: She is well-developed  HENT:      Head: Normocephalic and atraumatic  Eyes:      Conjunctiva/sclera: Conjunctivae normal    Cardiovascular:      Rate and Rhythm: Normal rate and regular rhythm  Heart sounds: No murmur heard  Pulmonary:      Effort: Pulmonary effort is normal  No respiratory distress  Breath sounds: Rales present  Abdominal:      Palpations: Abdomen is soft  Tenderness: There is no abdominal tenderness  Skin:     General: Skin is warm and dry  Neurological:      Mental Status: She is alert  Additional Data:     Lab Results:  Results from last 7 days   Lab Units 07/27/22  0255   WBC Thousand/uL 11 91*   HEMOGLOBIN g/dL 12 3   HEMATOCRIT % 39 8   PLATELETS Thousands/uL 311   NEUTROS PCT % 73   LYMPHS PCT % 17   MONOS PCT % 7   EOS PCT % 2     Results from last 7 days   Lab Units 07/27/22  0255   SODIUM mmol/L 143   POTASSIUM mmol/L 3 6   CHLORIDE mmol/L 106   CO2 mmol/L 29   BUN mg/dL 26*   CREATININE mg/dL 1 40*   ANION GAP mmol/L 8   CALCIUM mg/dL 9 2   GLUCOSE RANDOM mg/dL 113                       Imaging: Personally reviewed the following imaging: chest xray  XR chest 1 view portable    (Results Pending)       EKG and Other Studies Reviewed on Admission:   · EKG: v paced HR 83     ** Please Note: This note has been constructed using a voice recognition system   **

## 2022-07-27 NOTE — ASSESSMENT & PLAN NOTE
Patient presents after episode of lightheadedness and AICD discharge  · Biventricular ICD device interrogated, showed 9 5 second run of v tach at rate of 272 bpm  · History of paroxysmal afib and dilated cardiomyopathy  · Troponins normal    · Follow up with own cardiologist after discharge  Episode likely due to CHF exacerbation  · Telemetry and electrolytes stable    · Encouraged follow up with advanced heart failure team at [place] for cardiac MRI and cardiac catheterization

## 2022-07-27 NOTE — PROGRESS NOTES
[address] Internal Medicine Progress Note  Patient: Tone Bauer 79 y o  female   MRN: [id number]  PCP: Juanito Cosme MD  Unit/Bed#: 2 1633 [facility] Encounter: [id number]  Date Of Visit: 07/28/22    Problem List:    Principal Problem:    NSVT (nonsustained ventricular tachycardia) ([place] [address] )  Active Problems:    Acute on chronic congestive heart failure ([place] [address] )    CKD (chronic kidney disease)    Mild intermittent asthma without complication    Dilated cardiomyopathy ([place] [address] )    Dyslipidemia    Hypertension    Paroxysmal atrial fibrillation (HCC)    Hypothyroidism      Assessment & Plan:    * NSVT (nonsustained ventricular tachycardia) ([place] [address] )  Assessment & Plan  Patient presents after episode of lightheadedness and AICD discharge  · Biventricular ICD device interrogated, showed 9 5 second run of v tach at rate of 272 bpm  · History of paroxysmal afib and dilated cardiomyopathy  · Troponins normal    · Cardiology input appreciated  Episode likely due to CHF exacerbation  · Continue to monitor telemetry and electrolytes  · Encouraged follow up with advanced heart failure team at [facility] for cardiac MRI and cardiac catheterization    Acute on chronic congestive heart failure ([place] [address] )  Assessment & Plan  Wt Readings from Last 3 Encounters:   07/28/22 108 kg (238 lb 1 6 oz)   06/24/22 107 kg (236 lb)   06/10/22 108 kg (237 lb)     Patient with increasing SOB on admission, leg swelling and weight gain over the last week  · Cardiology input appreciated  Recommend increasing Lasix to 60mg BID  · Continue entresto 97/103mg bid  On 60 mg of IV Lasix b i d  Which will be transitioned to [facility] starting tomorrow  · Continue daily weights and monitor I/O  Fluid and sodium restriction   Follow up with advanced heart failure team upon discharge             CKD (chronic kidney disease)  Assessment & Plan  Lab Results   Component Value Date    EGFR 47 07/28/2022    EGFR 38 07/27/2022    EGFR 50 06/24/2022    CREATININE 1 18 07/28/2022 CREATININE 1 40 (H) 07/27/2022    CREATININE 1 12 06/24/2022     Baseline creatinine 1 0-1 3, creatinine slightly above baseline on admission at 1 4  · Trending down  Repeat lab work in a m  Hypothyroidism  Assessment & Plan  Continue levothyroxine    Paroxysmal atrial fibrillation (HCC)  Assessment & Plan  Continue sotalol, metoprolol, xarelto  · Monitor on telemetry    Hypertension  Assessment & Plan  Continue lopressor, Entresto  Restarted Aldactone    Dyslipidemia  Assessment & Plan  Continue Pravachol    Dilated cardiomyopathy [facility])  Assessment & Plan  2D echo 4/22/2022 showed severely dilated LV with EF 20%, global hypokinesis with regional variation, diastolic function is abnormal, severely dilated LA, moderately dilated RA, severe MR, mild-to-moderate TR,moderately to severely elevated PA pressure  · Continue Rubye Cooler  · Cardiology input appreciated  Mild intermittent asthma without complication  Assessment & Plan  Continue Symbicort, levalbuterol          VTE Pharmacologic Prophylaxis: VTE Score: 5 High Risk (Score >/= 5) - Pharmacological DVT Prophylaxis Ordered: rivaroxaban (Xarelto)  Sequential Compression Devices Ordered  Patient Centered Rounds: I performed bedside rounds with nursing staff today  Discussions with Specialists or Other Care Team Provider: Cardiology    Education and Discussions with Family / Patient: Updated  (son) via phone  Time Spent for Care: 30 minutes  More than 50% of total time spent on counseling and coordination of care as described above  Current Length of Stay: 1 day(s)  Current Patient Status: Inpatient   Certification Statement: The patient will continue to require additional inpatient hospital stay due to CHF exacerbation requiring diuretics  Discharge Plan: Anticipate discharge in 24-48 hrs to home  Code Status: Level 1 - Full Code    Subjective:     Reports feeling much better today compared to yesterday    Denies any shortness of breath  Leg swelling has improved    Objective:     Vitals:   Temp (24hrs), Av 7 °F (36 5 °C), Min:97 °F (36 1 °C), Max:98 4 °F (36 9 °C)    Temp:  [97 °F (36 1 °C)-98 4 °F (36 9 °C)] 97 6 °F (36 4 °C)  HR:  [66-79] 72  Resp:  [17-20] 18  BP: ()/(55-73) 102/69  SpO2:  [95 %-99 %] 97 %  Body mass index is 39 35 kg/m²  Input and Output Summary (last 24 hours):   No intake or output data in the 24 hours ending 22 0929    Physical Exam:   Physical Exam  Constitutional:       General: She is not in acute distress  Appearance: She is not diaphoretic  HENT:      Head: Normocephalic and atraumatic  Eyes:      General:         Right eye: No discharge  Left eye: No discharge  Cardiovascular:      Rate and Rhythm: Normal rate and regular rhythm  Pulmonary:      Effort: Pulmonary effort is normal  No respiratory distress  Breath sounds: No wheezing or rales  Comments: Decreased breath sounds bilaterally  Abdominal:      General: Bowel sounds are normal  There is no distension  Palpations: Abdomen is soft  Tenderness: There is no abdominal tenderness  Musculoskeletal:      Right lower leg: No edema  Left lower leg: No edema  Neurological:      Mental Status: She is alert and oriented to person, place, and time           Additional Data:     Labs:  Results from last 7 days   Lab Units 22  0611 22  0255   WBC Thousand/uL 8 20 11 91*   HEMOGLOBIN g/dL 12 3 12 3   HEMATOCRIT % 39 1 39 8   PLATELETS Thousands/uL 311 311   NEUTROS PCT %  --  73   LYMPHS PCT %  --  17   MONOS PCT %  --  7   EOS PCT %  --  2     Results from last 7 days   Lab Units 22  0611   SODIUM mmol/L 145   POTASSIUM mmol/L 4 3   CHLORIDE mmol/L 107   CO2 mmol/L 30   BUN mg/dL 19   CREATININE mg/dL 1 18   ANION GAP mmol/L 8   CALCIUM mg/dL 9 3   GLUCOSE RANDOM mg/dL 109                       Lines/Drains:  Invasive Devices  Report    Peripheral Intravenous Line Duration           Peripheral IV 07/27/22 Right Antecubital 1 day                  Telemetry:  Telemetry Orders (From admission, onward)             48 Hour Telemetry Monitoring  (ED Bridging Orders Panel)  Continuous x 48 hours        References:    Telemetry Guidelines   Question:  Reason for 48 Hour Telemetry  Answer:  Arrhythmias Requiring Medical Therapy (eg  SVT, Vtach/fib, Bradycardia, Uncontrolled A-fib)                 Telemetry Reviewed: Normal Sinus Rhythm  Indication for Continued Telemetry Use: Arrthymias requiring medical therapy             Imaging: Reviewed radiology reports from this admission including: chest xray    Recent Cultures (last 7 days):         Last 24 Hours Medication List:   Current Facility-Administered Medications   Medication Dose Route Frequency Provider Last Rate    acetaminophen  650 mg Oral Q6H PRN Darleene Ghulam, PA-C      ALPRAZolam  0 5 mg Oral BID PRN MAJOR Terry      budesonide-formoterol  2 puff Inhalation BID Darleene Ghulam, PA-C      furosemide  60 mg Intravenous BID LevoMAJOR Gupta      levalbuterol  0 63 mg Nebulization Q6H PRN Darleene Ghulam, PA-ALBA      levothyroxine  150 mcg Oral Daily Zoe Vecellio, PA-C      metoprolol tartrate  12 5 mg Oral Q12H [facility] LevoMAJOR Gupta      ondansetron  4 mg Intravenous Q6H PRN Darleene Ghulam, PA-ALBA      pantoprazole  40 mg Oral Early Morning Zoe Vecellio, PA-C      potassium chloride  10 mEq Oral Daily Zoe Vecellio, PA-C      pravastatin  40 mg Oral Daily With [employer], PA-C      rivaroxaban  20 mg Oral Daily Darleene Ghulam, PA-C      sacubitril-valsartan  1 tablet Oral BID LevoMAJOR Gupta      sotalol  80 mg Oral Q12H Zoe Vecellio, PA-C      tiZANidine  2 mg Oral Q8H PRN Nehemiahleestee Parmar PA-C          Today, Patient Was Seen By: Mariah Carter DO    ** Please Note: "This note has been constructed using a voice recognition system  Therefore there may be syntax, spelling, and/or grammatical errors   Please call if you have any questions  "**

## 2022-07-27 NOTE — ASSESSMENT & PLAN NOTE
Lab Results   Component Value Date    EGFR 38 07/27/2022    EGFR 50 06/24/2022    EGFR 60 05/17/2022    CREATININE 1 40 (H) 07/27/2022    CREATININE 1 12 06/24/2022    CREATININE 0 97 05/17/2022   Baseline creatinine 1 0-1 3, creatinine slightly above baseline  · Monitor with BMP

## 2022-07-27 NOTE — ASSESSMENT & PLAN NOTE
Lab Results   Component Value Date    EGFR 46 07/29/2022    EGFR 47 07/28/2022    EGFR 38 07/27/2022    CREATININE 1 20 07/29/2022    CREATININE 1 18 07/28/2022    CREATININE 1 40 (H) 07/27/2022     Baseline creatinine 1 0-1 3, creatinine slightly above baseline on admission at 1 4  · Creatinine back to baseline     · Repeat lab work after discharge

## 2022-07-27 NOTE — ED NOTES
This RN tried multiple times to interrogate pacemaker using [employer]  [employer] device following the printed instructions  This RN called the HELP line which transferred to a [address] [place] [address] representative  The representative attempted to talk this RN through a different process of interrogating with no success  [address] [place] [address] representative states he will drive to us to assist in person and will be here in approximately 1 hour  Dr Alice Devine made aware  Patient resting in stretcher at this time  Patient updated on the plan of care        [facility]  07/27/22 9982

## 2022-07-27 NOTE — ASSESSMENT & PLAN NOTE
2D echo 4/22/2022 showed severely dilated LV with EF 20%, global hypokinesis with regional variation, diastolic function is abnormal, severely dilated LA, moderately dilated RA, severe MR, mild-to-moderate TR,moderately to severely elevated PA pressure    · Continue Entresto, Lopressor, Aldactone

## 2022-07-27 NOTE — Clinical Note
Aguila Lencho Flavors accompanied Ramez Argueta to the emergency department on 7/27/2022  Return date if applicable: If you have any questions or concerns, please don't hesitate to call        Jimi Tanner RN

## 2022-07-27 NOTE — ASSESSMENT & PLAN NOTE
Wt Readings from Last 3 Encounters:   07/28/22 108 kg (238 lb 1 6 oz)   06/24/22 107 kg (236 lb)   06/10/22 108 kg (237 lb)     Patient with increasing SOB on admission, leg swelling and weight gain over the last week  · Cardiology consulted  · Continue Entresto 97/103mg BID  Patient was started on IV lasix 60mg BID  This was transitioned to Demadex 60mg every other day alternating with Demadex 40mg every other day, per cardiology  · Daily weights showed a five pound weight loss since admission  I/Os stable  Follow up with advanced heart failure team upon discharge

## 2022-07-27 NOTE — ED PROVIDER NOTES
Final Diagnosis:  1  Mild intermittent asthma without complication    2  V-tach ([address] )    3  Postural dizziness with presyncope    4  AICD discharge        Chief Complaint   Patient presents with    Cardiac Device Problem     Pt with AICD discharge at 145am while using restroom, pt with hx of AFIB, reports that she has had this happen once prior, unsure of report from last visit  Denies CP, reports some SOB  HPI  Patient with a history of cardiomyopathy EF %  Has a history of   Has [employer] AICD Pacer that was exchanged a few years ago for biventric pacer  Tonight felt lightheaded while up going to bathroom almost feinted and then felt a jolt in her chest  Felt better after but got anxious and SOB  Presents with mild anxiety SOB  Has mild wheeze on expiration  Given xopenex ipratropium and it's better  Given versed and improvement in anxiety SOB  Interrogated pacer and shows 7 sec of Vtach at time of symptoms  admit    - No language barrier    - History obtained from patient  - There are no limitations to the history obtained  - Previous charting underwent limited review with attention to last ED visits, labs, ekgs, and prior imaging      PMH:   has a past medical history of A-fib ([address] ), Abnormal blood sugar, Acid reflux, Acute bronchitis, Allergic reaction, Anxiety, Arthritis, Asthma, Cardiomyopathy ([address] ), Cellulitis of left lower leg, Chest pain, CHF (congestive heart failure) ([address] ), Constipation, Depression with anxiety, Disease of thyroid gland, Diverticulitis, Dyslipidemia, Dyspnea on exertion, Elbow pain, Fracture of olecranon, open, Gastritis, Generalized pain, Hematuria, History of colonoscopy, Hypertension, Hypokalemia, Hypomagnesemia, Leg cramping, Moderate obesity, Morbid obesity due to excess calories ([address] ), Myalgia, Myositis, Nausea in adult, Nephrolithiasis, Prepatellar bursitis, unspecified knee, Screening for lipid disorders, Shortness of breath, Spasm of muscle, and Thyroid trouble  PSH:   has a past surgical history that includes Laparoscopic cholecystectomy; Total abdominal hysterectomy w/ bilateral salpingoophorectomy; and Cardiac defibrillator placement  Social History:    Tobacco Use: Low Risk     Smoking Tobacco Use: Never Smoker    Smokeless Tobacco Use: Never Used     Alcohol Use: Not on file     Patient with no illicit use    ROS:    Pertinent positives/negatives:   Review of Systems   Respiratory: Positive for shortness of breath  Cardiovascular: Positive for palpitations  Psychiatric/Behavioral: The patient is nervous/anxious  CONSTITUTIONAL:  No dizziness  No weakness  No unexpected weight loss  EYES:  No pain, erythema, or discharge  No loss of vision  ENT:  No tinnitus, decreased hearing  No epistaxis/purulent drainage  No voice change, airway closing, trismus  CARDIOVASCULAR:  No chest pain  No palpitations  No new lower extremity edema  RESPIRATORY:  No purulent cough  No hemoptysis  No dyspnea  No paroxysmal nocturnal dyspnea  No stridor, audible wheezing bedside  GASTROINTESTINAL:  Normal appetite  No vomiting, diarrhea  No pain  No bloating  No melena  GENITOURINARY:  No frequency, urgency, nocturia  No hematuria or dysuria  No discharge  No sores/adenopathy  MUSCULOSKELETAL:  No arthralgias or myalgias that are new  INTEGUMENTARY:  No swelling  No unexpected contusions  No abrasions  No lymphangitis  NEUROLOGIC:  No meningismus  No numbness of the extremities  No new focal weakness  No postural instability  PSYCHIATRIC:  No SI HI AVH  HEMATOLOGICAL:  No bleeding  No petechiae  No bruising  ALLERGIES:  No urticaria  No sudden abd cramping  No stridor      PE:     Physical exam highlights:   Physical Exam       Vitals:    07/27/22 0315 07/27/22 0400 07/27/22 0438 07/27/22 0639   BP: 102/67 107/63 105/62 106/66   BP Location: Right arm Right arm Right arm Right arm   Pulse: 72 67 64 70   Resp: 20 20 18 18   Temp:       TempSrc: SpO2: 93% 93% 95% 92%     Vitals reviewed by me  Nursing note reviewed  Chaperone present for all sensitive exam   Const: No acute distress  Alert  Nontoxic  Not diaphoretic  HEENT: External ears normal  No protrusion drainage swelling  Nose normal  No drainage/traumatic deformity  MMM  Mouth with baseline/symmetric movement  No trismus  Eyes: No squinting  No icterus  Tracks through the room with normal EOM  No tearing/swelling/drainage  Neck: ROM normal  No rigidity  No meningismus  Cards: Rate as per vitals  Compared to monitor sinus unless documented above  Regular  Well perfused  Pulm: able to verbalize without additional effort  Effort and excursion normal  No disress  No audible wheezing/ stridor  Normal resp rate  Wheeze  Abd: No distension beyond baseline  No fluctuant wave  Patient without peritoneal pain with shifting/bumping the bed  MSK: ROM normal and baseline  No deformity  Skin: No new rashes visible  Well perfused  Neuro: Nonfocal  Baseline  CN grossly intact  Moving all four with coordination  Psych: Normal behavior and affect  Anxious        A:  - Nursing note reviewed  Ddx and MDM      phantom shock  Vtach/vfib shock      Asthma? ED Course as of 07/27/22 0706 Wed Jul 27, 2022   9471 Procedure Note: EKG  Date/Time: 07/27/22 3:13 AM   Interpreted by: Octavio Heredia  Indications / Diagnosis: AICD fire  ECG reviewed by me, the ED Provider: yes   The EKG demonstrates:  Rhythm: sinus  normal   Intervals: normal intervals  Axis: left axis  QRS/Blocks: long QRS   ST Changes: No acute ST Changes, no STD/GORDO    T wave changes: none       0340 Awaiting interrogation   0500 Interrogater comes from [employer] and gives us appropriate interogator and there's 7 seconds of vtach at the time she specifies had symptoms     XR chest 1 view portable    (Results Pending)     Orders Placed This Encounter   Procedures    XR chest 1 view portable    CBC and differential    Basic metabolic panel    Magnesium    HS Troponin 0hr (reflex protocol)    HS Troponin I 2hr    HS Troponin I 4hr    Diet Cardiovascular; Sodium 2 GM;  Fluid Restriction 1800 ML    Please arrange for icd/pacemaker interrogation    Vital Signs per unit routine    Up and OOB as tolerated    Daily weights    I/O    Apply SCD or Foot pumps    48 Hour Telemetry Monitoring    Level 1-Full Code: all life saving measures are indicated    Inpatient consult to Cardiology    ECG 12 lead    ECG 12 lead    INPATIENT ADMISSION     Labs Reviewed   CBC AND DIFFERENTIAL - Abnormal       Result Value Ref Range Status    WBC 11 91 (*) 4 31 - 10 16 Thousand/uL Final    RBC 4 63  3 81 - 5 12 Million/uL Final    Hemoglobin 12 3  11 5 - 15 4 g/dL Final    Hematocrit 39 8  34 8 - 46 1 % Final    MCV 86  82 - 98 fL Final    MCH 26 6 (*) 26 8 - 34 3 pg Final    MCHC 30 9 (*) 31 4 - 37 4 g/dL Final    RDW 15 6 (*) 11 6 - 15 1 % Final    MPV 9 8  8 9 - 12 7 fL Final    Platelets 641  634 - 390 Thousands/uL Final    nRBC 0  /100 WBCs Final    Neutrophils Relative 73  43 - 75 % Final    Immat GRANS % 1  0 - 2 % Final    Lymphocytes Relative 17  14 - 44 % Final    Monocytes Relative 7  4 - 12 % Final    Eosinophils Relative 2  0 - 6 % Final    Basophils Relative 0  0 - 1 % Final    Neutrophils Absolute 8 70 (*) 1 85 - 7 62 Thousands/µL Final    Immature Grans Absolute 0 06  0 00 - 0 20 Thousand/uL Final    Lymphocytes Absolute 2 06  0 60 - 4 47 Thousands/µL Final    Monocytes Absolute 0 80  0 17 - 1 22 Thousand/µL Final    Eosinophils Absolute 0 24  0 00 - 0 61 Thousand/µL Final    Basophils Absolute 0 05  0 00 - 0 10 Thousands/µL Final   BASIC METABOLIC PANEL - Abnormal    Sodium 143  135 - 147 mmol/L Final    Potassium 3 6  3 5 - 5 3 mmol/L Final    Chloride 106  96 - 108 mmol/L Final    CO2 29  21 - 32 mmol/L Final    ANION GAP 8  4 - 13 mmol/L Final    BUN 26 (*) 5 - 25 mg/dL Final    Creatinine 1 40 (*) 0 60 - 1 30 mg/dL Final Comment: Standardized to IDMS reference method    Glucose 113  65 - 140 mg/dL Final    Comment: If the patient is fasting, the ADA then defines impaired fasting glucose as > 100 mg/dL and diabetes as > or equal to 123 mg/dL  Specimen collection should occur prior to Sulfasalazine administration due to the potential for falsely depressed results  Specimen collection should occur prior to Sulfapyridine administration due to the potential for falsely elevated results  Calcium 9 2  8 3 - 10 1 mg/dL Final    eGFR 38  ml/min/1 73sq m Final    Narrative:     [place] guidelines for Chronic Kidney Disease (CKD):     Stage 1 with normal or high GFR (GFR > 90 mL/min/1 73 square meters)    Stage 2 Mild CKD (GFR = 60-89 mL/min/1 73 square meters)    Stage 3A Moderate CKD (GFR = 45-59 mL/min/1 73 square meters)    Stage 3B Moderate CKD (GFR = 30-44 mL/min/1 73 square meters)    Stage 4 Severe CKD (GFR = 15-29 mL/min/1 73 square meters)    Stage 5 End Stage CKD (GFR <15 mL/min/1 73 square meters)  Note: GFR calculation is accurate only with a steady state creatinine   MAGNESIUM - Normal    Magnesium 2 3  1 6 - 2 6 mg/dL Final   HS TROPONIN I 0HR - Normal    hs TnI 0hr 10  "Refer to ACS Flowchart"- see link ng/L Final    Comment:                                              Initial (time 0) result  If >=50 ng/L, Myocardial injury suggested ;  Type of myocardial injury and treatment strategy  to be determined  If 5-49 ng/L, a delta result at 2 hours and or 4 hours will be needed to further evaluate  If <4 ng/L, and chest pain has been >3 hours since onset, patient may qualify for discharge based on the HEART score in the ED  If <5 ng/L and <3hours since onset of chest pain, a delta result at 2 hours will be needed to further evaluate  HS Troponin 99th Percentile URL of a Health Population=12 ng/L with a 95% Confidence Interval of 8-18 ng/L      Second Troponin (time 2 hours)  If calculated delta >= 20 ng/L,  Myocardial injury suggested ; Type of myocardial injury and treatment strategy to be determined  If 5-49 ng/L and the calculated delta is 5-19 ng/L, consult medical service for evaluation  Continue evaluation for ischemia on ecg and other possible etiology and repeat hs troponin at 4 hours  If delta is <5 ng/L at 2 hours, consider discharge based on risk stratification via the HEART score (if in ED), or FIDELINA risk score in IP/Observation  HS Troponin 99th Percentile URL of a Health Population=12 ng/L with a 95% Confidence Interval of 8-18 ng/L    HS TROPONIN I 2HR - Normal    hs TnI 2hr 18  "Refer to ACS Flowchart"- see link ng/L Final    Comment:                                              Initial (time 0) result  If >=50 ng/L, Myocardial injury suggested ;  Type of myocardial injury and treatment strategy  to be determined  If 5-49 ng/L, a delta result at 2 hours and or 4 hours will be needed to further evaluate  If <4 ng/L, and chest pain has been >3 hours since onset, patient may qualify for discharge based on the HEART score in the ED  If <5 ng/L and <3hours since onset of chest pain, a delta result at 2 hours will be needed to further evaluate  HS Troponin 99th Percentile URL of a Health Population=12 ng/L with a 95% Confidence Interval of 8-18 ng/L  Second Troponin (time 2 hours)  If calculated delta >= 20 ng/L,  Myocardial injury suggested ; Type of myocardial injury and treatment strategy to be determined  If 5-49 ng/L and the calculated delta is 5-19 ng/L, consult medical service for evaluation  Continue evaluation for ischemia on ecg and other possible etiology and repeat hs troponin at 4 hours  If delta is <5 ng/L at 2 hours, consider discharge based on risk stratification via the HEART score (if in ED), or FIDELINA risk score in IP/Observation  HS Troponin 99th Percentile URL of a Health Population=12 ng/L with a 95% Confidence Interval of 8-18 ng/L      Delta 2hr hsTnI 8  <20 ng/L Final   HS TROPONIN I 4HR       Final Diagnosis:  1  Mild intermittent asthma without complication    2  V-tach ([address] )    3  Postural dizziness with presyncope    4   AICD discharge        P:  - hospital tx includes   Medications   budesonide-formoterol (SYMBICORT) 160-4 5 mcg/act inhaler 2 puff (has no administration in time range)   sacubitril-valsartan (ENTRESTO)  MG per tablet 1 tablet (has no administration in time range)   levalbuterol (XOPENEX) inhalation solution 0 63 mg (has no administration in time range)   levothyroxine tablet 150 mcg (has no administration in time range)   metoprolol tartrate (LOPRESSOR) tablet 12 5 mg (has no administration in time range)   pantoprazole (PROTONIX) EC tablet 40 mg (has no administration in time range)   pravastatin (PRAVACHOL) tablet 40 mg (has no administration in time range)   sotalol (BETAPACE) tablet 80 mg (has no administration in time range)   tiZANidine (ZANAFLEX) tablet 2 mg (has no administration in time range)   rivaroxaban (XARELTO) tablet 20 mg (has no administration in time range)   acetaminophen (TYLENOL) tablet 650 mg (has no administration in time range)   ondansetron (ZOFRAN) injection 4 mg (has no administration in time range)   furosemide (LASIX) injection 40 mg (has no administration in time range)   potassium chloride (K-DUR,KLOR-CON) CR tablet 10 mEq (has no administration in time range)   midazolam (VERSED) injection 1 mg (1 mg Intravenous Given 7/27/22 0256)   ipratropium (ATROVENT) 0 02 % inhalation solution 0 5 mg (0 5 mg Nebulization Given 7/27/22 0189)   levalbuterol (XOPENEX) inhalation solution 1 25 mg (1 25 mg Nebulization Given 7/27/22 3967)   potassium chloride (K-DUR,KLOR-CON) CR tablet 40 mEq (40 mEq Oral Given 7/27/22 2004)         - disposition  Time reflects when diagnosis was documented in both MDM as applicable and the Disposition within this note     Time User Action Codes Description Comment    7/27/2022 4:13 AM Moris Chairez Add [J45 20] Mild intermittent asthma without complication     6/28/8461  5:02 AM Moris Chairez Add [I47 2] V-tach [facility])     7/27/2022  5:02 AM Moris Chairez Add [C64,  R55] Postural dizziness with presyncope     7/27/2022  5:02 AM Moris Chairez Add [Z45 02] AICD discharge       ED Disposition     ED Disposition   Admit    Condition   Stable    Date/Time   Wed Jul 27, 2022  5:02 AM    Comment   Case was discussed with EMMA and the patient's admission status was agreed to be Admission Status: inpatient status to the service of Dr Lin Falcon   Follow-up Information    None         - patient will call their PCP to let them know they were in the emergency department  We discuss return precautions       - additional tx intended, if consistent with primary provider:  - patient to follow with :      Patient's Medications   Discharge Prescriptions    LEVALBUTEROL (XOPENEX) 1 25 MG/3 ML NEBULIZER SOLUTION    Take 3 mL (1 25 mg total) by nebulization 1 (one) time for 1 dose       Start Date: 7/27/2022 End Date: 7/27/2022       Order Dose: 1 25 mg       Quantity: 3 mL    Refills: 0     No discharge procedures on file  Prior to Admission Medications   Prescriptions Last Dose Informant Patient Reported? Taking? ALPRAZolam (XANAX) 0 25 mg tablet  Self Yes No   Sig: Take 0 25 mg by mouth daily as needed   Entresto  MG TABS  Self No No   Sig: take 1 tablet by mouth twice a day   Magnesium 100 MG CAPS  Self Yes No   Sig: Take by mouth   Xarelto 20 MG tablet  Self No No   Sig: take 1 tablet by mouth once daily   budesonide-formoterol (SYMBICORT) 160-4 5 mcg/act inhaler  Self No No   Sig: Inhale 2 puffs 2 (two) times a day Rinse mouth after use     diclofenac sodium (VOLTAREN) 1 %  Self No No   Sig: Apply 2 g topically 4 (four) times a day   levalbuterol (XOPENEX HFA) 45 mcg/act inhaler  Self No No   Sig: Inhale 2 puffs every 6 (six) hours as needed for shortness of breath levothyroxine (Synthroid) 150 mcg tablet  Self No No   Sig: Take 1 tablet (150 mcg total) by mouth in the morning  metoprolol tartrate (LOPRESSOR) 25 mg tablet  Self No No   Sig: Take 0 5 tablets (12 5 mg total) by mouth every 12 (twelve) hours   midodrine (PROAMATINE) 2 5 mg tablet   No No   Sig: take 1 tablet by mouth twice a day if needed   pantoprazole (PROTONIX) 40 mg tablet  Self No No   Sig: take 1 tablet by mouth once daily   potassium chloride (K-DUR) 10 mEq tablet  Self No No   Sig: take 1 tablet by mouth once daily   pravastatin (PRAVACHOL) 40 mg tablet  Self No No   Sig: take 1 tablet by mouth once daily   sotalol (BETAPACE) 80 mg tablet  Self No No   Sig: TAKE 1 TABLET BY MOUTH EVERY 12 HOURS   spironolactone (ALDACTONE) 25 mg tablet  Self No No   Sig: TAKE 1/2 TABLET BY MOUTH DAILY   tiZANidine (ZANAFLEX) 2 mg tablet  Self No No   Sig: take 1 tablet by mouth every 8 hours if needed for muscle spasm   torsemide (DEMADEX) 20 mg tablet  Self No No   Sig: take 2 tablets by mouth once daily      Facility-Administered Medications: None       Portions of the record may have been created with voice recognition software  Occasional wrong word or "sound a like" substitutions may have occurred due to the inherent limitations of voice recognition software  Read the chart carefully and recognize, using context, where substitutions have occurred      Electronically signed by:  MD Satinder Maya MD  07/27/22 8297

## 2022-07-27 NOTE — ASSESSMENT & PLAN NOTE
Patient presents after episode of lightheadedness and AICD discharge  · Biventricular ICD device interrogated, showed 7 second run of v tach  · EKG shows v paced rhythm, HR 83  · History of paroxysmal afib and dilated cardiomyopathy  · Trend troponin  · Continue current medications  · Monitor on telemetry  · Optimize electrolytes  · Consult cardiology

## 2022-07-27 NOTE — TELEPHONE ENCOUNTER
She is known to have a VT in the past   Right heart catheterization is probably part of workup by the advanced heart failure team   I think and I am sure she will be fine with right heart catheterization

## 2022-07-27 NOTE — ASSESSMENT & PLAN NOTE
2D echo 4/22/2022 showed severely dilated LV with EF 20%, global hypokinesis with regional variation, diastolic function is abnormal, severely dilated LA, moderately dilated RA, severe MR, mild-to-moderate TR,moderately to severely elevated PA pressure    · Continue entresto

## 2022-07-27 NOTE — ASSESSMENT & PLAN NOTE
Wt Readings from Last 3 Encounters:   06/24/22 107 kg (236 lb)   06/10/22 108 kg (237 lb)   05/17/22 107 kg (236 lb 5 3 oz)     Patient with increasing SOB, leg swelling over the last week  · Start lasix 40mg BID  · Continue entresto 97/103mg bid  Daily weights, I/Os  Fluid, sodium restriction  · Cardiology consult

## 2022-07-27 NOTE — ASSESSMENT & PLAN NOTE
Continue Pravachol Glycopyrrolate Pregnancy And Lactation Text: This medication is Pregnancy Category B and is considered safe during pregnancy. It is unknown if it is excreted breast milk.

## 2022-07-28 LAB
ANION GAP SERPL CALCULATED.3IONS-SCNC: 8 MMOL/L (ref 4–13)
BUN SERPL-MCNC: 19 MG/DL (ref 5–25)
CALCIUM SERPL-MCNC: 9.3 MG/DL (ref 8.3–10.1)
CHLORIDE SERPL-SCNC: 107 MMOL/L (ref 96–108)
CO2 SERPL-SCNC: 30 MMOL/L (ref 21–32)
CREAT SERPL-MCNC: 1.18 MG/DL (ref 0.6–1.3)
ERYTHROCYTE [DISTWIDTH] IN BLOOD BY AUTOMATED COUNT: 15.8 % (ref 11.6–15.1)
GFR SERPL CREATININE-BSD FRML MDRD: 47 ML/MIN/1.73SQ M
GLUCOSE SERPL-MCNC: 109 MG/DL (ref 65–140)
HCT VFR BLD AUTO: 39.1 % (ref 34.8–46.1)
HGB BLD-MCNC: 12.3 G/DL (ref 11.5–15.4)
MCH RBC QN AUTO: 27 PG (ref 26.8–34.3)
MCHC RBC AUTO-ENTMCNC: 31.5 G/DL (ref 31.4–37.4)
MCV RBC AUTO: 86 FL (ref 82–98)
PLATELET # BLD AUTO: 311 THOUSANDS/UL (ref 149–390)
PMV BLD AUTO: 10.2 FL (ref 8.9–12.7)
POTASSIUM SERPL-SCNC: 4.3 MMOL/L (ref 3.5–5.3)
RBC # BLD AUTO: 4.56 MILLION/UL (ref 3.81–5.12)
SODIUM SERPL-SCNC: 145 MMOL/L (ref 135–147)
WBC # BLD AUTO: 8.2 THOUSAND/UL (ref 4.31–10.16)

## 2022-07-28 PROCEDURE — 85027 COMPLETE CBC AUTOMATED: CPT | Performed by: FAMILY MEDICINE

## 2022-07-28 PROCEDURE — 99232 SBSQ HOSP IP/OBS MODERATE 35: CPT | Performed by: INTERNAL MEDICINE

## 2022-07-28 PROCEDURE — 99232 SBSQ HOSP IP/OBS MODERATE 35: CPT | Performed by: FAMILY MEDICINE

## 2022-07-28 PROCEDURE — 80048 BASIC METABOLIC PNL TOTAL CA: CPT | Performed by: FAMILY MEDICINE

## 2022-07-28 PROCEDURE — 94760 N-INVAS EAR/PLS OXIMETRY 1: CPT

## 2022-07-28 RX ORDER — SPIRONOLACTONE 25 MG/1
12.5 TABLET ORAL DAILY
Status: DISCONTINUED | OUTPATIENT
Start: 2022-07-28 | End: 2022-07-29 | Stop reason: HOSPADM

## 2022-07-28 RX ORDER — TORSEMIDE 20 MG/1
60 TABLET ORAL EVERY OTHER DAY
Status: DISCONTINUED | OUTPATIENT
Start: 2022-07-29 | End: 2022-07-29 | Stop reason: HOSPADM

## 2022-07-28 RX ORDER — TORSEMIDE 20 MG/1
40 TABLET ORAL EVERY OTHER DAY
Status: DISCONTINUED | OUTPATIENT
Start: 2022-07-30 | End: 2022-07-29 | Stop reason: HOSPADM

## 2022-07-28 RX ORDER — FUROSEMIDE 10 MG/ML
60 INJECTION INTRAMUSCULAR; INTRAVENOUS 2 TIMES DAILY
Status: COMPLETED | OUTPATIENT
Start: 2022-07-28 | End: 2022-07-28

## 2022-07-28 RX ADMIN — ACETAMINOPHEN 650 MG: 325 TABLET, FILM COATED ORAL at 18:45

## 2022-07-28 RX ADMIN — BUDESONIDE AND FORMOTEROL FUMARATE DIHYDRATE 2 PUFF: 160; 4.5 AEROSOL RESPIRATORY (INHALATION) at 08:54

## 2022-07-28 RX ADMIN — BUDESONIDE AND FORMOTEROL FUMARATE DIHYDRATE 2 PUFF: 160; 4.5 AEROSOL RESPIRATORY (INHALATION) at 17:02

## 2022-07-28 RX ADMIN — ALPRAZOLAM 0.5 MG: 0.5 TABLET ORAL at 17:02

## 2022-07-28 RX ADMIN — RIVAROXABAN 20 MG: 20 TABLET, FILM COATED ORAL at 08:53

## 2022-07-28 RX ADMIN — PRAVASTATIN SODIUM 40 MG: 40 TABLET ORAL at 17:02

## 2022-07-28 RX ADMIN — SACUBITRIL AND VALSARTAN 1 TABLET: 97; 103 TABLET, FILM COATED ORAL at 10:39

## 2022-07-28 RX ADMIN — LEVOTHYROXINE SODIUM 150 MCG: 150 TABLET ORAL at 05:51

## 2022-07-28 RX ADMIN — FUROSEMIDE 60 MG: 10 INJECTION, SOLUTION INTRAMUSCULAR; INTRAVENOUS at 17:03

## 2022-07-28 RX ADMIN — FUROSEMIDE 60 MG: 10 INJECTION, SOLUTION INTRAMUSCULAR; INTRAVENOUS at 08:52

## 2022-07-28 RX ADMIN — SACUBITRIL AND VALSARTAN 1 TABLET: 97; 103 TABLET, FILM COATED ORAL at 17:03

## 2022-07-28 RX ADMIN — POTASSIUM CHLORIDE 10 MEQ: 750 TABLET, EXTENDED RELEASE ORAL at 08:53

## 2022-07-28 RX ADMIN — PANTOPRAZOLE SODIUM 40 MG: 40 TABLET, DELAYED RELEASE ORAL at 05:51

## 2022-07-28 NOTE — PROGRESS NOTES
Progress Note - Cardiology   [facility]     Tone Bauer 79 y o  female MRN: [id number]  : 1955  Unit/Bed#: Miesha Vargas Encounter: [id number]    Assessment and Plan:   1  Ventricular tachycardia:  Patient with 9 5 second of Run of VT at a rate of 272 beats per minute    -  patient received therapy    -  noted to be volume overloaded and approximately 6 lb above dry weight weight    -  continue sotalol 80 mg b i d     -  continue Lopressor 12 5 mg b i d     -  follow-up with Heart success program at [facility] as currently scheduled for testing    2  Acute on chronic systolic heart failure:  Approximately 6 lb weight gain above dry weight, patient has lost approximately 5 at this time  Will DC IV Lasix after this evening's dose    -  will start Demadex 60 mg every other day alternating with 40 mg every other day on 2022    -  continue Entresto 93/103 mg twice a day    -  continue Lopressor 12 point mg b i d     -  restart Aldactone 12 5 mg in the a m  2022    -  patient has not tolerated SGLT 2 inhibitors    -  continue low-sodium diet    -  monitor I&Os, daily weights and labs    -  follow-up with Heart success program at [facility] is currently scheduled    3  Chronic kidney disease:  Renal function currently stable will continue monitor    4  Dilated cardiomyopathy:  Patient has ICD    5  Paroxysmal atrial fibrillation:  Maintaining sinus rhythm on beta-blocker, sotalol and Xarelto    6  Hypertension:  Blood pressure stable on medication as per # 2  Subjective / Objective:   Patient seen examined  She states that she is feeling better  No further episodes of V-tach or palpitations  And no defibrillator firing  Patient has lost approximately 5 lb since admission and is almost to her dry weight  Will transition her back to her oral diuretics tomorrow    Discussed with her possibly doing a  60 mg alternating with 40 mg every other day dosing    Vitals: Blood pressure 105/71, pulse 82, temperature 97 6 °F (36 4 °C), resp  rate 18, height 5' 6" (1 676 m), weight 108 kg (238 lb 1 6 oz), SpO2 98 %  Vitals:    07/28/22 0547 07/28/22 1027   Weight: 111 kg (243 lb 13 3 oz) 108 kg (238 lb 1 6 oz)     Body mass index is 38 43 kg/m²  BP Readings from Last 3 Encounters:   07/28/22 105/71   06/24/22 108/59   06/10/22 110/60     Orthostatic Blood Pressures    Flowsheet Row Most Recent Value   Blood Pressure 105/71 filed at 07/28/2022 1027   Patient Position - Orthostatic VS Lying filed at 07/27/2022 2129        I/O       07/26 0701 07/27 0700 07/27 0701  07/28 0700 07/28 0701 07/29 0700    Urine (mL/kg/hr) 150  800 (0 9)    Total Output 150  800    Net -150  -800               Invasive Devices  Report    Peripheral Intravenous Line  Duration           Peripheral IV 07/27/22 Right Antecubital 1 day                  Intake/Output Summary (Last 24 hours) at 7/28/2022 1504  Last data filed at 7/28/2022 1039  Gross per 24 hour   Intake --   Output 800 ml   Net -800 ml         Physical Exam:   Physical Exam  Vitals and nursing note reviewed  Constitutional:       General: She is not in acute distress  Appearance: Normal appearance  She is obese  HENT:      Right Ear: External ear normal       Left Ear: External ear normal    Eyes:      General: No scleral icterus  Right eye: No discharge  Left eye: No discharge  Cardiovascular:      Rate and Rhythm: Normal rate and regular rhythm  Pulses: Normal pulses  Heart sounds: Murmur heard  Systolic murmur is present with a grade of 2/6  Pulmonary:      Effort: Pulmonary effort is normal  No accessory muscle usage or respiratory distress  Breath sounds: Examination of the right-lower field reveals decreased breath sounds  Examination of the left-lower field reveals decreased breath sounds  Decreased breath sounds present  Abdominal:      General: Bowel sounds are normal  There is no distension  Palpations: Abdomen is soft  Musculoskeletal:      Right lower leg: No edema  Left lower leg: No edema  Skin:     General: Skin is warm and dry  Capillary Refill: Capillary refill takes less than 2 seconds  Neurological:      General: No focal deficit present  Mental Status: She is alert and oriented to person, place, and time  Mental status is at baseline  Psychiatric:         Mood and Affect: Mood normal                  Medications/ Allergies:     Current Facility-Administered Medications   Medication Dose Route Frequency Provider Last Rate    acetaminophen  650 mg Oral Q6H PRN [facility], BONNIE      ALPRAZolam  0 5 mg Oral BID PRN MAJOR Terry      budesonide-formoterol  2 puff Inhalation BID [facility], BONNIE      furosemide  60 mg Intravenous BID SteffanyMAJOR Fallon      levalbuterol  0 63 mg Nebulization Q6H PRN [facility], BONNIE      levothyroxine  150 mcg Oral Daily [facility]      metoprolol tartrate  12 5 mg Oral Q12H [facility] MAJOR Lora      ondansetron  4 mg Intravenous Q6H PRN [facility], BONNIE      pantoprazole  40 mg Oral Early Morning [facility], BONNIE      potassium chloride  10 mEq Oral Daily [facility], BONNIE      pravastatin  40 mg Oral Daily With CamSemi, PA-ALBA      rivaroxaban  20 mg Oral Daily [facility], BONNIE      sacubitril-valsartan  1 tablet Oral BID MAJOR Lora      sotalol  80 mg Oral Q12H Zoe Vecleathao, BONINE      tiZANidine  2 mg Oral Q8H PRN Zoe Vecellio, BONNIE       acetaminophen, 650 mg, Q6H PRN  ALPRAZolam, 0 5 mg, BID PRN  levalbuterol, 0 63 mg, Q6H PRN  ondansetron, 4 mg, Q6H PRN  tiZANidine, 2 mg, Q8H PRN      Allergies   Allergen Reactions    Carvedilol Palpitations, Other (See Comments), Dizziness and Edema     Started several days after initiation of carvedilol 3 125 milligrams bid with duration of complaints approximately 36 hours    Patient also had numbness and tingling of hands and feet   Omnipaque [Iohexol] Swelling    Penicillins Swelling    Iv Dye  [Iodinated Diagnostic Agents] Throat Swelling    Jardiance [Empagliflozin] Throat Swelling    Metoprolol Throat Swelling    Other Swelling     Fresh herbs "basil, cilantro"    Shellfish-Derived Products - Food Allergy Hives       VTE Pharmacologic Prophylaxis:   Sequential compression device (Venodyne)     Labs:   Troponins:  Results from last 7 days   Lab Units 07/27/22  0712 07/27/22  0511   HSTNI D2 ng/L  --  8   HSTNI D4 ng/L 10  --      CBC with diff:  Results from last 7 days   Lab Units 07/28/22  0611 07/27/22  0255   WBC Thousand/uL 8 20 11 91*   HEMOGLOBIN g/dL 12 3 12 3   HEMATOCRIT % 39 1 39 8   MCV fL 86 86   PLATELETS Thousands/uL 311 311   MCH pg 27 0 26 6*   MCHC g/dL 31 5 30 9*   RDW % 15 8* 15 6*   MPV fL 10 2 9 8   NRBC AUTO /100 WBCs  --  0     CMP:  Results from last 7 days   Lab Units 07/28/22  0611 07/27/22  0255   SODIUM mmol/L 145 143   POTASSIUM mmol/L 4 3 3 6   CHLORIDE mmol/L 107 106   CO2 mmol/L 30 29   ANION GAP mmol/L 8 8   BUN mg/dL 19 26*   CREATININE mg/dL 1 18 1 40*   CALCIUM mg/dL 9 3 9 2   EGFR ml/min/1 73sq m 47 38     Magnesium:  Results from last 7 days   Lab Units 07/27/22  0255   MAGNESIUM mg/dL 2 3       Imaging & Testing   I have personally reviewed pertinent reports  XR chest 1 view portable    Result Date: 7/27/2022  Narrative: CHEST INDICATION:   sob and pacer leads  COMPARISON:  6/24/2022 EXAM PERFORMED/VIEWS:  XR CHEST PORTABLE Single view FINDINGS: Cardiomediastinal silhouette remains enlarged  Typical left-sided cardiac pacer is unchanged  The lungs are clear  No pneumothorax or pleural effusion  Osseous structures appear within normal limits for patient age  Impression: Persistent cardiomegaly No acute cardiopulmonary disease   Findings are stable Workstation performed: [id number]     Cardiac EP device report    Result Date: 7/8/2022  Narrative: SJM BI-V ICD/ACTIVE SYSTEM IS MRI CONDITIONAL MERLIN TRANSMISSION: N/B BATTERY VOLTAGE NEARING KEITH  WILL SCHEDULE MONTHLY BATTERY CHECKS  (1 YR) AP 12%  90%  ALL AVAILABLE LEAD PARAMETERS WITHIN NORMAL LIMITS  [address] <30 SEC (ATRIAL NOISE, HX OF THE SAME)  NO SIGNIFICANT HIGH RATE EPISODES  CORVUE IMPEDANCE MONITORING WITHIN NORMAL LIMITS  NORMAL DEVICE FUNCTION  EKG / Monitor: Personally reviewed  Sinus rhythm        Ward Schaeffer, [facility]  Cardiology      "This note has been constructed using a voice recognition system  Therefore there may be syntax, spelling, and/or grammatical errors   Please call if you have any questions  "

## 2022-07-28 NOTE — PLAN OF CARE
Problem: RESPIRATORY - ADULT  Goal: Achieves optimal ventilation and oxygenation  Description: INTERVENTIONS:  - Assess for changes in respiratory status  - Assess for changes in mentation and behavior  - Position to facilitate oxygenation and minimize respiratory effort  - Oxygen administered by appropriate delivery if ordered  - Initiate smoking cessation education as indicated  - Encourage broncho-pulmonary hygiene including cough, deep breathe, Incentive Spirometry  - Assess the need for suctioning and aspirate as needed  - Assess and instruct to report SOB or any respiratory difficulty  - Respiratory Therapy support as indicated  Outcome: Progressing     Problem: CARDIOVASCULAR - ADULT  Goal: Maintains optimal cardiac output and hemodynamic stability  Description: INTERVENTIONS:  - Monitor I/O, vital signs and rhythm  - Monitor for S/S and trends of decreased cardiac output  - Administer and titrate ordered vasoactive medications to optimize hemodynamic stability  - Assess quality of pulses, skin color and temperature  - Assess for signs of decreased coronary artery perfusion  - Instruct patient to report change in severity of symptoms  Outcome: Progressing  Goal: Absence of cardiac dysrhythmias or at baseline rhythm  Description: INTERVENTIONS:  - Continuous cardiac monitoring, vital signs, obtain 12 lead EKG if ordered  - Administer antiarrhythmic and heart rate control medications as ordered  - Monitor electrolytes and administer replacement therapy as ordered  Outcome: Progressing     Problem: MOBILITY - ADULT  Goal: Maintain or return to baseline ADL function  Description: INTERVENTIONS:  -  Assess patient's ability to carry out ADLs; assess patient's baseline for ADL function and identify physical deficits which impact ability to perform ADLs (bathing, care of mouth/teeth, toileting, grooming, dressing, etc )  - Assess/evaluate cause of self-care deficits   - Assess range of motion  - Assess patient's mobility; develop plan if impaired  - Assess patient's need for assistive devices and provide as appropriate  - Encourage maximum independence but intervene and supervise when necessary  - Involve family in performance of ADLs  - Assess for home care needs following discharge   - Consider OT consult to assist with ADL evaluation and planning for discharge  - Provide patient education as appropriate  Outcome: Progressing  Goal: Maintains/Returns to pre admission functional level  Description: INTERVENTIONS:  - Perform BMAT or MOVE assessment daily    - Set and communicate daily mobility goal to care team and patient/family/caregiver  - Collaborate with rehabilitation services on mobility goals if consulted  - Perform Range of Motion 3 times a day  - Reposition patient every 3 hours    - Dangle patient 3 times a day  - Stand patient 3 times a day  - Ambulate patient 3 times a day  - Out of bed to chair 3 times a day   - Out of bed for meals 3 times a day  - Out of bed for toileting  - Record patient progress and toleration of activity level   Outcome: Progressing

## 2022-07-28 NOTE — PLAN OF CARE
Problem: RESPIRATORY - ADULT  Goal: Achieves optimal ventilation and oxygenation  Description: INTERVENTIONS:  - Assess for changes in respiratory status  - Assess for changes in mentation and behavior  - Position to facilitate oxygenation and minimize respiratory effort  - Oxygen administered by appropriate delivery if ordered  - Initiate smoking cessation education as indicated  - Encourage broncho-pulmonary hygiene including cough, deep breathe, Incentive Spirometry  - Assess the need for suctioning and aspirate as needed  - Assess and instruct to report SOB or any respiratory difficulty  - Respiratory Therapy support as indicated  7/28/2022 1015 by Nimco Lazaro RN  Outcome: Progressing  7/28/2022 1015 by Nimco Lazaro RN  Outcome: Progressing     Problem: CARDIOVASCULAR - ADULT  Goal: Maintains optimal cardiac output and hemodynamic stability  Description: INTERVENTIONS:  - Monitor I/O, vital signs and rhythm  - Monitor for S/S and trends of decreased cardiac output  - Administer and titrate ordered vasoactive medications to optimize hemodynamic stability  - Assess quality of pulses, skin color and temperature  - Assess for signs of decreased coronary artery perfusion  - Instruct patient to report change in severity of symptoms  Outcome: Progressing  Goal: Absence of cardiac dysrhythmias or at baseline rhythm  Description: INTERVENTIONS:  - Continuous cardiac monitoring, vital signs, obtain 12 lead EKG if ordered  - Administer antiarrhythmic and heart rate control medications as ordered  - Monitor electrolytes and administer replacement therapy as ordered  Outcome: Progressing     Problem: MOBILITY - ADULT  Goal: Maintain or return to baseline ADL function  Description: INTERVENTIONS:  -  Assess patient's ability to carry out ADLs; assess patient's baseline for ADL function and identify physical deficits which impact ability to perform ADLs (bathing, care of mouth/teeth, toileting, grooming, dressing, etc )  - Assess/evaluate cause of self-care deficits   - Assess range of motion  - Assess patient's mobility; develop plan if impaired  - Assess patient's need for assistive devices and provide as appropriate  - Encourage maximum independence but intervene and supervise when necessary  - Involve family in performance of ADLs  - Assess for home care needs following discharge   - Consider OT consult to assist with ADL evaluation and planning for discharge  - Provide patient education as appropriate  7/28/2022 1015 by Dave Carrasco RN  Outcome: Progressing  7/28/2022 1015 by Dave Carrasco RN  Outcome: Progressing  Goal: Maintains/Returns to pre admission functional level  Description: INTERVENTIONS:  - Perform BMAT or MOVE assessment daily    - Set and communicate daily mobility goal to care team and patient/family/caregiver  - Collaborate with rehabilitation services on mobility goals if consulted  - Perform Range of Motion 2 times a day  - Reposition patient every 2 hours    - Dangle patient 2 times a day  - Stand patient 2 times a day  - Ambulate patient 2 times a day  - Out of bed to chair 2 times a day   - Out of bed for meals 2 times a day  - Out of bed for toileting  - Record patient progress and toleration of activity level   Outcome: Progressing     Problem: Potential for Falls  Goal: Patient will remain free of falls  Description: INTERVENTIONS:  - Educate patient/family on patient safety including physical limitations  - Instruct patient to call for assistance with activity   - Consult OT/PT to assist with strengthening/mobility   - Keep Call bell within reach  - Keep bed low and locked with side rails adjusted as appropriate  - Keep care items and personal belongings within reach  - Initiate and maintain comfort rounds  - Make Fall Risk Sign visible to staff  - Offer Toileting every 2 Hours, in advance of need  - Initiate/Maintain bed alarm  - Obtain necessary fall risk management equipment: fall risk   - Apply yellow socks and bracelet for high fall risk patients  - Consider moving patient to room near nurses station  Outcome: Progressing

## 2022-07-29 VITALS
HEIGHT: 66 IN | TEMPERATURE: 98.3 F | WEIGHT: 236.11 LBS | RESPIRATION RATE: 18 BRPM | SYSTOLIC BLOOD PRESSURE: 119 MMHG | HEART RATE: 89 BPM | DIASTOLIC BLOOD PRESSURE: 80 MMHG | OXYGEN SATURATION: 97 % | BODY MASS INDEX: 37.95 KG/M2

## 2022-07-29 LAB
ANION GAP SERPL CALCULATED.3IONS-SCNC: 10 MMOL/L (ref 4–13)
BUN SERPL-MCNC: 22 MG/DL (ref 5–25)
CALCIUM SERPL-MCNC: 9.3 MG/DL (ref 8.3–10.1)
CHLORIDE SERPL-SCNC: 103 MMOL/L (ref 96–108)
CO2 SERPL-SCNC: 29 MMOL/L (ref 21–32)
CREAT SERPL-MCNC: 1.2 MG/DL (ref 0.6–1.3)
GFR SERPL CREATININE-BSD FRML MDRD: 46 ML/MIN/1.73SQ M
GLUCOSE SERPL-MCNC: 98 MG/DL (ref 65–140)
POTASSIUM SERPL-SCNC: 3.6 MMOL/L (ref 3.5–5.3)
SODIUM SERPL-SCNC: 142 MMOL/L (ref 135–147)

## 2022-07-29 PROCEDURE — 80048 BASIC METABOLIC PNL TOTAL CA: CPT | Performed by: FAMILY MEDICINE

## 2022-07-29 PROCEDURE — 99232 SBSQ HOSP IP/OBS MODERATE 35: CPT | Performed by: INTERNAL MEDICINE

## 2022-07-29 PROCEDURE — 99239 HOSP IP/OBS DSCHRG MGMT >30: CPT | Performed by: FAMILY MEDICINE

## 2022-07-29 RX ORDER — TORSEMIDE 20 MG/1
TABLET ORAL
Refills: 0
Start: 2022-07-29

## 2022-07-29 RX ADMIN — LEVOTHYROXINE SODIUM 150 MCG: 150 TABLET ORAL at 05:34

## 2022-07-29 RX ADMIN — SPIRONOLACTONE 12.5 MG: 25 TABLET ORAL at 12:36

## 2022-07-29 RX ADMIN — BUDESONIDE AND FORMOTEROL FUMARATE DIHYDRATE 2 PUFF: 160; 4.5 AEROSOL RESPIRATORY (INHALATION) at 08:57

## 2022-07-29 RX ADMIN — SOTALOL HYDROCHLORIDE 80 MG: 80 TABLET ORAL at 12:36

## 2022-07-29 RX ADMIN — SACUBITRIL AND VALSARTAN 1 TABLET: 97; 103 TABLET, FILM COATED ORAL at 09:00

## 2022-07-29 RX ADMIN — PANTOPRAZOLE SODIUM 40 MG: 40 TABLET, DELAYED RELEASE ORAL at 05:34

## 2022-07-29 RX ADMIN — TORSEMIDE 60 MG: 20 TABLET ORAL at 12:36

## 2022-07-29 RX ADMIN — RIVAROXABAN 20 MG: 20 TABLET, FILM COATED ORAL at 08:59

## 2022-07-29 RX ADMIN — ACETAMINOPHEN 650 MG: 325 TABLET, FILM COATED ORAL at 05:35

## 2022-07-29 RX ADMIN — POTASSIUM CHLORIDE 10 MEQ: 750 TABLET, EXTENDED RELEASE ORAL at 08:58

## 2022-07-29 NOTE — PLAN OF CARE
Problem: RESPIRATORY - ADULT  Goal: Achieves optimal ventilation and oxygenation  Description: INTERVENTIONS:  - Assess for changes in respiratory status  - Assess for changes in mentation and behavior  - Position to facilitate oxygenation and minimize respiratory effort  - Oxygen administered by appropriate delivery if ordered  - Initiate smoking cessation education as indicated  - Encourage broncho-pulmonary hygiene including cough, deep breathe, Incentive Spirometry  - Assess the need for suctioning and aspirate as needed  - Assess and instruct to report SOB or any respiratory difficulty  - Respiratory Therapy support as indicated  7/29/2022 1345 by Choco Reese RN  Outcome: Progressing  7/29/2022 1345 by Choco Reese RN  Outcome: Progressing     Problem: CARDIOVASCULAR - ADULT  Goal: Maintains optimal cardiac output and hemodynamic stability  Description: INTERVENTIONS:  - Monitor I/O, vital signs and rhythm  - Monitor for S/S and trends of decreased cardiac output  - Administer and titrate ordered vasoactive medications to optimize hemodynamic stability  - Assess quality of pulses, skin color and temperature  - Assess for signs of decreased coronary artery perfusion  - Instruct patient to report change in severity of symptoms  7/29/2022 1345 by Choco Reese RN  Outcome: Progressing  7/29/2022 1345 by Choco Reese RN  Outcome: Progressing  Goal: Absence of cardiac dysrhythmias or at baseline rhythm  Description: INTERVENTIONS:  - Continuous cardiac monitoring, vital signs, obtain 12 lead EKG if ordered  - Administer antiarrhythmic and heart rate control medications as ordered  - Monitor electrolytes and administer replacement therapy as ordered  7/29/2022 1345 by Choco Reese RN  Outcome: Progressing  7/29/2022 1345 by Choco Reese RN  Outcome: Progressing     Problem: MOBILITY - ADULT  Goal: Maintain or return to baseline ADL function  Description: INTERVENTIONS:  -  Assess patient's ability to carry out ADLs; assess patient's baseline for ADL function and identify physical deficits which impact ability to perform ADLs (bathing, care of mouth/teeth, toileting, grooming, dressing, etc )  - Assess/evaluate cause of self-care deficits   - Assess range of motion  - Assess patient's mobility; develop plan if impaired  - Assess patient's need for assistive devices and provide as appropriate  - Encourage maximum independence but intervene and supervise when necessary  - Involve family in performance of ADLs  - Assess for home care needs following discharge   - Consider OT consult to assist with ADL evaluation and planning for discharge  - Provide patient education as appropriate  7/29/2022 1345 by Jose Roman RN  Outcome: Progressing  7/29/2022 1345 by Jose Roman RN  Outcome: Progressing  Goal: Maintains/Returns to pre admission functional level  Description: INTERVENTIONS:  - Perform BMAT or MOVE assessment daily    - Set and communicate daily mobility goal to care team and patient/family/caregiver  - Collaborate with rehabilitation services on mobility goals if consulted  - Perform Range of Motion 5 times a day  - Reposition patient every 3 hours    - Dangle patient 3 times a day  - Stand patient 3 times a day  - Ambulate patient 3 times a day  - Out of bed to chair 1 times a day   - Out of bed for meals 1 times a day  - Out of bed for toileting  - Record patient progress and toleration of activity level   7/29/2022 1345 by Jose Roman RN  Outcome: Progressing  7/29/2022 1345 by Jose Roman RN  Outcome: Progressing     Problem: Potential for Falls  Goal: Patient will remain free of falls  Description: INTERVENTIONS:  - Educate patient/family on patient safety including physical limitations  - Instruct patient to call for assistance with activity   - Consult OT/PT to assist with strengthening/mobility   - Keep Call bell within reach  - Keep bed low and locked with side rails adjusted as appropriate  - Keep care items and personal belongings within reach  - Initiate and maintain comfort rounds  - Make Fall Risk Sign visible to staff  - Offer Toileting every 2 Hours, in advance of need  - Initiate/Maintain bed, chair alarm  - Obtain necessary fall risk management equipment: alarms  - Apply yellow socks and bracelet for high fall risk patients  - Consider moving patient to room near nurses station  7/29/2022 1345 by John Goldsmith, RN  Outcome: Progressing  7/29/2022 1345 by John Goldsmith, RN  Outcome: Progressing

## 2022-07-29 NOTE — DISCHARGE SUMMARY
Discharge Summary - [facility]    Patient Information: Sherice Baron 79 y o  female MRN: [id number]  Unit/Bed#: Miesha Vargas Encounter: [id number]    Discharging Physician / Practitioner: Asha Pelletier DO  PCP: Giuseppe Parks MD  Admission Date: 7/27/2022  Discharge Date: 07/29/22    Reason for Admission: Cardiac Device Problem (Pt with AICD discharge at 145am while using restroom, pt with hx of AFIB, reports that she has had this happen once prior, unsure of report from last visit  Denies CP, reports some SOB  )      Discharge Diagnoses:     Principal Problem:    NSVT (nonsustained ventricular tachycardia) ([facility])  Active Problems:    Acute on chronic congestive heart failure (HCC)    CKD (chronic kidney disease)    Mild intermittent asthma without complication    Dilated cardiomyopathy ([facility])    Dyslipidemia    Hypertension    Paroxysmal atrial fibrillation (HCC)    Hypothyroidism  Resolved Problems:    * No resolved hospital problems  *        * NSVT (nonsustained ventricular tachycardia) ([facility] [address] )  Assessment & Plan  Patient presents after episode of lightheadedness and AICD discharge  · Biventricular ICD device interrogated, showed 9 5 second run of v tach at rate of 272 bpm  · History of paroxysmal afib and dilated cardiomyopathy  · Troponins normal    · Follow up with own cardiologist after discharge  Episode likely due to CHF exacerbation  · Telemetry and electrolytes stable  · Encouraged follow up with advanced heart failure team at [facility] for cardiac MRI and cardiac catheterization    Acute on chronic congestive heart failure [facility])  Assessment & Plan  Wt Readings from Last 3 Encounters:   07/28/22 108 kg (238 lb 1 6 oz)   06/24/22 107 kg (236 lb)   06/10/22 108 kg (237 lb)     Patient with increasing SOB on admission, leg swelling and weight gain over the last week  · Cardiology consulted  · Continue Entresto 97/103mg BID  Patient was started on IV lasix 60mg BID   This was transitioned to Demadex 60mg every other day alternating with Demadex 40mg every other day, per cardiology  · Daily weights showed a five pound weight loss since admission  I/Os stable  Follow up with advanced heart failure team upon discharge  CKD (chronic kidney disease)  Assessment & Plan  Lab Results   Component Value Date    EGFR 46 07/29/2022    EGFR 47 07/28/2022    EGFR 38 07/27/2022    CREATININE 1 20 07/29/2022    CREATININE 1 18 07/28/2022    CREATININE 1 40 (H) 07/27/2022     Baseline creatinine 1 0-1 3, creatinine slightly above baseline on admission at 1 4  · Creatinine back to baseline  · Repeat lab work after discharge    Hypothyroidism  Assessment & Plan  Continue levothyroxine    Paroxysmal atrial fibrillation [facility])  Assessment & Plan  Continue Lopressor, sotalol, xarelto    Hypertension  Assessment & Plan  Continue lopressor, Entresto  Aldactone initially held but later restarted    Dyslipidemia  Assessment & Plan  Continue Pravachol    Dilated cardiomyopathy [facility])  Assessment & Plan  2D echo 4/22/2022 showed severely dilated LV with EF 20%, global hypokinesis with regional variation, diastolic function is abnormal, severely dilated LA, moderately dilated RA, severe MR, mild-to-moderate TR,moderately to severely elevated PA pressure  · Continue Entresto, Lopressor, Aldactone      Mild intermittent asthma without complication  Assessment & Plan  Continue Symbicort and Xopenex      Consultations During Hospital Stay:  IP CONSULT TO CARDIOLOGY    Procedures Performed:     · None    Significant Findings:     · Refer to hospital course and above listed diagnosis related plan for details    Imaging while in hospital:    XR chest 1 view portable    Result Date: 7/27/2022  Narrative: CHEST INDICATION:   sob and pacer leads  COMPARISON:  6/24/2022 EXAM PERFORMED/VIEWS:  XR CHEST PORTABLE Single view FINDINGS: Cardiomediastinal silhouette remains enlarged   Typical left-sided cardiac pacer is unchanged  The lungs are clear  No pneumothorax or pleural effusion  Osseous structures appear within normal limits for patient age  Impression: Persistent cardiomegaly No acute cardiopulmonary disease  Findings are stable Workstation performed: [id number]     Cardiac EP device report    Result Date: 7/8/2022  Narrative: SUNI BI-V ICD/ACTIVE SYSTEM IS MRI CONDITIONAL MERLIN TRANSMISSION: N/B BATTERY VOLTAGE NEARING KEITH  WILL SCHEDULE MONTHLY BATTERY CHECKS  (1 YR) AP 12%  90%  ALL AVAILABLE LEAD PARAMETERS WITHIN NORMAL LIMITS  [address] <30 SEC (ATRIAL NOISE, HX OF THE SAME)  NO SIGNIFICANT HIGH RATE EPISODES  CORVUE IMPEDANCE MONITORING WITHIN NORMAL LIMITS  NORMAL DEVICE FUNCTION  Incidental Findings:   · No incidental findings  Test Results Pending at Discharge (will require follow up):   · As per After Visit Summary     Outpatient Tests Requested:  · BMP    Complications:  Refer to hospital course and above listed diagnosis related plan, if any    Hospital Course:     Manuel Beard is a 79 y o  female patient who originally presented to the hospital on 7/27/2022 due to AICD firing  The device was interrogated which showed a 9 5 second run of Vtach  Patient was admitted and cardiology was consulted  AICD firing likely due to acute CHF exacerbation  Patient was started on IV lasix 60mg  Cardiology then switched to [facility]  Throughout the hospital course, patient lost 5lb  Patient is currently stable, leg swelling and shortness of breath has improved  She is now asymptomatic  Patient will need to follow up with her advanced heart failure team at [facility]  Patient cleared by Cardiology prior to discharge  Discharge plan discussed with patient and her brother/sister at bedside    Please see above list of diagnoses and related plan for additional information  Condition at Discharge: good     Discharge Day Visit / Exam:     Subjective:  Patient has no complaints today   She states she feels much better  Leg swelling has improved  Denies chest pain, shortness of breath and wants to go home as soon as possible    Vitals: Blood Pressure: 119/80 (07/29/22 1230)  Pulse: 89 (07/29/22 1230)  Temperature: 98 3 °F (36 8 °C) (07/29/22 0802)  Temp Source: Oral (07/29/22 0802)  Respirations: 18 (07/29/22 1230)  Height: 5' 6" (167 6 cm) (07/27/22 1002)  Weight - Scale: 107 kg (236 lb 1 8 oz) (07/29/22 0600)  SpO2: 97 % (07/29/22 1230)  Exam:   Physical Exam  Constitutional:       General: She is not in acute distress  Appearance: Normal appearance  She is not diaphoretic  HENT:      Head: Normocephalic and atraumatic  Eyes:      General:         Right eye: No discharge  Left eye: No discharge  Cardiovascular:      Rate and Rhythm: Normal rate and regular rhythm  Pulmonary:      Effort: Pulmonary effort is normal  No respiratory distress  Breath sounds: No wheezing or rales  Comments: Decreased breath sounds b/l  Abdominal:      General: Bowel sounds are normal  There is no distension  Palpations: Abdomen is soft  Tenderness: There is no abdominal tenderness  Musculoskeletal:      Right lower leg: No edema  Left lower leg: No edema  Neurological:      Mental Status: She is alert and oriented to person, place, and time  Psychiatric:         Mood and Affect: Mood normal          Behavior: Behavior normal          Discharge instructions/Information to patient and family:(Discharge Medications and Follow up):   See after visit summary for information provided to patient and family  Provisions for Follow-Up Care:  See after visit summary for information related to follow-up care and any pertinent home health orders  Disposition: Home    Planned Readmission:  No     Discharge Statement:  I spent > 30 minutes discharging the patient  This time was spent on the day of discharge  I had direct contact with the patient on the day of discharge  Greater than 50% of the total time was spent examining patient, answering all patient questions, arranging and discussing plan of care with patient as well as directly providing post-discharge instructions  Additional time then spent on discharge activities  Discharge Medications:  See after visit summary for reconciled discharge medications provided to patient and family  ** Please Note: "This note has been constructed using a voice recognition system  Therefore there may be syntax, spelling, and/or grammatical errors   Please call if you have any questions  "**

## 2022-07-29 NOTE — PLAN OF CARE
Problem: RESPIRATORY - ADULT  Goal: Achieves optimal ventilation and oxygenation  Description: INTERVENTIONS:  - Assess for changes in respiratory status  - Assess for changes in mentation and behavior  - Position to facilitate oxygenation and minimize respiratory effort  - Oxygen administered by appropriate delivery if ordered  - Initiate smoking cessation education as indicated  - Encourage broncho-pulmonary hygiene including cough, deep breathe, Incentive Spirometry  - Assess the need for suctioning and aspirate as needed  - Assess and instruct to report SOB or any respiratory difficulty  - Respiratory Therapy support as indicated  Outcome: Progressing     Problem: CARDIOVASCULAR - ADULT  Goal: Maintains optimal cardiac output and hemodynamic stability  Description: INTERVENTIONS:  - Monitor I/O, vital signs and rhythm  - Monitor for S/S and trends of decreased cardiac output  - Administer and titrate ordered vasoactive medications to optimize hemodynamic stability  - Assess quality of pulses, skin color and temperature  - Assess for signs of decreased coronary artery perfusion  - Instruct patient to report change in severity of symptoms  Outcome: Progressing  Goal: Absence of cardiac dysrhythmias or at baseline rhythm  Description: INTERVENTIONS:  - Continuous cardiac monitoring, vital signs, obtain 12 lead EKG if ordered  - Administer antiarrhythmic and heart rate control medications as ordered  - Monitor electrolytes and administer replacement therapy as ordered  Outcome: Progressing     Problem: MOBILITY - ADULT  Goal: Maintain or return to baseline ADL function  Description: INTERVENTIONS:  -  Assess patient's ability to carry out ADLs; assess patient's baseline for ADL function and identify physical deficits which impact ability to perform ADLs (bathing, care of mouth/teeth, toileting, grooming, dressing, etc )  - Assess/evaluate cause of self-care deficits   - Assess range of motion  - Assess patient's mobility; develop plan if impaired  - Assess patient's need for assistive devices and provide as appropriate  - Encourage maximum independence but intervene and supervise when necessary  - Involve family in performance of ADLs  - Assess for home care needs following discharge   - Consider OT consult to assist with ADL evaluation and planning for discharge  - Provide patient education as appropriate  Outcome: Progressing  Goal: Maintains/Returns to pre admission functional level  Description: INTERVENTIONS:  - Perform BMAT or MOVE assessment daily    - Set and communicate daily mobility goal to care team and patient/family/caregiver  - Collaborate with rehabilitation services on mobility goals if consulted  - Perform Range of Motion 3 times a day  - Reposition patient every 2 hours    - Dangle patient 3 times a day  - Stand patient 3 times a day  - Ambulate patient 3 times a day  - Out of bed to chair 3 times a day   - Out of bed for meals 3  Problem: Potential for Falls  Goal: Patient will remain free of falls  Description: INTERVENTIONS:  - Educate patient/family on patient safety including physical limitations  - Instruct patient to call for assistance with activity   - Consult OT/PT to assist with strengthening/mobility   - Keep Call bell within reach  - Keep bed low and locked with side rails adjusted as appropriate  - Keep care items and personal belongings within reach  - Initiate and maintain comfort rounds  - Make Fall Risk Sign visible to staff  - Offer Toileting every 2 Hours, in advance of need  - Initiate/Maintain bed alarm  - Obtain necessary fall risk management equipment:   - Apply yellow socks and bracelet for high fall risk patients  - Consider moving patient to room near nurses station  Outcome: Progressing    times a day  - Out of bed for toileting  - Record patient progress and toleration of activity level   Outcome: Progressing

## 2022-07-29 NOTE — CASE MANAGEMENT
Case Management Discharge Planning Note    Patient name Patty Wilder  [facility]  MRN [id number]  : 1955 Date 2022       Current Admission Date: 2022  Current Admission Diagnosis:NSVT (nonsustained ventricular tachycardia) [facility])   Patient Active Problem List    Diagnosis Date Noted    Palpitations 05/15/2022    CKD (chronic kidney disease) 05/15/2022    Elevated glucose 05/15/2022    Frequent PVCs     Chest pain 2022    NSVT (nonsustained ventricular tachycardia) ([facility] ) 2021    Acute on chronic congestive heart failure ([facility] ) 10/10/2020    Mild intermittent asthma without complication     Dyspnea on exertion 2018    [facility]    Moderate obesity 2018    Chronic combined systolic and diastolic congestive heart failure, NYHA class 2 ([facility] ) 2017    Dyslipidemia 2017    Hypertension 2013    Dilated cardiomyopathy ([facility] ) 10/31/2013    Hypothyroidism 10/26/2013      LOS (days): 2  Geometric Mean LOS (GMLOS) (days): 3 80  Days to GMLOS:1 5     OBJECTIVE:  Risk of Unplanned Readmission Score: 15 7     Current admission status: Inpatient   [facility] Pharmacy:   [id number], [place], [address] [place] [address]  Phone: [phone] Fax: [phone] Adali Crain, [address] [place] [id number]  Phone: [phone] Fax: [phone]    Primary Care Provider: Gabriel Samayoa MD    Primary Insurance: [facility]  Secondary Insurance: [facility]  e  MA JERRY    DISCHARGE DETAILS:    Discharge planning discussed with[de-identified] Patient  Freedom of Choice: Yes  Comments - Freedom of Choice: SW met briefly with pt to review IMM and plans  Pt's plan is to return home when discharged  No discharge needs anticipated by pt    Pt said she is very well supported by home health aide provided to her through [address] helps her M-F 3 hours per day  Requested [address]         Is the patient interested in [facility] at discharge?: No    DME Referral Provided  Referral made for DME?: No    Other Referral/Resources/Interventions Provided:  Interventions: None Indicated    Treatment Team Recommendation: Home  Discharge Destination Plan[de-identified] Home  Transport at Discharge : Family    IMM Given (Date):: 07/29/22  IMM Given to[de-identified] Patient (IMM reviewed with pt  Pt verbalized understanding  Pt signed IMM and copy given    Copy also placed in scan bin for chart )

## 2022-07-29 NOTE — PROGRESS NOTES
22 1000   Clinical Encounter Type   Visited With Patient   Routine Visit Introduction   Continue Visiting Yes   Referral From Nurse   Gnosticism Encounters   Gnosticism Needs [facility] Text;Prayer    Pastoral Care Progress Note    2022  Patient: Leila Connor : 1955  Admission Date & Time: 2022 0224  MRN: [id number] CSN: [id number]                     Chaplaincy Interventions Utilized:   Empowerment: Clarified, confirmed, or reviewed information from treatment team , Encouraged focus on present, Provided anxiety containment, and Reframed experience of patient/family    Exploration: Explored hope, Explored emotional needs & resources, Explored spiritual needs & resources, and Facilitated story telling    Relationship Building: Cultivated a relationship of care and support and Listened empathically    Ritual: Provided Amish and Read sacred text        Chaplaincy Outcomes Achieved:  Arranged for community clergy surrogate, Expressed gratitude, Expressed humor, Expressed peace, Expressed ultimate hope, Spiritual resources utilized, Tearfully processed emotions, and Verbally processed emotions      Spiritual Coping Strategies Utilized:   Connectedness, Spiritual comfort, and Positive spiritual reframing

## 2022-07-29 NOTE — PROGRESS NOTES
Progress Note - Cardiology   [facility]     Leila Connor 79 y o  female MRN: [id number]  : 1955  Unit/Bed#: [address]  Encounter: [id number]    Assessment and Plan:   1  Ventricular tachycardia:  Patient with 9 5 second of Run of [place] at a rate of 272 beats per minute    -  patient received therapy    -  noted to be volume overloaded and approximately 6 lb above dry weight weight    -  continue sotalol 80 mg b i d     -  continue Lopressor 12 5 mg b i d     -  follow-up with [facility] success program at [place] as currently scheduled for testing     2  Acute on chronic systolic heart failure:  Approximately 6 lb weight gain above dry weight, patient has lost approximately 6 at this time  Will DC IV Lasix after this evening's dose    -  will start Demadex 60 mg every other day alternating with 40 mg every other day on 2022    -  continue Entresto 93/103 mg twice a day    -  continue Lopressor 12 point mg b i d     -  continue Aldactone 12 5 mg in the a m  2022    -  patient has not tolerated SGLT 2 inhibitors    -  continue low-sodium diet    -  monitor I&Os, daily weights and labs    -  follow-up with [facility] success program at [place] is currently scheduled     3  Chronic kidney disease:  Renal function currently stable will continue monitor     4  Dilated cardiomyopathy:  Patient has ICD     5  Paroxysmal atrial fibrillation:  Maintaining sinus rhythm on beta-blocker, sotalol and Xarelto     6  Hypertension:  Blood pressure stable on medication as per # 2  Subjective / Objective:   Patient seen and examined  She is in good spirits and feels that she is back to her baseline  Cleared for discharge today  Vitals: Blood pressure 119/80, pulse 89, temperature 98 3 °F (36 8 °C), temperature source Oral, resp  rate 18, height 5' 6" (1 676 m), weight 107 kg (236 lb 1 8 oz), SpO2 97 %    Vitals:    22 1027 22 0600   Weight: 108 kg (238 lb 1 6 oz) 107 kg (236 lb 1 8 oz)     Body mass index is 38 11 kg/m²  BP Readings from Last 3 Encounters:   07/29/22 119/80   06/24/22 108/59   06/10/22 110/60     Orthostatic Blood Pressures    Flowsheet Row Most Recent Value   Blood Pressure 119/80 filed at 07/29/2022 1230   Patient Position - Orthostatic VS Lying filed at 07/29/2022 1230        I/O       07/27 0701 07/28 0700 07/28 0701  07/29 0700 07/29 0701  07/30 0700    P  O    475    I V  (mL/kg)   10 (0 1)    Total Intake(mL/kg)   485 (4 5)    Urine (mL/kg/hr)  1400 (0 5)     Total Output  1400     Net  -1400 +485               Invasive Devices  Report    Peripheral Intravenous Line  Duration           Peripheral IV 07/27/22 Right Antecubital 2 days                  Intake/Output Summary (Last 24 hours) at 7/29/2022 1554  Last data filed at 7/29/2022 0801  Gross per 24 hour   Intake 485 ml   Output 600 ml   Net -115 ml         Physical Exam:   Physical Exam  Vitals and nursing note reviewed  Constitutional:       Appearance: Normal appearance  She is obese  HENT:      Right Ear: External ear normal       Left Ear: External ear normal       Nose: Nose normal    Eyes:      General: No scleral icterus  Right eye: No discharge  Left eye: No discharge  Cardiovascular:      Rate and Rhythm: Normal rate and regular rhythm  Pulses: Normal pulses  Heart sounds: Murmur heard  Pulmonary:      Effort: Pulmonary effort is normal  No respiratory distress  Breath sounds: Normal breath sounds  No wheezing, rhonchi or rales  Abdominal:      General: Bowel sounds are normal  There is no distension  Palpations: Abdomen is soft  Musculoskeletal:      Right lower leg: No edema  Left lower leg: No edema  Skin:     General: Skin is warm and dry  Capillary Refill: Capillary refill takes less than 2 seconds  Neurological:      General: No focal deficit present  Mental Status: She is alert  Mental status is at baseline     Psychiatric: Mood and Affect: Mood normal                  Medications/ Allergies:     Current Facility-Administered Medications   Medication Dose Route Frequency Provider Last Rate    acetaminophen  650 mg Oral Q6H PRN Nicole Lam PA-C      ALPRAZolam  0 5 mg Oral BID PRN MAJOR Terry      budesonide-formoterol  2 puff Inhalation BID Nicole Lam PA-C      levalbuterol  0 63 mg Nebulization Q6H PRN Nicole Lam PA-C      levothyroxine  150 mcg Oral Daily Zoe VecBONNIE carrasco      metoprolol tartrate  12 5 mg Oral Q12H [address] MAJOR Traore      ondansetron  4 mg Intravenous Q6H PRN Nicole Lam PA-C      pantoprazole  40 mg Oral Early Morning Zoe BONNIE Oliveira      potassium chloride  10 mEq Oral Daily Nicole Lam PA-C      pravastatin  40 mg Oral Daily With CycellBONNIE      rivaroxaban  20 mg Oral Daily Nicole Lam PA-C      sacubitril-valsartan  1 tablet Oral BID MAJOR Traore      sotalol  80 mg Oral Q12H Zoe BONNIE Oliveira      spironolactone  12 5 mg Oral Daily MAJOR Traore      tiZANidine  2 mg Oral Q8H PRN Nicole Lam PA-C      [START ON 7/30/2022] torsemide  40 mg Oral Every Other Day Elsa Apo, CRLESLY      torsemide  60 mg Oral Every Other Day MAJOR Traore       acetaminophen, 650 mg, Q6H PRN  ALPRAZolam, 0 5 mg, BID PRN  levalbuterol, 0 63 mg, Q6H PRN  ondansetron, 4 mg, Q6H PRN  tiZANidine, 2 mg, Q8H PRN      Allergies   Allergen Reactions    Carvedilol Palpitations, Other (See Comments), Dizziness and Edema     Started several days after initiation of carvedilol 3 125 milligrams bid with duration of complaints approximately 36 hours  Patient also had numbness and tingling of hands and feet      Omnipaque [Iohexol] Swelling    Penicillins Swelling    Iv Dye  [Iodinated Diagnostic Agents] Throat Swelling    Jardiance [Empagliflozin] Throat Swelling    Metoprolol Throat Swelling    Other Swelling     Fresh herbs "basil, cilantro"    Shellfish-Derived Products - Food Allergy Hives       VTE Pharmacologic Prophylaxis:   Sequential compression device (Venodyne)     Labs:   Troponins:  Results from last 7 days   Lab Units 07/27/22  0712 07/27/22  0511   HSTNI D2 ng/L  --  8   HSTNI D4 ng/L 10  --      CBC with diff:  Results from last 7 days   Lab Units 07/28/22  0611 07/27/22  0255   WBC Thousand/uL 8 20 11 91*   HEMOGLOBIN g/dL 12 3 12 3   HEMATOCRIT % 39 1 39 8   MCV fL 86 86   PLATELETS Thousands/uL 311 311   MCH pg 27 0 26 6*   MCHC g/dL 31 5 30 9*   RDW % 15 8* 15 6*   MPV fL 10 2 9 8   NRBC AUTO /100 WBCs  --  0     CMP:  Results from last 7 days   Lab Units 07/29/22  0527 07/28/22  0611 07/27/22  0255   SODIUM mmol/L 142 145 143   POTASSIUM mmol/L 3 6 4 3 3 6   CHLORIDE mmol/L 103 107 106   CO2 mmol/L 29 30 29   ANION GAP mmol/L 10 8 8   BUN mg/dL 22 19 26*   CREATININE mg/dL 1 20 1 18 1 40*   CALCIUM mg/dL 9 3 9 3 9 2   EGFR ml/min/1 73sq m 46 47 38     Magnesium:  Results from last 7 days   Lab Units 07/27/22  0255   MAGNESIUM mg/dL 2 3       Imaging & Testing   I have personally reviewed pertinent reports  XR chest 1 view portable    Result Date: 7/27/2022  Narrative: CHEST INDICATION:   sob and pacer leads  COMPARISON:  6/24/2022 EXAM PERFORMED/VIEWS:  XR CHEST PORTABLE Single view FINDINGS: Cardiomediastinal silhouette remains enlarged  Typical left-sided cardiac pacer is unchanged  The lungs are clear  No pneumothorax or pleural effusion  Osseous structures appear within normal limits for patient age  Impression: Persistent cardiomegaly No acute cardiopulmonary disease  Findings are stable Workstation performed: [id number]     Cardiac EP device report    Result Date: 7/8/2022  Narrative: SJM BI-V ICD/ACTIVE SYSTEM IS MRI CONDITIONAL MERLIN TRANSMISSION: N/B BATTERY VOLTAGE NEARING KEITH  WILL SCHEDULE MONTHLY BATTERY CHECKS  (1 YR) AP 12%  90%   ALL AVAILABLE LEAD PARAMETERS WITHIN NORMAL LIMITS  [address] <30 SEC (ATRIAL NOISE, HX OF THE SAME)  NO SIGNIFICANT HIGH RATE EPISODES  CORVUE IMPEDANCE MONITORING WITHIN NORMAL LIMITS  NORMAL DEVICE FUNCTION  Flor Phlegm, [address]  Cardiology      "This note has been constructed using a voice recognition system  Therefore there may be syntax, spelling, and/or grammatical errors   Please call if you have any questions  "

## 2022-07-29 NOTE — NURSING NOTE
Patient left unit via wheelchair in stable condition with all belongings accompanied by PCA and friend  AVS and all discharge instructions reviewed thoroughly with patient and were well understood

## 2022-08-01 NOTE — UTILIZATION REVIEW
Notification of Discharge   This is a Notification of Discharge from our facility [address]  Please be advised that this patient has been discharge from our facility  Below you will find the admission and discharge date and time including the patients disposition  UTILIZATION REVIEW CONTACT:  Eugenia Pantoja  Utilization   Network Utilization Review Department  Phone: [phone] x carefully listen to the prompts  All voicemails are confidential   Email: [email or web address]     PHYSICIAN ADVISORY SERVICES:  FOR IOTZ-SB-HMYV REVIEW - MEDICAL NECESSITY DENIAL  Phone: [phone]  Fax: [phone]  Email: [email or web address]     PRESENTATION DATE: 7/27/2022  2:24 AM  OBERVATION ADMISSION DATE:   INPATIENT ADMISSION DATE: 7/27/22  5:02 AM   DISCHARGE DATE: 7/29/2022  5:38 PM  DIS[facility]ITION[facility] [address] [place] [address] Road INFORMATION:  Send all requests for admission clinical reviews, approved or denied determinations and any other requests to dedicated fax number below belonging to the campus where the patient is receiving treatment   List of dedicated fax numbers:  [address] [place] [address] DENIALS (Administrative/Medical Necessity) [phone]   [address] (Maternity/NICU/Pediatrics) [phone]   [facility] [phone]   [address] [facility] [address] [phone]   [facility] [place]  [phone]   [address] [facility] [address] [place] [address],[address] [phone]   [facility]  [phone]   [address] [place] [address], [address]  [address] [facility] [address] [facility] [phone]

## 2022-08-05 ENCOUNTER — HOSPITAL ENCOUNTER (EMERGENCY)
Facility: HOSPITAL | Age: 67
Discharge: HOME | End: 2022-08-06
Attending: EMERGENCY MEDICINE | Admitting: EMERGENCY MEDICINE
Payer: COMMERCIAL

## 2022-08-05 DIAGNOSIS — I49.3 FREQUENT PVCS: ICD-10-CM

## 2022-08-05 DIAGNOSIS — R07.89 ATYPICAL CHEST PAIN: Primary | ICD-10-CM

## 2022-08-05 PROCEDURE — 99285 EMERGENCY DEPT VISIT HI MDM: CPT

## 2022-08-05 PROCEDURE — 93005 ELECTROCARDIOGRAM TRACING: CPT

## 2022-08-05 RX ORDER — DIAZEPAM 5 MG/ML
5 INJECTION, SOLUTION INTRAMUSCULAR; INTRAVENOUS ONCE
Status: DISCONTINUED | OUTPATIENT
Start: 2022-08-05 | End: 2022-08-05

## 2022-08-05 RX ORDER — MIDAZOLAM HYDROCHLORIDE 2 MG/2ML
1 INJECTION, SOLUTION INTRAMUSCULAR; INTRAVENOUS ONCE
Status: COMPLETED | OUTPATIENT
Start: 2022-08-06 | End: 2022-08-06

## 2022-08-06 VITALS
RESPIRATION RATE: 22 BRPM | SYSTOLIC BLOOD PRESSURE: 99 MMHG | BODY MASS INDEX: 37.93 KG/M2 | OXYGEN SATURATION: 100 % | HEIGHT: 66 IN | WEIGHT: 236 LBS | DIASTOLIC BLOOD PRESSURE: 58 MMHG | HEART RATE: 60 BPM | TEMPERATURE: 98.7 F

## 2022-08-06 LAB
2HR DELTA HS TROPONIN: -1 NG/L
ALBUMIN SERPL BCP-MCNC: 3.6 G/DL (ref 3.5–5)
ALP SERPL-CCNC: 76 U/L (ref 46–116)
ALT SERPL W P-5'-P-CCNC: 17 U/L (ref 12–78)
ANION GAP SERPL CALCULATED.3IONS-SCNC: 7 MMOL/L (ref 4–13)
AST SERPL W P-5'-P-CCNC: 20 U/L (ref 5–45)
BASOPHILS # BLD AUTO: 0.04 THOUSANDS/ΜL (ref 0–0.1)
BASOPHILS NFR BLD AUTO: 1 % (ref 0–1)
BILIRUB DIRECT SERPL-MCNC: 0.1 MG/DL (ref 0–0.2)
BILIRUB SERPL-MCNC: 0.33 MG/DL (ref 0.2–1)
BUN SERPL-MCNC: 27 MG/DL (ref 5–25)
CALCIUM SERPL-MCNC: 9.6 MG/DL (ref 8.3–10.1)
CARDIAC TROPONIN I PNL SERPL HS: 7 NG/L
CARDIAC TROPONIN I PNL SERPL HS: 8 NG/L
CHLORIDE SERPL-SCNC: 97 MMOL/L (ref 96–108)
CO2 SERPL-SCNC: 31 MMOL/L (ref 21–32)
CREAT SERPL-MCNC: 1.47 MG/DL (ref 0.6–1.3)
EOSINOPHIL # BLD AUTO: 0.29 THOUSAND/ΜL (ref 0–0.61)
EOSINOPHIL NFR BLD AUTO: 4 % (ref 0–6)
ERYTHROCYTE [DISTWIDTH] IN BLOOD BY AUTOMATED COUNT: 15.4 % (ref 11.6–15.1)
GFR SERPL CREATININE-BSD FRML MDRD: 36 ML/MIN/1.73SQ M
GLUCOSE SERPL-MCNC: 84 MG/DL (ref 65–140)
HCT VFR BLD AUTO: 40.3 % (ref 34.8–46.1)
HGB BLD-MCNC: 12.8 G/DL (ref 11.5–15.4)
IMM GRANULOCYTES # BLD AUTO: 0.02 THOUSAND/UL (ref 0–0.2)
IMM GRANULOCYTES NFR BLD AUTO: 0 % (ref 0–2)
LIPASE SERPL-CCNC: 65 U/L (ref 73–393)
LYMPHOCYTES # BLD AUTO: 2.95 THOUSANDS/ΜL (ref 0.6–4.47)
LYMPHOCYTES NFR BLD AUTO: 39 % (ref 14–44)
MAGNESIUM SERPL-MCNC: 2.5 MG/DL (ref 1.6–2.6)
MCH RBC QN AUTO: 27.2 PG (ref 26.8–34.3)
MCHC RBC AUTO-ENTMCNC: 31.8 G/DL (ref 31.4–37.4)
MCV RBC AUTO: 86 FL (ref 82–98)
MONOCYTES # BLD AUTO: 0.69 THOUSAND/ΜL (ref 0.17–1.22)
MONOCYTES NFR BLD AUTO: 9 % (ref 4–12)
NEUTROPHILS # BLD AUTO: 3.63 THOUSANDS/ΜL (ref 1.85–7.62)
NEUTS SEG NFR BLD AUTO: 47 % (ref 43–75)
NRBC BLD AUTO-RTO: 0 /100 WBCS
NT-PROBNP SERPL-MCNC: 1501 PG/ML
PLATELET # BLD AUTO: 334 THOUSANDS/UL (ref 149–390)
PMV BLD AUTO: 10.3 FL (ref 8.9–12.7)
POTASSIUM SERPL-SCNC: 3.5 MMOL/L (ref 3.5–5.3)
PROT SERPL-MCNC: 7.3 G/DL (ref 6.4–8.4)
RBC # BLD AUTO: 4.7 MILLION/UL (ref 3.81–5.12)
SODIUM SERPL-SCNC: 135 MMOL/L (ref 135–147)
WBC # BLD AUTO: 7.62 THOUSAND/UL (ref 4.31–10.16)

## 2022-08-06 PROCEDURE — 96374 THER/PROPH/DIAG INJ IV PUSH: CPT

## 2022-08-06 PROCEDURE — 99285 EMERGENCY DEPT VISIT HI MDM: CPT | Performed by: EMERGENCY MEDICINE

## 2022-08-06 PROCEDURE — 93005 ELECTROCARDIOGRAM TRACING: CPT

## 2022-08-06 PROCEDURE — 83690 ASSAY OF LIPASE: CPT | Performed by: EMERGENCY MEDICINE

## 2022-08-06 PROCEDURE — 83735 ASSAY OF MAGNESIUM: CPT | Performed by: EMERGENCY MEDICINE

## 2022-08-06 PROCEDURE — 80048 BASIC METABOLIC PNL TOTAL CA: CPT | Performed by: EMERGENCY MEDICINE

## 2022-08-06 PROCEDURE — 84484 ASSAY OF TROPONIN QUANT: CPT | Performed by: EMERGENCY MEDICINE

## 2022-08-06 PROCEDURE — 83880 ASSAY OF NATRIURETIC PEPTIDE: CPT | Performed by: EMERGENCY MEDICINE

## 2022-08-06 PROCEDURE — 80076 HEPATIC FUNCTION PANEL: CPT | Performed by: EMERGENCY MEDICINE

## 2022-08-06 PROCEDURE — 36415 COLL VENOUS BLD VENIPUNCTURE: CPT | Performed by: EMERGENCY MEDICINE

## 2022-08-06 PROCEDURE — 85025 COMPLETE CBC W/AUTO DIFF WBC: CPT | Performed by: EMERGENCY MEDICINE

## 2022-08-06 RX ORDER — METOCLOPRAMIDE HYDROCHLORIDE 5 MG/ML
10 INJECTION INTRAMUSCULAR; INTRAVENOUS ONCE
Status: COMPLETED | OUTPATIENT
Start: 2022-08-06 | End: 2022-08-06

## 2022-08-06 RX ORDER — ONDANSETRON 2 MG/ML
4 INJECTION INTRAMUSCULAR; INTRAVENOUS ONCE
Status: DISCONTINUED | OUTPATIENT
Start: 2022-08-06 | End: 2022-08-06

## 2022-08-06 RX ADMIN — MIDAZOLAM 1 MG: 1 INJECTION INTRAMUSCULAR; INTRAVENOUS at 00:11

## 2022-08-06 RX ADMIN — METOCLOPRAMIDE HYDROCHLORIDE 10 MG: 5 INJECTION INTRAMUSCULAR; INTRAVENOUS at 02:07

## 2022-08-06 NOTE — ED NOTES
Pt complaining of mid upper gastric spasming that started suddenly  MD notified  Awaiting further orders       Jodie Coe RN  08/06/22 70

## 2022-08-06 NOTE — ED PROVIDER NOTES
Final Diagnosis:  1  Atypical chest pain    2  Frequent PVCs        Chief Complaint   Patient presents with    Chest Pain     Patient comes tonight complaining of left side chest pain, started earlier, not sure if it is her asthma, was at [facility] yesterday for pre op testing for cardiac catheterization to be done at [facility]      HPI  Patient presents with left sided chest pain  Recent admission for AICD firing for 9 seconds of vtach  Few hours ago noted some left anterior chest pain  Tried inahler for asthma because assoc SOB  Nonexertional  No vomiting  Some nausea  No diaphoresis  Also with significant anxiety  AICD didn't fire  Interrogation with PVCs  pateint w/ symptomatic improvement after versed  No wheeze  Normal RR  No increased WOB  satting RA  Has transient abd cramping, history of, requires no additional medications  Nausea improves with reglan  - No language barrier    - History obtained from patient  - There are no limitations to the history obtained  - Previous charting underwent limited review with attention to last ED visits, labs, ekgs, and prior imaging  PMH:   has a past medical history of A-fib ([address] ), Abnormal blood sugar, Acid reflux, Acute bronchitis, Allergic reaction, Anxiety, Arthritis, Asthma, Cardiomyopathy ([address] ), Cellulitis of left lower leg, Chest pain, CHF (congestive heart failure) ([address] ), Constipation, Depression with anxiety, Disease of thyroid gland, Diverticulitis, Dyslipidemia, Dyspnea on exertion, Elbow pain, Fracture of olecranon, open, Gastritis, Generalized pain, Hematuria, History of colonoscopy, Hypertension, Hypokalemia, Hypomagnesemia, Leg cramping, Moderate obesity, Morbid obesity due to excess calories ([address] ), Myalgia, Myositis, Nausea in adult, Nephrolithiasis, Prepatellar bursitis, unspecified knee, Screening for lipid disorders, Shortness of breath, Spasm of muscle, and Thyroid trouble      PSH:   has a past surgical history that includes Laparoscopic cholecystectomy; Total abdominal hysterectomy w/ bilateral salpingoophorectomy; and Cardiac defibrillator placement  Social History:    Tobacco Use: Low Risk     Smoking Tobacco Use: Never Smoker    Smokeless Tobacco Use: Never Used     Alcohol Use: Not on file     Patient with no illicit use    ROS:    Pertinent positives/negatives:   Review of Systems     CONSTITUTIONAL:  No dizziness  No weakness  No unexpected weight loss  EYES:  No pain, erythema, or discharge  No loss of vision  ENT:  No tinnitus, decreased hearing  No epistaxis/purulent drainage  No voice change, airway closing, trismus  CARDIOVASCULAR:  No chest pain  No palpitations  No new lower extremity edema  RESPIRATORY:  No purulent cough  No hemoptysis  No dyspnea  No paroxysmal nocturnal dyspnea  No stridor, audible wheezing bedside  GASTROINTESTINAL:  Normal appetite  No vomiting, diarrhea  No pain  No bloating  No melena  GENITOURINARY:  No frequency, urgency, nocturia  No hematuria or dysuria  No discharge  No sores/adenopathy  MUSCULOSKELETAL:  No arthralgias or myalgias that are new  INTEGUMENTARY:  No swelling  No unexpected contusions  No abrasions  No lymphangitis  NEUROLOGIC:  No meningismus  No numbness of the extremities  No new focal weakness  No postural instability  PSYCHIATRIC:  No SI HI AVH  HEMATOLOGICAL:  No bleeding  No petechiae  No bruising  ALLERGIES:  No urticaria  No sudden abd cramping  No stridor  PE:     Physical exam highlights:   Physical Exam       Vitals:    08/06/22 0100 08/06/22 0115 08/06/22 0130 08/06/22 0300   BP: 97/61 102/74 106/71 99/58   BP Location: Left arm   Left arm   Pulse: 62 62 62 60   Resp: 16 (!) 23 21 22   Temp:       TempSrc:       SpO2: 100% 100% 100% 100%   Weight:       Height:         Vitals reviewed by me  Nursing note reviewed  Chaperone present for all sensitive exam   Const: No acute distress  Alert  Nontoxic  Not diaphoretic      HEENT: External ears normal  No protrusion drainage swelling  Nose normal  No drainage/traumatic deformity  MMM  Mouth with baseline/symmetric movement  No trismus  Eyes: No squinting  No icterus  Tracks through the room with normal EOM  No tearing/swelling/drainage  Neck: ROM normal  No rigidity  No meningismus  Cards: Rate as per vitals  Compared to monitor sinus unless documented above  Regular  Well perfused  Pulm: able to verbalize without additional effort  Effort and excursion normal  No disress  No audible wheezing/ stridor  Normal resp rate  Abd: No distension beyond baseline  No fluctuant wave  Patient without peritoneal pain with shifting/bumping the bed  MSK: ROM normal and baseline  No deformity  Skin: No new rashes visible  Well perfused  Neuro: Nonfocal  Baseline  CN grossly intact  Moving all four with coordination  Psych: Normal behavior and affect  A:  - Nursing note reviewed  Ddx and MDM  ACS? HEART Risk Score    Flowsheet Row Most Recent Value   Heart Score Risk Calculator    History 0 Filed at: 08/06/2022 0244   ECG 1 Filed at: 08/06/2022 0244   Age 2 Filed at: 08/06/2022 0244   Risk Factors 2 Filed at: 08/06/2022 0244   Troponin 0 Filed at: 08/06/2022 0244   HEART Score 5 Filed at: 08/06/2022 0244          Procedure Note: EKG  Date/Time: 08/06/22 6:29 AM   Interpreted by: Jesenia Castillo  Indications / Diagnosis: CP  ECG reviewed by me, the ED Provider: yes   The EKG demonstrates:  Rhythm: vpace  Intervals: normal intervals  Axis: left axis  QRS/Blocks: paced but expected  QRS  ST Changes: No acute ST Changes, no STD/GORDO  T wave changes: none  PVCs on one EKG    trop    abd pain, epigastric, lipase hepatic function panel    Interrogate pacer for arrythmia - none    Check lytes                No orders to display     Orders Placed This Encounter   Procedures    CBC and differential    Basic metabolic panel    HS Troponin 0hr (reflex protocol)    NT-BNP PRO    Magnesium    HS Troponin I 2hr    Lipase    Hepatic function panel    Ambulatory Referral to Cardiology    ECG 12 lead     Labs Reviewed   CBC AND DIFFERENTIAL - Abnormal       Result Value Ref Range Status    WBC 7 62  4 31 - 10 16 Thousand/uL Final    RBC 4 70  3 81 - 5 12 Million/uL Final    Hemoglobin 12 8  11 5 - 15 4 g/dL Final    Hematocrit 40 3  34 8 - 46 1 % Final    MCV 86  82 - 98 fL Final    MCH 27 2  26 8 - 34 3 pg Final    MCHC 31 8  31 4 - 37 4 g/dL Final    RDW 15 4 (*) 11 6 - 15 1 % Final    MPV 10 3  8 9 - 12 7 fL Final    Platelets 593  193 - 390 Thousands/uL Final    nRBC 0  /100 WBCs Final    Neutrophils Relative 47  43 - 75 % Final    Immat GRANS % 0  0 - 2 % Final    Lymphocytes Relative 39  14 - 44 % Final    Monocytes Relative 9  4 - 12 % Final    Eosinophils Relative 4  0 - 6 % Final    Basophils Relative 1  0 - 1 % Final    Neutrophils Absolute 3 63  1 85 - 7 62 Thousands/µL Final    Immature Grans Absolute 0 02  0 00 - 0 20 Thousand/uL Final    Lymphocytes Absolute 2 95  0 60 - 4 47 Thousands/µL Final    Monocytes Absolute 0 69  0 17 - 1 22 Thousand/µL Final    Eosinophils Absolute 0 29  0 00 - 0 61 Thousand/µL Final    Basophils Absolute 0 04  0 00 - 0 10 Thousands/µL Final   BASIC METABOLIC PANEL - Abnormal    Sodium 135  135 - 147 mmol/L Final    Potassium 3 5  3 5 - 5 3 mmol/L Final    Chloride 97  96 - 108 mmol/L Final    CO2 31  21 - 32 mmol/L Final    ANION GAP 7  4 - 13 mmol/L Final    BUN 27 (*) 5 - 25 mg/dL Final    Creatinine 1 47 (*) 0 60 - 1 30 mg/dL Final    Comment: Standardized to IDMS reference method    Glucose 84  65 - 140 mg/dL Final    Comment: If the patient is fasting, the ADA then defines impaired fasting glucose as > 100 mg/dL and diabetes as > or equal to 123 mg/dL  Specimen collection should occur prior to Sulfasalazine administration due to the potential for falsely depressed results   Specimen collection should occur prior to Sulfapyridine administration due to the potential for falsely elevated results  Calcium 9 6  8 3 - 10 1 mg/dL Final    eGFR 36  ml/min/1 73sq m Final    Narrative:     [place] guidelines for Chronic Kidney Disease (CKD):     Stage 1 with normal or high GFR (GFR > 90 mL/min/1 73 square meters)    Stage 2 Mild CKD (GFR = 60-89 mL/min/1 73 square meters)    Stage 3A Moderate CKD (GFR = 45-59 mL/min/1 73 square meters)    Stage 3B Moderate CKD (GFR = 30-44 mL/min/1 73 square meters)    Stage 4 Severe CKD (GFR = 15-29 mL/min/1 73 square meters)    Stage 5 End Stage CKD (GFR <15 mL/min/1 73 square meters)  Note: GFR calculation is accurate only with a steady state creatinine   NT-BNP PRO (BRAIN NATRIURETIC PEPTIDE) - Abnormal    NT-proBNP 1,501 (*) <125 pg/mL Final   LIPASE - Abnormal    Lipase 65 (*) 73 - 393 u/L Final   HS TROPONIN I 0HR - Normal    hs TnI 0hr 8  "Refer to ACS Flowchart"- see link ng/L Final    Comment:                                              Initial (time 0) result  If >=50 ng/L, Myocardial injury suggested ;  Type of myocardial injury and treatment strategy  to be determined  If 5-49 ng/L, a delta result at 2 hours and or 4 hours will be needed to further evaluate  If <4 ng/L, and chest pain has been >3 hours since onset, patient may qualify for discharge based on the HEART score in the ED  If <5 ng/L and <3hours since onset of chest pain, a delta result at 2 hours will be needed to further evaluate  HS Troponin 99th Percentile URL of a Health Population=12 ng/L with a 95% Confidence Interval of 8-18 ng/L  Second Troponin (time 2 hours)  If calculated delta >= 20 ng/L,  Myocardial injury suggested ; Type of myocardial injury and treatment strategy to be determined  If 5-49 ng/L and the calculated delta is 5-19 ng/L, consult medical service for evaluation  Continue evaluation for ischemia on ecg and other possible etiology and repeat hs troponin at 4 hours    If delta is <5 ng/L at 2 hours, consider discharge based on risk stratification via the HEART score (if in ED), or FIDELINA risk score in IP/Observation  HS Troponin 99th Percentile URL of a Health Population=12 ng/L with a 95% Confidence Interval of 8-18 ng/L  MAGNESIUM - Normal    Magnesium 2 5  1 6 - 2 6 mg/dL Final   HS TROPONIN I 2HR - Normal    hs TnI 2hr 7  "Refer to ACS Flowchart"- see link ng/L Final    Comment:                                              Initial (time 0) result  If >=50 ng/L, Myocardial injury suggested ;  Type of myocardial injury and treatment strategy  to be determined  If 5-49 ng/L, a delta result at 2 hours and or 4 hours will be needed to further evaluate  If <4 ng/L, and chest pain has been >3 hours since onset, patient may qualify for discharge based on the HEART score in the ED  If <5 ng/L and <3hours since onset of chest pain, a delta result at 2 hours will be needed to further evaluate  HS Troponin 99th Percentile URL of a Health Population=12 ng/L with a 95% Confidence Interval of 8-18 ng/L  Second Troponin (time 2 hours)  If calculated delta >= 20 ng/L,  Myocardial injury suggested ; Type of myocardial injury and treatment strategy to be determined  If 5-49 ng/L and the calculated delta is 5-19 ng/L, consult medical service for evaluation  Continue evaluation for ischemia on ecg and other possible etiology and repeat hs troponin at 4 hours  If delta is <5 ng/L at 2 hours, consider discharge based on risk stratification via the HEART score (if in ED), or FIDELINA risk score in IP/Observation  HS Troponin 99th Percentile URL of a Health Population=12 ng/L with a 95% Confidence Interval of 8-18 ng/L  Delta 2hr hsTnI -1  <20 ng/L Final   HEPATIC FUNCTION PANEL - Normal    Total Bilirubin 0 33  0 20 - 1 00 mg/dL Final    Comment: Use of this assay is not recommended for patients undergoing treatment with eltrombopag due to the potential for falsely elevated results      Bilirubin, Direct 0 10  0 00 - 0 20 mg/dL Final Comment: Slightly Hemolyzed; Results May be Affected    Alkaline Phosphatase 76  46 - 116 U/L Final    AST 20  5 - 45 U/L Final    Comment: Specimen collection should occur prior to Sulfasalazine administration due to the potential for falsely depressed results  ALT 17  12 - 78 U/L Final    Comment: Specimen collection should occur prior to Sulfasalazine administration due to the potential for falsely depressed results  Total Protein 7 3  6 4 - 8 4 g/dL Final    Albumin 3 6  3 5 - 5 0 g/dL Final       Final Diagnosis:  1  Atypical chest pain    2  Frequent PVCs        P:  - hospital tx includes   Medications   midazolam (VERSED) injection 1 mg (1 mg Intravenous Given 8/6/22 0011)   metoclopramide (REGLAN) injection 10 mg (10 mg Intravenous Given 8/6/22 0207)         - disposition  Time reflects when diagnosis was documented in both MDM as applicable and the Disposition within this note     Time User Action Codes Description Comment    8/6/2022  2:44 AM Eleonora Ortiz Add [R07 89] Atypical chest pain     8/6/2022  2:45 AM lEeonora Ortiz Add [I49 3] Frequent PVCs       ED Disposition     ED Disposition   Discharge    Condition   Stable    Date/Time   Sat Aug 6, 2022  2:44 AM    Comment   Lucienne Severance discharge to home/self care  Follow-up Information     Follow up With Specialties Details Why Contact Info    Destini Castillo MD Cardiology   [id number] [facility]  [phone]            - patient will call their PCP to let them know they were in the emergency department   We discuss return precautions       - additional tx intended, if consistent with primary provider:  - patient to follow with :      Discharge Medication List as of 8/6/2022  2:46 AM      CONTINUE these medications which have NOT CHANGED    Details   ALPRAZolam (XANAX) 0 25 mg tablet Take 0 25 mg by mouth daily as needed, Starting Mon 3/28/2022, Historical Med budesonide-formoterol (SYMBICORT) 160-4 5 mcg/act inhaler Inhale 2 puffs 2 (two) times a day Rinse mouth after use , Starting u 10/4/2018, Normal      diclofenac sodium (VOLTAREN) 1 % Apply 2 g topically 4 (four) times a day, Starting Fri 5/8/2020, Normal      Entresto  MG TABS take 1 tablet by mouth twice a day, Normal      levalbuterol (XOPENEX HFA) 45 mcg/act inhaler Inhale 2 puffs every 6 (six) hours as needed for shortness of breath, Starting Thu 10/4/2018, Normal      levothyroxine (Synthroid) 150 mcg tablet Take 1 tablet (150 mcg total) by mouth in the morning , Starting Fri 5/20/2022, Until Fri 8/5/2022, Normal      Magnesium 100 MG CAPS Take by mouth, Historical Med      midodrine (PROAMATINE) 2 5 mg tablet take 1 tablet by mouth twice a day if needed, Normal      pantoprazole (PROTONIX) 40 mg tablet take 1 tablet by mouth once daily, Normal      potassium chloride (K-DUR) 10 mEq tablet take 1 tablet by mouth once daily, Normal      pravastatin (PRAVACHOL) 40 mg tablet take 1 tablet by mouth once daily, Normal      sotalol (BETAPACE) 80 mg tablet TAKE 1 TABLET BY MOUTH EVERY 12 HOURS, Normal      spironolactone (ALDACTONE) 25 mg tablet TAKE 1/2 TABLET BY MOUTH DAILY, Normal      tiZANidine (ZANAFLEX) 2 mg tablet take 1 tablet by mouth every 8 hours if needed for muscle spasm, Normal      torsemide (DEMADEX) 20 mg tablet Take 60 mg daily alternating with 40 mg daily, No Print      Xarelto 20 MG tablet take 1 tablet by mouth once daily, Normal             Prior to Admission Medications   Prescriptions Last Dose Informant Patient Reported? Taking?    ALPRAZolam (XANAX) 0 25 mg tablet  Self Yes Yes   Sig: Take 0 25 mg by mouth daily as needed   Entresto  MG TABS  Self No Yes   Sig: take 1 tablet by mouth twice a day   Magnesium 100 MG CAPS  Self Yes Yes   Sig: Take by mouth   Xarelto 20 MG tablet  Self No Yes   Sig: take 1 tablet by mouth once daily   budesonide-formoterol (SYMBICORT) 160-4 5 mcg/act inhaler  Self No Yes   Sig: Inhale 2 puffs 2 (two) times a day Rinse mouth after use  diclofenac sodium (VOLTAREN) 1 %  Self No Yes   Sig: Apply 2 g topically 4 (four) times a day   levalbuterol (XOPENEX HFA) 45 mcg/act inhaler  Self No Yes   Sig: Inhale 2 puffs every 6 (six) hours as needed for shortness of breath   levothyroxine (Synthroid) 150 mcg tablet  Self No Yes   Sig: Take 1 tablet (150 mcg total) by mouth in the morning  midodrine (PROAMATINE) 2 5 mg tablet   No Yes   Sig: take 1 tablet by mouth twice a day if needed   pantoprazole (PROTONIX) 40 mg tablet  Self No Yes   Sig: take 1 tablet by mouth once daily   potassium chloride (K-DUR) 10 mEq tablet  Self No Yes   Sig: take 1 tablet by mouth once daily   pravastatin (PRAVACHOL) 40 mg tablet  Self No Yes   Sig: take 1 tablet by mouth once daily   sotalol (BETAPACE) 80 mg tablet  Self No Yes   Sig: TAKE 1 TABLET BY MOUTH EVERY 12 HOURS   spironolactone (ALDACTONE) 25 mg tablet  Self No Yes   Sig: TAKE 1/2 TABLET BY MOUTH DAILY   tiZANidine (ZANAFLEX) 2 mg tablet  Self No Yes   Sig: take 1 tablet by mouth every 8 hours if needed for muscle spasm   torsemide (DEMADEX) 20 mg tablet   No Yes   Sig: Take 60 mg daily alternating with 40 mg daily      Facility-Administered Medications: None       Portions of the record may have been created with voice recognition software  Occasional wrong word or "sound a like" substitutions may have occurred due to the inherent limitations of voice recognition software  Read the chart carefully and recognize, using context, where substitutions have occurred      Electronically signed by:  MD Leni Carrillo MD  08/06/22 4201

## 2022-08-07 LAB
ATRIAL RATE: 60 BPM
ATRIAL RATE: 62 BPM
ATRIAL RATE: 63 BPM
ATRIAL RATE: 66 BPM
P AXIS: 30 DEGREES
P AXIS: 49 DEGREES
P AXIS: 5 DEGREES
P AXIS: 58 DEGREES
PR INTERVAL: 178 MS
PR INTERVAL: 180 MS
PR INTERVAL: 232 MS
QRS AXIS: -31 DEGREES
QRS AXIS: -40 DEGREES
QRS AXIS: 132 DEGREES
QRS AXIS: 5 DEGREES
QRSD INTERVAL: 148 MS
QRSD INTERVAL: 156 MS
QRSD INTERVAL: 160 MS
QRSD INTERVAL: 168 MS
QT INTERVAL: 486 MS
QT INTERVAL: 506 MS
QT INTERVAL: 508 MS
QT INTERVAL: 514 MS
QTC INTERVAL: 497 MS
QTC INTERVAL: 508 MS
QTC INTERVAL: 521 MS
QTC INTERVAL: 530 MS
T WAVE AXIS: 100 DEGREES
T WAVE AXIS: 114 DEGREES
T WAVE AXIS: 81 DEGREES
T WAVE AXIS: 95 DEGREES
VENTRICULAR RATE: 60 BPM
VENTRICULAR RATE: 62 BPM
VENTRICULAR RATE: 63 BPM
VENTRICULAR RATE: 66 BPM

## 2022-08-07 PROCEDURE — 93010 ELECTROCARDIOGRAM REPORT: CPT | Performed by: INTERNAL MEDICINE

## 2022-08-08 ENCOUNTER — TELEPHONE (OUTPATIENT)
Dept: CARDIOLOGY CLINIC | Facility: CLINIC | Age: 67
End: 2022-08-08

## 2022-08-08 NOTE — TELEPHONE ENCOUNTER
----- Message from Dolores Bryant MD sent at 8/8/2022 12:45 PM EDT -----  Patient's device interrogation reading shows,  device function is normal  Has battery life of 11 month      Please call patient

## 2022-08-29 ENCOUNTER — TELEPHONE (OUTPATIENT)
Dept: CARDIOLOGY CLINIC | Facility: CLINIC | Age: 67
End: 2022-08-29

## 2022-09-07 NOTE — ED NOTES
All belongings sent home with family  [facility]  09/07/22 4906

## 2022-09-07 NOTE — ED PROVIDER NOTES
History  No chief complaint on file  77-year-old female with past history of CHF status post AICD placement, atrial fibrillation on Xarelto, hypothyroidism, GERD, diverticulitis, cardiomyopathy, anxiety, depression, hyperlipidemia, presents to the ER with CPR in progress  Patient has history of low output cardiac failure  Patient is followed by cardiac transplant team at [facility]   Patient was recently placed on Milrinone drip secondary to her heart failure  Patient is currently being considered for cardiac transplant candidate  Earlier today patient started to have some lightheadedness and weakness  Patient started to drive herself to the emergency department  Patient then became extremely short of breath  Patient pulled her car over and around 2046 this evening she called 911  While talking to the police, patient lost consciousness  EMS team was activated immediately  EMS came to find patient in cardiopulmonary arrest   CPR started at 8:55 p m  Tone Howard Patient was intubated in the field  Patient found to be initially in ventricular fibrillation  Patient was shocked 3 times as well as 6 rounds of epinephrine, 1 amp of bicarb and 300 mg of amiodarone prior to arrival to the ED  Patient arrived with CPR in progress  History provided by:  EMS personnel      Prior to Admission Medications   Prescriptions Last Dose Informant Patient Reported? Taking? ALPRAZolam (XANAX) 0 25 mg tablet  Self Yes No   Sig: Take 0 25 mg by mouth daily as needed   Entresto  MG TABS  Self No No   Sig: take 1 tablet by mouth twice a day   Magnesium 100 MG CAPS  Self Yes No   Sig: Take by mouth   Xarelto 20 MG tablet  Self No No   Sig: take 1 tablet by mouth once daily   budesonide-formoterol (SYMBICORT) 160-4 5 mcg/act inhaler  Self No No   Sig: Inhale 2 puffs 2 (two) times a day Rinse mouth after use     diclofenac sodium (VOLTAREN) 1 %  Self No No   Sig: Apply 2 g topically 4 (four) times a day levalbuterol (XOPENEX HFA) 45 mcg/act inhaler  Self No No   Sig: Inhale 2 puffs every 6 (six) hours as needed for shortness of breath   levothyroxine (Synthroid) 150 mcg tablet  Self No No   Sig: Take 1 tablet (150 mcg total) by mouth in the morning     midodrine (PROAMATINE) 2 5 mg tablet   No No   Sig: take 1 tablet by mouth twice a day if needed   pantoprazole (PROTONIX) 40 mg tablet  Self No No   Sig: take 1 tablet by mouth once daily   potassium chloride (K-DUR) 10 mEq tablet  Self No No   Sig: take 1 tablet by mouth once daily   pravastatin (PRAVACHOL) 40 mg tablet  Self No No   Sig: take 1 tablet by mouth once daily   sotalol (BETAPACE) 80 mg tablet  Self No No   Sig: TAKE 1 TABLET BY MOUTH EVERY 12 HOURS   spironolactone (ALDACTONE) 25 mg tablet  Self No No   Sig: TAKE 1/2 TABLET BY MOUTH DAILY   tiZANidine (ZANAFLEX) 2 mg tablet  Self No No   Sig: take 1 tablet by mouth every 8 hours if needed for muscle spasm   torsemide (DEMADEX) 20 mg tablet   No No   Sig: Take 60 mg daily alternating with 40 mg daily      Facility-Administered Medications: None       Past Medical History:   Diagnosis Date    A-fib ([facility])     Abnormal blood sugar     Acid reflux     Acute bronchitis     Allergic reaction     Anxiety     Arthritis     Asthma     Cardiomyopathy ([facility])     Cellulitis of left lower leg     Chest pain     CHF (congestive heart failure) ([facility])     Constipation     Depression with anxiety     Disease of thyroid gland     Diverticulitis     Dyslipidemia     Dyspnea on exertion     Elbow pain     Fracture of olecranon, open     Gastritis     Generalized pain     Hematuria     History of colonoscopy     Hypertension     Hypokalemia     Hypomagnesemia     Leg cramping     Moderate obesity     Morbid obesity due to excess calories ([facility])     Myalgia     Myositis     Nausea in adult     Nephrolithiasis     Prepatellar bursitis, unspecified knee     Screening for lipid disorders     Shortness of breath     Spasm of muscle     Thyroid trouble        Past Surgical History:   Procedure Laterality Date    CARDIAC DEFIBRILLATOR PLACEMENT      LAPAROSCOPIC CHOLECYSTECTOMY      TOTAL ABDOMINAL HYSTERECTOMY W/ BILATERAL SALPINGOOPHORECTOMY         Family History   Problem Relation Age of Onset    Hypertension Sister     Cancer Sister     Coronary artery disease Family     Diabetes type II Family     Hypertension Family      I have reviewed and agree with the history as documented  E-Cigarette/Vaping    E-Cigarette Use Never User      E-Cigarette/Vaping Substances     Social History     Tobacco Use    Smoking status: Never Smoker    Smokeless tobacco: Never Used   Vaping Use    Vaping Use: Never used   Substance Use Topics    Alcohol use: Not Currently    Drug use: No       Review of Systems   Unable to perform ROS: Patient unresponsive       Physical Exam  Physical Exam  Constitutional:       Comments: CPR in progress   Eyes:      Comments: Fixed and dilated pupils   Pulmonary:      Breath sounds: Rhonchi present        Comments: Patient intubated  Abdominal:      Comments: Abdomen is soft, nondistended, with bowel sounds present to all 4 quadrants   Musculoskeletal:      Comments: No pedal edema noted  No peripheral pulses palpated         Vital Signs  ED Triage Vitals   Temp Pulse Respirations Blood Pressure SpO2   -- 09/06/22 2128 09/06/22 2133 09/06/22 2128 --    (!) 0 (!) 101 (!) 92/49       Temp src Heart Rate Source Patient Position - Orthostatic VS BP Location FiO2 (%)   -- -- -- -- --             Pain Score       --                  Vitals:    09/06/22 2139 09/06/22 2142 09/06/22 2145 09/06/22 2148   BP:       Pulse: (!) 204 (!) 0 (!) 204 (!) 0         Visual Acuity      ED Medications  Medications   ALTEPLASE (ACTIVASE) IV INFUSION (PE) infusion 100 mg (0 mg Intravenous Stopped 9/6/22 2144)   EPINEPHrine (ADRENALIN) injection (1 mg Intravenous Given 9/6/22 2146)   calcium chloride 1 g/10 mL injection (1 g Intravenous Given 9/6/22 2138)   magnesium sulfate 1 g/2 mL injection (2 g Intravenous Given 9/6/22 2133)   amiodarone 150 mg/3 mL injection (150 mg Intravenous Given 9/6/22 2134)   sodium bicarbonate 50 mEq/50 mL injection (50 mEq Intravenous Given 9/6/22 2142)   dextrose 25 g/50 mL IV solution (25 g Intravenous Given 9/6/22 2140)       Diagnostic Studies  Results Reviewed     None                 No orders to display              Procedures  CriticalCare Time  Performed by: Xu Sung DO  Authorized by: Xu Sung DO     Critical care provider statement:     Critical care time (minutes):  30    Critical care time was exclusive of:  Separately billable procedures and treating other patients    Critical care was necessary to treat or prevent imminent or life-threatening deterioration of the following conditions:  Circulatory failure, cardiac failure, CNS failure or compromise and respiratory failure    Critical care was time spent personally by me on the following activities:  Blood draw for specimens, obtaining history from patient or surrogate, development of treatment plan with patient or surrogate, discussions with consultants, evaluation of patient's response to treatment, examination of patient, review of old charts, re-evaluation of patient's condition, ordering and review of laboratory studies, ordering and review of radiographic studies, interpretation of cardiac output measurements and ordering and performing treatments and interventions             ED Course  ED Course as of 09/07/22 0115   Tue Sep 06, 2022   2153 Time of death 21:47  Patient's son, Ismael Schilder, notified  He is on his way to the ER  His wife is driving him here  [facility] and spoke with patient's cardiac transplant team at [facility] regarding patient's death                                               MDM  Number of Diagnoses or Management Options     Amount and/or Complexity of Data Reviewed  Discussion of test results with the performing providers: yes  Decide to obtain previous medical records or to obtain history from someone other than the patient: yes  Obtain history from someone other than the patient: yes  Review and summarize past medical records: yes  Discuss the patient with other providers: yes  Independent visualization of images, tracings, or specimens: yes    Risk of Complications, Morbidity, and/or Mortality  General comments: Please see code summary for full detail of resuscitative medications  Patient received 3 shocks, 6 rounds of epinephrine, 1 amp of bicarb as well as 300 mg of amiodarone prior to arrival to the ED  In the ED patient found to be in ventricular fibrillation initially  Two more shocks administered along with 150 mg of amiodarone  Patient additionally receive 5 more rounds of epinephrine, 2 g of magnesium, 2 g of calcium, 2 additional amps of bicarb  Patient then went into a PEA rhythm  Patient was also given tPA for possible pulmonary embolism  Patient underwent resuscitation for almost 1 hour without any return of pulse  No cardiac activity noted on bedside ultrasound  Subsequently resuscitative efforts and did went asystole was noted on the monitor  Time of that was   Patient's cardiac transplant team notified of patient's dust   I called and spoke with patient's son who then arrived to see patient after she           Disposition  Final diagnoses:   Cardiopulmonary arrest [facility])     Time reflects when diagnosis was documented in both MDM as applicable and the Disposition within this note     Time User Action Codes Description Comment    2022 10:04 PM Vikki June Add [I46 9] Cardiopulmonary arrest [facility])       ED Disposition     ED Disposition       Condition   --    Date/Time   Tue Sep 6, 2022 10:04 PM    Comment   --         Follow-up Information    None         Patient's Medications   Discharge Prescriptions    No medications on file       No discharge procedures on file      PDMP Review     None          ED Provider  Electronically Signed by           Ginger Moffett DO  09/07/22 7330

## 2022-10-12 DIAGNOSIS — I48.0 PAF (PAROXYSMAL ATRIAL FIBRILLATION) (HCC): ICD-10-CM

## 2022-10-12 RX ORDER — SOTALOL HYDROCHLORIDE 80 MG/1
TABLET ORAL
Qty: 180 TABLET | Refills: 3 | OUTPATIENT
Start: 2022-10-12